# Patient Record
Sex: MALE | Race: WHITE | NOT HISPANIC OR LATINO | Employment: OTHER | RURAL
[De-identification: names, ages, dates, MRNs, and addresses within clinical notes are randomized per-mention and may not be internally consistent; named-entity substitution may affect disease eponyms.]

---

## 2017-08-15 ENCOUNTER — OFFICE VISIT (OUTPATIENT)
Dept: OTOLARYNGOLOGY | Facility: CLINIC | Age: 37
End: 2017-08-15

## 2017-08-15 VITALS — BODY MASS INDEX: 30.06 KG/M2 | HEIGHT: 70 IN | WEIGHT: 210 LBS | OXYGEN SATURATION: 97 %

## 2017-08-15 DIAGNOSIS — J34.2 DEVIATED NASAL SEPTUM: ICD-10-CM

## 2017-08-15 DIAGNOSIS — J32.4 CHRONIC PANSINUSITIS: Primary | ICD-10-CM

## 2017-08-15 PROCEDURE — 99243 OFF/OP CNSLTJ NEW/EST LOW 30: CPT | Performed by: OTOLARYNGOLOGY

## 2017-08-15 PROCEDURE — 31231 NASAL ENDOSCOPY DX: CPT | Performed by: OTOLARYNGOLOGY

## 2017-08-15 RX ORDER — FLUTICASONE PROPIONATE 50 MCG
2 SPRAY, SUSPENSION (ML) NASAL DAILY
Qty: 16 G | Refills: 11 | Status: SHIPPED | OUTPATIENT
Start: 2017-08-15 | End: 2021-08-10

## 2017-08-15 RX ORDER — OMEPRAZOLE 40 MG/1
CAPSULE, DELAYED RELEASE ORAL
COMMUNITY
Start: 2017-07-31 | End: 2019-06-13

## 2017-08-15 RX ORDER — PREDNISONE 20 MG/1
TABLET ORAL
Qty: 18 TABLET | Refills: 0 | Status: SHIPPED | OUTPATIENT
Start: 2017-08-15 | End: 2019-05-30

## 2017-08-15 RX ORDER — CEFUROXIME AXETIL 250 MG/1
250 TABLET ORAL 2 TIMES DAILY
Qty: 42 TABLET | Refills: 0 | Status: SHIPPED | OUTPATIENT
Start: 2017-08-15 | End: 2019-05-30

## 2017-08-15 RX ORDER — MONTELUKAST SODIUM 10 MG/1
TABLET ORAL
COMMUNITY
Start: 2017-06-06 | End: 2019-05-30

## 2017-08-16 NOTE — PROGRESS NOTES
Subjective   Jordin Parham is a 36 y.o. male.   This is a consultation from Dr. Hinkle  History of Present Illness     Patient reports he has had symptoms for greater than 6 months of nasal congestion headaches postnasal drainage sneezing and intermittent purulent rhinorrhea.  The headaches are severe when they occur and are usually either over the right or left eye.  Postnasal drainage is described as copious and awakens him from sleep and prevents him from getting good rest.  Nose is chronically congested on a daily basis.  Last antibiotics were proximally 2 months ago.  Currently is taking Singulair.  Has reportedly used Flonase in the past.  No previous nasal surgery or injury.    The following portions of the patient's history were reviewed and updated as appropriate: allergies, current medications, past family history, past medical history, past social history, past surgical history and problem list.      Jordin Parham reports that he has never smoked. He has never used smokeless tobacco.  Patient is not a tobacco user and has not been counseled for use of tobacco products    No family history on file.  Negative for bleeding disorder    Current Outpatient Prescriptions:   •  cefuroxime (CEFTIN) 250 MG tablet, Take 1 tablet by mouth 2 (Two) Times a Day., Disp: 42 tablet, Rfl: 0  •  fluticasone (FLONASE) 50 MCG/ACT nasal spray, 2 sprays into each nostril Daily for 30 days., Disp: 16 g, Rfl: 11  •  montelukast (SINGULAIR) 10 MG tablet, , Disp: , Rfl:   •  omeprazole (priLOSEC) 40 MG capsule, , Disp: , Rfl:   •  predniSONE (DELTASONE) 20 MG tablet, 1 po tid x 3 days 1 po bid x 3 days 1 po qd x 3 days then stop, Disp: 18 tablet, Rfl: 0  (Flonase, Ceftin, prednisone all prescribed as a result of this visit)    No past medical history on file.  Denies hypertension, coronary artery disease, diabetes    Review of Systems   HENT: Positive for trouble swallowing.    Gastrointestinal: Positive for abdominal pain and  nausea.        Heartburn   Musculoskeletal: Positive for back pain.   Psychiatric/Behavioral:        Mood changes; insomnia   All other systems reviewed and are negative.          Objective   Physical Exam    General: Well-developed well-nourished male in no acute distress.  Alert and oriented ×3. Head: Normocephalic. Face: Symmetrical strength and appearance. PERRL. EOMI. Voice:Strong. Speech:Fluent  Ears: External ears no deformity, canals show some nonobstructing wax, tympanic membranes intact clear and mobile bilaterally.  Nose: Nares show no discharge mass polyp or purulence.  Boggy mucosa is present.  No gross external deformity.  Septum: To the left  Oral cavity: Lips and gums without lesions.  Tongue and floor of mouth without lesions.  Parotid and submandibular ducts unobstructed.  No mucosal lesions on the buccal mucosa or vestibule of the mouth.  Pharynx: No erythema exudate mass or ulcer  Neck: No lymphadenopathy.  No thyromegaly.  Trachea and larynx midline.  No masses in the parotid or submandibular glands.    He'll endoscopy is performed: Lakhwinder-Synephrine and Xylocaine are instilled the nares.  0° scope is passed into each nostril.  Septal deformity to the left naris the left nasal passage.  Mucopurulent secretions are noted in the nares bilaterally including boggy mucosa in the middle meatus on both sides.    Patient reportedly had a CT scan in May 2017.  This is not available for review (the disc he brings today has a CT of the abdomen and pelvis not sinuses) but the report is reviewed and shows mucosal thickening in the frontal sinus on the right the ethmoid sinuses bilaterally and the maxillary sinuses bilaterally    Assessment/Plan   Jordin was seen today for sinus problem.    Diagnoses and all orders for this visit:    Chronic pansinusitis    Deviated nasal septum        Plan: We will attempt aggressive medical therapy with Flonase 2 sprays each nostril daily Ceftin 250 mg by mouth twice a day ×21  days and a 9 day prednisone taper.  Reevaluate in a month.  Call sooner for problems.    My thanks to Dr. Hinkle for this consultation

## 2019-05-28 PROBLEM — E66.9 CLASS 1 OBESITY WITH BODY MASS INDEX (BMI) OF 30.0 TO 30.9 IN ADULT: Status: ACTIVE | Noted: 2019-05-28
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
## 2019-05-30 ENCOUNTER — OFFICE VISIT (OUTPATIENT)
Dept: FAMILY MEDICINE CLINIC | Facility: CLINIC | Age: 39
End: 2019-05-30

## 2019-05-30 VITALS
BODY MASS INDEX: 31.42 KG/M2 | DIASTOLIC BLOOD PRESSURE: 78 MMHG | OXYGEN SATURATION: 98 % | TEMPERATURE: 98.6 F | HEART RATE: 66 BPM | WEIGHT: 224.4 LBS | SYSTOLIC BLOOD PRESSURE: 124 MMHG | HEIGHT: 71 IN

## 2019-05-30 DIAGNOSIS — F39 MOOD DISORDER (HCC): ICD-10-CM

## 2019-05-30 DIAGNOSIS — R68.82 LOW LIBIDO: ICD-10-CM

## 2019-05-30 DIAGNOSIS — F41.1 GAD (GENERALIZED ANXIETY DISORDER): ICD-10-CM

## 2019-05-30 DIAGNOSIS — K21.9 GASTROESOPHAGEAL REFLUX DISEASE, ESOPHAGITIS PRESENCE NOT SPECIFIED: ICD-10-CM

## 2019-05-30 DIAGNOSIS — Z00.00 ANNUAL PHYSICAL EXAM: ICD-10-CM

## 2019-05-30 DIAGNOSIS — Z00.00 GENERAL MEDICAL EXAMINATION: ICD-10-CM

## 2019-05-30 DIAGNOSIS — Z13.1 ENCOUNTER FOR SCREENING FOR DIABETES MELLITUS: ICD-10-CM

## 2019-05-30 DIAGNOSIS — R10.12 LEFT UPPER QUADRANT PAIN: ICD-10-CM

## 2019-05-30 DIAGNOSIS — Z13.29 ENCOUNTER FOR SCREENING FOR ENDOCRINE DISORDER: ICD-10-CM

## 2019-05-30 DIAGNOSIS — Z13.6 ENCOUNTER FOR SCREENING FOR CARDIOVASCULAR DISORDERS: ICD-10-CM

## 2019-05-30 DIAGNOSIS — E66.9 CLASS 1 OBESITY WITH BODY MASS INDEX (BMI) OF 30.0 TO 30.9 IN ADULT, UNSPECIFIED OBESITY TYPE, UNSPECIFIED WHETHER SERIOUS COMORBIDITY PRESENT: Primary | ICD-10-CM

## 2019-05-30 PROCEDURE — 99214 OFFICE O/P EST MOD 30 MIN: CPT | Performed by: FAMILY MEDICINE

## 2019-05-30 RX ORDER — SUCRALFATE 1 G/1
1 TABLET ORAL 4 TIMES DAILY
Qty: 30 TABLET | Refills: 2 | Status: SHIPPED | OUTPATIENT
Start: 2019-05-30 | End: 2019-07-19

## 2019-05-30 RX ORDER — QUETIAPINE FUMARATE 25 MG/1
25 TABLET, FILM COATED ORAL NIGHTLY
Qty: 30 TABLET | Refills: 3 | Status: SHIPPED | OUTPATIENT
Start: 2019-05-30 | End: 2019-07-19

## 2019-05-30 RX ORDER — PAROXETINE 10 MG/1
10 TABLET, FILM COATED ORAL EVERY MORNING
Qty: 30 TABLET | Refills: 3 | Status: SHIPPED | OUTPATIENT
Start: 2019-05-30 | End: 2019-07-19 | Stop reason: SDUPTHER

## 2019-05-30 NOTE — PATIENT INSTRUCTIONS
Bipolar 2 Disorder  Bipolar 2 disorder is a mental health disorder in which a person has episodes of emotional highs (sri) and lows (depression). Bipolar 2 is different from other bipolar disorders because the manic episodes are not as high and do not last as long. This is called hypomania. People with bipolar 2 disorder usually go back and forth between hypomanic and depressive episodes.  What are the causes?  The cause of this condition is not known.  What increases the risk?  The following factors may make you more likely to develop this condition:  · Having a family member with the disorder.  · An imbalance of certain chemicals in the brain (neurotransmitters).  · Stress, such as a death, illness, or financial problems.  · Certain conditions that affect the brain or spinal cord (neurologic conditions).  · Brain injury (trauma).  · Having another mental health disorder, such as:  ? Obsessive compulsive disorder.  ? Schizophrenia.    What are the signs or symptoms?  Symptoms of hypomania include:  · Very high self-esteem or self-confidence.  · Decreased need for sleep.  · Unusual talkativeness or feeling a need to keep talking. Speech may be very fast. It may seem like you cannot stop talking.  · Racing thoughts or constant talking, with quick shifts between topics that may or may not be related (flight of ideas).  · Decreased ability to focus or concentrate.  · Increased purposeful activity, such as work, studies, or social activity.  · Increased nonproductive activity. This could be pacing, squirming and fidgeting, or finger and toe tapping.  · Impulsive behavior and poor judgment. This may result in high-risk activities, such as having unprotected sex or spending a lot of money.    Symptoms of depression include:  · Feeling sad, hopeless, or helpless.  · Frequent or uncontrollable crying.  · Lack of feeling or caring about anything.  · Sleeping too much.  · Moving more slowly than usual.  · Not being able to  enjoy things you used to enjoy.  · Desire to be alone all the time.  · Feeling guilty or worthless.  · Lack of energy or motivation.  · Trouble concentrating or remembering.  · Trouble making decisions.  · Increased appetite.  · Thoughts of death or desire to harm yourself.    How is this diagnosed?  To diagnose bipolar 2 disorder, your health care provider may ask about your:  · Emotional episodes.  · Medical history.  · Alcohol and drug use. This includes prescription medicines. Certain medical conditions and substances can cause symptoms that seem like bipolar disorder (secondary bipolar disorder).    How is this treated?  Bipolar 2 disorder is a long-term (chronic) illness. It is best controlled with ongoing (continuous) treatment rather than being treated only when symptoms occur. Treatment may include:  · Psychotherapy. Some forms of talk therapy, such as cognitive-behavioral therapy (CBT), can provide support, education, and guidance.  · Coping strategies, such as journaling or relaxation exercises. Relaxation exercises include:  ? Yoga.  ? Meditation.  ? Deep breathing.  · Lifestyle changes, such as:  ? Limiting alcohol and drug use.  ? Exercising regularly.  ? Getting plenty of sleep.  ? Making healthy eating choices.  · Medicine. Medicine can be prescribed by a health care provider who specializes in treating mental disorders (psychiatrist).  ? Medicines called mood stabilizers are usually prescribed.  ? If symptoms occur even while taking a mood stabilizer, other medicines may be added.    A combination of medicine, talk therapy, and coping methods is the best way to treat this condition.  Follow these instructions at home:  Activity  · Return to your normal activities as told by your health care provider.  · Find activities that you enjoy, and make time to do them.  · Exercise regularly as told by your health care provider.  Lifestyle  · Limit alcohol intake to no more than 1 drink a day for nonpregnant  women and 2 drinks a day for men. One drink equals 12 oz of beer, 5 oz of wine, or 1½ oz of hard liquor.  · Follow a set schedule for eating and sleeping.  · Eat a balanced diet that includes fresh fruits and vegetables, whole grains, low-fat dairy, and lean meats.  · Get at least 7-8 hours of sleep each night.  General instructions  · Take over-the-counter and prescription medicines only as told by your health care provider.  · Think about joining a support group. Your health care provider may be able to recommend a support group.  · Talk with your family and loved ones about your treatment goals and how they can help.  · Keep all follow-up visits as told by your health care provider. This is important.  Where to find more information  For more information about bipolar 2 disorder, visit the following websites:  · National [place] on Mental Illness: www.liu.org  · [place] National [place] of Mental Health: www.nimh.nih.gov    Contact a health care provider if:  · Your symptoms get worse.  · You have side effects from your medicine, and they get worse.  · You have trouble sleeping.  · You have trouble doing daily activities.  · You feel unsafe in your surroundings.  · You are dealing with substance abuse.  Get help right away if:  · You have new symptoms.  · You have thoughts about harming yourself or others.  · You harm yourself.  Summary  · Bipolar 2 disorder is a mental health disorder in which a person has episodes of hypomania and depression.  · Bipolar 2 is best treated through a combination of medicines, talk therapy, and coping strategies.  · Talk with your family and loved ones about your treatment goals and how they can help.  This information is not intended to replace advice given to you by your health care provider. Make sure you discuss any questions you have with your health care provider.  Document Released: 01/23/2018 Document Revised: 01/23/2018 Document Reviewed: 01/23/2018  [employer]  Patient Education © 2019 [employer]    Bipolar 1 Disorder  Bipolar 1 disorder is a mental health disorder in which a person has episodes of emotional highs (sri), and may also have episodes of emotional lows (depression) in addition to highs. Bipolar 1 disorder is different from other bipolar disorders because it involves extreme manic episodes. These episodes last at least one week or involve symptoms that are so severe that hospitalization is needed to keep the person safe.  What increases the risk?  The cause of this condition is not known. However, certain factors make you more likely to have bipolar disorder, such as:  · Having a family member with the disorder.  · An imbalance of certain chemicals in the brain (neurotransmitters).  · Stress, such as illness, financial problems, or a death.  · Certain conditions that affect the brain or spinal cord (neurologic conditions).  · Brain injury (trauma).  · Having another mental health disorder, such as:  ? Obsessive compulsive disorder.  ? Schizophrenia.    What are the signs or symptoms?  Symptoms of sri include:  · Very high self-esteem or self-confidence.  · Decreased need for sleep.  · Unusual talkativeness or feeling a need to keep talking. Speech may be very fast. It may seem like you cannot stop talking.  · Racing thoughts or constant talking, with quick shifts between topics that may or may not be related (flight of ideas).  · Decreased ability to focus or concentrate.  · Increased purposeful activity, such as work, studies, or social activity.  · Increased nonproductive activity. This could be pacing, squirming and fidgeting, or finger and toe tapping.  · Impulsive behavior and poor judgment. This may result in high-risk activities, such as having unprotected sex or spending a lot of money.    Symptoms of depression include:  · Feeling sad, hopeless, or helpless.  · Frequent or uncontrollable crying.  · Lack of feeling or caring about  anything.  · Sleeping too much.  · Moving more slowly than usual.  · Not being able to enjoy things you used to enjoy.  · Wanting to be alone all the time.  · Feeling guilty or worthless.  · Lack of energy or motivation.  · Trouble concentrating or remembering.  · Trouble making decisions.  · Increased appetite.  · Thoughts of death, or the desire to harm yourself.    Sometimes, you may have a mixed mood. This means having symptoms of depression and sri. Stress can make symptoms worse.  How is this diagnosed?  To diagnose bipolar disorder, your health care provider may ask about your:  · Emotional episodes.  · Medical history.  · Alcohol and drug use. This includes prescription medicines. Certain medical conditions and substances can cause symptoms that seem like bipolar disorder (secondary bipolar disorder).    How is this treated?  Bipolar disorder is a long-term (chronic) illness. It is best controlled with ongoing (continuous) treatment rather than treatment only when symptoms occur. Treatment may include:  · Medicine. Medicine can be prescribed by a provider who specializes in treating mental disorders (psychiatrist).  ? Medicines called mood stabilizers are usually prescribed.  ? If symptoms occur even while taking a mood stabilizer, other medicines may be added.  · Psychotherapy. Some forms of talk therapy, such as cognitive-behavioral therapy (CBT), can provide support, education, and guidance.  · Coping methods, such as journaling or relaxation exercises. These may include:  ? Yoga.  ? Meditation.  ? Deep breathing.  · Lifestyle changes, such as:  ? Limiting alcohol and drug use.  ? Exercising regularly.  ? Getting plenty of sleep.  ? Making healthy eating choices.    A combination of medicine, talk therapy, and coping methods is best. A procedure in which electricity is applied to the brain through the scalp (electroconvulsive therapy) may be used in cases of severe sri when medicine and psychotherapy  work too slowly or do not work.  Follow these instructions at home:  Activity    · Return to your normal activities as told by your health care provider.  · Find activities that you enjoy, and make time to do them.  · Exercise regularly as told by your health care provider.  Lifestyle  · Limit alcohol intake to no more than 1 drink a day for nonpregnant women and 2 drinks a day for men. One drink equals 12 oz of beer, 5 oz of wine, or 1½ oz of hard liquor.  · Follow a set schedule for eating and sleeping.  · Eat a balanced diet that includes fresh fruits and vegetables, whole grains, low-fat dairy, and lean meat.  · Get 7-8 hours of sleep each night.  General instructions  · Take over-the-counter and prescription medicines only as told by your health care provider.  · Think about joining a support group. Your health care provider may be able to recommend a support group.  · Talk with your family and loved ones about your treatment goals and how they can help.  · Keep all follow-up visits as told by your health care provider. This is important.  Where to find more information  For more information about bipolar disorder, visit the following websites:  · National [place] on Mental Illness: www.liu.org  · [place] National [place] of Mental Health: www.nimh.nih.gov    Contact a health care provider if:  · Your symptoms get worse.  · You have side effects from your medicine, and they get worse.  · You have trouble sleeping.  · You have trouble doing daily activities.  · You feel unsafe in your surroundings.  · You are dealing with substance abuse.  Get help right away if:  · You have new symptoms.  · You have thoughts about harming yourself.  · You self-harm.  This information is not intended to replace advice given to you by your health care provider. Make sure you discuss any questions you have with your health care provider.  Document Released: 03/26/2002 Document Revised: 08/13/2017 Document Reviewed:  08/17/2017  [employer] Patient Education © 2019 [employer]  Paroxetine tablets  What is this medicine?  PAROXETINE (pa ISADORA e teen) is used to treat depression. It may also be used to treat anxiety disorders, obsessive compulsive disorder, panic attacks, post traumatic stress, and premenstrual dysphoric disorder (PMDD).  This medicine may be used for other purposes; ask your health care provider or pharmacist if you have questions.  COMMON BRAND NAME(S): Paxil, Pexeva  What should I tell my health care provider before I take this medicine?  They need to know if you have any of these conditions:  -bipolar disorder or a family history of bipolar disorder  -bleeding disorders  -glaucoma  -heart disease  -kidney disease  -liver disease  -low levels of sodium in the blood  -seizures  -suicidal thoughts, plans, or attempt; a previous suicide attempt by you or a family member  -take MAOIs like Carbex, Eldepryl, Marplan, Nardil, and Parnate  -take medicines that treat or prevent blood clots  -thyroid disease  -an unusual or allergic reaction to paroxetine, other medicines, foods, dyes, or preservatives  -pregnant or trying to get pregnant  -breast-feeding  How should I use this medicine?  Take this medicine by mouth with a glass of water. Follow the directions on the prescription label. You can take it with or without food. Take your medicine at regular intervals. Do not take your medicine more often than directed. Do not stop taking this medicine suddenly except upon the advice of your doctor. Stopping this medicine too quickly may cause serious side effects or your condition may worsen.  A special MedGuide will be given to you by the pharmacist with each prescription and refill. Be sure to read this information carefully each time.  Talk to your pediatrician regarding the use of this medicine in children. Special care may be needed.  Overdosage: If you think you have taken too much of this medicine contact a  poison control center or emergency room at once.  NOTE: This medicine is only for you. Do not share this medicine with others.  What if I miss a dose?  If you miss a dose, take it as soon as you can. If it is almost time for your next dose, take only that dose. Do not take double or extra doses.  What may interact with this medicine?  Do not take this medicine with any of the following medications:  -linezolid  -MAOIs like Carbex, Eldepryl, Marplan, Nardil, and Parnate  -methylene blue (injected into a vein)  -pimozide  -thioridazine  This medicine may also interact with the following medications:  -alcohol  -amphetamines  -aspirin and aspirin-like medicines  -atomoxetine  -certain medicines for depression, anxiety, or psychotic disturbances  -certain medicines for irregular heart beat like propafenone, flecainide, encainide, and quinidine  -certain medicines for migraine headache like almotriptan, eletriptan, frovatriptan, naratriptan, rizatriptan, sumatriptan, zolmitriptan  -cimetidine  -digoxin  -diuretics  -fentanyl  -fosamprenavir  -furazolidone  -isoniazid  -lithium  -medicines that treat or prevent blood clots like warfarin, enoxaparin, and dalteparin  -medicines for sleep  -NSAIDs, medicines for pain and inflammation, like ibuprofen or naproxen  -phenobarbital  -phenytoin  -procarbazine  -rasagiline  -ritonavir  -supplements like Brad's wort, kava kava, valerian  -tamoxifen  -tramadol  -tryptophan  This list may not describe all possible interactions. Give your health care provider a list of all the medicines, herbs, non-prescription drugs, or dietary supplements you use. Also tell them if you smoke, drink alcohol, or use illegal drugs. Some items may interact with your medicine.  What should I watch for while using this medicine?  Tell your doctor if your symptoms do not get better or if they get worse. Visit your doctor or health care professional for regular checks on your progress. Because it may  take several weeks to see the full effects of this medicine, it is important to continue your treatment as prescribed by your doctor.  Patients and their families should watch out for new or worsening thoughts of suicide or depression. Also watch out for sudden changes in feelings such as feeling anxious, agitated, panicky, irritable, hostile, aggressive, impulsive, severely restless, overly excited and hyperactive, or not being able to sleep. If this happens, especially at the beginning of treatment or after a change in dose, call your health care professional.  You may get drowsy or dizzy. Do not drive, use machinery, or do anything that needs mental alertness until you know how this medicine affects you. Do not stand or sit up quickly, especially if you are an older patient. This reduces the risk of dizzy or fainting spells. Alcohol may interfere with the effect of this medicine. Avoid alcoholic drinks.  Your mouth may get dry. Chewing sugarless gum or sucking hard candy, and drinking plenty of water will help. Contact your doctor if the problem does not go away or is severe.  What side effects may I notice from receiving this medicine?  Side effects that you should report to your doctor or health care professional as soon as possible:  -allergic reactions like skin rash, itching or hives, swelling of the face, lips, or tongue  -anxious  -black, tarry stools  -changes in vision  -confusion  -elevated mood, decreased need for sleep, racing thoughts, impulsive behavior  -eye pain  -fast, irregular heartbeat  -feeling faint or lightheaded, falls  -feeling agitated, angry, or irritable  -hallucination, loss of contact with reality  -loss of balance or coordination  -loss of memory  -painful or prolonged erections  -restlessness, pacing, inability to keep still  -seizures  -stiff muscles  -suicidal thoughts or other mood changes  -trouble sleeping  -unusual bleeding or bruising  -unusually weak or  tired  -vomiting  Side effects that usually do not require medical attention (report to your doctor or health care professional if they continue or are bothersome):  -change in appetite or weight  -change in sex drive or performance  -diarrhea  -dizziness  -dry mouth  -increased sweating  -indigestion, nausea  -tired  -tremors  This list may not describe all possible side effects. Call your doctor for medical advice about side effects. You may report side effects to FDA at 7-152-FDA-7235.  Where should I keep my medicine?  Keep out of the reach of children.  Store at room temperature between 15 and 30 degrees C (59 and 86 degrees F). Keep container tightly closed. Throw away any unused medicine after the expiration date.  NOTE: This sheet is a summary. It may not cover all possible information. If you have questions about this medicine, talk to your doctor, pharmacist, or health care provider.  © 2019 [employer] (2017-05-20 15:50:32)  Quetiapine tablets  What is this medicine?  QUETIAPINE (kwserenity ROCKWELL a christina) is an antipsychotic. It is used to treat schizophrenia and bipolar disorder, also known as manic-depression.  This medicine may be used for other purposes; ask your health care provider or pharmacist if you have questions.  COMMON BRAND NAME(S): Seroquel  What should I tell my health care provider before I take this medicine?  They need to know if you have any of these conditions:  -blockage in your bowel  -cataracts  -constipation  -dehydration  -diabetes  -difficulty swallowing  -glaucoma  -heart disease  -history of breast cancer  -kidney disease  -liver disease  -low blood counts, like low white cell, platelet, or red cell counts  -low blood pressure or dizziness when standing up  -Parkinson's disease  -previous heart attack  -prostate disease  -seizures  -stomach or intestine problems  -suicidal thoughts, plans or attempt; a previous suicide attempt by you or a family member  -thyroid  disease  -trouble passing urine  -an unusual or allergic reaction to quetiapine, other medicines, foods, dyes, or preservatives  -pregnant or trying to get pregnant  -breast-feeding  How should I use this medicine?  Take this medicine by mouth. Swallow it with a drink of water. Follow the directions on the prescription label. If it upsets your stomach you can take it with food. Take your medicine at regular intervals. Do not take it more often than directed. Do not stop taking except on the advice of your doctor or health care professional.  A special MedGuide will be given to you by the pharmacist with each prescription and refill. Be sure to read this information carefully each time.  Talk to your pediatrician regarding the use of this medicine in children. While this drug may be prescribed for children as young as 10 years for selected conditions, precautions do apply.  Patients over age 65 years may have a stronger reaction to this medicine and need smaller doses.  Overdosage: If you think you have taken too much of this medicine contact a poison control center or emergency room at once.  NOTE: This medicine is only for you. Do not share this medicine with others.  What if I miss a dose?  If you miss a dose, take it as soon as you can. If it is almost time for your next dose, take only that dose. Do not take double or extra doses.  What may interact with this medicine?  Do not take this medicine with any of the following medications:  -cisapride  -dofetilide  -dronedarone  -fluconazole  -metoclopramide  -pimozide  -posaconazole  -thioridazine  This medicine may also interact with the following medications:  -alcohol  -antihistamines for allergy cough and cold  -antiviral medicines for HIV or AIDS  -atropine  -certain medicines for bladder problems like oxybutynin, tolterodine  -certain medicines for blood pressure  -certain medicines for depression, anxiety, or psychotic disturbances  -certain medicines for  diabetes  -certain medicines for stomach problems like dicyclomine, hyoscyamine  -certain medicines for travel sickness like scopolamine  -certain medicines for Parkinson's disease  -certain medicines for seizures like carbamazepine, phenobarbital, phenytoin  -cimetidine  -erythromycin  -ipratropium  -other medicines that prolong the QT interval (cause an abnormal heart rhythm)  -rifampin  -steroid medicines like prednisone or cortisone  This list may not describe all possible interactions. Give your health care provider a list of all the medicines, herbs, non-prescription drugs, or dietary supplements you use. Also tell them if you smoke, drink alcohol, or use illegal drugs. Some items may interact with your medicine.  What should I watch for while using this medicine?  Visit your doctor or health care professional for regular checks on your progress. It may be several weeks before you see the full effects of this medicine.  Your health care provider may suggest that you have your eyes examined prior to starting this medicine, and every 6 months thereafter.  If you have been taking this medicine regularly for some time, do not suddenly stop taking it. You must gradually reduce the dose or your symptoms may get worse. Ask your doctor or health care professional for advice.  Patients and their families should watch out for worsening depression or thoughts of suicide. Also watch out for sudden or severe changes in feelings such as feeling anxious, agitated, panicky, irritable, hostile, aggressive, impulsive, severely restless, overly excited and hyperactive, or not being able to sleep. If this happens, especially at the beginning of antidepressant treatment or after a change in dose, call your health care professional.  You may get dizzy or drowsy. Do not drive, use machinery, or do anything that needs mental alertness until you know how this medicine affects you. Do not stand or sit up quickly, especially if you are  an older patient. This reduces the risk of dizzy or fainting spells. Alcohol can increase dizziness and drowsiness. Avoid alcoholic drinks.  Do not treat yourself for colds, diarrhea or allergies. Ask your doctor or health care professional for advice, some ingredients may increase possible side effects.  This medicine can reduce the response of your body to heat or cold. Dress warm in cold weather and stay hydrated in hot weather. If possible, avoid extreme temperatures like saunas, hot tubs, very hot or cold showers, or activities that can cause dehydration such as vigorous exercise.  What side effects may I notice from receiving this medicine?  Side effects that you should report to your doctor or health care professional as soon as possible:  -allergic reactions like skin rash, itching or hives, swelling of the face, lips, or tongue  -changes in vision  -difficulty swallowing  -elevated mood, decreased need for sleep, racing thoughts, impulsive behavior  -eye pain  -redness, blistering, peeling, or loosening of the skin, including inside the mouth  -restlessness, pacing, inability to keep still  -seizures  -signs and symptoms of a dangerous change in heartbeat or heart rhythm like chest pain; dizziness; fast, irregular heartbeat; palpitations; feeling faint or lightheaded; falls; breathing problems  -signs and symptoms of high blood sugar such as dizziness; dry mouth; dry skin; fruity breath; nausea; stomach pain; increased hunger; increased thirst; increased urination  -signs and symptoms of hypothyroidism like fatigue; increased sensitivity to cold; weight gain; hoarseness; thinning hair  -signs and symptoms of infection like fever; chills; cough; sore throat; pain or trouble passing urine  -signs and symptoms of low blood pressure like dizziness; feeling faint or lightheaded; falls; unusually weak or tired  -signs and symptoms of neuroleptic malignant syndrome (NMS) like confusion; fast, irregular heartbeat;  high fever; increased sweating; stiff muscles  -signs and symptoms of a stroke like changes in vision; confusion; trouble speaking or understanding; severe headaches; sudden numbness or weakness of the face, arm or leg; trouble walking; dizziness; loss of balance or coordination  -signs and symptoms of tardive dyskinesia, like uncontrollable head, mouth, neck, arm, or leg movements  -suicidal thoughts, mood changes  Side effects that usually do not require medical attention (report to your doctor or health care professional if they continue or are bothersome):  -change in sex drive or performance  -constipation  -drowsiness  -dry mouth  -upset stomach  -weight gain  This list may not describe all possible side effects. Call your doctor for medical advice about side effects. You may report side effects to FDA at 5-840-FDA-5065.  Where should I keep my medicine?  Keep out of the reach of children.  Store at room temperature between 15 and 30 degrees C (59 and 86 degrees F). Throw away any unused medicine after the expiration date.  NOTE: This sheet is a summary. It may not cover all possible information. If you have questions about this medicine, talk to your doctor, pharmacist, or health care provider.  © 2019 [employer] (2018-12-07 14:16:00)    Bipolar 1 Disorder  Bipolar 1 disorder is a mental health disorder in which a person has episodes of emotional highs (sri), and may also have episodes of emotional lows (depression) in addition to highs. Bipolar 1 disorder is different from other bipolar disorders because it involves extreme manic episodes. These episodes last at least one week or involve symptoms that are so severe that hospitalization is needed to keep the person safe.  What increases the risk?  The cause of this condition is not known. However, certain factors make you more likely to have bipolar disorder, such as:  · Having a family member with the disorder.  · An imbalance of certain  chemicals in the brain (neurotransmitters).  · Stress, such as illness, financial problems, or a death.  · Certain conditions that affect the brain or spinal cord (neurologic conditions).  · Brain injury (trauma).  · Having another mental health disorder, such as:  ? Obsessive compulsive disorder.  ? Schizophrenia.    What are the signs or symptoms?  Symptoms of sri include:  · Very high self-esteem or self-confidence.  · Decreased need for sleep.  · Unusual talkativeness or feeling a need to keep talking. Speech may be very fast. It may seem like you cannot stop talking.  · Racing thoughts or constant talking, with quick shifts between topics that may or may not be related (flight of ideas).  · Decreased ability to focus or concentrate.  · Increased purposeful activity, such as work, studies, or social activity.  · Increased nonproductive activity. This could be pacing, squirming and fidgeting, or finger and toe tapping.  · Impulsive behavior and poor judgment. This may result in high-risk activities, such as having unprotected sex or spending a lot of money.    Symptoms of depression include:  · Feeling sad, hopeless, or helpless.  · Frequent or uncontrollable crying.  · Lack of feeling or caring about anything.  · Sleeping too much.  · Moving more slowly than usual.  · Not being able to enjoy things you used to enjoy.  · Wanting to be alone all the time.  · Feeling guilty or worthless.  · Lack of energy or motivation.  · Trouble concentrating or remembering.  · Trouble making decisions.  · Increased appetite.  · Thoughts of death, or the desire to harm yourself.    Sometimes, you may have a mixed mood. This means having symptoms of depression and sri. Stress can make symptoms worse.  How is this diagnosed?  To diagnose bipolar disorder, your health care provider may ask about your:  · Emotional episodes.  · Medical history.  · Alcohol and drug use. This includes prescription medicines. Certain medical  conditions and substances can cause symptoms that seem like bipolar disorder (secondary bipolar disorder).    How is this treated?  Bipolar disorder is a long-term (chronic) illness. It is best controlled with ongoing (continuous) treatment rather than treatment only when symptoms occur. Treatment may include:  · Medicine. Medicine can be prescribed by a provider who specializes in treating mental disorders (psychiatrist).  ? Medicines called mood stabilizers are usually prescribed.  ? If symptoms occur even while taking a mood stabilizer, other medicines may be added.  · Psychotherapy. Some forms of talk therapy, such as cognitive-behavioral therapy (CBT), can provide support, education, and guidance.  · Coping methods, such as journaling or relaxation exercises. These may include:  ? Yoga.  ? Meditation.  ? Deep breathing.  · Lifestyle changes, such as:  ? Limiting alcohol and drug use.  ? Exercising regularly.  ? Getting plenty of sleep.  ? Making healthy eating choices.    A combination of medicine, talk therapy, and coping methods is best. A procedure in which electricity is applied to the brain through the scalp (electroconvulsive therapy) may be used in cases of severe sri when medicine and psychotherapy work too slowly or do not work.  Follow these instructions at home:  Activity    · Return to your normal activities as told by your health care provider.  · Find activities that you enjoy, and make time to do them.  · Exercise regularly as told by your health care provider.  Lifestyle  · Limit alcohol intake to no more than 1 drink a day for nonpregnant women and 2 drinks a day for men. One drink equals 12 oz of beer, 5 oz of wine, or 1½ oz of hard liquor.  · Follow a set schedule for eating and sleeping.  · Eat a balanced diet that includes fresh fruits and vegetables, whole grains, low-fat dairy, and lean meat.  · Get 7-8 hours of sleep each night.  General instructions  · Take over-the-counter and  prescription medicines only as told by your health care provider.  · Think about joining a support group. Your health care provider may be able to recommend a support group.  · Talk with your family and loved ones about your treatment goals and how they can help.  · Keep all follow-up visits as told by your health care provider. This is important.  Where to find more information  For more information about bipolar disorder, visit the following websites:  · National [place] on Mental Illness: www.liu.org  · [place] of [place] Health: www.nimh.nih.gov    Contact a health care provider if:  · Your symptoms get worse.  · You have side effects from your medicine, and they get worse.  · You have trouble sleeping.  · You have trouble doing daily activities.  · You feel unsafe in your surroundings.  · You are dealing with substance abuse.  Get help right away if:  · You have new symptoms.  · You have thoughts about harming yourself.  · You self-harm.  This information is not intended to replace advice given to you by your health care provider. Make sure you discuss any questions you have with your health care provider.  Document Released: 03/26/2002 Document Revised: 08/13/2017 Document Reviewed: 08/17/2017  [employer] Interactive Patient Education © 2019 [employer]  Panic Attack  A panic attack is a sudden episode of severe anxiety, fear, or discomfort that causes physical and emotional symptoms. The attack may be in response to something frightening, or it may occur for no known reason.  Symptoms of a panic attack can be similar to symptoms of a heart attack or stroke. It is important to see your health care provider when you have a panic attack so that these conditions can be ruled out.  A panic attack is a symptom of another condition. Most panic attacks go away with treatment of the underlying problem. If you have panic attacks often, you may have a condition called panic disorder.  What are the  causes?  A panic attack may be caused by:  · An extreme, life-threatening situation, such as a war or natural disaster.  · An anxiety disorder, such as post-traumatic stress disorder.  · Depression.  · Certain medical conditions, including heart problems, neurological conditions, and infections.  · Certain over-the-counter and prescription medicines.  · Illegal drugs that increase heart rate and blood pressure, such as methamphetamine.  · Alcohol.  · Supplements that increase anxiety.  · Panic disorder.    What increases the risk?  You are more likely to develop this condition if:  · You have an anxiety disorder.  · You have another mental health condition.  · You take certain medicines.  · You use alcohol, illegal drugs, or other substances.  · You are under extreme stress.  · A life event is causing increased feelings of anxiety and depression.    What are the signs or symptoms?  A panic attack starts suddenly, usually lasts about 20 minutes, and occurs with one or more of the following:  · A pounding heart.  · A feeling that your heart is beating irregularly or faster than normal (palpitations).  · Sweating.  · Trembling or shaking.  · Shortness of breath or feeling smothered.  · Feeling choked.  · Chest pain or discomfort.  · Nausea or a strange feeling in your stomach.  · Dizziness, feeling lightheaded, or feeling like you might faint.  · Chills or hot flashes.  · Numbness or tingling in your lips, hands, or feet.  · Feeling confused, or feeling that you are not yourself.  · Fear of losing control or being emotionally unstable.  · Fear of dying.    How is this diagnosed?  A panic attack is diagnosed with an assessment by your health care provider. During the assessment your health care provider will ask questions about:  · Your history of anxiety, depression, and panic attacks.  · Your medical history.  · Whether you drink alcohol, use illegal drugs, take supplements, or take medicines. Be honest about your  substance use.    Your health care provider may also:  · Order blood tests or other kinds of tests to rule out serious medical conditions.  · Refer you to a mental health professional for further evaluation.    How is this treated?  Treatment depends on the cause of the panic attack:  · If the cause is a medical problem, your health care provider will either treat that problem or refer you to a specialist.  · If the cause is emotional, you may be given anti-anxiety medicines or referred to a counselor. These medicines may reduce how often attacks happen, reduce how severe the attacks are, and lower anxiety.  · If the cause is a medicine, your health care provider may tell you to stop the medicine, change your dose, or take a different medicine.  · If the cause is a drug, treatment may involve letting the drug wear off and taking medicine to help the drug leave your body or to counteract its effects. Attacks caused by drug abuse may continue even if you stop using the drug.    Follow these instructions at home:  · Take over-the-counter and prescription medicines only as told by your health care provider.  · If you feel anxious, limit your caffeine intake.  · Take good care of your physical and mental health by:  ? Eating a balanced diet that includes plenty of fresh fruits and vegetables, whole grains, lean meats, and low-fat dairy.  ? Getting plenty of rest. Try to get 7-8 hours of uninterrupted sleep each night.  ? Exercising regularly. Try to get 30 minutes of physical activity at least 5 days a week.  ? Not smoking. Talk to your health care provider if you need help quitting.  ? Limiting alcohol intake to no more than 1 drink a day for nonpregnant women and 2 drinks a day for men. One drink equals 12 oz of beer, 5 oz of wine, or 1½ oz of hard liquor.  · Keep all follow-up visits as told by your health care provider. This is important. Panic attacks may have underlying physical or emotional problems that take time  to accurately diagnose.  Contact a health care provider if:  · Your symptoms do not improve, or they get worse.  · You are not able to take your medicine as prescribed because of side effects.  Get help right away if:  · You have serious thoughts about hurting yourself or others.  · You have symptoms of a panic attack. Do not drive yourself to the hospital. Have someone else drive you or call an ambulance.  If you ever feel like you may hurt yourself or others, or you have thoughts about taking your own life, get help right away. You can go to your nearest emergency department or call:  · Your local emergency services (911 in the [place]  · A suicide crisis helpline, such as the National Suicide Prevention Lifeline at 1-898.556.3100. This is open 24 hours a day.    Summary  · A panic attack is a sign of a serious health or mental health condition. Get help right away. Do not drive yourself to the hospital. Have someone else drive you or call an ambulance.  · Always see a health care provider to have the reasons for the panic attack correctly diagnosed.  · If your panic attack was caused by a physical problem, follow your health care provider's suggestions for medicine, referral to a specialist, and lifestyle changes.  · If your panic attack was caused by an emotional problem, follow through with counseling from a qualified mental health specialist.  · If you feel like you may hurt yourself or others, call 911 and get help right away.  This information is not intended to replace advice given to you by your health care provider. Make sure you discuss any questions you have with your health care provider.  Document Released: 12/18/2006 Document Revised: 01/26/2018 Document Reviewed: 01/26/2018  [employer] Interactive Patient Education © 2019 [employer]    Gastroesophageal Reflux Disease, Adult  Normally, food travels down the esophagus and stays in the stomach to be digested. However, when a person has gastroesophageal  reflux disease (GERD), food and stomach acid move back up into the esophagus. When this happens, the esophagus becomes sore and inflamed. Over time, GERD can create small holes (ulcers) in the lining of the esophagus.  What are the causes?  This condition is caused by a problem with the muscle between the esophagus and the stomach (lower esophageal sphincter, or LES). Normally, the LES muscle closes after food passes through the esophagus to the stomach. When the LES is weakened or abnormal, it does not close properly, and that allows food and stomach acid to go back up into the esophagus. The LES can be weakened by certain dietary substances, medicines, and medical conditions, including:  · Tobacco use.  · Pregnancy.  · Having a hiatal hernia.  · Heavy alcohol use.  · Certain foods and beverages, such as coffee, chocolate, onions, and peppermint.    What increases the risk?  This condition is more likely to develop in:  · People who have an increased body weight.  · People who have connective tissue disorders.  · People who use NSAID medicines.    What are the signs or symptoms?  Symptoms of this condition include:  · Heartburn.  · Difficult or painful swallowing.  · The feeling of having a lump in the throat.  · A bitter taste in the mouth.  · Bad breath.  · Having a large amount of saliva.  · Having an upset or bloated stomach.  · Belching.  · Chest pain.  · Shortness of breath or wheezing.  · Ongoing (chronic) cough or a night-time cough.  · Wearing away of tooth enamel.  · Weight loss.    Different conditions can cause chest pain. Make sure to see your health care provider if you experience chest pain.  How is this diagnosed?  Your health care provider will take a medical history and perform a physical exam. To determine if you have mild or severe GERD, your health care provider may also monitor how you respond to treatment. You may also have other tests, including:  · An endoscopy to examine your stomach and  esophagus with a small camera.  · A test that measures the acidity level in your esophagus.  · A test that measures how much pressure is on your esophagus.  · A barium swallow or modified barium swallow to show the shape, size, and functioning of your esophagus.    How is this treated?  The goal of treatment is to help relieve your symptoms and to prevent complications. Treatment for this condition may vary depending on how severe your symptoms are. Your health care provider may recommend:  · Changes to your diet.  · Medicine.  · Surgery.    Follow these instructions at home:  Diet  · Follow a diet as recommended by your health care provider. This may involve avoiding foods and drinks such as:  ? Coffee and tea (with or without caffeine).  ? Drinks that contain alcohol.  ? Energy drinks and sports drinks.  ? Carbonated drinks or sodas.  ? Chocolate and cocoa.  ? Peppermint and mint flavorings.  ? Garlic and onions.  ? Horseradish.  ? Spicy and acidic foods, including peppers, chili powder, sales powder, vinegar, hot sauces, and barbecue sauce.  ? Citrus fruit juices and citrus fruits, such as oranges, juancarlos, and limes.  ? Tomato-based foods, such as red sauce, chili, salsa, and pizza with red sauce.  ? Fried and fatty foods, such as donuts, french fries, potato chips, and high-fat dressings.  ? High-fat meats, such as hot dogs and fatty cuts of red and white meats, such as rib eye steak, sausage, ham, and santiago.  ? High-fat dairy items, such as whole milk, butter, and cream cheese.  · Eat small, frequent meals instead of large meals.  · Avoid drinking large amounts of liquid with your meals.  · Avoid eating meals during the 2-3 hours before bedtime.  · Avoid lying down right after you eat.  · Do not exercise right after you eat.  General instructions  · Pay attention to any changes in your symptoms.  · Take over-the-counter and prescription medicines only as told by your health care provider. Do not take aspirin,  ibuprofen, or other NSAIDs unless your health care provider told you to do so.  · Do not use any tobacco products, including cigarettes, chewing tobacco, and e-cigarettes. If you need help quitting, ask your health care provider.  · Wear loose-fitting clothing. Do not wear anything tight around your waist that causes pressure on your abdomen.  · Raise (elevate) the head of your bed 6 inches (15cm).  · Try to reduce your stress, such as with yoga or meditation. If you need help reducing stress, ask your health care provider.  · If you are overweight, reduce your weight to an amount that is healthy for you. Ask your health care provider for guidance about a safe weight loss goal.  · Keep all follow-up visits as told by your health care provider. This is important.  Contact a health care provider if:  · You have new symptoms.  · You have unexplained weight loss.  · You have difficulty swallowing, or it hurts to swallow.  · You have wheezing or a persistent cough.  · Your symptoms do not improve with treatment.  · You have a hoarse voice.  Get help right away if:  · You have pain in your arms, neck, jaw, teeth, or back.  · You feel sweaty, dizzy, or light-headed.  · You have chest pain or shortness of breath.  · You vomit and your vomit looks like blood or coffee grounds.  · You faint.  · Your stool is bloody or black.  · You cannot swallow, drink, or eat.  This information is not intended to replace advice given to you by your health care provider. Make sure you discuss any questions you have with your health care provider.  Document Released: 09/27/2006 Document Revised: 05/17/2017 Document Reviewed: 04/13/2016  [employer] Interactive Patient Education © 2018 [employer]    Food Choices for Gastroesophageal Reflux Disease, Adult  When you have gastroesophageal reflux disease (GERD), the foods you eat and your eating habits are very important. Choosing the right foods can help ease the discomfort of GERD. Consider  working with a diet and nutrition specialist (dietitian) to help you make healthy food choices.  What general guidelines should I follow?  Eating plan  · Choose healthy foods low in fat, such as fruits, vegetables, whole grains, low-fat dairy products, and lean meat, fish, and poultry.  · Eat frequent, small meals instead of three large meals each day. Eat your meals slowly, in a relaxed setting. Avoid bending over or lying down until 2-3 hours after eating.  · Limit high-fat foods such as fatty meats or fried foods.  · Limit your intake of oils, butter, and shortening to less than 8 teaspoons each day.  · Avoid the following:  ? Foods that cause symptoms. These may be different for different people. Keep a food diary to keep track of foods that cause symptoms.  ? Alcohol.  ? Drinking large amounts of liquid with meals.  ? Eating meals during the 2-3 hours before bed.  · Cook foods using methods other than frying. This may include baking, grilling, or broiling.  Lifestyle    · Maintain a healthy weight. Ask your health care provider what weight is healthy for you. If you need to lose weight, work with your health care provider to do so safely.  · Exercise for at least 30 minutes on 5 or more days each week, or as told by your health care provider.  · Avoid wearing clothes that fit tightly around your waist and chest.  · Do not use any products that contain nicotine or tobacco, such as cigarettes and e-cigarettes. If you need help quitting, ask your health care provider.  · Sleep with the head of your bed raised. Use a wedge under the mattress or blocks under the bed frame to raise the head of the bed.  What foods are not recommended?  The items listed may not be a complete list. Talk with your dietitian about what dietary choices are best for you.  Grains  Pastries or quick breads with added fat. French toast.  Vegetables  Deep fried vegetables. French fries. Any vegetables prepared with added fat. Any vegetables  that cause symptoms. For some people this may include tomatoes and tomato products, chili peppers, onions and garlic, and horseradish.  Fruits  Any fruits prepared with added fat. Any fruits that cause symptoms. For some people this may include citrus fruits, such as oranges, grapefruit, pineapple, and juancarlos.  Meats and other protein foods  High-fat meats, such as fatty beef or pork, hot dogs, ribs, ham, sausage, salami and santiago. Fried meat or protein, including fried fish and fried chicken. Nuts and nut butters.  Dairy  Whole milk and chocolate milk. Sour cream. Cream. Ice cream. Cream cheese. Milk shakes.  Beverages  Coffee and tea, with or without caffeine. Carbonated beverages. Sodas. Energy drinks. Fruit juice made with acidic fruits (such as orange or grapefruit). Tomato juice. Alcoholic drinks.  Fats and oils  Butter. Margarine. Shortening. Ghee.  Sweets and desserts  Chocolate and cocoa. Donuts.  Seasoning and other foods  Pepper. Peppermint and spearmint. Any condiments, herbs, or seasonings that cause symptoms. For some people, this may include sales, hot sauce, or vinegar-based salad dressings.  Summary  · When you have gastroesophageal reflux disease (GERD), food and lifestyle choices are very important to help ease the discomfort of GERD.  · Eat frequent, small meals instead of three large meals each day. Eat your meals slowly, in a relaxed setting. Avoid bending over or lying down until 2-3 hours after eating.  · Limit high-fat foods such as fatty meat or fried foods.  This information is not intended to replace advice given to you by your health care provider. Make sure you discuss any questions you have with your health care provider.  Document Released: 12/18/2006 Document Revised: 12/19/2017 Document Reviewed: 12/19/2017  [employer] Patient Education © 2019 [employer]

## 2019-05-31 ENCOUNTER — LAB (OUTPATIENT)
Dept: LAB | Facility: HOSPITAL | Age: 39
End: 2019-05-31

## 2019-05-31 DIAGNOSIS — R10.12 LEFT UPPER QUADRANT PAIN: ICD-10-CM

## 2019-05-31 DIAGNOSIS — R68.82 LOW LIBIDO: ICD-10-CM

## 2019-05-31 DIAGNOSIS — E66.9 CLASS 1 OBESITY WITH BODY MASS INDEX (BMI) OF 30.0 TO 30.9 IN ADULT, UNSPECIFIED OBESITY TYPE, UNSPECIFIED WHETHER SERIOUS COMORBIDITY PRESENT: ICD-10-CM

## 2019-05-31 LAB
25(OH)D3 SERPL-MCNC: 27.7 NG/ML (ref 30–100)
ALBUMIN SERPL-MCNC: 4.5 G/DL (ref 3.5–5.2)
ALBUMIN/GLOB SERPL: 1.3 G/DL
ALP SERPL-CCNC: 67 U/L (ref 39–117)
ALT SERPL W P-5'-P-CCNC: 23 U/L (ref 1–41)
AMPHET+METHAMPHET UR QL: NEGATIVE
AMYLASE SERPL-CCNC: 60 U/L (ref 28–100)
ANION GAP SERPL CALCULATED.3IONS-SCNC: 11 MMOL/L
AST SERPL-CCNC: 17 U/L (ref 1–40)
BARBITURATES UR QL SCN: NEGATIVE
BASOPHILS # BLD AUTO: 0.08 10*3/MM3 (ref 0–0.2)
BASOPHILS NFR BLD AUTO: 1.2 % (ref 0–1.5)
BENZODIAZ UR QL SCN: NEGATIVE
BILIRUB SERPL-MCNC: 0.6 MG/DL (ref 0.2–1.2)
BUN BLD-MCNC: 12 MG/DL (ref 6–20)
BUN/CREAT SERPL: 11.2 (ref 7–25)
CALCIUM SPEC-SCNC: 9.3 MG/DL (ref 8.6–10.5)
CANNABINOIDS SERPL QL: NEGATIVE
CHLORIDE SERPL-SCNC: 103 MMOL/L (ref 98–107)
CHOLEST SERPL-MCNC: 230 MG/DL (ref 0–200)
CO2 SERPL-SCNC: 27 MMOL/L (ref 22–29)
COCAINE UR QL: NEGATIVE
CREAT BLD-MCNC: 1.07 MG/DL (ref 0.76–1.27)
CRP SERPL-MCNC: 0.05 MG/DL (ref 0–0.5)
DEPRECATED RDW RBC AUTO: 38 FL (ref 37–54)
EOSINOPHIL # BLD AUTO: 0.31 10*3/MM3 (ref 0–0.4)
EOSINOPHIL NFR BLD AUTO: 4.6 % (ref 0.3–6.2)
ERYTHROCYTE [DISTWIDTH] IN BLOOD BY AUTOMATED COUNT: 12.6 % (ref 12.3–15.4)
ERYTHROCYTE [SEDIMENTATION RATE] IN BLOOD: 0 MM/HR (ref 0–15)
GFR SERPL CREATININE-BSD FRML MDRD: 77 ML/MIN/1.73
GLOBULIN UR ELPH-MCNC: 3.4 GM/DL
GLUCOSE BLD-MCNC: 104 MG/DL (ref 65–99)
HBA1C MFR BLD: 5.3 % (ref 4.8–5.6)
HCT VFR BLD AUTO: 48.6 % (ref 37.5–51)
HDLC SERPL-MCNC: 43 MG/DL (ref 40–60)
HGB BLD-MCNC: 16.4 G/DL (ref 13–17.7)
IMM GRANULOCYTES # BLD AUTO: 0.02 10*3/MM3 (ref 0–0.05)
IMM GRANULOCYTES NFR BLD AUTO: 0.3 % (ref 0–0.5)
IRON 24H UR-MRATE: 69 MCG/DL (ref 59–158)
IRON SATN MFR SERPL: 21 % (ref 20–50)
LDLC SERPL CALC-MCNC: 167 MG/DL (ref 0–100)
LDLC/HDLC SERPL: 3.89 {RATIO}
LIPASE SERPL-CCNC: 51 U/L (ref 13–60)
LYMPHOCYTES # BLD AUTO: 2 10*3/MM3 (ref 0.7–3.1)
LYMPHOCYTES NFR BLD AUTO: 29.7 % (ref 19.6–45.3)
MAGNESIUM SERPL-MCNC: 2.2 MG/DL (ref 1.6–2.6)
MCH RBC QN AUTO: 28.1 PG (ref 26.6–33)
MCHC RBC AUTO-ENTMCNC: 33.7 G/DL (ref 31.5–35.7)
MCV RBC AUTO: 83.4 FL (ref 79–97)
METHADONE UR QL SCN: NEGATIVE
MONOCYTES # BLD AUTO: 0.81 10*3/MM3 (ref 0.1–0.9)
MONOCYTES NFR BLD AUTO: 12 % (ref 5–12)
NEUTROPHILS # BLD AUTO: 3.51 10*3/MM3 (ref 1.7–7)
NEUTROPHILS NFR BLD AUTO: 52.2 % (ref 42.7–76)
NRBC BLD AUTO-RTO: 0 /100 WBC (ref 0–0.2)
OPIATES UR QL: NEGATIVE
OXYCODONE UR QL SCN: NEGATIVE
PLATELET # BLD AUTO: 218 10*3/MM3 (ref 140–450)
PMV BLD AUTO: 10.2 FL (ref 6–12)
POTASSIUM BLD-SCNC: 4.4 MMOL/L (ref 3.5–5.2)
PROT SERPL-MCNC: 7.9 G/DL (ref 6–8.5)
RBC # BLD AUTO: 5.83 10*6/MM3 (ref 4.14–5.8)
SODIUM BLD-SCNC: 141 MMOL/L (ref 136–145)
T3FREE SERPL-MCNC: 3.66 PG/ML (ref 2–4.4)
T4 FREE SERPL-MCNC: 1.04 NG/DL (ref 0.93–1.7)
TESTOST SERPL-MCNC: 458 NG/DL (ref 249–836)
TIBC SERPL-MCNC: 329 MCG/DL (ref 298–536)
TRANSFERRIN SERPL-MCNC: 221 MG/DL (ref 200–360)
TRIGL SERPL-MCNC: 99 MG/DL (ref 0–150)
TSH SERPL DL<=0.05 MIU/L-ACNC: 1.07 MIU/ML (ref 0.27–4.2)
VIT B12 BLD-MCNC: 339 PG/ML (ref 211–946)
VLDLC SERPL-MCNC: 19.8 MG/DL
WBC NRBC COR # BLD: 6.73 10*3/MM3 (ref 3.4–10.8)

## 2019-05-31 PROCEDURE — 82150 ASSAY OF AMYLASE: CPT

## 2019-05-31 PROCEDURE — 80307 DRUG TEST PRSMV CHEM ANLYZR: CPT

## 2019-05-31 PROCEDURE — 82306 VITAMIN D 25 HYDROXY: CPT

## 2019-05-31 PROCEDURE — 84481 FREE ASSAY (FT-3): CPT

## 2019-05-31 PROCEDURE — 86140 C-REACTIVE PROTEIN: CPT

## 2019-05-31 PROCEDURE — 84403 ASSAY OF TOTAL TESTOSTERONE: CPT

## 2019-05-31 PROCEDURE — 84439 ASSAY OF FREE THYROXINE: CPT

## 2019-05-31 PROCEDURE — 82607 VITAMIN B-12: CPT

## 2019-05-31 PROCEDURE — 84466 ASSAY OF TRANSFERRIN: CPT

## 2019-05-31 PROCEDURE — 80053 COMPREHEN METABOLIC PANEL: CPT

## 2019-05-31 PROCEDURE — 83540 ASSAY OF IRON: CPT

## 2019-05-31 PROCEDURE — 83735 ASSAY OF MAGNESIUM: CPT

## 2019-05-31 PROCEDURE — 85025 COMPLETE CBC W/AUTO DIFF WBC: CPT

## 2019-05-31 PROCEDURE — 84443 ASSAY THYROID STIM HORMONE: CPT

## 2019-05-31 PROCEDURE — 83036 HEMOGLOBIN GLYCOSYLATED A1C: CPT

## 2019-05-31 PROCEDURE — 83690 ASSAY OF LIPASE: CPT

## 2019-05-31 PROCEDURE — 80061 LIPID PANEL: CPT

## 2019-05-31 PROCEDURE — 85651 RBC SED RATE NONAUTOMATED: CPT

## 2019-05-31 PROCEDURE — 86677 HELICOBACTER PYLORI ANTIBODY: CPT

## 2019-06-03 ENCOUNTER — TELEPHONE (OUTPATIENT)
Dept: FAMILY MEDICINE CLINIC | Facility: CLINIC | Age: 39
End: 2019-06-03

## 2019-06-03 LAB
H PYLORI IGA SER IA-ACNC: <9 UNITS (ref 0–8.9)
H PYLORI IGG SER IA-ACNC: 7.32 INDEX VALUE (ref 0–0.79)
H PYLORI IGM SER-ACNC: <9 UNITS (ref 0–8.9)

## 2019-06-03 RX ORDER — ERGOCALCIFEROL 1.25 MG/1
50000 CAPSULE ORAL WEEKLY
Qty: 4 CAPSULE | Refills: 11 | Status: SHIPPED | OUTPATIENT
Start: 2019-06-03 | End: 2019-07-19 | Stop reason: SDUPTHER

## 2019-06-03 NOTE — TELEPHONE ENCOUNTER
----- Message from Jhon Amezquita MD sent at 5/31/2019  9:56 PM CDT -----  Testosterone levels normal    Vitamin B12 and T3 normal     Vitamin D low and recommend starting Vitamin D3 50,000 units once a week. Give 4 pills and 3 refills.     Awaiting rest of labwork.

## 2019-06-03 NOTE — TELEPHONE ENCOUNTER
----- Message from Jhon Amezquita MD sent at 5/31/2019  1:20 PM CDT -----  Urine drug screen normal     Thyroid studies normal     Kidney function shows GFR in 70s. Recommend pt drink a lot of fluids     Cholesterol levels elevated. Recommend heart healthy diet     CRP, amylase and lipase normal    Awaiting rest of labwork.

## 2019-06-03 NOTE — TELEPHONE ENCOUNTER
Unable to reach pt,left message to call me back----- Message from Jhon Amezquita MD sent at 5/31/2019  1:45 PM CDT -----  hga1c normal  Unable to reach pt,left message to call me back  Pt returned my call

## 2019-06-05 ENCOUNTER — TELEPHONE (OUTPATIENT)
Dept: FAMILY MEDICINE CLINIC | Facility: CLINIC | Age: 39
End: 2019-06-05

## 2019-06-05 RX ORDER — METRONIDAZOLE 500 MG/1
500 TABLET ORAL 2 TIMES DAILY
Qty: 28 TABLET | Refills: 0 | Status: SHIPPED | OUTPATIENT
Start: 2019-06-05 | End: 2019-07-19

## 2019-06-05 RX ORDER — OMEPRAZOLE 40 MG/1
40 CAPSULE, DELAYED RELEASE ORAL DAILY
Qty: 30 CAPSULE | Refills: 3 | Status: SHIPPED | OUTPATIENT
Start: 2019-06-05 | End: 2019-07-19 | Stop reason: SDUPTHER

## 2019-06-05 RX ORDER — AMOXICILLIN 500 MG/1
1000 CAPSULE ORAL 2 TIMES DAILY
Qty: 28 CAPSULE | Refills: 0 | Status: SHIPPED | OUTPATIENT
Start: 2019-06-05 | End: 2019-07-19

## 2019-06-05 NOTE — TELEPHONE ENCOUNTER
----- Message from Jhon Amezquita MD sent at 6/4/2019 12:18 PM CDT -----  Call pt    Pt has positive antibodies with H pylori IgG that suggest H Pylori gastritis. Pt may have had infection for months.  May be cause of abdominal pain    Recommend referral to Gastroenterology and will discuss in next visit. May need EGD with biopsy     Can continue on prilosec 40 mg daily    Also recommend pt start three antibiotics    Amoxicillin 1000 mg pO BID for 14 days. Give 28 pills and no refills  Clarithromycin 500 mg PO BID for 14 days. Give 28 pills and no refills  Flagyl  500 mg PO BID for 14 days give 28 pills and no refills    Send to pharmacy.    Recheck on next visit. Thanks  Called pt

## 2019-06-05 NOTE — TELEPHONE ENCOUNTER
Call pt will send medication but cannot take seroquel while taking clarithromycin which can cause heart issues.  Thanks

## 2019-06-06 ENCOUNTER — TELEPHONE (OUTPATIENT)
Dept: FAMILY MEDICINE CLINIC | Facility: CLINIC | Age: 39
End: 2019-06-06

## 2019-06-06 NOTE — TELEPHONE ENCOUNTER
Pharmacy left  requesting a call. Has questions about clarithromycin xl that was sent in yesterday.

## 2019-06-11 PROBLEM — A04.8 H. PYLORI INFECTION: Status: ACTIVE | Noted: 2019-06-11
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
## 2019-06-13 ENCOUNTER — OFFICE VISIT (OUTPATIENT)
Dept: FAMILY MEDICINE CLINIC | Facility: CLINIC | Age: 39
End: 2019-06-13

## 2019-06-13 VITALS
HEART RATE: 92 BPM | TEMPERATURE: 98.9 F | HEIGHT: 71 IN | SYSTOLIC BLOOD PRESSURE: 120 MMHG | WEIGHT: 228 LBS | DIASTOLIC BLOOD PRESSURE: 80 MMHG | OXYGEN SATURATION: 96 % | BODY MASS INDEX: 31.92 KG/M2

## 2019-06-13 DIAGNOSIS — F39 MOOD DISORDER (HCC): ICD-10-CM

## 2019-06-13 DIAGNOSIS — E78.2 MIXED HYPERLIPIDEMIA: ICD-10-CM

## 2019-06-13 DIAGNOSIS — K21.9 GASTROESOPHAGEAL REFLUX DISEASE, ESOPHAGITIS PRESENCE NOT SPECIFIED: ICD-10-CM

## 2019-06-13 DIAGNOSIS — F41.1 GAD (GENERALIZED ANXIETY DISORDER): ICD-10-CM

## 2019-06-13 DIAGNOSIS — A04.8 H. PYLORI INFECTION: ICD-10-CM

## 2019-06-13 DIAGNOSIS — F19.91 HISTORY OF DRUG USE: ICD-10-CM

## 2019-06-13 DIAGNOSIS — R10.12 LEFT UPPER QUADRANT PAIN: Primary | ICD-10-CM

## 2019-06-13 DIAGNOSIS — R68.82 LOW LIBIDO: ICD-10-CM

## 2019-06-13 DIAGNOSIS — E55.9 VITAMIN D DEFICIENCY: ICD-10-CM

## 2019-06-13 DIAGNOSIS — E66.9 CLASS 1 OBESITY WITH BODY MASS INDEX (BMI) OF 30.0 TO 30.9 IN ADULT, UNSPECIFIED OBESITY TYPE, UNSPECIFIED WHETHER SERIOUS COMORBIDITY PRESENT: ICD-10-CM

## 2019-06-13 DIAGNOSIS — N28.9 RENAL IMPAIRMENT: ICD-10-CM

## 2019-06-13 PROCEDURE — 99214 OFFICE O/P EST MOD 30 MIN: CPT | Performed by: FAMILY MEDICINE

## 2019-06-13 NOTE — PATIENT INSTRUCTIONS
Finish treatment with  H. Pylori infection with amoxicillin, clarithromycin and flagyl and prilosec or omeprazole    Then when finished with clarithromycin may resumre seroquel or quietipine.       Helicobacter Pylori Infection  Helicobacter pylori infection is an infection in the stomach that is caused by the Helicobacter pylori (H. pylori) bacteria. This type of bacteria often lives in the lining of the stomach. The infection can cause ulcers and irritation (gastritis) in some people. It is the most common cause of ulcers in the stomach (gastric ulcer) and in the upper part of the intestine (duodenal ulcer). Having this infection may also increase the risk of stomach cancer and a type of white blood cell cancer (lymphoma) that affects the stomach.  What are the causes?  H. pylori is a type of bacteria that is often found in the stomachs of healthy people. The bacteria may be passed from person to person through contact with stool or saliva. It is not known why some people develop ulcers, gastritis, or cancer from the infection.  What increases the risk?  This condition is more likely to develop in people who:  · Have family members with the infection.  · Live with many other people, such as in a dormitory.  · Are of , , or  descent.    What are the signs or symptoms?  Most people with this infection do not have symptoms. If you do have symptoms, they may include:  · Heartburn.  · Stomach pain.  · Nausea.  · Vomiting.  · Blood-tinged vomit.  · Loss of appetite.  · Bad breath.    How is this diagnosed?  This condition may be diagnosed based on your symptoms, a physical exam, and various tests. Tests may include:  · Blood tests or stool tests to check for the proteins (antibodies) that your body may produce in response to the bacteria. These tests are the best way to confirm the diagnosis.  · A breath test to check for the type of gas that the H. pylori bacteria release after breaking down a  substance called urea. For the test, you are asked to drink urea. This test is often done after treatment in order to find out if the treatment worked.  · A procedure in which a thin, flexible tube with a tiny camera at the end is placed into your stomach and upper intestine (upper endoscopy). Your health care provider may also take tissue samples (biopsy) to test for H. pylori and cancer.    How is this treated?  Treatment for this condition usually involves taking a combination of medicines (triple therapy) for a couple of weeks. Triple therapy includes one medicine to reduce the acid in your stomach and two types of antibiotic medicines. Many drug combinations have been approved for treatment. Treatment usually kills the H. pylori and reduces your risk of cancer. You may need to be tested for H. pylori again after treatment. In some cases, the treatment may need to be repeated.  Follow these instructions at home:  · Take over-the-counter and prescription medicines only as told by your health care provider.  · Take your antibiotics as told by your health care provider. Do not stop taking the antibiotics even if you start to feel better.  · You can do all your usual activities and eat what you usually do.  · Take steps to prevent future infections:  ? Wash your hands often.  ? Make sure the food you eat has been properly prepared.  ? Drink water only from clean sources.  · Keep all follow-up visits as told by your health care provider. This is important.  Contact a health care provider if:  · Your symptoms do not get better.  · Your symptoms return after treatment.  This information is not intended to replace advice given to you by your health care provider. Make sure you discuss any questions you have with your health care provider.  Document Released: 04/10/2017 Document Revised: 05/25/2017 Document Reviewed: 12/30/2015  Elsevier Interactive Patient Education © 2019 [employer]    Helicobacter Pylori Antibodies  Test  Why am I having this test?  This test is used to check for a type of bacteria called Helicobacter pylori (H. pylori). H. pylori can be found in the cells that line the stomach. Having high levels of H. pylori in your stomach puts you at risk for:  · Stomach ulcers and small bowel ulcers.  · Long-term (chronic) inflammation of the lining of the stomach.  · Ulcers in the part of the body that moves food from the mouth to the stomach (esophagus).  · Stomach cancer if the infection is not treated.    Most people with H. pylori in their stomach have no symptoms. Your health care provider may ask you to have this test if you have symptoms of a stomach ulcer or small bowel ulcer, such as stomach pain before or after eating, heartburn, or nausea after eating.  What is being tested?  This test checks your blood for antibodies to the H. pylori bacteria. Antibodies are proteins made by your immune system to fight germs and infection. The test checks for antibodies that the immune system produces in response to infection with H. pylori.  What kind of sample is taken?  A blood sample is required for this test. It is usually collected by inserting a needle into a blood vessel or by sticking a finger with a small needle.  Tell a health care provider about:  · All medicines you are taking, including vitamins, herbs, eye drops, creams, and over-the-counter medicines.  How are the results reported?  Your test results will be reported as values that are categorized as positive, negative, or equivocal. Equivocal means that your results are neither positive nor negative. Your health care provider will compare your results to normal ranges that were established after testing a large group of people (reference ranges). Reference ranges may vary among labs and hospitals. For this test, common reference ranges for the two types of antibodies that may be tested are:  · IgG antibodies:  ? Less than 0.75 units/mL. This is  negative.  ? Greater than or equal to 1 unit/mL. This is positive.  ? 0.75-0.99 units/mL. This is equivocal.  · IgM antibodies:  ? Less than or equal to 30 units/mL. This is negative.  ? Greater than or equal to 40 units/mL. This is positive.  ? 30.01-39.99 units/mL. This is equivocal.    What do the results mean?  Test results that are higher than normal, or positive, may indicate various health conditions, such as:  · Short-term or long-term irritation of the stomach lining (gastritis).  · Small bowel ulcer.  · Stomach ulcer.  · Stomach cancer.    Talk with your health care provider about what your results mean.  Questions to ask your health care provider  Ask your health care provider, or the department that is doing the test:  · When will my results be ready?  · How will I get my results?  · What are my treatment options?  · What other tests do I need?  · What are my next steps?    Summary  · This test is used to check for a type of bacteria called Helicobacter pylori (H. pylori). Having high levels of H. pylori in your stomach puts you at risk for ulcers in the gastrointestinal tract or stomach cancer.  · This test checks your blood for antibodies to the H. pylori bacteria.  · Most people with H. pylori in their stomach have no symptoms. Your health care provider may ask you to have this test if you have symptoms of a stomach ulcer or small bowel ulcer, such as stomach pain before or after eating, heartburn, or nausea after eating.  · Talk with your health care provider about what your results mean.  This information is not intended to replace advice given to you by your health care provider. Make sure you discuss any questions you have with your health care provider.  Document Released: 01/11/2006 Document Revised: 07/31/2018 Document Reviewed: 07/31/2018  Elsevier Interactive Patient Education © 2019 [employer]  Cariprazine oral capsules  What is this medicine?  CARIPRAZINE (car i PRA zeen) is an  antipsychotic. It is used to treat schizophrenia or bipolar disorder. Bipolar disorder is also known as manic-depression.  This medicine may be used for other purposes; ask your health care provider or pharmacist if you have questions.  COMMON BRAND NAME(S): JOANNE  What should I tell my health care provider before I take this medicine?  They need to know if you have any of these conditions:  -dementia  -diabetes  -difficulty swallowing  -heart disease  -history of breast cancer  -kidney disease  -liver disease  -low blood counts, like low white cell, platelet, or red cell counts  -low blood pressure  -Parkinson's disease  -seizures  -suicidal thoughts, plans, or attempt; a previous suicide attempt by you or a family member  -an unusual or allergic reaction to cariprazine, other medicines, foods, dyes, or preservatives  -pregnant or trying to get pregnant  -breast-feeding  How should I use this medicine?  Take this medicine by mouth with a glass of water. Follow the directions on the prescription label. You may take it with or without food. Take your medicine at regular intervals. Do not take it more often than directed. Do not stop taking except on your doctor's advice.  Talk to your pediatrician regarding the use of this medicine in children. Special care may be needed.  Overdosage: If you think you have taken too much of this medicine contact a poison control center or emergency room at once.  NOTE: This medicine is only for you. Do not share this medicine with others.  What if I miss a dose?  If you miss a dose, take it as soon as you can. If it is almost time for your next dose, take only that dose. Do not take double or extra doses.  What may interact with this medicine?  Do not take this medicine with any of the following medications:  -metoclopramide  This medicine may also interact with the following medications:  -carbamazepine  -certain medicines for depression, anxiety, or psychotic  disturbances  -certain medicines for sleep  -itraconazole  -ketoconazole  -medicines for blood pressure  -rifampin  -Jessica's wort  This list may not describe all possible interactions. Give your health care provider a list of all the medicines, herbs, non-prescription drugs, or dietary supplements you use. Also tell them if you smoke, drink alcohol, or use illegal drugs. Some items may interact with your medicine.  What should I watch for while using this medicine?  Visit your doctor or health care professional for regular checks on your progress. It may be several weeks before you see the full effects of this medicine. Notify your doctor or health care professional if your symptoms get worse, if you have new symptoms, if you are having an unusual effect from this medicine, or if you feel out of control, very discouraged or think you might harm yourself or others.  Do not suddenly stop taking this medicine. You may need to gradually reduce the dose. Ask your doctor or health care professional for advice.  You may get dizzy or drowsy. Do not drive, use machinery, or do anything that needs mental alertness until you know how this medicine affects you. Do not stand or sit up quickly, especially if you are an older patient. This reduces the risk of dizzy or fainting spells. Alcohol can increase dizziness and drowsiness. Avoid alcoholic drinks.  This medicine may cause dry eyes and blurred vision. If you wear contact lenses you may feel some discomfort. Lubricating drops may help. See your eye doctor if the problem does not go away or is severe.  This medicine can reduce the response of your body to heat or cold. Dress warm in cold weather and stay hydrated in hot weather. If possible, avoid extreme temperatures like saunas, hot tubs, very hot or cold showers, or activities that can cause dehydration such as vigorous exercise.  Women should inform their doctor if they wish to become pregnant or think they might be  pregnant. The effects of this medicine on an unborn child are not known. A registry is available to monitor pregnancy outcomes in pregnant women exposed to this medicine or similar medicines. Talk to your health care professional or pharmacist for more information.  What side effects may I notice from receiving this medicine?  Side effects that you should report to your doctor or health care professional as soon as possible:  -allergic reactions like skin rash, itching or hives, swelling of the face, lips, or tongue  -changes in emotions or moods  -confusion  -difficulty swallowing  -feeling faint or lightheaded, falls  -fever or chills, sore throat  -inability to control muscle movements in the face, mouth, hands, arms, or legs  -increased hunger or thirst  -increased urination  -missed or irregular menstrual periods  -problems with balance, talking, walking  -redness, blistering, peeling or loosening of the skin, including inside the mouth  -seizures  -stiff muscles  -suicidal thoughts or other mood changes  -unusually weak or tired  Side effects that usually do not require medical attention (report to your doctor or health care professional if they continue or are bothersome):  -constipation  -dizziness  -drowsiness  -nausea, vomiting  -restlessness  -upset stomach  -weight gain  This list may not describe all possible side effects. Call your doctor for medical advice about side effects. You may report side effects to FDA at 5-373-FDA-4012.  Where should I keep my medicine?  Keep out of the reach of children.  Store at room temperature between 15 and 30 degrees C (59 and 86 degrees F). Protect from light. Throw away any unused medicine after the expiration date.  NOTE: This sheet is a summary. It may not cover all possible information. If you have questions about this medicine, talk to your doctor, pharmacist, or health care provider.  © 2019 Elsevier/[employer] Standard (2017-11-17 15:40:17)    Helicobacter Pylori  Infection  Helicobacter pylori infection is an infection in the stomach that is caused by the Helicobacter pylori (H. pylori) bacteria. This type of bacteria often lives in the lining of the stomach. The infection can cause ulcers and irritation (gastritis) in some people. It is the most common cause of ulcers in the stomach (gastric ulcer) and in the upper part of the intestine (duodenal ulcer). Having this infection may also increase the risk of stomach cancer and a type of white blood cell cancer (lymphoma) that affects the stomach.  What are the causes?  H. pylori is a type of bacteria that is often found in the stomachs of healthy people. The bacteria may be passed from person to person through contact with stool or saliva. It is not known why some people develop ulcers, gastritis, or cancer from the infection.  What increases the risk?  This condition is more likely to develop in people who:  · Have family members with the infection.  · Live with many other people, such as in a dormitory.  · Are of , , or  descent.    What are the signs or symptoms?  Most people with this infection do not have symptoms. If you do have symptoms, they may include:  · Heartburn.  · Stomach pain.  · Nausea.  · Vomiting.  · Blood-tinged vomit.  · Loss of appetite.  · Bad breath.    How is this diagnosed?  This condition may be diagnosed based on your symptoms, a physical exam, and various tests. Tests may include:  · Blood tests or stool tests to check for the proteins (antibodies) that your body may produce in response to the bacteria. These tests are the best way to confirm the diagnosis.  · A breath test to check for the type of gas that the H. pylori bacteria release after breaking down a substance called urea. For the test, you are asked to drink urea. This test is often done after treatment in order to find out if the treatment worked.  · A procedure in which a thin, flexible tube with a tiny camera at the  end is placed into your stomach and upper intestine (upper endoscopy). Your health care provider may also take tissue samples (biopsy) to test for H. pylori and cancer.    How is this treated?  Treatment for this condition usually involves taking a combination of medicines (triple therapy) for a couple of weeks. Triple therapy includes one medicine to reduce the acid in your stomach and two types of antibiotic medicines. Many drug combinations have been approved for treatment. Treatment usually kills the H. pylori and reduces your risk of cancer. You may need to be tested for H. pylori again after treatment. In some cases, the treatment may need to be repeated.  Follow these instructions at home:  · Take over-the-counter and prescription medicines only as told by your health care provider.  · Take your antibiotics as told by your health care provider. Do not stop taking the antibiotics even if you start to feel better.  · You can do all your usual activities and eat what you usually do.  · Take steps to prevent future infections:  ? Wash your hands often.  ? Make sure the food you eat has been properly prepared.  ? Drink water only from clean sources.  · Keep all follow-up visits as told by your health care provider. This is important.  Contact a health care provider if:  · Your symptoms do not get better.  · Your symptoms return after treatment.  This information is not intended to replace advice given to you by your health care provider. Make sure you discuss any questions you have with your health care provider.  Document Released: 04/10/2017 Document Revised: 05/25/2017 Document Reviewed: 12/30/2015  [employer] Interactive Patient Education © 2019 [employer]    Helicobacter Pylori Antibodies Test  Why am I having this test?  This test is used to check for a type of bacteria called Helicobacter pylori (H. pylori). H. pylori can be found in the cells that line the stomach. Having high levels of H. pylori in your  stomach puts you at risk for:  · Stomach ulcers and small bowel ulcers.  · Long-term (chronic) inflammation of the lining of the stomach.  · Ulcers in the part of the body that moves food from the mouth to the stomach (esophagus).  · Stomach cancer if the infection is not treated.    Most people with H. pylori in their stomach have no symptoms. Your health care provider may ask you to have this test if you have symptoms of a stomach ulcer or small bowel ulcer, such as stomach pain before or after eating, heartburn, or nausea after eating.  What is being tested?  This test checks your blood for antibodies to the H. pylori bacteria. Antibodies are proteins made by your immune system to fight germs and infection. The test checks for antibodies that the immune system produces in response to infection with H. pylori.  What kind of sample is taken?  A blood sample is required for this test. It is usually collected by inserting a needle into a blood vessel or by sticking a finger with a small needle.  Tell a health care provider about:  · All medicines you are taking, including vitamins, herbs, eye drops, creams, and over-the-counter medicines.  How are the results reported?  Your test results will be reported as values that are categorized as positive, negative, or equivocal. Equivocal means that your results are neither positive nor negative. Your health care provider will compare your results to normal ranges that were established after testing a large group of people (reference ranges). Reference ranges may vary among labs and hospitals. For this test, common reference ranges for the two types of antibodies that may be tested are:  · IgG antibodies:  ? Less than 0.75 units/mL. This is negative.  ? Greater than or equal to 1 unit/mL. This is positive.  ? 0.75-0.99 units/mL. This is equivocal.  · IgM antibodies:  ? Less than or equal to 30 units/mL. This is negative.  ? Greater than or equal to 40 units/mL. This is  positive.  ? 30.01-39.99 units/mL. This is equivocal.    What do the results mean?  Test results that are higher than normal, or positive, may indicate various health conditions, such as:  · Short-term or long-term irritation of the stomach lining (gastritis).  · Small bowel ulcer.  · Stomach ulcer.  · Stomach cancer.    Talk with your health care provider about what your results mean.  Questions to ask your health care provider  Ask your health care provider, or the department that is doing the test:  · When will my results be ready?  · How will I get my results?  · What are my treatment options?  · What other tests do I need?  · What are my next steps?    Summary  · This test is used to check for a type of bacteria called Helicobacter pylori (H. pylori). Having high levels of H. pylori in your stomach puts you at risk for ulcers in the gastrointestinal tract or stomach cancer.  · This test checks your blood for antibodies to the H. pylori bacteria.  · Most people with H. pylori in their stomach have no symptoms. Your health care provider may ask you to have this test if you have symptoms of a stomach ulcer or small bowel ulcer, such as stomach pain before or after eating, heartburn, or nausea after eating.  · Talk with your health care provider about what your results mean.  This information is not intended to replace advice given to you by your health care provider. Make sure you discuss any questions you have with your health care provider.  Document Released: 01/11/2006 Document Revised: 07/31/2018 Document Reviewed: 07/31/2018  [employer] Interactive Patient Education © 2019 [employer]

## 2019-06-28 ENCOUNTER — TELEPHONE (OUTPATIENT)
Dept: FAMILY MEDICINE CLINIC | Facility: CLINIC | Age: 39
End: 2019-06-28

## 2019-06-28 NOTE — TELEPHONE ENCOUNTER
Call pt    Its okay to drink alcohol with moderation. Keep in mind alcohol will make him dehydrated.  Needs to drink enough water  Especially during hot weather.    Thanks

## 2019-06-28 NOTE — TELEPHONE ENCOUNTER
Patient called wanting to know if it would be okay to drink beer over the holiday. He states he don't understand his kidney problems that he has, so he wanted to ask.
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
## 2019-07-19 ENCOUNTER — OFFICE VISIT (OUTPATIENT)
Dept: FAMILY MEDICINE CLINIC | Facility: CLINIC | Age: 39
End: 2019-07-19

## 2019-07-19 VITALS
DIASTOLIC BLOOD PRESSURE: 82 MMHG | WEIGHT: 224.2 LBS | HEART RATE: 60 BPM | TEMPERATURE: 98.4 F | HEIGHT: 71 IN | BODY MASS INDEX: 31.39 KG/M2 | OXYGEN SATURATION: 98 % | SYSTOLIC BLOOD PRESSURE: 124 MMHG

## 2019-07-19 DIAGNOSIS — K21.9 GASTROESOPHAGEAL REFLUX DISEASE, ESOPHAGITIS PRESENCE NOT SPECIFIED: ICD-10-CM

## 2019-07-19 DIAGNOSIS — E66.9 CLASS 1 OBESITY WITH BODY MASS INDEX (BMI) OF 30.0 TO 30.9 IN ADULT, UNSPECIFIED OBESITY TYPE, UNSPECIFIED WHETHER SERIOUS COMORBIDITY PRESENT: Primary | ICD-10-CM

## 2019-07-19 DIAGNOSIS — F19.91 HISTORY OF DRUG USE: ICD-10-CM

## 2019-07-19 DIAGNOSIS — F39 MOOD DISORDER (HCC): ICD-10-CM

## 2019-07-19 DIAGNOSIS — E78.2 MIXED HYPERLIPIDEMIA: ICD-10-CM

## 2019-07-19 DIAGNOSIS — E55.9 VITAMIN D DEFICIENCY: ICD-10-CM

## 2019-07-19 DIAGNOSIS — N28.1 RENAL CYST: ICD-10-CM

## 2019-07-19 DIAGNOSIS — F41.1 GAD (GENERALIZED ANXIETY DISORDER): ICD-10-CM

## 2019-07-19 DIAGNOSIS — A04.8 H. PYLORI INFECTION: ICD-10-CM

## 2019-07-19 DIAGNOSIS — K76.0 FATTY LIVER: ICD-10-CM

## 2019-07-19 DIAGNOSIS — N28.9 RENAL IMPAIRMENT: ICD-10-CM

## 2019-07-19 DIAGNOSIS — R68.82 LOW LIBIDO: ICD-10-CM

## 2019-07-19 PROCEDURE — 99214 OFFICE O/P EST MOD 30 MIN: CPT | Performed by: FAMILY MEDICINE

## 2019-07-19 RX ORDER — HYDROXYZINE PAMOATE 25 MG/1
25 CAPSULE ORAL 3 TIMES DAILY PRN
Qty: 90 CAPSULE | Refills: 3 | Status: SHIPPED | OUTPATIENT
Start: 2019-07-19 | End: 2019-08-09

## 2019-07-19 RX ORDER — OMEPRAZOLE 40 MG/1
40 CAPSULE, DELAYED RELEASE ORAL DAILY
Qty: 30 CAPSULE | Refills: 3 | Status: SHIPPED | OUTPATIENT
Start: 2019-07-19 | End: 2020-03-25 | Stop reason: SDUPTHER

## 2019-07-19 RX ORDER — CLOTRIMAZOLE AND BETAMETHASONE DIPROPIONATE 10; .64 MG/G; MG/G
CREAM TOPICAL 2 TIMES DAILY
Qty: 45 G | Refills: 3 | Status: SHIPPED | OUTPATIENT
Start: 2019-07-19 | End: 2020-06-25

## 2019-07-19 RX ORDER — PAROXETINE 10 MG/1
10 TABLET, FILM COATED ORAL EVERY MORNING
Qty: 30 TABLET | Refills: 3 | Status: SHIPPED | OUTPATIENT
Start: 2019-07-19 | End: 2019-08-29

## 2019-07-19 RX ORDER — ERGOCALCIFEROL 1.25 MG/1
50000 CAPSULE ORAL WEEKLY
Qty: 4 CAPSULE | Refills: 11 | Status: SHIPPED | OUTPATIENT
Start: 2019-07-19 | End: 2020-04-02 | Stop reason: SDUPTHER

## 2019-07-19 NOTE — PATIENT INSTRUCTIONS
Nonalcoholic Fatty Liver Disease Diet  Nonalcoholic fatty liver disease is a condition that causes fat to accumulate in and around the liver. The disease makes it harder for the liver to work the way that it should. Following a healthy diet can help to keep nonalcoholic fatty liver disease under control. It can also help to prevent or improve conditions that are associated with the disease, such as heart disease, diabetes, high blood pressure, and abnormal cholesterol levels. Along with regular exercise, this diet:  · Promotes weight loss.  · Helps to control blood sugar levels.  · Helps to improve the way that the body uses insulin.    What do I need to know about this diet?  · Use the glycemic index (GI) to plan your meals. The index tells you how quickly a food will raise your blood sugar. Choose low-GI foods. These foods take a longer time to raise blood sugar.  · Keep track of how many calories you take in. Eating the right amount of calories will help you to achieve a healthy weight.  · You may want to follow a Mediterranean diet. This diet includes a lot of vegetables, lean meats or fish, whole grains, fruits, and healthy oils and fats.  What foods can I eat?  Grains  Whole grains, such as whole-wheat or whole-grain breads, crackers, tortillas, cereals, and pasta. Stone-ground whole wheat. Pumpernickel bread. Unsweetened oatmeal. Bulgur. Barley. Quinoa. Brown or wild rice. Corn or whole-wheat flour tortillas.  Vegetables  Lettuce. Spinach. Peas. Beets. Cauliflower. Cabbage. Broccoli. Carrots. Tomatoes. Squash. Eggplant. Herbs. Peppers. Onions. Cucumbers. [place] sprouts. Yams and sweet potatoes. Beans. Lentils.  Fruits  Bananas. Apples. Oranges. Grapes. Papaya. [place]. Pomegranate. Kiwi. Grapefruit. Cherries.  Meats and Other Protein Sources  Seafood and shellfish. Lean meats. Poultry. Tofu.  Dairy  Low-fat or fat-free dairy products, such as yogurt, cottage cheese, and cheese.  Beverages  Water. Sugar-free  "drinks. Tea. Coffee. Low-fat or skim milk. Milk alternatives, such as soy or almond milk. Real fruit juice.  Condiments  Mustard. Relish. Low-fat, low-sugar ketchup and barbecue sauce. Low-fat or fat-free mayonnaise.  Sweets and Desserts  Sugar-free sweets.  Fats and Oils  Avocado. Canola or olive oil. Nuts and nut butters. Seeds.  The items listed above may not be a complete list of recommended foods or beverages. Contact your dietitian for more options.  What foods are not recommended?  Palm oil and coconut oil. Processed foods. Fried foods. Sweetened drinks, such as sweet tea, milkshakes, snow cones, iced sweet drinks, and sodas. Alcohol. Sweets. Foods that contain a lot of salt or sodium.  The items listed above may not be a complete list of foods and beverages to avoid. Contact your dietitian for more information.  This information is not intended to replace advice given to you by your health care provider. Make sure you discuss any questions you have with your health care provider.  Document Released: 05/03/2016 Document Revised: 05/25/2017 Document Reviewed: 01/12/2016  [employer] Interactive Patient Education © 2019 [employer]    DASH Eating Plan  DASH stands for \"Dietary Approaches to Stop Hypertension.\" The DASH eating plan is a healthy eating plan that has been shown to reduce high blood pressure (hypertension). It may also reduce your risk for type 2 diabetes, heart disease, and stroke. The DASH eating plan may also help with weight loss.  What are tips for following this plan?  General guidelines  · Avoid eating more than 2,300 mg (milligrams) of salt (sodium) a day. If you have hypertension, you may need to reduce your sodium intake to 1,500 mg a day.  · Limit alcohol intake to no more than 1 drink a day for nonpregnant women and 2 drinks a day for men. One drink equals 12 oz of beer, 5 oz of wine, or 1½ oz of hard liquor.  · Work with your health care provider to maintain a healthy body weight or to " "lose weight. Ask what an ideal weight is for you.  · Get at least 30 minutes of exercise that causes your heart to beat faster (aerobic exercise) most days of the week. Activities may include walking, swimming, or biking.  · Work with your health care provider or diet and nutrition specialist (dietitian) to adjust your eating plan to your individual calorie needs.  Reading food labels  · Check food labels for the amount of sodium per serving. Choose foods with less than 5 percent of the Daily Value of sodium. Generally, foods with less than 300 mg of sodium per serving fit into this eating plan.  · To find whole grains, look for the word \"whole\" as the first word in the ingredient list.  Shopping  · Buy products labeled as \"low-sodium\" or \"no salt added.\"  · Buy fresh foods. Avoid canned foods and premade or frozen meals.  Cooking  · Avoid adding salt when cooking. Use salt-free seasonings or herbs instead of table salt or sea salt. Check with your health care provider or pharmacist before using salt substitutes.  · Do not bentley foods. Cook foods using healthy methods such as baking, boiling, grilling, and broiling instead.  · Cook with heart-healthy oils, such as olive, canola, soybean, or sunflower oil.  Meal planning    · Eat a balanced diet that includes:  ? 5 or more servings of fruits and vegetables each day. At each meal, try to fill half of your plate with fruits and vegetables.  ? Up to 6-8 servings of whole grains each day.  ? Less than 6 oz of lean meat, poultry, or fish each day. A 3-oz serving of meat is about the same size as a deck of cards. One egg equals 1 oz.  ? 2 servings of low-fat dairy each day.  ? A serving of nuts, seeds, or beans 5 times each week.  ? Heart-healthy fats. Healthy fats called Omega-3 fatty acids are found in foods such as flaxseeds and coldwater fish, like sardines, salmon, and mackerel.  · Limit how much you eat of the following:  ? Canned or prepackaged foods.  ? Food that is " high in trans fat, such as fried foods.  ? Food that is high in saturated fat, such as fatty meat.  ? Sweets, desserts, sugary drinks, and other foods with added sugar.  ? Full-fat dairy products.  · Do not salt foods before eating.  · Try to eat at least 2 vegetarian meals each week.  · Eat more home-cooked food and less restaurant, buffet, and fast food.  · When eating at a restaurant, ask that your food be prepared with less salt or no salt, if possible.  What foods are recommended?  The items listed may not be a complete list. Talk with your dietitian about what dietary choices are best for you.  Grains  Whole-grain or whole-wheat bread. Whole-grain or whole-wheat pasta. Brown rice. Oatmeal. Quinoa. Bulgur. Whole-grain and low-sodium cereals. Candis bread. Low-fat, low-sodium crackers. Whole-wheat flour tortillas.  Vegetables  Fresh or frozen vegetables (raw, steamed, roasted, or grilled). Low-sodium or reduced-sodium tomato and vegetable juice. Low-sodium or reduced-sodium tomato sauce and tomato paste. Low-sodium or reduced-sodium canned vegetables.  Fruits  All fresh, dried, or frozen fruit. Canned fruit in natural juice (without added sugar).  Meat and other protein foods  Skinless chicken or turkey. Ground chicken or turkey. Pork with fat trimmed off. Fish and seafood. Egg whites. Dried beans, peas, or lentils. Unsalted nuts, nut butters, and seeds. Unsalted canned beans. Lean cuts of beef with fat trimmed off. Low-sodium, lean deli meat.  Dairy  Low-fat (1%) or fat-free (skim) milk. Fat-free, low-fat, or reduced-fat cheeses. Nonfat, low-sodium ricotta or cottage cheese. Low-fat or nonfat yogurt. Low-fat, low-sodium cheese.  Fats and oils  Soft margarine without trans fats. Vegetable oil. Low-fat, reduced-fat, or light mayonnaise and salad dressings (reduced-sodium). Canola, safflower, olive, soybean, and sunflower oils. Avocado.  Seasoning and other foods  Herbs. Spices. Seasoning mixes without salt.  Unsalted popcorn and pretzels. Fat-free sweets.  What foods are not recommended?  The items listed may not be a complete list. Talk with your dietitian about what dietary choices are best for you.  Grains  Baked goods made with fat, such as croissants, muffins, or some breads. Dry pasta or rice meal packs.  Vegetables  Creamed or fried vegetables. Vegetables in a cheese sauce. Regular canned vegetables (not low-sodium or reduced-sodium). Regular canned tomato sauce and paste (not low-sodium or reduced-sodium). Regular tomato and vegetable juice (not low-sodium or reduced-sodium). Pickles. Olives.  Fruits  Canned fruit in a light or heavy syrup. Fried fruit. Fruit in cream or butter sauce.  Meat and other protein foods  Fatty cuts of meat. Ribs. Fried meat. [place]ura. Sausage. Bologna and other processed lunch meats. Salami. Fatback. Hotdogs. Bratwurst. Salted nuts and seeds. Canned beans with added salt. Canned or smoked fish. Whole eggs or egg yolks. Chicken or turkey with skin.  Dairy  Whole or 2% milk, cream, and half-and-half. Whole or full-fat cream cheese. Whole-fat or sweetened yogurt. Full-fat cheese. Nondairy creamers. Whipped toppings. Processed cheese and cheese spreads.  Fats and oils  Butter. Stick margarine. Lard. Shortening. Ghee. [place] fat. Tropical oils, such as coconut, palm kernel, or palm oil.  Seasoning and other foods  Salted popcorn and pretzels. Onion salt, garlic salt, seasoned salt, table salt, and sea salt. [facility] sauce. Tartar sauce. Barbecue sauce. Teriyaki sauce. Soy sauce, including reduced-sodium. Steak sauce. Canned and packaged gravies. Fish sauce. Oyster sauce. Cocktail sauce. Horseradish that you find on the shelf. Ketchup. Mustard. Meat flavorings and tenderizers. Bouillon cubes. Hot sauce and Tabasco sauce. Premade or packaged marinades. Premade or packaged taco seasonings. Relishes. Regular salad dressings.  Where to find more information:  · National Heart, Lung, and Blood  [place]: www.nhlbi.nih.gov  · American Heart Association: www.heart.org  Summary  · The DASH eating plan is a healthy eating plan that has been shown to reduce high blood pressure (hypertension). It may also reduce your risk for type 2 diabetes, heart disease, and stroke.  · With the DASH eating plan, you should limit salt (sodium) intake to 2,300 mg a day. If you have hypertension, you may need to reduce your sodium intake to 1,500 mg a day.  · When on the DASH eating plan, aim to eat more fresh fruits and vegetables, whole grains, lean proteins, low-fat dairy, and heart-healthy fats.  · Work with your health care provider or diet and nutrition specialist (dietitian) to adjust your eating plan to your individual calorie needs.  This information is not intended to replace advice given to you by your health care provider. Make sure you discuss any questions you have with your health care provider.  Document Released: 12/06/2012 Document Revised: 12/11/2017 Document Reviewed: 12/11/2017  [employer] Interactive Patient Education © 2019 [employer].    Low-Sodium Eating Plan  Sodium, which is an element that makes up salt, helps you maintain a healthy balance of fluids in your body. Too much sodium can increase your blood pressure and cause fluid and waste to be held in your body.  Your health care provider or dietitian may recommend following this plan if you have high blood pressure (hypertension), kidney disease, liver disease, or heart failure. Eating less sodium can help lower your blood pressure, reduce swelling, and protect your heart, liver, and kidneys.  What are tips for following this plan?  General guidelines  · Most people on this plan should limit their sodium intake to 1,500-2,000 mg (milligrams) of sodium each day.  Reading food labels  · The Nutrition Facts label lists the amount of sodium in one serving of the food. If you eat more than one serving, you must multiply the listed amount of sodium by the  "number of servings.  · Choose foods with less than 140 mg of sodium per serving.  · Avoid foods with 300 mg of sodium or more per serving.  Shopping  · Look for lower-sodium products, often labeled as \"low-sodium\" or \"no salt added.\"  · Always check the sodium content even if foods are labeled as \"unsalted\" or \"no salt added\".  · Buy fresh foods.  ? Avoid canned foods and premade or frozen meals.  ? Avoid canned, cured, or processed meats  · Buy breads that have less than 80 mg of sodium per slice.  Cooking  · Eat more home-cooked food and less restaurant, buffet, and fast food.  · Avoid adding salt when cooking. Use salt-free seasonings or herbs instead of table salt or sea salt. Check with your health care provider or pharmacist before using salt substitutes.  · Cook with plant-based oils, such as canola, sunflower, or olive oil.  Meal planning  · When eating at a restaurant, ask that your food be prepared with less salt or no salt, if possible.  · Avoid foods that contain MSG (monosodium glutamate). MSG is sometimes added to Chinese food, bouillon, and some canned foods.  What foods are recommended?  The items listed may not be a complete list. Talk with your dietitian about what dietary choices are best for you.  Grains  Low-sodium cereals, including oats, puffed wheat and rice, and shredded wheat. Low-sodium crackers. Unsalted rice. Unsalted pasta. Low-sodium bread. Whole-grain breads and whole-grain pasta.  Vegetables  Fresh or frozen vegetables. \"No salt added\" canned vegetables. \"No salt added\" tomato sauce and paste. Low-sodium or reduced-sodium tomato and vegetable juice.  Fruits  Fresh, frozen, or canned fruit. Fruit juice.  Meats and other protein foods  Fresh or frozen (no salt added) meat, poultry, seafood, and fish. Low-sodium canned tuna and salmon. Unsalted nuts. Dried peas, beans, and lentils without added salt. Unsalted canned beans. Eggs. Unsalted nut butters.  Dairy  Milk. Soy milk. Cheese that " is naturally low in sodium, such as ricotta cheese, fresh mozzarella, or Swiss cheese Low-sodium or reduced-sodium cheese. Cream cheese. Yogurt.  Fats and oils  Unsalted butter. Unsalted margarine with no trans fat. Vegetable oils such as canola or olive oils.  Seasonings and other foods  Fresh and dried herbs and spices. Salt-free seasonings. Low-sodium mustard and ketchup. Sodium-free salad dressing. Sodium-free light mayonnaise. Fresh or refrigerated horseradish. Lemon juice. Vinegar. Homemade, reduced-sodium, or low-sodium soups. Unsalted popcorn and pretzels. Low-salt or salt-free chips.  What foods are not recommended?  The items listed may not be a complete list. Talk with your dietitian about what dietary choices are best for you.  Grains  Instant hot cereals. Bread stuffing, pancake, and biscuit mixes. Croutons. Seasoned rice or pasta mixes. Noodle soup cups. Boxed or frozen macaroni and cheese. Regular salted crackers. Self-rising flour.  Vegetables  Sauerkraut, pickled vegetables, and relishes. Olives. French fries. Onion rings. Regular canned vegetables (not low-sodium or reduced-sodium). Regular canned tomato sauce and paste (not low-sodium or reduced-sodium). Regular tomato and vegetable juice (not low-sodium or reduced-sodium). Frozen vegetables in sauces.  Meats and other protein foods  Meat or fish that is salted, canned, smoked, spiced, or pickled. [place], ham, sausage, hotdogs, corned beef, chipped beef, packaged lunch meats, salt pork, jerky, pickled herring, anchovies, regular canned tuna, sardines, salted nuts.  Dairy  Processed cheese and cheese spreads. Cheese curds. Blue cheese. Feta cheese. String cheese. Regular cottage cheese. Buttermilk. Canned milk.  Fats and oils  Salted butter. Regular margarine. Ghee. [place] fat.  Seasonings and other foods  Onion salt, garlic salt, seasoned salt, table salt, and sea salt. Canned and packaged gravies. Worcestershire sauce. Tartar sauce. Barbecue sauce.  Teriyaki sauce. Soy sauce, including reduced-sodium. Steak sauce. Fish sauce. Oyster sauce. Cocktail sauce. Horseradish that you find on the shelf. Regular ketchup and mustard. Meat flavorings and tenderizers. Bouillon cubes. Hot sauce and Tabasco sauce. Premade or packaged marinades. Premade or packaged taco seasonings. Relishes. Regular salad dressings. Salsa. Potato and tortilla chips. Corn chips and puffs. Salted popcorn and pretzels. Canned or dried soups. Pizza. Frozen entrees and pot pies.  Summary  · Eating less sodium can help lower your blood pressure, reduce swelling, and protect your heart, liver, and kidneys.  · Most people on this plan should limit their sodium intake to 1,500-2,000 mg (milligrams) of sodium each day.  · Canned, boxed, and frozen foods are high in sodium. Restaurant foods, fast foods, and pizza are also very high in sodium. You also get sodium by adding salt to food.  · Try to cook at home, eat more fresh fruits and vegetables, and eat less fast food, canned, processed, or prepared foods.  This information is not intended to replace advice given to you by your health care provider. Make sure you discuss any questions you have with your health care provider.  Document Released: 06/09/2003 Document Revised: 12/11/2017 Document Reviewed: 12/11/2017  [employer] Interactive Patient Education © 2019 [employer]    High-Fiber Diet  Fiber, also called dietary fiber, is a type of carbohydrate found in fruits, vegetables, whole grains, and beans. A high-fiber diet can have many health benefits. Your health care provider may recommend a high-fiber diet to help:  · Prevent constipation. Fiber can make your bowel movements more regular.  · Lower your cholesterol.  · Relieve hemorrhoids, uncomplicated diverticulosis, or irritable bowel syndrome.  · Prevent overeating as part of a weight-loss plan.  · Prevent heart disease, type 2 diabetes, and certain cancers.    What is my plan?  The recommended  daily intake of fiber includes:  · 38 grams for men under age 50.  · 30 grams for men over age 50.  · 25 grams for women under age 50.  · 21 grams for women over age 50.    You can get the recommended daily intake of dietary fiber by eating a variety of fruits, vegetables, grains, and beans. Your health care provider may also recommend a fiber supplement if it is not possible to get enough fiber through your diet.  What do I need to know about a high-fiber diet?  · Fiber supplements have not been widely studied for their effectiveness, so it is better to get fiber through food sources.  · Always check the fiber content on the nutrition facts label of any prepackaged food. Look for foods that contain at least 5 grams of fiber per serving.  · Ask your dietitian if you have questions about specific foods that are related to your condition, especially if those foods are not listed in the following section.  · Increase your daily fiber consumption gradually. Increasing your intake of dietary fiber too quickly may cause bloating, cramping, or gas.  · Drink plenty of water. Water helps you to digest fiber.  What foods can I eat?  Grains  Whole-grain breads. Multigrain cereal. Oats and oatmeal. Brown rice. Barley. Bulgur wheat. Millet. Bran muffins. Popcorn. Rye wafer crackers.  Vegetables  Sweet potatoes. Spinach. Kale. Artichokes. Cabbage. Broccoli. Green peas. Carrots. Squash.  Fruits  Berries. Pears. Apples. Oranges. Avocados. Prunes and raisins. Dried figs.  Meats and Other Protein Sources  [place], kidney, jones, and soy beans. Split peas. Lentils. Nuts and seeds.  Dairy  Fiber-fortified yogurt.  Beverages  Fiber-fortified soy milk. Fiber-fortified orange juice.  Other  Fiber bars.  The items listed above may not be a complete list of recommended foods or beverages. Contact your dietitian for more options.  What foods are not recommended?  Grains  White bread. Pasta made with refined flour. White rice.  Vegetables  Fried  potatoes. Canned vegetables. Well-cooked vegetables.  Fruits  Fruit juice. Cooked, strained fruit.  Meats and Other Protein Sources  Fatty cuts of meat. Fried poultry or fried fish.  Dairy  Milk. Yogurt. Cream cheese. Sour cream.  Beverages  Soft drinks.  Other  Cakes and pastries. Butter and oils.  The items listed above may not be a complete list of foods and beverages to avoid. Contact your dietitian for more information.  What are some tips for including high-fiber foods in my diet?  · Eat a wide variety of high-fiber foods.  · Make sure that half of all grains consumed each day are whole grains.  · Replace breads and cereals made from refined flour or white flour with whole-grain breads and cereals.  · Replace white rice with brown rice, bulgur wheat, or millet.  · Start the day with a breakfast that is high in fiber, such as a cereal that contains at least 5 grams of fiber per serving.  · Use beans in place of meat in soups, salads, or pasta.  · Eat high-fiber snacks, such as berries, raw vegetables, nuts, or popcorn.  This information is not intended to replace advice given to you by your health care provider. Make sure you discuss any questions you have with your health care provider.  Document Released: 12/18/2006 Document Revised: 05/25/2017 Document Reviewed: 06/02/2015  [employer] Interactive Patient Education © 2018 [employer]    Heart-Healthy Eating Plan  Many factors influence your heart health, including eating and exercise habits. Heart (coronary) risk increases with abnormal blood fat (lipid) levels. Heart-healthy meal planning includes limiting unhealthy fats, increasing healthy fats, and making other small dietary changes. This includes maintaining a healthy body weight to help keep lipid levels within a normal range.  What is my plan?  Your health care provider recommends that you:  · Get no more than _________% of the total calories in your daily diet from fat.  · Limit your intake of  "saturated fat to less than _________% of your total calories each day.  · Limit the amount of cholesterol in your diet to less than _________ mg per day.    What types of fat should I choose?  · Choose healthy fats more often. Choose monounsaturated and polyunsaturated fats, such as olive oil and canola oil, flaxseeds, walnuts, almonds, and seeds.  · Eat more omega-3 fats. Good choices include salmon, mackerel, sardines, tuna, flaxseed oil, and ground flaxseeds. Aim to eat fish at least two times each week.  · Limit saturated fats. Saturated fats are primarily found in animal products, such as meats, butter, and cream. Plant sources of saturated fats include palm oil, palm kernel oil, and coconut oil.  · Avoid foods with partially hydrogenated oils in them. These contain trans fats. Examples of foods that contain trans fats are stick margarine, some tub margarines, cookies, crackers, and other baked goods.  What general guidelines do I need to follow?  · Check food labels carefully to identify foods with trans fats or high amounts of saturated fat.  · Fill one half of your plate with vegetables and green salads. Eat 4-5 servings of vegetables per day. A serving of vegetables equals 1 cup of raw leafy vegetables, ½ cup of raw or cooked cut-up vegetables, or ½ cup of vegetable juice.  · Fill one fourth of your plate with whole grains. Look for the word \"whole\" as the first word in the ingredient list.  · Fill one fourth of your plate with lean protein foods.  · Eat 4-5 servings of fruit per day. A serving of fruit equals one medium whole fruit, ¼ cup of dried fruit, ½ cup of fresh, frozen, or canned fruit, or ½ cup of 100% fruit juice.  · Eat more foods that contain soluble fiber. Examples of foods that contain this type of fiber are apples, broccoli, carrots, beans, peas, and barley. Aim to get 20-30 g of fiber per day.  · Eat more home-cooked food and less restaurant, buffet, and fast food.  · Limit or avoid " alcohol.  · Limit foods that are high in starch and sugar.  · Avoid fried foods.  · Cook foods by using methods other than frying. Baking, boiling, grilling, and broiling are all great options. Other fat-reducing suggestions include:  ? Removing the skin from poultry.  ? Removing all visible fats from meats.  ? Skimming the fat off of stews, soups, and gravies before serving them.  ? Steaming vegetables in water or broth.  · Lose weight if you are overweight. Losing just 5-10% of your initial body weight can help your overall health and prevent diseases such as diabetes and heart disease.  · Increase your consumption of nuts, legumes, and seeds to 4-5 servings per week. One serving of dried beans or legumes equals ½ cup after being cooked, one serving of nuts equals 1½ ounces, and one serving of seeds equals ½ ounce or 1 tablespoon.  · You may need to monitor your salt (sodium) intake, especially if you have high blood pressure. Talk with your health care provider or dietitian to get more information about reducing sodium.  What foods can I eat?  Grains    Breads, including Dutch, white, nolvia, wheat, raisin, rye, oatmeal, and Italian. Tortillas that are neither fried nor made with lard or trans fat. Low-fat rolls, including hotdog and hamburger buns and English muffins. Biscuits. Muffins. Waffles. Pancakes. Light popcorn. Whole-grain cereals. Flatbread. Zahra toast. Pretzels. Breadsticks. Rusks. Low-fat snacks and crackers, including oyster, saltine, matzo, ginger, animal, and rye. Rice and pasta, including brown rice and those that are made with whole wheat.  Vegetables  All vegetables.  Fruits  All fruits, but limit coconut.  Meats and Other Protein Sources  Lean, well-trimmed beef, veal, pork, and lamb. Chicken and turkey without skin. All fish and shellfish. Wild duck, rabbit, pheasant, and venison. Egg whites or low-cholesterol egg substitutes. Dried beans, peas, lentils, and tofu. Seeds and most  nuts.  Dairy  Low-fat or nonfat cheeses, including ricotta, string, and mozzarella. Skim or 1% milk that is liquid, powdered, or evaporated. Buttermilk that is made with low-fat milk. Nonfat or low-fat yogurt.  Beverages  Mineral water. Diet carbonated beverages.  Sweets and Desserts  Sherbets and fruit ices. Honey, jam, marmalade, jelly, and syrups. Meringues and gelatins. Pure sugar candy, such as hard candy, jelly beans, gumdrops, mints, marshmallows, and small amounts of dark chocolate. Patrick food cake.  Eat all sweets and desserts in moderation.  Fats and Oils  Nonhydrogenated (trans-free) margarines. Vegetable oils, including soybean, sesame, sunflower, olive, peanut, safflower, corn, canola, and cottonseed. Salad dressings or mayonnaise that are made with a vegetable oil. Limit added fats and oils that you use for cooking, baking, salads, and as spreads.  Other  Cocoa powder. Coffee and tea. All seasonings and condiments.  The items listed above may not be a complete list of recommended foods or beverages. Contact your dietitian for more options.  What foods are not recommended?  Grains  Breads that are made with saturated or trans fats, oils, or whole milk. Croissants. Butter rolls. Cheese breads. Sweet rolls. Donuts. Buttered popcorn. Chow mein noodles. High-fat crackers, such as cheese or butter crackers.  Meats and Other Protein Sources  Fatty meats, such as hotdogs, short ribs, sausage, spareribs, santiago, ribeye roast or steak, and mutton. High-fat deli meats, such as salami and bologna. Caviar. Domestic duck and goose. Organ meats, such as kidney, liver, sweetbreads, brains, gizzard, chitterlings, and heart.  Dairy  Cream, sour cream, cream cheese, and creamed cottage cheese. Whole milk cheeses, including blue (jesse), [place] Vignesh, Brie, Addy, American, Havarti, Swiss, cheddar, Camembert, and Essex. Whole or 2% milk that is liquid, evaporated, or condensed. Whole buttermilk. Cream sauce or high-fat  cheese sauce. Yogurt that is made from whole milk.  Beverages  Regular sodas and drinks with added sugar.  Sweets and Desserts  Frosting. Pudding. Cookies. Cakes other than robby food cake. Candy that has milk chocolate or white chocolate, hydrogenated fat, butter, coconut, or unknown ingredients. Buttered syrups. Full-fat ice cream or ice cream drinks.  Fats and Oils  Gravy that has suet, meat fat, or shortening. Cocoa butter, hydrogenated oils, palm oil, coconut oil, palm kernel oil. These can often be found in baked products, candy, fried foods, nondairy creamers, and whipped toppings. Solid fats and shortenings, including santiago fat, salt pork, lard, and butter. Nondairy cream substitutes, such as coffee creamers and sour cream substitutes. Salad dressings that are made of unknown oils, cheese, or sour cream.  The items listed above may not be a complete list of foods and beverages to avoid. Contact your dietitian for more information.  This information is not intended to replace advice given to you by your health care provider. Make sure you discuss any questions you have with your health care provider.  Document Released: 09/26/2009 Document Revised: 07/07/2017 Document Reviewed: 06/11/2015  [employer] Interactive Patient Education © 2019 [employer]

## 2019-07-25 DIAGNOSIS — R10.12 LEFT UPPER QUADRANT PAIN: ICD-10-CM

## 2019-08-09 ENCOUNTER — OFFICE VISIT (OUTPATIENT)
Dept: GASTROENTEROLOGY | Facility: CLINIC | Age: 39
End: 2019-08-09

## 2019-08-09 VITALS
HEART RATE: 64 BPM | WEIGHT: 219.6 LBS | HEIGHT: 71 IN | DIASTOLIC BLOOD PRESSURE: 68 MMHG | BODY MASS INDEX: 30.74 KG/M2 | SYSTOLIC BLOOD PRESSURE: 118 MMHG

## 2019-08-09 DIAGNOSIS — R19.5 MUCUS IN STOOL: ICD-10-CM

## 2019-08-09 DIAGNOSIS — R19.4 CHANGE IN BOWEL HABITS: ICD-10-CM

## 2019-08-09 DIAGNOSIS — R10.12 LEFT UPPER QUADRANT PAIN: ICD-10-CM

## 2019-08-09 DIAGNOSIS — K21.9 GASTROESOPHAGEAL REFLUX DISEASE, ESOPHAGITIS PRESENCE NOT SPECIFIED: Primary | ICD-10-CM

## 2019-08-09 PROCEDURE — 99214 OFFICE O/P EST MOD 30 MIN: CPT | Performed by: NURSE PRACTITIONER

## 2019-08-09 RX ORDER — SODIUM, POTASSIUM,MAG SULFATES 17.5-3.13G
1 SOLUTION, RECONSTITUTED, ORAL ORAL EVERY 12 HOURS
Qty: 2 BOTTLE | Refills: 0 | Status: SHIPPED | OUTPATIENT
Start: 2019-08-09 | End: 2019-09-17 | Stop reason: HOSPADM

## 2019-08-09 RX ORDER — DEXTROSE AND SODIUM CHLORIDE 5; .45 G/100ML; G/100ML
30 INJECTION, SOLUTION INTRAVENOUS CONTINUOUS PRN
Status: CANCELLED | OUTPATIENT
Start: 2019-09-17

## 2019-08-09 NOTE — PROGRESS NOTES
Chief Complaint   Patient presents with   • Heartburn   • Abdominal Pain   • h-pylori       Subjective    Jordin Parham is a 38 y.o. male. he is here today for follow-up.    History of Present Illness  38-year-old male presents to discuss chronic reflux history of H. pylori gastritis fatty liver and left upper quadrant abdominal pain changes in bowel habits and mucus within his stool.  States he has modified his diet in order to lose weight for fatty liver and has been eating lots of fruits and vegetables and feels like diarrhea may be related to that however has noted some mucus within his stool and has intermittent left upper quadrant abdominal pain.  He was treated for H. pylori in May with triple therapy and started on PPI and states symptoms have significantly improve however if he misses medication he will have reflux throughout the day.  He denies any nausea vomiting and has lost 5 pounds with active attempts at weight loss.  Plan; schedule patient for EGD and colonoscopy due to chronic reflux left upper quadrant abdominal pain change in bowel habits and mucus within the stool.  Follow-up after test return office sooner if needed       The following portions of the patient's history were reviewed and updated as appropriate:   Past Medical History:   Diagnosis Date   • Fatty liver    • GERD (gastroesophageal reflux disease)    • Hyperlipidemia      Past Surgical History:   Procedure Laterality Date   • HEMORRHOIDECTOMY       Family History   Problem Relation Age of Onset   • Alcohol abuse Mother    • Anxiety disorder Mother    • Arthritis Mother    • Asthma Mother    • Cancer Mother    • Depression Mother    • Hypertension Mother    • Stroke Mother    • Hyperlipidemia Father    • Thyroid disease Sister        Prior to Admission medications    Medication Sig Start Date End Date Taking? Authorizing Provider   clotrimazole-betamethasone (LOTRISONE) 1-0.05 % cream Apply  topically to the appropriate area as  "directed 2 (Two) Times a Day. 7/19/19  Yes Jhon Amezquita MD   omeprazole (priLOSEC) 40 MG capsule Take 1 capsule by mouth Daily. 7/19/19  Yes Jhon Amezquita MD   PARoxetine (PAXIL) 10 MG tablet Take 1 tablet by mouth Every Morning. 7/19/19  Yes Jhon Amezquita MD   vitamin D (ERGOCALCIFEROL) 09995 units capsule capsule Take 1 capsule by mouth 1 (One) Time Per Week. 7/19/19  Yes Jhon Amezquita MD   hydrOXYzine pamoate (VISTARIL) 25 MG capsule Take 1 capsule by mouth 3 (Three) Times a Day As Needed for Itching. 7/19/19 8/9/19 Yes Jhon Amezquita MD   Cariprazine HCl (VRAYLAR) 1.5 MG capsule capsule Take 1 capsule by mouth Daily. 7/19/19 8/9/19  Jhon Amezquita MD     No Known Allergies  Social History     Socioeconomic History   • Marital status:      Spouse name: Not on file   • Number of children: Not on file   • Years of education: Not on file   • Highest education level: Not on file   Tobacco Use   • Smoking status: Never Smoker   • Smokeless tobacco: Never Used   Substance and Sexual Activity   • Alcohol use: Yes       Review of Systems  Review of Systems   Constitutional: Positive for appetite change. Negative for activity change, chills, diaphoresis, fatigue, fever and unexpected weight change.   HENT: Negative for trouble swallowing.    Respiratory: Negative for shortness of breath.    Gastrointestinal: Positive for abdominal pain and diarrhea. Negative for abdominal distention, anal bleeding, blood in stool, constipation, nausea, rectal pain and vomiting.   Musculoskeletal: Negative for arthralgias.   Skin: Negative for pallor.   Neurological: Negative for light-headedness.        /68 (BP Location: Right arm)   Pulse 64   Ht 179.1 cm (70.5\")   Wt 99.6 kg (219 lb 9.6 oz)   BMI 31.06 kg/m²     Objective    Physical Exam   Constitutional: He is oriented to person, place, and time. He appears well-developed and well-nourished. He is cooperative. No distress.   HENT:   Head: " Normocephalic and atraumatic.   Neck: Normal range of motion. Neck supple. No thyromegaly present.   Cardiovascular: Normal rate, regular rhythm and normal heart sounds.   Pulmonary/Chest: Effort normal and breath sounds normal. He has no wheezes. He has no rhonchi. He has no rales.   Abdominal: Soft. Normal appearance and bowel sounds are normal. He exhibits no distension. There is no hepatosplenomegaly. There is no tenderness. There is no rigidity and no guarding. No hernia.   Lymphadenopathy:     He has no cervical adenopathy.   Neurological: He is alert and oriented to person, place, and time.   Skin: Skin is warm, dry and intact. No rash noted. No pallor.   Psychiatric: He has a normal mood and affect. His speech is normal.     Lab on 05/31/2019   Component Date Value Ref Range Status   • WBC 05/31/2019 6.73  3.40 - 10.80 10*3/mm3 Final   • RBC 05/31/2019 5.83* 4.14 - 5.80 10*6/mm3 Final   • Hemoglobin 05/31/2019 16.4  13.0 - 17.7 g/dL Final   • Hematocrit 05/31/2019 48.6  37.5 - 51.0 % Final   • MCV 05/31/2019 83.4  79.0 - 97.0 fL Final   • MCH 05/31/2019 28.1  26.6 - 33.0 pg Final   • MCHC 05/31/2019 33.7  31.5 - 35.7 g/dL Final   • RDW 05/31/2019 12.6  12.3 - 15.4 % Final   • RDW-SD 05/31/2019 38.0  37.0 - 54.0 fl Final   • MPV 05/31/2019 10.2  6.0 - 12.0 fL Final   • Platelets 05/31/2019 218  140 - 450 10*3/mm3 Final   • Neutrophil % 05/31/2019 52.2  42.7 - 76.0 % Final   • Lymphocyte % 05/31/2019 29.7  19.6 - 45.3 % Final   • Monocyte % 05/31/2019 12.0  5.0 - 12.0 % Final   • Eosinophil % 05/31/2019 4.6  0.3 - 6.2 % Final   • Basophil % 05/31/2019 1.2  0.0 - 1.5 % Final   • Immature Grans % 05/31/2019 0.3  0.0 - 0.5 % Final   • Neutrophils, Absolute 05/31/2019 3.51  1.70 - 7.00 10*3/mm3 Final   • Lymphocytes, Absolute 05/31/2019 2.00  0.70 - 3.10 10*3/mm3 Final   • Monocytes, Absolute 05/31/2019 0.81  0.10 - 0.90 10*3/mm3 Final   • Eosinophils, Absolute 05/31/2019 0.31  0.00 - 0.40 10*3/mm3 Final   •  Basophils, Absolute 05/31/2019 0.08  0.00 - 0.20 10*3/mm3 Final   • Immature Grans, Absolute 05/31/2019 0.02  0.00 - 0.05 10*3/mm3 Final   • nRBC 05/31/2019 0.0  0.0 - 0.2 /100 WBC Final   • Glucose 05/31/2019 104* 65 - 99 mg/dL Final   • BUN 05/31/2019 12  6 - 20 mg/dL Final   • Creatinine 05/31/2019 1.07  0.76 - 1.27 mg/dL Final   • Sodium 05/31/2019 141  136 - 145 mmol/L Final   • Potassium 05/31/2019 4.4  3.5 - 5.2 mmol/L Final   • Chloride 05/31/2019 103  98 - 107 mmol/L Final   • CO2 05/31/2019 27.0  22.0 - 29.0 mmol/L Final   • Calcium 05/31/2019 9.3  8.6 - 10.5 mg/dL Final   • Total Protein 05/31/2019 7.9  6.0 - 8.5 g/dL Final   • Albumin 05/31/2019 4.50  3.50 - 5.20 g/dL Final   • ALT (SGPT) 05/31/2019 23  1 - 41 U/L Final   • AST (SGOT) 05/31/2019 17  1 - 40 U/L Final   • Alkaline Phosphatase 05/31/2019 67  39 - 117 U/L Final   • Total Bilirubin 05/31/2019 0.6  0.2 - 1.2 mg/dL Final   • eGFR Non African Amer 05/31/2019 77  >60 mL/min/1.73 Final   • Globulin 05/31/2019 3.4  gm/dL Final   • A/G Ratio 05/31/2019 1.3  g/dL Final   • BUN/Creatinine Ratio 05/31/2019 11.2  7.0 - 25.0 Final   • Anion Gap 05/31/2019 11.0  mmol/L Final   • Hemoglobin A1C 05/31/2019 5.30  4.80 - 5.60 % Final   • Total Cholesterol 05/31/2019 230* 0 - 200 mg/dL Final   • Triglycerides 05/31/2019 99  0 - 150 mg/dL Final   • HDL Cholesterol 05/31/2019 43  40 - 60 mg/dL Final   • LDL Cholesterol  05/31/2019 167* 0 - 100 mg/dL Final   • VLDL Cholesterol 05/31/2019 19.8  mg/dL Final   • LDL/HDL Ratio 05/31/2019 3.89   Final   • TSH 05/31/2019 1.070  0.270 - 4.200 mIU/mL Final   • Free T4 05/31/2019 1.04  0.93 - 1.70 ng/dL Final   • T3, Free 05/31/2019 3.66  2.00 - 4.40 pg/mL Final   • 25 Hydroxy, Vitamin D 05/31/2019 27.7* 30.0 - 100.0 ng/ml Final   • Vitamin B-12 05/31/2019 339  211 - 946 pg/mL Final   • Iron 05/31/2019 69  59 - 158 mcg/dL Final   • Iron Saturation 05/31/2019 21  20 - 50 % Final   • Transferrin 05/31/2019 221  200 - 360  mg/dL Final   • TIBC 05/31/2019 329  298 - 536 mcg/dL Final   • Amphet/Methamphet, Screen 05/31/2019 Negative  Negative Final   • Barbiturates Screen, Urine 05/31/2019 Negative  Negative Final   • Benzodiazepine Screen, Urine 05/31/2019 Negative  Negative Final   • Cocaine Screen, Urine 05/31/2019 Negative  Negative Final   • Opiate Screen 05/31/2019 Negative  Negative Final   • THC, Screen, Urine 05/31/2019 Negative  Negative Final   • Methadone Screen, Urine 05/31/2019 Negative  Negative Final   • Oxycodone Screen, Urine 05/31/2019 Negative  Negative Final   • Magnesium 05/31/2019 2.2  1.6 - 2.6 mg/dL Final   • Testosterone, Total 05/31/2019 458.00  249.00 - 836.00 ng/dL Final   • Amylase 05/31/2019 60  28 - 100 U/L Final   • Lipase 05/31/2019 51  13 - 60 U/L Final   • Sed Rate 05/31/2019 0  0 - 15 mm/hr Final   • C-Reactive Protein 05/31/2019 0.05  0.00 - 0.50 mg/dL Final   • H. pylori IgG 05/31/2019 7.32* 0.00 - 0.79 Index Value Final                                 Negative           <0.80                               Equivocal    0.80 - 0.89                               Positive           >0.89   • H. pylori, IgA ABS 05/31/2019 <9.0  0.0 - 8.9 units Final                                    Negative          <9.0                                  Equivocal   9.0 - 11.0                                  Positive         >11.0   • H. Pylori, IgM 05/31/2019 <9.0  0.0 - 8.9 units Final                                    Negative          <9.0                                  Equivocal   9.0 - 11.0                                  Positive         >11.0  This test was developed and its performance characteristics  determined by [facility]. It has not been cleared or approved  by the Food and Drug Administration.     Assessment/Plan      1. Gastroesophageal reflux disease, esophagitis presence not specified    2. Left upper quadrant pain    3. Change in bowel habits    4. Mucus in stool    .       Orders placed during  this encounter include:  Orders Placed This Encounter   Procedures   • Follow Anesthesia Guidelines / Standing Orders     Standing Status:   Future   • Obtain Informed Consent     Standing Status:   Future     Order Specific Question:   Informed Consent Given For     Answer:   ESOPHAGOGASTRODUODENOSCOPY and Colonoscopy       ESOPHAGOGASTRODUODENOSCOPY (N/A), COLONOSCOPY (N/A)    Review and/or summary of lab tests, radiology, procedures, medications. Review and summary of old records and obtaining of history. The risks and benefits of my recommendations, as well as other treatment options were discussed with the patient today. Questions were answered.    New Medications Ordered This Visit   Medications   • sodium-potassium-magnesium sulfates (SUPREP BOWEL PREP KIT) 17.5-3.13-1.6 GM/177ML solution oral solution     Sig: Take 1 bottle by mouth Every 12 (Twelve) Hours.     Dispense:  2 bottle     Refill:  0       Follow-up: Return in about 4 weeks (around 9/6/2019).          This document has been electronically signed by YULIET Okeefe on August 9, 2019 9:38 AM             Results for orders placed or performed in visit on 05/31/19   Helicobacter Pylori, IgA IgG IgM   Result Value Ref Range    H. pylori IgG 7.32 (H) 0.00 - 0.79 Index Value    H. pylori, IgA ABS <9.0 0.0 - 8.9 units    H. Pylori, IgM <9.0 0.0 - 8.9 units   CBC Auto Differential   Result Value Ref Range    WBC 6.73 3.40 - 10.80 10*3/mm3    RBC 5.83 (H) 4.14 - 5.80 10*6/mm3    Hemoglobin 16.4 13.0 - 17.7 g/dL    Hematocrit 48.6 37.5 - 51.0 %    MCV 83.4 79.0 - 97.0 fL    MCH 28.1 26.6 - 33.0 pg    MCHC 33.7 31.5 - 35.7 g/dL    RDW 12.6 12.3 - 15.4 %    RDW-SD 38.0 37.0 - 54.0 fl    MPV 10.2 6.0 - 12.0 fL    Platelets 218 140 - 450 10*3/mm3    Neutrophil % 52.2 42.7 - 76.0 %    Lymphocyte % 29.7 19.6 - 45.3 %    Monocyte % 12.0 5.0 - 12.0 %    Eosinophil % 4.6 0.3 - 6.2 %    Basophil % 1.2 0.0 - 1.5 %    Immature Grans % 0.3 0.0 - 0.5 %    Neutrophils,  Absolute 3.51 1.70 - 7.00 10*3/mm3    Lymphocytes, Absolute 2.00 0.70 - 3.10 10*3/mm3    Monocytes, Absolute 0.81 0.10 - 0.90 10*3/mm3    Eosinophils, Absolute 0.31 0.00 - 0.40 10*3/mm3    Basophils, Absolute 0.08 0.00 - 0.20 10*3/mm3    Immature Grans, Absolute 0.02 0.00 - 0.05 10*3/mm3    nRBC 0.0 0.0 - 0.2 /100 WBC   Iron Profile   Result Value Ref Range    Iron 69 59 - 158 mcg/dL    Iron Saturation 21 20 - 50 %    Transferrin 221 200 - 360 mg/dL    TIBC 329 298 - 536 mcg/dL   Urine Drug Screen - Urine, Clean Catch   Result Value Ref Range    Amphet/Methamphet, Screen Negative Negative    Barbiturates Screen, Urine Negative Negative    Benzodiazepine Screen, Urine Negative Negative    Cocaine Screen, Urine Negative Negative    Opiate Screen Negative Negative    THC, Screen, Urine Negative Negative    Methadone Screen, Urine Negative Negative    Oxycodone Screen, Urine Negative Negative   Vitamin D 25 Hydroxy   Result Value Ref Range    25 Hydroxy, Vitamin D 27.7 (L) 30.0 - 100.0 ng/ml   Sedimentation rate, automated   Result Value Ref Range    Sed Rate 0 0 - 15 mm/hr   C-reactive protein   Result Value Ref Range    C-Reactive Protein 0.05 0.00 - 0.50 mg/dL   T3, Free   Result Value Ref Range    T3, Free 3.66 2.00 - 4.40 pg/mL   TSH   Result Value Ref Range    TSH 1.070 0.270 - 4.200 mIU/mL   T4, Free   Result Value Ref Range    Free T4 1.04 0.93 - 1.70 ng/dL   Testosterone   Result Value Ref Range    Testosterone, Total 458.00 249.00 - 836.00 ng/dL   Magnesium   Result Value Ref Range    Magnesium 2.2 1.6 - 2.6 mg/dL   Lipase   Result Value Ref Range    Lipase 51 13 - 60 U/L   Hemoglobin A1c   Result Value Ref Range    Hemoglobin A1C 5.30 4.80 - 5.60 %   Vitamin B12   Result Value Ref Range    Vitamin B-12 339 211 - 946 pg/mL   Amylase   Result Value Ref Range    Amylase 60 28 - 100 U/L   Lipid Panel   Result Value Ref Range    Total Cholesterol 230 (H) 0 - 200 mg/dL    Triglycerides 99 0 - 150 mg/dL    HDL  "Cholesterol 43 40 - 60 mg/dL    LDL Cholesterol  167 (H) 0 - 100 mg/dL    VLDL Cholesterol 19.8 mg/dL    LDL/HDL Ratio 3.89    Comprehensive Metabolic Panel   Result Value Ref Range    Glucose 104 (H) 65 - 99 mg/dL    BUN 12 6 - 20 mg/dL    Creatinine 1.07 0.76 - 1.27 mg/dL    Sodium 141 136 - 145 mmol/L    Potassium 4.4 3.5 - 5.2 mmol/L    Chloride 103 98 - 107 mmol/L    CO2 27.0 22.0 - 29.0 mmol/L    Calcium 9.3 8.6 - 10.5 mg/dL    Total Protein 7.9 6.0 - 8.5 g/dL    Albumin 4.50 3.50 - 5.20 g/dL    ALT (SGPT) 23 1 - 41 U/L    AST (SGOT) 17 1 - 40 U/L    Alkaline Phosphatase 67 39 - 117 U/L    Total Bilirubin 0.6 0.2 - 1.2 mg/dL    eGFR Non African Amer 77 >60 mL/min/1.73    Globulin 3.4 gm/dL    A/G Ratio 1.3 g/dL    BUN/Creatinine Ratio 11.2 7.0 - 25.0    Anion Gap 11.0 mmol/L   Results for orders placed or performed in visit on 04/03/08   Converted Surgical Pathology   Result Value Ref Range    Spec Descr 1 SPECIMEN(S): A VAS DEFERENS, LEFT     Specimen description 2 SPECIMEN(S): B VAS DEFERENS, RIGHT     Clinical Information   CLINICAL HISTORY:  Vasectomy       Gross Description         GROSS DESCRIPTION:  The first container is labeled \"left vas deferens\" and has a segment of  cylindrical tissue measuring 0.5 cm long and 0.3 cm in diameter.  It is  entirely embedded as A.  The second container is labeled \"right vas deferens\" and has a segment  of cylindrical tissue measuring 0.6 cm long and 0.3 cm in diameter.  It  is bisected and entirely embedded as B.      Final Diagnosis         FINAL DIAGNOSIS:  A.  SEGMENT, LEFT VAS DEFERENS:            SEGMENT OF UNREMARKABLE VAS DEFERENS.  B.  SEGMENT, RIGHT VAS DEFERENS:            SEGMENT OF UNREMARKABLE VAS DEFERENS.    DIAGNOSIS CODE:  1      Comment   CLINICAL DIAGNOSIS:  Vasectomy       CONVERTED (HISTORICAL) FINAL PATHOLOGIST       Diagnostician:  GANESH KHAN M.D.  Pathologist  Electronically Signed 04/07/2008       "

## 2019-08-09 NOTE — PATIENT INSTRUCTIONS
Heartburn    Heartburn is a type of pain or discomfort that can happen in the throat or chest. It is often described as a burning pain. It may also cause a bad taste in the mouth. Heartburn may feel worse when you lie down or bend over, and it is often worse at night. Heartburn may be caused by stomach contents that move back up into the esophagus (reflux).  Follow these instructions at home:  Take these actions to decrease your discomfort and to help avoid complications.  Diet  · Follow a diet as recommended by your health care provider. This may involve avoiding foods and drinks such as:  ? Coffee and tea (with or without caffeine).  ? Drinks that contain alcohol.  ? Energy drinks and sports drinks.  ? Carbonated drinks or sodas.  ? Chocolate and cocoa.  ? Peppermint and mint flavorings.  ? Garlic and onions.  ? Horseradish.  ? Spicy and acidic foods, including peppers, chili powder, sales powder, vinegar, hot sauces, and barbecue sauce.  ? Citrus fruit juices and citrus fruits, such as oranges, juancarlos, and limes.  ? Tomato-based foods, such as red sauce, chili, salsa, and pizza with red sauce.  ? Fried and fatty foods, such as donuts, french fries, potato chips, and high-fat dressings.  ? High-fat meats, such as hot dogs and fatty cuts of red and white meats, such as rib eye steak, sausage, ham, and santiago.  ? High-fat dairy items, such as whole milk, butter, and cream cheese.  · Eat small, frequent meals instead of large meals.  · Avoid drinking large amounts of liquid with your meals.  · Avoid eating meals during the 2-3 hours before bedtime.  · Avoid lying down right after you eat.  · Do not exercise right after you eat.  General instructions  · Pay attention to any changes in your symptoms.  · Take over-the-counter and prescription medicines only as told by your health care provider. Do not take aspirin, ibuprofen, or other NSAIDs unless your health care provider told you to do so.  · Do not use any tobacco  products, including cigarettes, chewing tobacco, and e-cigarettes. If you need help quitting, ask your health care provider.  · Wear loose-fitting clothing. Do not wear anything tight around your waist that causes pressure on your abdomen.  · Raise (elevate) the head of your bed about 6 inches (15 cm).  · Try to reduce your stress, such as with yoga or meditation. If you need help reducing stress, ask your health care provider.  · If you are overweight, reduce your weight to an amount that is healthy for you. Ask your health care provider for guidance about a safe weight loss goal.  · Keep all follow-up visits as told by your health care provider. This is important.  Contact a health care provider if:  · You have new symptoms.  · You have unexplained weight loss.  · You have difficulty swallowing, or it hurts to swallow.  · You have wheezing or a persistent cough.  · Your symptoms do not improve with treatment.  · You have frequent heartburn for more than two weeks.  Get help right away if:  · You have pain in your arms, neck, jaw, teeth, or back.  · You feel sweaty, dizzy, or light-headed.  · You have chest pain or shortness of breath.  · You vomit and your vomit looks like blood or coffee grounds.  · Your stool is bloody or black.  This information is not intended to replace advice given to you by your health care provider. Make sure you discuss any questions you have with your health care provider.  Document Released: 05/06/2010 Document Revised: 05/25/2017 Document Reviewed: 04/13/2016  [employer] Interactive Patient Education © 2019 [employer]

## 2019-08-20 ENCOUNTER — LAB (OUTPATIENT)
Dept: LAB | Facility: HOSPITAL | Age: 39
End: 2019-08-20

## 2019-08-20 DIAGNOSIS — K76.0 FATTY LIVER: ICD-10-CM

## 2019-08-20 DIAGNOSIS — N28.9 RENAL IMPAIRMENT: ICD-10-CM

## 2019-08-20 LAB
25(OH)D3 SERPL-MCNC: 32.3 NG/ML (ref 30–100)
ALBUMIN SERPL-MCNC: 4.4 G/DL (ref 3.5–5.2)
ALBUMIN/GLOB SERPL: 1.3 G/DL
ALP SERPL-CCNC: 65 U/L (ref 39–117)
ALT SERPL W P-5'-P-CCNC: 19 U/L (ref 1–41)
ANION GAP SERPL CALCULATED.3IONS-SCNC: 12.9 MMOL/L (ref 5–15)
AST SERPL-CCNC: 15 U/L (ref 1–40)
BASOPHILS # BLD AUTO: 0.07 10*3/MM3 (ref 0–0.2)
BASOPHILS NFR BLD AUTO: 1.1 % (ref 0–1.5)
BILIRUB SERPL-MCNC: 0.9 MG/DL (ref 0.2–1.2)
BUN BLD-MCNC: 12 MG/DL (ref 6–20)
BUN/CREAT SERPL: 12.4 (ref 7–25)
CALCIUM SPEC-SCNC: 9.3 MG/DL (ref 8.6–10.5)
CHLORIDE SERPL-SCNC: 100 MMOL/L (ref 98–107)
CO2 SERPL-SCNC: 26.1 MMOL/L (ref 22–29)
CREAT BLD-MCNC: 0.97 MG/DL (ref 0.76–1.27)
DEPRECATED RDW RBC AUTO: 40.1 FL (ref 37–54)
EOSINOPHIL # BLD AUTO: 0.35 10*3/MM3 (ref 0–0.4)
EOSINOPHIL NFR BLD AUTO: 5.5 % (ref 0.3–6.2)
ERYTHROCYTE [DISTWIDTH] IN BLOOD BY AUTOMATED COUNT: 13 % (ref 12.3–15.4)
GFR SERPL CREATININE-BSD FRML MDRD: 86 ML/MIN/1.73
GLOBULIN UR ELPH-MCNC: 3.3 GM/DL
GLUCOSE BLD-MCNC: 96 MG/DL (ref 65–99)
HCT VFR BLD AUTO: 52.1 % (ref 37.5–51)
HGB BLD-MCNC: 16.9 G/DL (ref 13–17.7)
IMM GRANULOCYTES # BLD AUTO: 0.03 10*3/MM3 (ref 0–0.05)
IMM GRANULOCYTES NFR BLD AUTO: 0.5 % (ref 0–0.5)
LYMPHOCYTES # BLD AUTO: 1.83 10*3/MM3 (ref 0.7–3.1)
LYMPHOCYTES NFR BLD AUTO: 29 % (ref 19.6–45.3)
MCH RBC QN AUTO: 27.7 PG (ref 26.6–33)
MCHC RBC AUTO-ENTMCNC: 32.4 G/DL (ref 31.5–35.7)
MCV RBC AUTO: 85.3 FL (ref 79–97)
MONOCYTES # BLD AUTO: 0.68 10*3/MM3 (ref 0.1–0.9)
MONOCYTES NFR BLD AUTO: 10.8 % (ref 5–12)
NEUTROPHILS # BLD AUTO: 3.35 10*3/MM3 (ref 1.7–7)
NEUTROPHILS NFR BLD AUTO: 53.1 % (ref 42.7–76)
NRBC BLD AUTO-RTO: 0 /100 WBC (ref 0–0.2)
PLATELET # BLD AUTO: 245 10*3/MM3 (ref 140–450)
PMV BLD AUTO: 10.2 FL (ref 6–12)
POTASSIUM BLD-SCNC: 4.4 MMOL/L (ref 3.5–5.2)
PROT SERPL-MCNC: 7.7 G/DL (ref 6–8.5)
RBC # BLD AUTO: 6.11 10*6/MM3 (ref 4.14–5.8)
SODIUM BLD-SCNC: 139 MMOL/L (ref 136–145)
WBC NRBC COR # BLD: 6.31 10*3/MM3 (ref 3.4–10.8)

## 2019-08-20 PROCEDURE — 82306 VITAMIN D 25 HYDROXY: CPT

## 2019-08-20 PROCEDURE — 80053 COMPREHEN METABOLIC PANEL: CPT

## 2019-08-20 PROCEDURE — 85025 COMPLETE CBC W/AUTO DIFF WBC: CPT

## 2019-08-21 ENCOUNTER — TELEPHONE (OUTPATIENT)
Dept: FAMILY MEDICINE CLINIC | Facility: CLINIC | Age: 39
End: 2019-08-21

## 2019-08-21 NOTE — TELEPHONE ENCOUNTER
----- Message from Jhon Amezquita MD sent at 8/21/2019  7:20 AM CDT -----  Call pt    Vitamin D improved    Also kidney function improved with GFR increasing from 70s to 80s range    On CB hematocrit and red blood cell slightly elevated and concentrated. Continue to monitor    Recheck on next visit. Thanks  Called pt
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
## 2019-08-29 ENCOUNTER — OFFICE VISIT (OUTPATIENT)
Dept: FAMILY MEDICINE CLINIC | Facility: CLINIC | Age: 39
End: 2019-08-29

## 2019-08-29 VITALS
BODY MASS INDEX: 31.19 KG/M2 | TEMPERATURE: 98.6 F | WEIGHT: 222.8 LBS | SYSTOLIC BLOOD PRESSURE: 120 MMHG | HEIGHT: 71 IN | DIASTOLIC BLOOD PRESSURE: 80 MMHG | HEART RATE: 78 BPM | OXYGEN SATURATION: 96 %

## 2019-08-29 DIAGNOSIS — E78.2 MIXED HYPERLIPIDEMIA: ICD-10-CM

## 2019-08-29 DIAGNOSIS — M25.511 RIGHT SHOULDER PAIN, UNSPECIFIED CHRONICITY: ICD-10-CM

## 2019-08-29 DIAGNOSIS — E55.9 VITAMIN D DEFICIENCY: ICD-10-CM

## 2019-08-29 DIAGNOSIS — F41.1 GAD (GENERALIZED ANXIETY DISORDER): ICD-10-CM

## 2019-08-29 DIAGNOSIS — E66.9 CLASS 1 OBESITY WITH BODY MASS INDEX (BMI) OF 30.0 TO 30.9 IN ADULT, UNSPECIFIED OBESITY TYPE, UNSPECIFIED WHETHER SERIOUS COMORBIDITY PRESENT: Primary | ICD-10-CM

## 2019-08-29 DIAGNOSIS — K76.0 FATTY LIVER: ICD-10-CM

## 2019-08-29 DIAGNOSIS — L28.2 PRURITIC RASH: ICD-10-CM

## 2019-08-29 DIAGNOSIS — F19.91 HISTORY OF DRUG USE: ICD-10-CM

## 2019-08-29 DIAGNOSIS — A04.8 H. PYLORI INFECTION: ICD-10-CM

## 2019-08-29 DIAGNOSIS — F39 MOOD DISORDER (HCC): ICD-10-CM

## 2019-08-29 DIAGNOSIS — N28.9 RENAL IMPAIRMENT: ICD-10-CM

## 2019-08-29 PROCEDURE — 99214 OFFICE O/P EST MOD 30 MIN: CPT | Performed by: FAMILY MEDICINE

## 2019-08-29 RX ORDER — FLUTICASONE PROPIONATE 50 MCG
2 SPRAY, SUSPENSION (ML) NASAL DAILY
Qty: 1 BOTTLE | Refills: 3 | Status: SHIPPED | OUTPATIENT
Start: 2019-08-29 | End: 2022-02-04 | Stop reason: SDUPTHER

## 2019-08-29 RX ORDER — HYDROXYZINE PAMOATE 25 MG/1
25 CAPSULE ORAL 3 TIMES DAILY PRN
Qty: 90 CAPSULE | Refills: 3 | Status: SHIPPED | OUTPATIENT
Start: 2019-08-29 | End: 2019-12-05

## 2019-08-29 RX ORDER — PAROXETINE HYDROCHLORIDE 20 MG/1
20 TABLET, FILM COATED ORAL EVERY MORNING
Qty: 30 TABLET | Refills: 3 | Status: SHIPPED | OUTPATIENT
Start: 2019-08-29 | End: 2020-01-17 | Stop reason: SDUPTHER

## 2019-09-17 ENCOUNTER — ANESTHESIA (OUTPATIENT)
Dept: GASTROENTEROLOGY | Facility: HOSPITAL | Age: 39
End: 2019-09-17

## 2019-09-17 ENCOUNTER — HOSPITAL ENCOUNTER (OUTPATIENT)
Facility: HOSPITAL | Age: 39
Setting detail: HOSPITAL OUTPATIENT SURGERY
Discharge: HOME | End: 2019-09-17
Attending: INTERNAL MEDICINE | Admitting: INTERNAL MEDICINE

## 2019-09-17 ENCOUNTER — ANESTHESIA EVENT (OUTPATIENT)
Dept: GASTROENTEROLOGY | Facility: HOSPITAL | Age: 39
End: 2019-09-17

## 2019-09-17 VITALS
TEMPERATURE: 97.3 F | HEART RATE: 95 BPM | HEIGHT: 70 IN | DIASTOLIC BLOOD PRESSURE: 84 MMHG | BODY MASS INDEX: 30.66 KG/M2 | RESPIRATION RATE: 20 BRPM | SYSTOLIC BLOOD PRESSURE: 139 MMHG | WEIGHT: 214.13 LBS | OXYGEN SATURATION: 93 %

## 2019-09-17 DIAGNOSIS — K21.9 GASTROESOPHAGEAL REFLUX DISEASE, ESOPHAGITIS PRESENCE NOT SPECIFIED: ICD-10-CM

## 2019-09-17 DIAGNOSIS — R19.5 MUCUS IN STOOL: ICD-10-CM

## 2019-09-17 DIAGNOSIS — R19.4 CHANGE IN BOWEL HABITS: ICD-10-CM

## 2019-09-17 DIAGNOSIS — R10.12 LEFT UPPER QUADRANT PAIN: ICD-10-CM

## 2019-09-17 PROCEDURE — 88305 TISSUE EXAM BY PATHOLOGIST: CPT | Performed by: PATHOLOGY

## 2019-09-17 PROCEDURE — 25010000002 PROPOFOL 10 MG/ML EMULSION: Performed by: NURSE ANESTHETIST, CERTIFIED REGISTERED

## 2019-09-17 PROCEDURE — 88305 TISSUE EXAM BY PATHOLOGIST: CPT | Performed by: INTERNAL MEDICINE

## 2019-09-17 PROCEDURE — 45380 COLONOSCOPY AND BIOPSY: CPT | Performed by: INTERNAL MEDICINE

## 2019-09-17 PROCEDURE — 88342 IMHCHEM/IMCYTCHM 1ST ANTB: CPT | Performed by: INTERNAL MEDICINE

## 2019-09-17 PROCEDURE — 88342 IMHCHEM/IMCYTCHM 1ST ANTB: CPT | Performed by: PATHOLOGY

## 2019-09-17 PROCEDURE — 43239 EGD BIOPSY SINGLE/MULTIPLE: CPT | Performed by: INTERNAL MEDICINE

## 2019-09-17 RX ORDER — PROPOFOL 10 MG/ML
VIAL (ML) INTRAVENOUS AS NEEDED
Status: DISCONTINUED | OUTPATIENT
Start: 2019-09-17 | End: 2019-09-17 | Stop reason: SURG

## 2019-09-17 RX ORDER — DEXTROSE AND SODIUM CHLORIDE 5; .45 G/100ML; G/100ML
30 INJECTION, SOLUTION INTRAVENOUS CONTINUOUS PRN
Status: DISCONTINUED | OUTPATIENT
Start: 2019-09-17 | End: 2019-09-17 | Stop reason: HOSPADM

## 2019-09-17 RX ORDER — LIDOCAINE HYDROCHLORIDE 20 MG/ML
INJECTION, SOLUTION INTRAVENOUS AS NEEDED
Status: DISCONTINUED | OUTPATIENT
Start: 2019-09-17 | End: 2019-09-17 | Stop reason: SURG

## 2019-09-17 RX ADMIN — PROPOFOL 100 MG: 10 INJECTION, EMULSION INTRAVENOUS at 14:21

## 2019-09-17 RX ADMIN — PROPOFOL 100 MG: 10 INJECTION, EMULSION INTRAVENOUS at 14:23

## 2019-09-17 RX ADMIN — PROPOFOL 200 MG: 10 INJECTION, EMULSION INTRAVENOUS at 14:15

## 2019-09-17 RX ADMIN — LIDOCAINE HYDROCHLORIDE 100 MG: 20 INJECTION, SOLUTION INTRAVENOUS at 14:15

## 2019-09-17 RX ADMIN — PROPOFOL 100 MG: 10 INJECTION, EMULSION INTRAVENOUS at 14:17

## 2019-09-17 RX ADMIN — PROPOFOL 30 MG: 10 INJECTION, EMULSION INTRAVENOUS at 14:25

## 2019-09-17 RX ADMIN — PROPOFOL 100 MG: 10 INJECTION, EMULSION INTRAVENOUS at 14:19

## 2019-09-17 RX ADMIN — DEXTROSE AND SODIUM CHLORIDE 30 ML/HR: 5; 450 INJECTION, SOLUTION INTRAVENOUS at 12:34

## 2019-09-17 NOTE — ANESTHESIA PREPROCEDURE EVALUATION
Anesthesia Evaluation     Patient summary reviewed and Nursing notes reviewed   NPO Solid Status: > 8 hours  NPO Liquid Status: > 2 hours           Airway   No difficulty expected  Dental      Pulmonary - negative pulmonary ROS and normal exam   Cardiovascular     Rhythm: regular  Rate: normal    (+) hyperlipidemia,       Neuro/Psych  (+) psychiatric history Bipolar,     GI/Hepatic/Renal/Endo    (+)   liver disease fatty liver disease,     Musculoskeletal (-) negative ROS    Abdominal    Substance History      OB/GYN          Other - negative ROS                       Anesthesia Plan    ASA 3     MAC     intravenous induction   Anesthetic plan, all risks, benefits, and alternatives have been provided, discussed and informed consent has been obtained with: patient.    Plan discussed with CRNA.

## 2019-09-17 NOTE — ANESTHESIA POSTPROCEDURE EVALUATION
Patient: Jordin Parham    Procedure Summary     Date:  09/17/19 Room / Location:  [facility] ENDOSCOPY 2 / [facility] ENDOSCOPY    Anesthesia Start:  1410 Anesthesia Stop:  1431    Procedures:       ESOPHAGOGASTRODUODENOSCOPY (N/A )      COLONOSCOPY (N/A ) Diagnosis:       Gastroesophageal reflux disease, esophagitis presence not specified      Left upper quadrant pain      Change in bowel habits      Mucus in stool      (Gastroesophageal reflux disease, esophagitis presence not specified [K21.9])      (Left upper quadrant pain [R10.12])      (Change in bowel habits [R19.4])      (Mucus in stool [R19.5])    Surgeon:  Rigoberto Flower MD Provider:  Man Ayala CRNA    Anesthesia Type:  MAC ASA Status:  3          Anesthesia Type: MAC  Last vitals  BP   131/77 (09/17/19 1229)   Temp   97.8 °F (36.6 °C) (09/17/19 1229)   Pulse   71 (09/17/19 1229)   Resp   16 (09/17/19 1229)     SpO2   97 % (09/17/19 1229)     Post Anesthesia Care and Evaluation    Patient location during evaluation: bedside  Patient participation: complete - patient participated  Level of consciousness: awake  Pain score: 0  Pain management: adequate  Airway patency: patent  Anesthetic complications: No anesthetic complications  PONV Status: none  Cardiovascular status: acceptable  Respiratory status: acceptable  Hydration status: acceptable

## 2019-09-17 NOTE — H&P
No chief complaint on file.      Subjective    Jordin Parham is a 38 y.o. male. he is here today for follow-up.    History of Present Illness  38-year-old male presents to discuss chronic reflux history of H. pylori gastritis fatty liver and left upper quadrant abdominal pain changes in bowel habits and mucus within his stool.  States he has modified his diet in order to lose weight for fatty liver and has been eating lots of fruits and vegetables and feels like diarrhea may be related to that however has noted some mucus within his stool and has intermittent left upper quadrant abdominal pain.  He was treated for H. pylori in May with triple therapy and started on PPI and states symptoms have significantly improve however if he misses medication he will have reflux throughout the day.  He denies any nausea vomiting and has lost 5 pounds with active attempts at weight loss.  Plan; schedule patient for EGD and colonoscopy due to chronic reflux left upper quadrant abdominal pain change in bowel habits and mucus within the stool.  Follow-up after test return office sooner if needed       The following portions of the patient's history were reviewed and updated as appropriate:   Past Medical History:   Diagnosis Date   • Anxiety    • Fatty liver    • GERD (gastroesophageal reflux disease)    • Hyperlipidemia      Past Surgical History:   Procedure Laterality Date   • HEMORRHOIDECTOMY       Family History   Problem Relation Age of Onset   • Alcohol abuse Mother    • Anxiety disorder Mother    • Arthritis Mother    • Asthma Mother    • Cancer Mother    • Depression Mother    • Hypertension Mother    • Stroke Mother    • Hyperlipidemia Father    • Thyroid disease Sister        Prior to Admission medications    Medication Sig Start Date End Date Taking? Authorizing Provider   clotrimazole-betamethasone (LOTRISONE) 1-0.05 % cream Apply  topically to the appropriate area as directed 2 (Two) Times a Day. 7/19/19  Yes  "Jhon Amezquita MD   omeprazole (priLOSEC) 40 MG capsule Take 1 capsule by mouth Daily. 7/19/19  Yes Jhon Amezquita MD   PARoxetine (PAXIL) 10 MG tablet Take 1 tablet by mouth Every Morning. 7/19/19  Yes Jhon Amezquita MD   vitamin D (ERGOCALCIFEROL) 34819 units capsule capsule Take 1 capsule by mouth 1 (One) Time Per Week. 7/19/19  Yes Jhon Amezquita MD   hydrOXYzine pamoate (VISTARIL) 25 MG capsule Take 1 capsule by mouth 3 (Three) Times a Day As Needed for Itching. 7/19/19 8/9/19 Yes Jhon Amezquita MD   Cariprazine HCl (VRAYLAR) 1.5 MG capsule capsule Take 1 capsule by mouth Daily. 7/19/19 8/9/19  Jhon Amezquita MD     No Known Allergies  Social History     Socioeconomic History   • Marital status: Single     Spouse name: Not on file   • Number of children: Not on file   • Years of education: Not on file   • Highest education level: Not on file   Tobacco Use   • Smoking status: Never Smoker   • Smokeless tobacco: Never Used   Substance and Sexual Activity   • Alcohol use: Yes     Comment: rarely   • Drug use: No   • Sexual activity: Defer       Review of Systems  Review of Systems   Constitutional: Positive for appetite change. Negative for activity change, chills, diaphoresis, fatigue, fever and unexpected weight change.   HENT: Negative for trouble swallowing.    Respiratory: Negative for shortness of breath.    Gastrointestinal: Positive for abdominal pain and diarrhea. Negative for abdominal distention, anal bleeding, blood in stool, constipation, nausea, rectal pain and vomiting.   Musculoskeletal: Negative for arthralgias.   Skin: Negative for pallor.   Neurological: Negative for light-headedness.        Ht 177.8 cm (70\")   Wt 99.8 kg (220 lb)   BMI 31.57 kg/m²     Objective    Physical Exam   Constitutional: He is oriented to person, place, and time. He appears well-developed and well-nourished. He is cooperative. No distress.   HENT:   Head: Normocephalic and atraumatic.   Neck: Normal range " of motion. Neck supple. No thyromegaly present.   Cardiovascular: Normal rate, regular rhythm and normal heart sounds.   Pulmonary/Chest: Effort normal and breath sounds normal. He has no wheezes. He has no rhonchi. He has no rales.   Abdominal: Soft. Normal appearance and bowel sounds are normal. He exhibits no distension. There is no hepatosplenomegaly. There is no tenderness. There is no rigidity and no guarding. No hernia.   Lymphadenopathy:     He has no cervical adenopathy.   Neurological: He is alert and oriented to person, place, and time.   Skin: Skin is warm, dry and intact. No rash noted. No pallor.   Psychiatric: He has a normal mood and affect. His speech is normal.     Lab on 05/31/2019   Component Date Value Ref Range Status   • WBC 05/31/2019 6.73  3.40 - 10.80 10*3/mm3 Final   • RBC 05/31/2019 5.83* 4.14 - 5.80 10*6/mm3 Final   • Hemoglobin 05/31/2019 16.4  13.0 - 17.7 g/dL Final   • Hematocrit 05/31/2019 48.6  37.5 - 51.0 % Final   • MCV 05/31/2019 83.4  79.0 - 97.0 fL Final   • MCH 05/31/2019 28.1  26.6 - 33.0 pg Final   • MCHC 05/31/2019 33.7  31.5 - 35.7 g/dL Final   • RDW 05/31/2019 12.6  12.3 - 15.4 % Final   • RDW-SD 05/31/2019 38.0  37.0 - 54.0 fl Final   • MPV 05/31/2019 10.2  6.0 - 12.0 fL Final   • Platelets 05/31/2019 218  140 - 450 10*3/mm3 Final   • Neutrophil % 05/31/2019 52.2  42.7 - 76.0 % Final   • Lymphocyte % 05/31/2019 29.7  19.6 - 45.3 % Final   • Monocyte % 05/31/2019 12.0  5.0 - 12.0 % Final   • Eosinophil % 05/31/2019 4.6  0.3 - 6.2 % Final   • Basophil % 05/31/2019 1.2  0.0 - 1.5 % Final   • Immature Grans % 05/31/2019 0.3  0.0 - 0.5 % Final   • Neutrophils, Absolute 05/31/2019 3.51  1.70 - 7.00 10*3/mm3 Final   • Lymphocytes, Absolute 05/31/2019 2.00  0.70 - 3.10 10*3/mm3 Final   • Monocytes, Absolute 05/31/2019 0.81  0.10 - 0.90 10*3/mm3 Final   • Eosinophils, Absolute 05/31/2019 0.31  0.00 - 0.40 10*3/mm3 Final   • Basophils, Absolute 05/31/2019 0.08  0.00 - 0.20  10*3/mm3 Final   • Immature Grans, Absolute 05/31/2019 0.02  0.00 - 0.05 10*3/mm3 Final   • nRBC 05/31/2019 0.0  0.0 - 0.2 /100 WBC Final   • Glucose 05/31/2019 104* 65 - 99 mg/dL Final   • BUN 05/31/2019 12  6 - 20 mg/dL Final   • Creatinine 05/31/2019 1.07  0.76 - 1.27 mg/dL Final   • Sodium 05/31/2019 141  136 - 145 mmol/L Final   • Potassium 05/31/2019 4.4  3.5 - 5.2 mmol/L Final   • Chloride 05/31/2019 103  98 - 107 mmol/L Final   • CO2 05/31/2019 27.0  22.0 - 29.0 mmol/L Final   • Calcium 05/31/2019 9.3  8.6 - 10.5 mg/dL Final   • Total Protein 05/31/2019 7.9  6.0 - 8.5 g/dL Final   • Albumin 05/31/2019 4.50  3.50 - 5.20 g/dL Final   • ALT (SGPT) 05/31/2019 23  1 - 41 U/L Final   • AST (SGOT) 05/31/2019 17  1 - 40 U/L Final   • Alkaline Phosphatase 05/31/2019 67  39 - 117 U/L Final   • Total Bilirubin 05/31/2019 0.6  0.2 - 1.2 mg/dL Final   • eGFR Non African Amer 05/31/2019 77  >60 mL/min/1.73 Final   • Globulin 05/31/2019 3.4  gm/dL Final   • A/G Ratio 05/31/2019 1.3  g/dL Final   • BUN/Creatinine Ratio 05/31/2019 11.2  7.0 - 25.0 Final   • Anion Gap 05/31/2019 11.0  mmol/L Final   • Hemoglobin A1C 05/31/2019 5.30  4.80 - 5.60 % Final   • Total Cholesterol 05/31/2019 230* 0 - 200 mg/dL Final   • Triglycerides 05/31/2019 99  0 - 150 mg/dL Final   • HDL Cholesterol 05/31/2019 43  40 - 60 mg/dL Final   • LDL Cholesterol  05/31/2019 167* 0 - 100 mg/dL Final   • VLDL Cholesterol 05/31/2019 19.8  mg/dL Final   • LDL/HDL Ratio 05/31/2019 3.89   Final   • TSH 05/31/2019 1.070  0.270 - 4.200 mIU/mL Final   • Free T4 05/31/2019 1.04  0.93 - 1.70 ng/dL Final   • T3, Free 05/31/2019 3.66  2.00 - 4.40 pg/mL Final   • 25 Hydroxy, Vitamin D 05/31/2019 27.7* 30.0 - 100.0 ng/ml Final   • Vitamin B-12 05/31/2019 339  211 - 946 pg/mL Final   • Iron 05/31/2019 69  59 - 158 mcg/dL Final   • Iron Saturation 05/31/2019 21  20 - 50 % Final   • Transferrin 05/31/2019 221  200 - 360 mg/dL Final   • TIBC 05/31/2019 532 933 - 914  mcg/dL Final   • Amphet/Methamphet, Screen 05/31/2019 Negative  Negative Final   • Barbiturates Screen, Urine 05/31/2019 Negative  Negative Final   • Benzodiazepine Screen, Urine 05/31/2019 Negative  Negative Final   • Cocaine Screen, Urine 05/31/2019 Negative  Negative Final   • Opiate Screen 05/31/2019 Negative  Negative Final   • THC, Screen, Urine 05/31/2019 Negative  Negative Final   • Methadone Screen, Urine 05/31/2019 Negative  Negative Final   • Oxycodone Screen, Urine 05/31/2019 Negative  Negative Final   • Magnesium 05/31/2019 2.2  1.6 - 2.6 mg/dL Final   • Testosterone, Total 05/31/2019 458.00  249.00 - 836.00 ng/dL Final   • Amylase 05/31/2019 60  28 - 100 U/L Final   • Lipase 05/31/2019 51  13 - 60 U/L Final   • Sed Rate 05/31/2019 0  0 - 15 mm/hr Final   • C-Reactive Protein 05/31/2019 0.05  0.00 - 0.50 mg/dL Final   • H. pylori IgG 05/31/2019 7.32* 0.00 - 0.79 Index Value Final                                 Negative           <0.80                               Equivocal    0.80 - 0.89                               Positive           >0.89   • H. pylori, IgA ABS 05/31/2019 <9.0  0.0 - 8.9 units Final                                    Negative          <9.0                                  Equivocal   9.0 - 11.0                                  Positive         >11.0   • H. Pylori, IgM 05/31/2019 <9.0  0.0 - 8.9 units Final                                    Negative          <9.0                                  Equivocal   9.0 - 11.0                                  Positive         >11.0  This test was developed and its performance characteristics  determined by [facility]. It has not been cleared or approved  by the Food and Drug Administration.     Assessment/Plan      1. Gastroesophageal reflux disease, esophagitis presence not specified    2. Left upper quadrant pain    3. Change in bowel habits    4. Mucus in stool    .       Orders placed during this encounter include:  Orders Placed This  Encounter   Procedures   • Obtain Informed Consent     Standing Status:   Standing     Number of Occurrences:   1     Order Specific Question:   Informed Consent Given For     Answer:   ESOPHAGOGASTRODUODENOSCOPY and Colonoscopy   • POC Glucose Once     Prior to Procedure on ALL Diabetic Patients     Standing Status:   Standing     Number of Occurrences:   1   • Insert Peripheral IV     Standing Status:   Standing     Number of Occurrences:   1       ESOPHAGOGASTRODUODENOSCOPY (N/A), COLONOSCOPY (N/A)    Review and/or summary of lab tests, radiology, procedures, medications. Review and summary of old records and obtaining of history. The risks and benefits of my recommendations, as well as other treatment options were discussed with the patient today. Questions were answered.    New Medications Ordered This Visit   Medications   • dextrose 5 % and sodium chloride 0.45 % infusion       Follow-up: No Follow-up on file.          This document has been electronically signed by Rigoberto Flower MD on September 17, 2019 12:25 PM             Results for orders placed or performed in visit on 08/20/19   CBC Auto Differential   Result Value Ref Range    WBC 6.31 3.40 - 10.80 10*3/mm3    RBC 6.11 (H) 4.14 - 5.80 10*6/mm3    Hemoglobin 16.9 13.0 - 17.7 g/dL    Hematocrit 52.1 (H) 37.5 - 51.0 %    MCV 85.3 79.0 - 97.0 fL    MCH 27.7 26.6 - 33.0 pg    MCHC 32.4 31.5 - 35.7 g/dL    RDW 13.0 12.3 - 15.4 %    RDW-SD 40.1 37.0 - 54.0 fl    MPV 10.2 6.0 - 12.0 fL    Platelets 245 140 - 450 10*3/mm3    Neutrophil % 53.1 42.7 - 76.0 %    Lymphocyte % 29.0 19.6 - 45.3 %    Monocyte % 10.8 5.0 - 12.0 %    Eosinophil % 5.5 0.3 - 6.2 %    Basophil % 1.1 0.0 - 1.5 %    Immature Grans % 0.5 0.0 - 0.5 %    Neutrophils, Absolute 3.35 1.70 - 7.00 10*3/mm3    Lymphocytes, Absolute 1.83 0.70 - 3.10 10*3/mm3    Monocytes, Absolute 0.68 0.10 - 0.90 10*3/mm3    Eosinophils, Absolute 0.35 0.00 - 0.40 10*3/mm3    Basophils, Absolute 0.07 0.00 - 0.20  10*3/mm3    Immature Grans, Absolute 0.03 0.00 - 0.05 10*3/mm3    nRBC 0.0 0.0 - 0.2 /100 WBC   Vitamin D 25 Hydroxy   Result Value Ref Range    25 Hydroxy, Vitamin D 32.3 30.0 - 100.0 ng/ml   Comprehensive Metabolic Panel   Result Value Ref Range    Glucose 96 65 - 99 mg/dL    BUN 12 6 - 20 mg/dL    Creatinine 0.97 0.76 - 1.27 mg/dL    Sodium 139 136 - 145 mmol/L    Potassium 4.4 3.5 - 5.2 mmol/L    Chloride 100 98 - 107 mmol/L    CO2 26.1 22.0 - 29.0 mmol/L    Calcium 9.3 8.6 - 10.5 mg/dL    Total Protein 7.7 6.0 - 8.5 g/dL    Albumin 4.40 3.50 - 5.20 g/dL    ALT (SGPT) 19 1 - 41 U/L    AST (SGOT) 15 1 - 40 U/L    Alkaline Phosphatase 65 39 - 117 U/L    Total Bilirubin 0.9 0.2 - 1.2 mg/dL    eGFR Non African Amer 86 >60 mL/min/1.73    Globulin 3.3 gm/dL    A/G Ratio 1.3 g/dL    BUN/Creatinine Ratio 12.4 7.0 - 25.0    Anion Gap 12.9 5.0 - 15.0 mmol/L   Results for orders placed or performed in visit on 05/31/19   Helicobacter Pylori, IgA IgG IgM   Result Value Ref Range    H. pylori IgG 7.32 (H) 0.00 - 0.79 Index Value    H. pylori, IgA ABS <9.0 0.0 - 8.9 units    H. Pylori, IgM <9.0 0.0 - 8.9 units   CBC Auto Differential   Result Value Ref Range    WBC 6.73 3.40 - 10.80 10*3/mm3    RBC 5.83 (H) 4.14 - 5.80 10*6/mm3    Hemoglobin 16.4 13.0 - 17.7 g/dL    Hematocrit 48.6 37.5 - 51.0 %    MCV 83.4 79.0 - 97.0 fL    MCH 28.1 26.6 - 33.0 pg    MCHC 33.7 31.5 - 35.7 g/dL    RDW 12.6 12.3 - 15.4 %    RDW-SD 38.0 37.0 - 54.0 fl    MPV 10.2 6.0 - 12.0 fL    Platelets 218 140 - 450 10*3/mm3    Neutrophil % 52.2 42.7 - 76.0 %    Lymphocyte % 29.7 19.6 - 45.3 %    Monocyte % 12.0 5.0 - 12.0 %    Eosinophil % 4.6 0.3 - 6.2 %    Basophil % 1.2 0.0 - 1.5 %    Immature Grans % 0.3 0.0 - 0.5 %    Neutrophils, Absolute 3.51 1.70 - 7.00 10*3/mm3    Lymphocytes, Absolute 2.00 0.70 - 3.10 10*3/mm3    Monocytes, Absolute 0.81 0.10 - 0.90 10*3/mm3    Eosinophils, Absolute 0.31 0.00 - 0.40 10*3/mm3    Basophils, Absolute 0.08 0.00 -  0.20 10*3/mm3    Immature Grans, Absolute 0.02 0.00 - 0.05 10*3/mm3    nRBC 0.0 0.0 - 0.2 /100 WBC   Iron Profile   Result Value Ref Range    Iron 69 59 - 158 mcg/dL    Iron Saturation 21 20 - 50 %    Transferrin 221 200 - 360 mg/dL    TIBC 329 298 - 536 mcg/dL   Urine Drug Screen - Urine, Clean Catch   Result Value Ref Range    Amphet/Methamphet, Screen Negative Negative    Barbiturates Screen, Urine Negative Negative    Benzodiazepine Screen, Urine Negative Negative    Cocaine Screen, Urine Negative Negative    Opiate Screen Negative Negative    THC, Screen, Urine Negative Negative    Methadone Screen, Urine Negative Negative    Oxycodone Screen, Urine Negative Negative   Vitamin D 25 Hydroxy   Result Value Ref Range    25 Hydroxy, Vitamin D 27.7 (L) 30.0 - 100.0 ng/ml   Sedimentation rate, automated   Result Value Ref Range    Sed Rate 0 0 - 15 mm/hr   C-reactive protein   Result Value Ref Range    C-Reactive Protein 0.05 0.00 - 0.50 mg/dL   T3, Free   Result Value Ref Range    T3, Free 3.66 2.00 - 4.40 pg/mL   TSH   Result Value Ref Range    TSH 1.070 0.270 - 4.200 mIU/mL   T4, Free   Result Value Ref Range    Free T4 1.04 0.93 - 1.70 ng/dL   Testosterone   Result Value Ref Range    Testosterone, Total 458.00 249.00 - 836.00 ng/dL   Magnesium   Result Value Ref Range    Magnesium 2.2 1.6 - 2.6 mg/dL   Lipase   Result Value Ref Range    Lipase 51 13 - 60 U/L   Hemoglobin A1c   Result Value Ref Range    Hemoglobin A1C 5.30 4.80 - 5.60 %   Vitamin B12   Result Value Ref Range    Vitamin B-12 339 211 - 946 pg/mL   Amylase   Result Value Ref Range    Amylase 60 28 - 100 U/L   Lipid Panel   Result Value Ref Range    Total Cholesterol 230 (H) 0 - 200 mg/dL    Triglycerides 99 0 - 150 mg/dL    HDL Cholesterol 43 40 - 60 mg/dL    LDL Cholesterol  167 (H) 0 - 100 mg/dL    VLDL Cholesterol 19.8 mg/dL    LDL/HDL Ratio 3.89      *Note: Due to a large number of results and/or encounters for the requested time period, some  results have not been displayed. A complete set of results can be found in Results Review.

## 2019-09-23 LAB
LAB AP CASE REPORT: NORMAL
LAB AP DIAGNOSIS COMMENT: NORMAL
PATH REPORT.FINAL DX SPEC: NORMAL
PATH REPORT.GROSS SPEC: NORMAL

## 2019-10-03 NOTE — PROGRESS NOTES
Subjective:  Jordin Parham is a 38 y.o. male who presents for       Patient Active Problem List   Diagnosis   • Class 1 obesity with body mass index (BMI) of 30.0 to 30.9 in adult   • Gastroesophageal reflux disease   • COOKIE (generalized anxiety disorder)   • Left upper quadrant pain   • Low libido   • Mood disorder (CMS/HCC)   • H. pylori infection   • Renal impairment   • Mixed hyperlipidemia   • Vitamin D deficiency   • History of drug use   • Fatty liver   • Renal cyst   • Change in bowel habits   • Mucus in stool           Current Outpatient Medications:   •  clotrimazole-betamethasone (LOTRISONE) 1-0.05 % cream, Apply  topically to the appropriate area as directed 2 (Two) Times a Day., Disp: 45 g, Rfl: 3  •  hydrOXYzine pamoate (VISTARIL) 25 MG capsule, Take 1 capsule by mouth 3 (Three) Times a Day As Needed for Itching., Disp: 90 capsule, Rfl: 3  •  omeprazole (priLOSEC) 40 MG capsule, Take 1 capsule by mouth Daily., Disp: 30 capsule, Rfl: 3  •  PARoxetine (PAXIL) 20 MG tablet, Take 1 tablet by mouth Every Morning., Disp: 30 tablet, Rfl: 3  •  vitamin D (ERGOCALCIFEROL) 53652 units capsule capsule, Take 1 capsule by mouth 1 (One) Time Per Week., Disp: 4 capsule, Rfl: 11    HPI     Pt is 39 yo male with CMH of obesity, COOKIE, GERD, mood disorder, history of H pylori infection, VItamin D deficiency,  Renal impairment, History of illicit drug use, fatty liver disease, gastritis,  Eosinophilic esophagitis, internal/external hemorrhoids, sp hemorrhoidectomy      8/29/19 pt is here for recheck. He is doing okay. He is eating healthier. He lost 6 lbs.  He is doing better with paxil but needs adjustment. He has eGD and colonoscopy scheduled soon. He was treated for H. Pylori.  Fiancee  State he is doing better mentally. His main concern is his right shoulder. 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  He denies trauma.  Has not tried anything pain  He also has allergy issues tried claritin but would like something  Hedy has a recurring rash. Was given prescription but pharmacy did not have . Had labwork  On 8/20/19  GFR improved from 77 to 86. Liver enzymes normal. CBC showed normal hemoglobin. Vitamin D     10/10/19 pt is here for recheck and followup. Pt had endoscopy done with Gastroenterology on 9/17/19 that showed  Severe esophagitis, gastritis. Colonoscopy showed internal/external hemorrhodis.  On biopsy he has eosinophilic esophagitis and gastritis. No evidence of H pyloi,  Since taking prilosec and changing his diet he has noticed improvement.   He has lost 3 lbs since last visit. He has felt better   His abdominal pain has subsided      Shoulder Injury    The incident occurred in the yard. The right shoulder is affected. The quality of the pain is described as aching. The pain does not radiate. The pain is at a severity of 4/10. The pain is mild. Pertinent negatives include no chest pain, muscle weakness, numbness or tingling. Nothing aggravates the symptoms. He has tried nothing for the symptoms. The treatment provided no relief.    Obesity   This is a chronic problem. The current episode started more than 1 year ago. The problem occurs constantly. The problem has been unchanged. Associated symptoms include abdominal pain and fatigue. Pertinent negatives include no anorexia, arthralgias, change in bowel habit, chest pain, chills, congestion, coughing, diaphoresis, fever, headaches, joint swelling, myalgias, nausea, neck pain, numbness, rash, sore throat, swollen glands, urinary symptoms, vertigo, visual change, vomiting or weakness. Nothing aggravates the symptoms. He has tried nothing for the symptoms. The treatment provided no relief.   Mental Health Problem   The primary symptoms include dysphoric mood. The primary symptoms do not include delusions, hallucinations, bizarre behavior, disorganized speech or negative symptoms. This is a chronic problem.   The onset of the illness is precipitated by a  stressful event, drug abuse and emotional stress. The degree of incapacity that he is experiencing as a consequence of his illness is severe. Sequelae of the illness include an inability to work and harmed interpersonal relations. Additional symptoms of the illness include anhedonia, insomnia, fatigue, agitation, feelings of worthlessness and abdominal pain. Additional symptoms of the illness do not include hypersomnia, appetite change, unexpected weight change, psychomotor retardation, attention impairment, euphoric mood, increased goal-directed activity, decreased need for sleep, distractible, poor judgment, visual change or headaches. He does not admit to suicidal ideas. He does not have a plan to commit suicide. He does not contemplate harming himself. He has not already injured self. He does not contemplate injuring another person. He has not already  injured another person. Risk factors that are present for mental illness include a history of mental illness.   Abdominal Pain   This is a chronic problem. The current episode started more than 1 year ago. The onset quality is gradual. The problem occurs intermittently. The problem has been unchanged. The pain is located in the LUQ. The pain is at a severity of 5/10. The pain is moderate. The quality of the pain is aching. The abdominal pain does not radiate. Pertinent negatives include no anorexia, arthralgias, belching, constipation, diarrhea, fever, flatus, frequency, headaches, melena, myalgias, nausea or vomiting. Nothing aggravates the pain. The pain is relieved by nothing. He has tried nothing for the symptoms. Prior diagnostic workup includes GI consult, lower endoscopy and upper endoscopy. There is no history of abdominal surgery, colon cancer, Crohn's disease, gallstones, GERD, irritable bowel syndrome, pancreatitis, PUD or ulcerative colitis.   Male  Problem   The patient's pertinent negatives include no genital injury, genital itching, genital  lesions, pelvic pain, penile discharge or penile pain. This is a chronic problem. The current episode started more than 1 year ago. The problem occurs daily. The problem has been gradually worsening. The pain is medium. Associated symptoms include abdominal pain. Pertinent negatives include no anorexia, chest pain, chills, constipation, coughing, diarrhea, fever, frequency, headaches, nausea, rash, shortness of breath, sore throat or vomiting. Nothing aggravates the symptoms. He has tried nothing for the symptoms. The treatment provided no relief. He is not sexually active. No, his partner does not have an STD. There is no history of BPH, chlamydia, cryptorchidism, erectile aid use, erectile dysfunction, a femoral hernia, gonorrhea, herpes simplex, HIV, an inguinal hernia, kidney stones, prostatitis, sickle cell disease, syphilis or varicocele.     Review of Systems  Review of Systems   Constitutional: Positive for activity change and fatigue. Negative for appetite change, chills, diaphoresis and fever.   HENT: Negative for congestion, postnasal drip, rhinorrhea, sinus pressure, sinus pain, sneezing, sore throat, trouble swallowing and voice change.    Respiratory: Negative for cough, choking, chest tightness, shortness of breath, wheezing and stridor.    Cardiovascular: Negative for chest pain.   Gastrointestinal: Positive for abdominal pain. Negative for diarrhea, nausea and vomiting.   Neurological: Negative for weakness and headaches.   Psychiatric/Behavioral: Positive for agitation and behavioral problems.       Patient Active Problem List   Diagnosis   • Class 1 obesity with body mass index (BMI) of 30.0 to 30.9 in adult   • Gastroesophageal reflux disease   • COOKIE (generalized anxiety disorder)   • Left upper quadrant pain   • Low libido   • Mood disorder (CMS/HCC)   • H. pylori infection   • Renal impairment   • Mixed hyperlipidemia   • Vitamin D deficiency   • History of drug use   • Fatty liver   • Renal  cyst   • Change in bowel habits   • Mucus in stool     Past Surgical History:   Procedure Laterality Date   • COLONOSCOPY N/A 9/17/2019    Procedure: COLONOSCOPY;  Surgeon: Rigoberto Flower MD;  Location: [facility] ENDOSCOPY;  Service: Gastroenterology   • ENDOSCOPY N/A 9/17/2019    Procedure: ESOPHAGOGASTRODUODENOSCOPY;  Surgeon: Rigoberto Flower MD;  Location: [facility] ENDOSCOPY;  Service: Gastroenterology   • HEMORRHOIDECTOMY       Social History     Socioeconomic History   • Marital status: Single     Spouse name: Not on file   • Number of children: Not on file   • Years of education: Not on file   • Highest education level: Not on file   Tobacco Use   • Smoking status: Never Smoker   • Smokeless tobacco: Never Used   Substance and Sexual Activity   • Alcohol use: Yes     Comment: rarely   • Drug use: No   • Sexual activity: Defer     Family History   Problem Relation Age of Onset   • Alcohol abuse Mother    • Anxiety disorder Mother    • Arthritis Mother    • Asthma Mother    • Cancer Mother    • Depression Mother    • Hypertension Mother    • Stroke Mother    • Hyperlipidemia Father    • Thyroid disease Sister      Admission on 09/17/2019, Discharged on 09/17/2019   Component Date Value Ref Range Status   • Case Report 09/17/2019    Final                    Value:Surgical Pathology Report                         Case: [id number]                                  Authorizing Provider:  Rigoberto Flower MD        Collected:           09/17/2019 02:20 PM          Ordering Location:     [facility]             Received:            09/18/2019 07:26 AM                                 [facility]                                                     Pathologist:           Venu Colon MD                                                        Specimens:   1) - Gastric, Antrum                                                                                2) - Esophagus, Distal                                                                               3) - Small Intestine, Ileum, terminal ileum                                                         4) - Large Intestine, colonic mucosa                                                      • Final Diagnosis 09/17/2019    Final                    Value:This result contains rich text formatting which cannot be displayed here.   • Comment 09/17/2019    Final                    Value:This result contains rich text formatting which cannot be displayed here.   • Gross Description 09/17/2019    Final                    Value:This result contains rich text formatting which cannot be displayed here.   Lab on 08/20/2019   Component Date Value Ref Range Status   • WBC 08/20/2019 6.31  3.40 - 10.80 10*3/mm3 Final   • RBC 08/20/2019 6.11* 4.14 - 5.80 10*6/mm3 Final   • Hemoglobin 08/20/2019 16.9  13.0 - 17.7 g/dL Final   • Hematocrit 08/20/2019 52.1* 37.5 - 51.0 % Final   • MCV 08/20/2019 85.3  79.0 - 97.0 fL Final   • MCH 08/20/2019 27.7  26.6 - 33.0 pg Final   • MCHC 08/20/2019 32.4  31.5 - 35.7 g/dL Final   • RDW 08/20/2019 13.0  12.3 - 15.4 % Final   • RDW-SD 08/20/2019 40.1  37.0 - 54.0 fl Final   • MPV 08/20/2019 10.2  6.0 - 12.0 fL Final   • Platelets 08/20/2019 245  140 - 450 10*3/mm3 Final   • Neutrophil % 08/20/2019 53.1  42.7 - 76.0 % Final   • Lymphocyte % 08/20/2019 29.0  19.6 - 45.3 % Final   • Monocyte % 08/20/2019 10.8  5.0 - 12.0 % Final   • Eosinophil % 08/20/2019 5.5  0.3 - 6.2 % Final   • Basophil % 08/20/2019 1.1  0.0 - 1.5 % Final   • Immature Grans % 08/20/2019 0.5  0.0 - 0.5 % Final   • Neutrophils, Absolute 08/20/2019 3.35  1.70 - 7.00 10*3/mm3 Final   • Lymphocytes, Absolute 08/20/2019 1.83  0.70 - 3.10 10*3/mm3 Final   • Monocytes, Absolute 08/20/2019 0.68  0.10 - 0.90 10*3/mm3 Final   • Eosinophils, Absolute 08/20/2019 0.35  0.00 - 0.40 10*3/mm3 Final   • Basophils, Absolute 08/20/2019 0.07  0.00 - 0.20 10*3/mm3 Final   • Immature Grans, Absolute  08/20/2019 0.03  0.00 - 0.05 10*3/mm3 Final   • nRBC 08/20/2019 0.0  0.0 - 0.2 /100 WBC Final   • Glucose 08/20/2019 96  65 - 99 mg/dL Final   • BUN 08/20/2019 12  6 - 20 mg/dL Final   • Creatinine 08/20/2019 0.97  0.76 - 1.27 mg/dL Final   • Sodium 08/20/2019 139  136 - 145 mmol/L Final   • Potassium 08/20/2019 4.4  3.5 - 5.2 mmol/L Final   • Chloride 08/20/2019 100  98 - 107 mmol/L Final   • CO2 08/20/2019 26.1  22.0 - 29.0 mmol/L Final   • Calcium 08/20/2019 9.3  8.6 - 10.5 mg/dL Final   • Total Protein 08/20/2019 7.7  6.0 - 8.5 g/dL Final   • Albumin 08/20/2019 4.40  3.50 - 5.20 g/dL Final   • ALT (SGPT) 08/20/2019 19  1 - 41 U/L Final   • AST (SGOT) 08/20/2019 15  1 - 40 U/L Final   • Alkaline Phosphatase 08/20/2019 65  39 - 117 U/L Final   • Total Bilirubin 08/20/2019 0.9  0.2 - 1.2 mg/dL Final   • eGFR Non  Amer 08/20/2019 86  >60 mL/min/1.73 Final   • Globulin 08/20/2019 3.3  gm/dL Final   • A/G Ratio 08/20/2019 1.3  g/dL Final   • BUN/Creatinine Ratio 08/20/2019 12.4  7.0 - 25.0 Final   • Anion Gap 08/20/2019 12.9  5.0 - 15.0 mmol/L Final   • 25 Hydroxy, Vitamin D 08/20/2019 32.3  30.0 - 100.0 ng/ml Final   Lab on 05/31/2019   Component Date Value Ref Range Status   • WBC 05/31/2019 6.73  3.40 - 10.80 10*3/mm3 Final   • RBC 05/31/2019 5.83* 4.14 - 5.80 10*6/mm3 Final   • Hemoglobin 05/31/2019 16.4  13.0 - 17.7 g/dL Final   • Hematocrit 05/31/2019 48.6  37.5 - 51.0 % Final   • MCV 05/31/2019 83.4  79.0 - 97.0 fL Final   • MCH 05/31/2019 28.1  26.6 - 33.0 pg Final   • MCHC 05/31/2019 33.7  31.5 - 35.7 g/dL Final   • RDW 05/31/2019 12.6  12.3 - 15.4 % Final   • RDW-SD 05/31/2019 38.0  37.0 - 54.0 fl Final   • MPV 05/31/2019 10.2  6.0 - 12.0 fL Final   • Platelets 05/31/2019 218  140 - 450 10*3/mm3 Final   • Neutrophil % 05/31/2019 52.2  42.7 - 76.0 % Final   • Lymphocyte % 05/31/2019 29.7  19.6 - 45.3 % Final   • Monocyte % 05/31/2019 12.0  5.0 - 12.0 % Final   • Eosinophil % 05/31/2019 4.6  0.3 - 6.2 %  Final   • Basophil % 05/31/2019 1.2  0.0 - 1.5 % Final   • Immature Grans % 05/31/2019 0.3  0.0 - 0.5 % Final   • Neutrophils, Absolute 05/31/2019 3.51  1.70 - 7.00 10*3/mm3 Final   • Lymphocytes, Absolute 05/31/2019 2.00  0.70 - 3.10 10*3/mm3 Final   • Monocytes, Absolute 05/31/2019 0.81  0.10 - 0.90 10*3/mm3 Final   • Eosinophils, Absolute 05/31/2019 0.31  0.00 - 0.40 10*3/mm3 Final   • Basophils, Absolute 05/31/2019 0.08  0.00 - 0.20 10*3/mm3 Final   • Immature Grans, Absolute 05/31/2019 0.02  0.00 - 0.05 10*3/mm3 Final   • nRBC 05/31/2019 0.0  0.0 - 0.2 /100 WBC Final   • Glucose 05/31/2019 104* 65 - 99 mg/dL Final   • BUN 05/31/2019 12  6 - 20 mg/dL Final   • Creatinine 05/31/2019 1.07  0.76 - 1.27 mg/dL Final   • Sodium 05/31/2019 141  136 - 145 mmol/L Final   • Potassium 05/31/2019 4.4  3.5 - 5.2 mmol/L Final   • Chloride 05/31/2019 103  98 - 107 mmol/L Final   • CO2 05/31/2019 27.0  22.0 - 29.0 mmol/L Final   • Calcium 05/31/2019 9.3  8.6 - 10.5 mg/dL Final   • Total Protein 05/31/2019 7.9  6.0 - 8.5 g/dL Final   • Albumin 05/31/2019 4.50  3.50 - 5.20 g/dL Final   • ALT (SGPT) 05/31/2019 23  1 - 41 U/L Final   • AST (SGOT) 05/31/2019 17  1 - 40 U/L Final   • Alkaline Phosphatase 05/31/2019 67  39 - 117 U/L Final   • Total Bilirubin 05/31/2019 0.6  0.2 - 1.2 mg/dL Final   • eGFR Non African Amer 05/31/2019 77  >60 mL/min/1.73 Final   • Globulin 05/31/2019 3.4  gm/dL Final   • A/G Ratio 05/31/2019 1.3  g/dL Final   • BUN/Creatinine Ratio 05/31/2019 11.2  7.0 - 25.0 Final   • Anion Gap 05/31/2019 11.0  mmol/L Final   • Hemoglobin A1C 05/31/2019 5.30  4.80 - 5.60 % Final   • Total Cholesterol 05/31/2019 230* 0 - 200 mg/dL Final   • Triglycerides 05/31/2019 99  0 - 150 mg/dL Final   • HDL Cholesterol 05/31/2019 43  40 - 60 mg/dL Final   • LDL Cholesterol  05/31/2019 167* 0 - 100 mg/dL Final   • VLDL Cholesterol 05/31/2019 19.8  mg/dL Final   • LDL/HDL Ratio 05/31/2019 3.89   Final   • TSH 05/31/2019 1.070  0.270  - 4.200 mIU/mL Final   • Free T4 05/31/2019 1.04  0.93 - 1.70 ng/dL Final   • T3, Free 05/31/2019 3.66  2.00 - 4.40 pg/mL Final   • 25 Hydroxy, Vitamin D 05/31/2019 27.7* 30.0 - 100.0 ng/ml Final   • Vitamin B-12 05/31/2019 339  211 - 946 pg/mL Final   • Iron 05/31/2019 69  59 - 158 mcg/dL Final   • Iron Saturation 05/31/2019 21  20 - 50 % Final   • Transferrin 05/31/2019 221  200 - 360 mg/dL Final   • TIBC 05/31/2019 329  298 - 536 mcg/dL Final   • Amphet/Methamphet, Screen 05/31/2019 Negative  Negative Final   • Barbiturates Screen, Urine 05/31/2019 Negative  Negative Final   • Benzodiazepine Screen, Urine 05/31/2019 Negative  Negative Final   • Cocaine Screen, Urine 05/31/2019 Negative  Negative Final   • Opiate Screen 05/31/2019 Negative  Negative Final   • THC, Screen, Urine 05/31/2019 Negative  Negative Final   • Methadone Screen, Urine 05/31/2019 Negative  Negative Final   • Oxycodone Screen, Urine 05/31/2019 Negative  Negative Final   • Magnesium 05/31/2019 2.2  1.6 - 2.6 mg/dL Final   • Testosterone, Total 05/31/2019 458.00  249.00 - 836.00 ng/dL Final   • Amylase 05/31/2019 60  28 - 100 U/L Final   • Lipase 05/31/2019 51  13 - 60 U/L Final   • Sed Rate 05/31/2019 0  0 - 15 mm/hr Final   • C-Reactive Protein 05/31/2019 0.05  0.00 - 0.50 mg/dL Final   • H. pylori IgG 05/31/2019 7.32* 0.00 - 0.79 Index Value Final                                 Negative           <0.80                               Equivocal    0.80 - 0.89                               Positive           >0.89   • H. pylori, IgA ABS 05/31/2019 <9.0  0.0 - 8.9 units Final                                    Negative          <9.0                                  Equivocal   9.0 - 11.0                                  Positive         >11.0   • H. Pylori, IgM 05/31/2019 <9.0  0.0 - 8.9 units Final                                    Negative          <9.0                                  Equivocal   9.0 - 11.0                                   "Positive         >11.0  This test was developed and its performance characteristics  determined by [facility]. It has not been cleared or approved  by the Food and Drug Administration.      No image results found.    @[email or web address]@    There is no immunization history on file for this patient.    The following portions of the patient's history were reviewed and updated as appropriate: allergies, current medications, past family history, past medical history, past social history, past surgical history and problem list.        Physical Exam  /80 (BP Location: Right arm, Patient Position: Sitting)   Pulse 96   Ht 177.8 cm (70\")   Wt 99.4 kg (219 lb 3.2 oz)   SpO2 98%   BMI 31.45 kg/m²     Physical Exam   Constitutional: He is oriented to person, place, and time. He appears well-developed and well-nourished.   HENT:   Head: Normocephalic and atraumatic.   Right Ear: External ear normal.   Eyes: Conjunctivae and EOM are normal. Pupils are equal, round, and reactive to light.   Neck: Normal range of motion. Neck supple.   Cardiovascular: Normal rate, regular rhythm and normal heart sounds.   No murmur heard.  Pulmonary/Chest: Effort normal and breath sounds normal. No respiratory distress.   Abdominal: Soft. Bowel sounds are normal. He exhibits no distension. There is no tenderness.   Obese abdomen    Musculoskeletal: Normal range of motion. He exhibits no edema or deformity.   Neurological: He is alert and oriented to person, place, and time. No cranial nerve deficit.   Skin: Skin is warm. No rash noted. He is not diaphoretic. No erythema. No pallor.   Psychiatric: He has a normal mood and affect. His behavior is normal.   Nursing note and vitals reviewed.      Assessment/Plan    Diagnosis Plan   1. Eosinophilic esophagitis  Recurrent Gastrointestinal Distress    Alpha - Gal Panel   2. Mood disorder (CMS/HCC)     3. Vitamin D deficiency     4. History of drug use     5. Mixed hyperlipidemia     6. Fatty liver   "   7. COOKIE (generalized anxiety disorder)     8. Gastroesophageal reflux disease, esophagitis presence not specified     9. Class 1 obesity with body mass index (BMI) of 30.0 to 30.9 in adult, unspecified obesity type, unspecified whether serious comorbidity present     10. Other gastritis without hemorrhage, unspecified chronicity          -went over labwork and US of abdomen  -renal cyst - monitor.   -eosinophilic esophagitis - will check gi distress panel and alpha gal. advsied to followup with  Gastroenteorlogy   -recommend influenza vaccination   -fatty liver -recommend heart healthy diet. Gave information today  -pruritis - vistaril 25 mg PO TID along with lotrisone cream BID  -H pylori infection, clarithromycin amoxicillin, PPI, flagyl.  Recommend EGD. Referral to Gastroenterology. Pt canceled appt with [facility]. He will need to reschedule   -vitamin D deficiency -vitamin D once a week  -LUQ pain - will get US of abdomen   -HLP - DASH diet, heart healthy diet.  -renal impairment -  Will monitor kidney function   -GERD -continue prilosec. Possible gastritis. Will need last EGD and endoscopy. Start on carafate 1 gram tablet QID. Will have colonoscopy and   -allergic rhintis - flonase nasal psray   -right shoulder pain - x-ray. Consider Orthopedic and PT/OT   -for low libido - will check testosterone along with thyroid level  -mood disorder/insomnia  - likely bipolar disorder. Recommend seroquel 25 mg at bedtime.  Will add   -panic attack - recommend paxil  Will go up from 10 to 20 mg daily.  Drug information provided  -recommended counseling but pt decline for now   -obesity - counseled pt to lose weight >5 minutes, diet information provided  -advised pt to be safe and call with questions and concerns. All questions addressed tdoay.   -recheck in 4  weeks         This document has been electronically signed by Jhon Amezquita MD on October 10, 2019 4:49 PM

## 2019-10-10 ENCOUNTER — OFFICE VISIT (OUTPATIENT)
Dept: FAMILY MEDICINE CLINIC | Facility: CLINIC | Age: 39
End: 2019-10-10

## 2019-10-10 VITALS
OXYGEN SATURATION: 98 % | WEIGHT: 219.2 LBS | SYSTOLIC BLOOD PRESSURE: 122 MMHG | HEART RATE: 96 BPM | BODY MASS INDEX: 31.38 KG/M2 | HEIGHT: 70 IN | DIASTOLIC BLOOD PRESSURE: 80 MMHG

## 2019-10-10 DIAGNOSIS — E78.2 MIXED HYPERLIPIDEMIA: ICD-10-CM

## 2019-10-10 DIAGNOSIS — F41.1 GAD (GENERALIZED ANXIETY DISORDER): ICD-10-CM

## 2019-10-10 DIAGNOSIS — F39 MOOD DISORDER (HCC): ICD-10-CM

## 2019-10-10 DIAGNOSIS — E66.9 CLASS 1 OBESITY WITH BODY MASS INDEX (BMI) OF 30.0 TO 30.9 IN ADULT, UNSPECIFIED OBESITY TYPE, UNSPECIFIED WHETHER SERIOUS COMORBIDITY PRESENT: ICD-10-CM

## 2019-10-10 DIAGNOSIS — K29.60 OTHER GASTRITIS WITHOUT HEMORRHAGE, UNSPECIFIED CHRONICITY: ICD-10-CM

## 2019-10-10 DIAGNOSIS — K21.9 GASTROESOPHAGEAL REFLUX DISEASE, ESOPHAGITIS PRESENCE NOT SPECIFIED: ICD-10-CM

## 2019-10-10 DIAGNOSIS — K20.0 EOSINOPHILIC ESOPHAGITIS: Primary | ICD-10-CM

## 2019-10-10 DIAGNOSIS — E55.9 VITAMIN D DEFICIENCY: ICD-10-CM

## 2019-10-10 DIAGNOSIS — K76.0 FATTY LIVER: ICD-10-CM

## 2019-10-10 DIAGNOSIS — F19.91 HISTORY OF DRUG USE: ICD-10-CM

## 2019-10-10 PROCEDURE — 99214 OFFICE O/P EST MOD 30 MIN: CPT | Performed by: FAMILY MEDICINE

## 2019-10-10 NOTE — PATIENT INSTRUCTIONS
Esophagitis    Esophagitis is inflammation of the esophagus. The esophagus is the tube that carries food and liquids from your mouth to your stomach. Esophagitis can cause soreness or pain in the esophagus. This condition can make it difficult and painful to swallow.  What are the causes?  Most causes of esophagitis are not serious. Common causes of this condition include:  · Gastroesophageal reflux disease (GERD). This is when stomach contents move back up into the esophagus (reflux).  · Repeated vomiting.  · An allergic-type reaction, especially caused by food allergies (eosinophilic esophagitis).  · Injury to the esophagus by swallowing large pills with or without water, or swallowing certain types of medicines.  · Swallowing (ingesting) harmful chemicals, such as household cleaning products.  · Heavy alcohol use.  · An infection of the esophagus. This most often occurs in people who have a weakened immune system.  · Radiation or chemotherapy treatment for cancer.  · Certain diseases such as sarcoidosis, Crohn disease, and scleroderma.  What are the signs or symptoms?  Symptoms of this condition include:  · Difficult or painful swallowing.  · Pain with swallowing acidic liquids, such as citrus juices.  · Pain with burping.  · Chest pain.  · Difficulty breathing.  · Nausea.  · Vomiting.  · Pain in the abdomen.  · Weight loss.  · Ulcers in the mouth.  · Patches of white material in the mouth (candidiasis).  · Fever.  · Coughing up blood or vomiting blood.  · Stool that is black, tarry, or bright red.  How is this diagnosed?  Your health care provider will take a medical history and perform a physical exam. You may also have other tests, including:  · An endoscopy to examine your stomach and esophagus with a small camera.  · A test that measures the acidity level in your esophagus.  · A test that measures how much pressure is on your esophagus.  · A barium swallow or modified barium swallow to show the shape, size,  and functioning of your esophagus.  · Allergy tests.  How is this treated?  Treatment for this condition depends on the cause of your esophagitis. In some cases, steroids or other medicines may be given to help relieve your symptoms or to treat the underlying cause of your condition. You may have to make some lifestyle changes, such as:  · Avoiding alcohol.  · Quitting smoking.  · Changing your diet.  · Exercising.  · Changing your sleep habits and your sleep environment.  Follow these instructions at home:  Take these actions to decrease your discomfort and to help avoid complications.  Diet  · Follow a diet as recommended by your health care provider. This may involve avoiding foods and drinks such as:  ? Coffee and tea (with or without caffeine).  ? Drinks that contain alcohol.  ? Energy drinks and sports drinks.  ? Carbonated drinks or sodas.  ? Chocolate and cocoa.  ? Peppermint and mint flavorings.  ? Garlic and onions.  ? Horseradish.  ? Spicy and acidic foods, including peppers, chili powder, sales powder, vinegar, hot sauces, and barbecue sauce.  ? Citrus fruit juices and citrus fruits, such as oranges, juancarlos, and limes.  ? Tomato-based foods, such as red sauce, chili, salsa, and pizza with red sauce.  ? Fried and fatty foods, such as donuts, french fries, potato chips, and high-fat dressings.  ? High-fat meats, such as hot dogs and fatty cuts of red and white meats, such as rib eye steak, sausage, ham, and santiago.  ? High-fat dairy items, such as whole milk, butter, and cream cheese.  · Eat small, frequent meals instead of large meals.  · Avoid drinking large amounts of liquid with your meals.  · Avoid eating meals during the 2-3 hours before bedtime.  · Avoid lying down right after you eat.  · Do not exercise right after you eat.  · Avoid foods and drinks that seem to make your symptoms worse.  General instructions  · Pay attention to any changes in your symptoms.  · Take over-the-counter and prescription  medicines only as told by your health care provider. Do not take aspirin, ibuprofen, or other NSAIDs unless your health care provider told you to do so.  · If you have trouble taking pills, use a pill splitter to decrease the size of the pill. This will decrease the chance of the pill getting stuck or injuring your esophagus on the way down. Also, drink water after you take a pill.  · Do not use any tobacco products, including cigarettes, chewing tobacco, and e-cigarettes. If you need help quitting, ask your health care provider.  · Wear loose-fitting clothing. Do not wear anything tight around your waist that causes pressure on your abdomen.  · Raise (elevate) the head of your bed about 6 inches (15 cm).  · Try to reduce your stress, such as with yoga or meditation. If you need help reducing stress, ask your health care provider.  · If you are overweight, reduce your weight to an amount that is healthy for you. Ask your health care provider for guidance about a safe weight loss goal.  · Keep all follow-up visits as told by your health care provider. This is important.  Contact a health care provider if:  · You have new symptoms.  · You have unexplained weight loss.  · You have difficulty swallowing, or it hurts to swallow.  · You have wheezing or a persistent cough.  · Your symptoms do not improve with treatment.  · You have frequent heartburn for more than two weeks.  Get help right away if:  · You have severe pain in your arms, neck, jaw, teeth, or back.  · You feel sweaty, dizzy, or light-headed.  · You have chest pain or shortness of breath.  · You vomit and your vomit looks like blood or coffee grounds.  · Your stool is bloody or black.  · You have a fever.  · You cannot swallow, drink, or eat.  This information is not intended to replace advice given to you by your health care provider. Make sure you discuss any questions you have with your health care provider.  Document Released: 01/25/2006 Document  Revised: 05/25/2017 Document Reviewed: 04/13/2016  [employer] Patient Education © 2019 [employer]

## 2019-11-08 ENCOUNTER — LAB (OUTPATIENT)
Dept: LAB | Facility: HOSPITAL | Age: 39
End: 2019-11-08

## 2019-11-08 DIAGNOSIS — K20.0 EOSINOPHILIC ESOPHAGITIS: ICD-10-CM

## 2019-11-08 PROCEDURE — 86003 ALLG SPEC IGE CRUDE XTRC EA: CPT

## 2019-11-08 PROCEDURE — 83516 IMMUNOASSAY NONANTIBODY: CPT

## 2019-11-08 PROCEDURE — 86008 ALLG SPEC IGE RECOMB EA: CPT

## 2019-11-09 LAB
GLIADIN PEPTIDE IGA SER-ACNC: 4 UNITS (ref 0–19)
GLIADIN PEPTIDE IGG SER-ACNC: 2 UNITS (ref 0–19)
TTG IGA SER-ACNC: <2 U/ML (ref 0–3)
TTG IGG SER-ACNC: <2 U/ML (ref 0–5)

## 2019-11-11 LAB
CALIF WALNUT POLN IGE QN: <0.1 KU/L
CODFISH IGE QN: <0.1 KU/L
CONV CLASS DESCRIPTION: ABNORMAL
COW MILK IGE QN: 1.02 KU/L
EGG WHITE IGE QN: <0.1 KU/L
GLUTEN IGE QN: 0.79 KU/L
HAZELNUT IGE QN: 0.25 KU/L
PEANUT IGE QN: 0.1 KU/L
SCALLOP IGE QN: <0.1 KU/L
SESAME SEED IGE: <0.1 KU/L
SHRIMP IGE: <0.1 KU/L
SOYBEAN IGE QN: 0.16 KU/L
WHEAT IGE QN: 0.84 KU/L

## 2019-11-13 ENCOUNTER — TELEPHONE (OUTPATIENT)
Dept: FAMILY MEDICINE CLINIC | Facility: CLINIC | Age: 39
End: 2019-11-13

## 2019-11-13 LAB
ALPHA GAL IGE: 0.4 KU/L
BEEF IGE QN: 0.62 KU/L
LAMB IGE QN: <0.1 KU/L
Lab: 0
Lab: 1
Lab: ABNORMAL
PORK IGE: 0.18 KU/L

## 2019-11-13 NOTE — TELEPHONE ENCOUNTER
----- Message from Jhon Amezquita MD sent at 11/13/2019 12:23 PM CST -----  I called pt and discussed results. 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 Recommended pt refrain from beef products. Pt advised to make appt in December    Recheck on next visit    All questions answered pt voiced understanding

## 2019-12-05 ENCOUNTER — TELEPHONE (OUTPATIENT)
Dept: FAMILY MEDICINE CLINIC | Facility: CLINIC | Age: 39
End: 2019-12-05

## 2019-12-05 RX ORDER — BUSPIRONE HYDROCHLORIDE 5 MG/1
5 TABLET ORAL 3 TIMES DAILY
Qty: 90 TABLET | Refills: 3 | Status: SHIPPED | OUTPATIENT
Start: 2019-12-05 | End: 2020-04-02

## 2020-01-17 ENCOUNTER — TELEPHONE (OUTPATIENT)
Dept: FAMILY MEDICINE CLINIC | Facility: CLINIC | Age: 40
End: 2020-01-17

## 2020-01-17 RX ORDER — PAROXETINE HYDROCHLORIDE 20 MG/1
20 TABLET, FILM COATED ORAL EVERY MORNING
Qty: 30 TABLET | Refills: 3 | Status: SHIPPED | OUTPATIENT
Start: 2020-01-17 | End: 2020-03-25 | Stop reason: SDUPTHER

## 2020-03-25 RX ORDER — OMEPRAZOLE 40 MG/1
40 CAPSULE, DELAYED RELEASE ORAL DAILY
Qty: 30 CAPSULE | Refills: 3 | Status: SHIPPED | OUTPATIENT
Start: 2020-03-25 | End: 2020-08-31 | Stop reason: SDUPTHER

## 2020-03-25 RX ORDER — PAROXETINE HYDROCHLORIDE 20 MG/1
20 TABLET, FILM COATED ORAL EVERY MORNING
Qty: 30 TABLET | Refills: 3 | Status: SHIPPED | OUTPATIENT
Start: 2020-03-25 | End: 2020-10-21 | Stop reason: SDUPTHER

## 2020-03-25 NOTE — TELEPHONE ENCOUNTER
Patient called and requested a refill on his omeprazole (priLOSEC) 40 MG capsule and PARoxetine (PAXIL) 20 MG tablet Rx.    Verified [facility].    Patient can be contacted at [phone].

## 2020-03-30 ENCOUNTER — TELEPHONE (OUTPATIENT)
Dept: FAMILY MEDICINE CLINIC | Facility: CLINIC | Age: 40
End: 2020-03-30

## 2020-03-30 NOTE — TELEPHONE ENCOUNTER
Patient called in stating that he has a cough he cant get rid of. He stated he can feel it in his chest that he is congested. He is requesting something be called in for him.     Please call at [phone]    Pharmacy confirmed.

## 2020-04-01 PROBLEM — Z91.018 ALLERGY TO ALPHA-GAL: Status: ACTIVE | Noted: 2020-04-01

## 2020-04-01 NOTE — PROGRESS NOTES
Subjective:  Jordin Parham is a 39 y.o. male who presents for       Patient Active Problem List   Diagnosis   • Class 1 obesity with body mass index (BMI) of 30.0 to 30.9 in adult   • Gastroesophageal reflux disease   • COOKIE (generalized anxiety disorder)   • Left upper quadrant pain   • Low libido   • Mood disorder (CMS/HCC)   • H. pylori infection   • Renal impairment   • Mixed hyperlipidemia   • Vitamin D deficiency   • History of drug use   • Fatty liver   • Renal cyst   • Change in bowel habits   • Mucus in stool   • Allergy to alpha-gal   • Bronchitis   • Upper respiratory tract infection   • Eosinophilic esophagitis           Current Outpatient Medications:   •  clotrimazole-betamethasone (LOTRISONE) 1-0.05 % cream, Apply  topically to the appropriate area as directed 2 (Two) Times a Day., Disp: 45 g, Rfl: 3  •  omeprazole (priLOSEC) 40 MG capsule, Take 1 capsule by mouth Daily., Disp: 30 capsule, Rfl: 3  •  PARoxetine (Paxil) 20 MG tablet, Take 1 tablet by mouth Every Morning., Disp: 30 tablet, Rfl: 3  •  vitamin D (ERGOCALCIFEROL) 1.25 MG (77169 UT) capsule capsule, Take 1 capsule by mouth 1 (One) Time Per Week., Disp: 4 capsule, Rfl: 11  •  azithromycin (Zithromax Z-Luis) 250 MG tablet, Take 2 tablets the first day, then 1 tablet daily for 4 days., Disp: 6 tablet, Rfl: 0  •  busPIRone (BUSPAR) 10 MG tablet, Take 1 tablet by mouth 3 (Three) Times a Day., Disp: 90 tablet, Rfl: 3  •  guaiFENesin (Mucinex) 600 MG 12 hr tablet, Take 2 tablets by mouth 2 (Two) Times a Day., Disp: 30 tablet, Rfl: 3    Pt is 39 yo male with CMH of obesity, COOKIE, GERD, mood disorder, history of H pylori infection, VItamin D deficiency,  Renal impairment, History of illicit drug use, fatty liver disease, gastritis,  Eosinophilic esophagitis, internal/external hemorrhoids, sp hemorrhoidectomy      10/10/19 pt is here for recheck and followup. Pt had endoscopy done with Gastroenterology on 9/17/19 that showed  Severe  esophagitis, gastritis. Colonoscopy showed internal/external hemorrhodis.  On biopsy he has eosinophilic esophagitis and gastritis. No evidence of H pyloi,  Since taking prilosec and changing his diet he has noticed improvement.   He has lost 3 lbs since last visit. He has felt better   His abdominal pain has subsided     4/2/20 Telemedicine Visit today for recheck. His main concern is cough for the past 2 weeks. No fever, no shortness of air no chest pain no dizziness. No known sick contact. He does not smoke tobacco. He currently works driving truck. He continues to take prilosec daily. He has had improvement of abdominal pain. He eats a lot of chicken         Cough   This is a recurrent problem. The current episode started 1 to 4 weeks ago. The problem has been waxing and waning. The problem occurs constantly. The cough is non-productive. Associated symptoms include shortness of breath. Pertinent negatives include no chest pain, chills, ear congestion, ear pain, fever, headaches, heartburn, hemoptysis, myalgias, nasal congestion, postnasal drip, rash, rhinorrhea, sore throat, sweats, weight loss or wheezing. Nothing aggravates the symptoms. He has tried nothing for the symptoms. The treatment provided no relief. There is no history of asthma, bronchiectasis, bronchitis, COPD, emphysema, environmental allergies or pneumonia.   Shoulder Injury    The incident occurred in the yard. The right shoulder is affected. The quality of the pain is described as aching. The pain does not radiate. The pain is at a severity of 4/10. The pain is mild. Pertinent negatives include no chest pain, muscle weakness, numbness or tingling. Nothing aggravates the symptoms. He has tried nothing for the symptoms. The treatment provided no relief.    Obesity   This is a chronic problem. The current episode started more than 1 year ago. The problem occurs constantly. The problem has been unchanged. Associated symptoms include abdominal  pain and fatigue. Pertinent negatives include no anorexia, arthralgias, change in bowel habit, chest pain, chills, congestion, coughing, diaphoresis, fever, headaches, joint swelling, myalgias, nausea, neck pain, numbness, rash, sore throat, swollen glands, urinary symptoms, vertigo, visual change, vomiting or weakness. Nothing aggravates the symptoms. He has tried nothing for the symptoms. The treatment provided no relief.   Mental Health Problem   The primary symptoms include dysphoric mood. The primary symptoms do not include delusions, hallucinations, bizarre behavior, disorganized speech or negative symptoms. This is a chronic problem.   The onset of the illness is precipitated by a stressful event, drug abuse and emotional stress. The degree of incapacity that he is experiencing as a consequence of his illness is severe. Sequelae of the illness include an inability to work and harmed interpersonal relations. Additional symptoms of the illness include anhedonia, insomnia, fatigue, agitation, feelings of worthlessness and abdominal pain. Additional symptoms of the illness do not include hypersomnia, appetite change, unexpected weight change, psychomotor retardation, attention impairment, euphoric mood, increased goal-directed activity, decreased need for sleep, distractible, poor judgment, visual change or headaches. He does not admit to suicidal ideas. He does not have a plan to commit suicide. He does not contemplate harming himself. He has not already injured self. He does not contemplate injuring another person. He has not already  injured another person. Risk factors that are present for mental illness include a history of mental illness.   Abdominal Pain   This is a chronic problem. The current episode started more than 1 year ago. The onset quality is gradual. The problem occurs intermittently. The problem has been unchanged. The pain is located in the LUQ. The pain is at a severity of 5/10. The pain  is moderate. The quality of the pain is aching. The abdominal pain does not radiate. Pertinent negatives include no anorexia, arthralgias, belching, constipation, diarrhea, fever, flatus, frequency, headaches, melena, myalgias, nausea or vomiting. Nothing aggravates the pain. The pain is relieved by nothing. He has tried nothing for the symptoms. Prior diagnostic workup includes GI consult, lower endoscopy and upper endoscopy. There is no history of abdominal surgery, colon cancer, Crohn's disease, gallstones, GERD, irritable bowel syndrome, pancreatitis, PUD or ulcerative colitis.   Male  Problem   The patient's pertinent negatives include no genital injury, genital itching, genital lesions, pelvic pain, penile discharge or penile pain. This is a chronic problem. The current episode started more than 1 year ago. The problem occurs daily. The problem has been gradually worsening. The pain is medium. Associated symptoms include abdominal pain. Pertinent negatives include no anorexia, chest pain, chills, constipation, coughing, diarrhea, fever, frequency, headaches, nausea, rash, shortness of breath, sore throat or vomiting. Nothing aggravates the symptoms. He has tried nothing for the symptoms. The treatment provided no relief. He is not sexually active. No, his partner does not have an STD. There is no history of BPH, chlamydia, cryptorchidism, erectile aid use, erectile dysfunction, a femoral hernia, gonorrhea, herpes simplex, HIV, an inguinal hernia, kidney stones, prostatitis, sickle cell disease, syphilis or varicocele.     Review of Systems  Review of Systems   Constitutional: Positive for activity change and fatigue. Negative for appetite change, chills, diaphoresis, fever and weight loss.   HENT: Negative for congestion, ear pain, postnasal drip, rhinorrhea, sinus pressure, sinus pain, sneezing, sore throat, trouble swallowing and voice change.    Respiratory: Positive for cough and shortness of breath.  Negative for hemoptysis, choking, chest tightness, wheezing and stridor.    Cardiovascular: Negative for chest pain.   Gastrointestinal: Negative for diarrhea, heartburn, nausea and vomiting.   Musculoskeletal: Negative for myalgias.   Skin: Negative for rash.   Allergic/Immunologic: Negative for environmental allergies.   Neurological: Positive for weakness and numbness. Negative for headaches.   Psychiatric/Behavioral: Positive for agitation and behavioral problems. The patient is nervous/anxious.         Mood disorder       Patient Active Problem List   Diagnosis   • Class 1 obesity with body mass index (BMI) of 30.0 to 30.9 in adult   • Gastroesophageal reflux disease   • COOKIE (generalized anxiety disorder)   • Left upper quadrant pain   • Low libido   • Mood disorder (CMS/HCC)   • H. pylori infection   • Renal impairment   • Mixed hyperlipidemia   • Vitamin D deficiency   • History of drug use   • Fatty liver   • Renal cyst   • Change in bowel habits   • Mucus in stool   • Allergy to alpha-gal   • Bronchitis   • Upper respiratory tract infection   • Eosinophilic esophagitis     Past Surgical History:   Procedure Laterality Date   • COLONOSCOPY N/A 9/17/2019    Procedure: COLONOSCOPY;  Surgeon: Rigoberto Flower MD;  Location: [facility] ENDOSCOPY;  Service: Gastroenterology   • ENDOSCOPY N/A 9/17/2019    Procedure: ESOPHAGOGASTRODUODENOSCOPY;  Surgeon: Rigoberto Flower MD;  Location: [facility] ENDOSCOPY;  Service: Gastroenterology   • HEMORRHOIDECTOMY       Social History     Socioeconomic History   • Marital status: Single     Spouse name: Not on file   • Number of children: Not on file   • Years of education: Not on file   • Highest education level: Not on file   Tobacco Use   • Smoking status: Never Smoker   • Smokeless tobacco: Never Used   Substance and Sexual Activity   • Alcohol use: Yes     Comment: rarely   • Drug use: No   • Sexual activity: Defer     Family History   Problem Relation Age of Onset   • Alcohol  abuse Mother    • Anxiety disorder Mother    • Arthritis Mother    • Asthma Mother    • Cancer Mother    • Depression Mother    • Hypertension Mother    • Stroke Mother    • Hyperlipidemia Father    • Thyroid disease Sister      Lab on 11/08/2019   Component Date Value Ref Range Status   • Beef 11/08/2019 0.62* <0.35 kU/L Final   • Class Description 11/08/2019 1   Final   • Amado 11/08/2019 <0.10  <0.35 kU/L Final   • Class Interpretation 11/08/2019 0   Final   • Pork 11/08/2019 0.18  <0.35 kU/L Final   • Class Interpretation 11/08/2019 0/1   Final    The test method is the [employer] ImmunoCAP allergen-specific  IgE system. CLASS INTERPRETATION   <0.10 kU/L= 0,  Negative; 0.10 - 0.34 kU/L= 0/1, Equivocal/Borderline;  0.35 - 0.69  kU/L=1, Low Positive; 0.70 - 3.49 kU/L=2,  Moderate Positive;  3.50  - 17.49 kU/L=3, High Positive;  17.50 - 49.99 kU/L= 4, Very High Positive; 50.00 - 99.99  kU/L= 5, Very High Positive;   >99.99 kU/L=6, Very High  Positive  *This test was developed and its performance  characteristics determined by [employer]. It has not  been cleared or approved by the U.[place]. Food and Drug  Administration.   • Alpha Gal IgE 11/08/2019 0.40* <0.10 kU/L Final    Previous reports (EBONY 2009;123:426-433) have demonstrated  that patients with IgE antibodies to  yawzrmtbo-d-1,3-galactose are at risk for delayed  anaphylaxis, angioedema, or urticaria following  consumption of beef, pork, or lamb.   • Class Description 11/08/2019 Comment   Final        Levels of Specific IgE       Class  Description of Class      ---------------------------  -----  --------------------                     < 0.10         0         Negative             0.10 -    0.31         0/I       Equivocal/Low             0.32 -    0.55         I         Low             0.56 -    1.40         II        Moderate             1.41 -    3.90         III       High             3.91 -   19.00         IV        Very High            19.01 -  100.00          V         Very High                    >100.00         VI        Very High   • Egg White 11/08/2019 <0.10  Class 0 kU/L Final   • Milk, Cow's 11/08/2019 1.02* Class II kU/L Final   • CodFish 11/08/2019 <0.10  Class 0 kU/L Final   • Sesame Seed 11/08/2019 <0.10  Class 0 kU/L Final   • Peanut 11/08/2019 0.10* Class 0/I kU/L Final   • Soybean 11/08/2019 0.16* Class 0/I kU/L Final   • Hazelnut 11/08/2019 0.25* Class 0/I kU/L Final   • Shrimp 11/08/2019 <0.10  Class 0 kU/L Final   • Scallop 11/08/2019 <0.10  Class 0 kU/L Final   • Gluten 11/08/2019 0.79* Class II kU/L Final   • [place]i 11/08/2019 <0.10  Class 0 kU/L Final   • Wheat 11/08/2019 0.84* Class II kU/L Final   • Gliadin Deamidated Peptide Ab, IgA 11/08/2019 4  0 - 19 units Final                       Negative                   0 - 19                     Weak Positive             20 - 30                     Moderate to Strong Positive   >30   • Deaminated Gliadin Ab IgG 11/08/2019 2  0 - 19 units Final                       Negative                   0 - 19                     Weak Positive             20 - 30                     Moderate to Strong Positive   >30   • Tissue Transglutaminase IgA 11/08/2019 <2  0 - 3 U/mL Final                                  Negative        0 -  3                                Weak Positive   4 - 10                                Positive           >10   Tissue Transglutaminase (tTG) has been identified   as the endomysial antigen.  Studies have demonstr-   ated that endomysial IgA antibodies have over 99%   specificity for gluten sensitive enteropathy.   • Tissue Transglutaminase IgG 11/08/2019 <2  0 - 5 U/mL Final                                  Negative        0 - 5                                Weak Positive   6 - 9                                Positive           >9      No image results found.    [unfilled]    There is no immunization history on file for this patient.    The following portions of the patient's  history were reviewed and updated as appropriate: allergies, current medications, past family history, past medical history, past social history, past surgical history and problem list.        Physical Exam  Physical Exam   Constitutional: He is oriented to person, place, and time. He appears well-developed and well-nourished.   HENT:   Head: Normocephalic and atraumatic.   Right Ear: External ear normal.   Coughing while on phone    Eyes: Pupils are equal, round, and reactive to light. Conjunctivae and EOM are normal.   Neck: Normal range of motion. Neck supple.   Cardiovascular: Normal rate, regular rhythm and normal heart sounds.   No murmur heard.  Pulmonary/Chest: No respiratory distress.   Unable to perform exam    Abdominal: Soft. Bowel sounds are normal. He exhibits no distension. There is no tenderness.   Obese abdomen    Musculoskeletal: Normal range of motion. He exhibits no edema or deformity.   Neurological: He is alert and oriented to person, place, and time. No cranial nerve deficit.   Skin: Skin is warm. No rash noted. He is not diaphoretic. No erythema. No pallor.   Psychiatric: He has a normal mood and affect. His behavior is normal.   Nursing note and vitals reviewed.      Assessment/Plan    Diagnosis Plan   1. Cough     2. Class 1 obesity with body mass index (BMI) of 30.0 to 30.9 in adult, unspecified obesity type, unspecified whether serious comorbidity present     3. COOKIE (generalized anxiety disorder)     4. Fatty liver     5. Gastroesophageal reflux disease, esophagitis presence not specified     6. Mixed hyperlipidemia     7. Mood disorder (CMS/HCC)     8. Vitamin D deficiency     9. Eosinophilic esophagitis     10. Allergy to alpha-gal     11. Upper respiratory tract infection, unspecified type     12. Bronchitis          -went over labwork and US of abdomen  -renal cyst - monitor.   -URI/cough/bronchitis  - likely viral but has been going on for 2 weeks now. Azithromycin for 5 days.   Recommend zinc and probiotic supplement. mucinex 600 mg every 12 hours PRN.  Use abluterol inhaler PRN   -eosinophilic esophagitis/alpha gal - advised pt to refrain from meat,pork products if possible/ GI following. Advised to limit if possible cow's milk soybean wheat products as well   -fatty liver -recommend heart healthy diet. Gave information today/ GI Following   -pruritis - vistaril 25 mg PO TID along with lotrisone cream BID  -H pylori infection,  Was treated clarithromycin amoxicillin, PPI, flagyl.  Recommend EGD. Referral to Gastroenterology. Pt canceled appt with [facility]. He will need to reschedule   -vitamin D deficiency -vitamin D once a week  -HLP - DASH diet, heart healthy diet.  -renal impairment -  Will monitor kidney function   -GERD -continue prilosec. Possible gastritis. Will need last EGD and endoscopy. Start on carafate 1 gram tablet QID. Will have colonoscopy and   -allergic rhintis - flonase nasal psray   -right shoulder pain - x-ray. Consider Orthopedic and PT/OT   -mood disorder/insomnia  - likely bipolar disorder. Recommend seroquel 25 mg at bedtime.  Will add   -panic attack/COOKIE - recommend paxil  Will go up from 10 to 20 mg daily.  Drug information provided  -recommended counseling but pt decline for now.  buspar from 5 to 10 mg PO TID  -obesity - counseled pt to lose weight >5 minutes, diet information provided  -advised pt to be safe and call with questions and concerns. All questions addressed tdoay.   -advised pt to followup with specialist and referrals  -advised pt go to ER or call 911 if symptoms worrisome or severe  -advised pt to be safe during current COVID 19 pandemic  This visit has been rescheduled as a phone visit to comply with patient safety concerns in accordance with CDC recommendations. Total time of discussion was 18 minutes.        This document has been electronically signed by Jhon Amezquita MD on April 2, 2020 08:28

## 2020-04-02 ENCOUNTER — OFFICE VISIT (OUTPATIENT)
Dept: FAMILY MEDICINE CLINIC | Facility: CLINIC | Age: 40
End: 2020-04-02

## 2020-04-02 ENCOUNTER — TELEPHONE (OUTPATIENT)
Dept: FAMILY MEDICINE CLINIC | Facility: CLINIC | Age: 40
End: 2020-04-02

## 2020-04-02 DIAGNOSIS — K20.0 EOSINOPHILIC ESOPHAGITIS: ICD-10-CM

## 2020-04-02 DIAGNOSIS — F39 MOOD DISORDER (HCC): ICD-10-CM

## 2020-04-02 DIAGNOSIS — K21.9 GASTROESOPHAGEAL REFLUX DISEASE, ESOPHAGITIS PRESENCE NOT SPECIFIED: ICD-10-CM

## 2020-04-02 DIAGNOSIS — Z91.018 ALLERGY TO ALPHA-GAL: ICD-10-CM

## 2020-04-02 DIAGNOSIS — E66.9 CLASS 1 OBESITY WITH BODY MASS INDEX (BMI) OF 30.0 TO 30.9 IN ADULT, UNSPECIFIED OBESITY TYPE, UNSPECIFIED WHETHER SERIOUS COMORBIDITY PRESENT: ICD-10-CM

## 2020-04-02 DIAGNOSIS — J40 BRONCHITIS: Primary | ICD-10-CM

## 2020-04-02 DIAGNOSIS — E55.9 VITAMIN D DEFICIENCY: ICD-10-CM

## 2020-04-02 DIAGNOSIS — K76.0 FATTY LIVER: ICD-10-CM

## 2020-04-02 DIAGNOSIS — J06.9 UPPER RESPIRATORY TRACT INFECTION, UNSPECIFIED TYPE: ICD-10-CM

## 2020-04-02 DIAGNOSIS — E78.2 MIXED HYPERLIPIDEMIA: ICD-10-CM

## 2020-04-02 DIAGNOSIS — F41.1 GAD (GENERALIZED ANXIETY DISORDER): ICD-10-CM

## 2020-04-02 DIAGNOSIS — R05.9 COUGH: ICD-10-CM

## 2020-04-02 PROCEDURE — 99213 OFFICE O/P EST LOW 20 MIN: CPT | Performed by: FAMILY MEDICINE

## 2020-04-02 RX ORDER — BUSPIRONE HYDROCHLORIDE 10 MG/1
10 TABLET ORAL 3 TIMES DAILY
Qty: 90 TABLET | Refills: 3 | Status: SHIPPED | OUTPATIENT
Start: 2020-04-02 | End: 2020-11-30 | Stop reason: SDUPTHER

## 2020-04-02 RX ORDER — GUAIFENESIN 600 MG/1
1200 TABLET, EXTENDED RELEASE ORAL 2 TIMES DAILY
Qty: 30 TABLET | Refills: 3 | Status: SHIPPED | OUTPATIENT
Start: 2020-04-02 | End: 2020-06-25

## 2020-04-02 RX ORDER — ERGOCALCIFEROL 1.25 MG/1
50000 CAPSULE ORAL WEEKLY
Qty: 4 CAPSULE | Refills: 11 | Status: SHIPPED | OUTPATIENT
Start: 2020-04-02 | End: 2020-11-30

## 2020-04-02 RX ORDER — AZITHROMYCIN 250 MG/1
TABLET, FILM COATED ORAL
Qty: 6 TABLET | Refills: 0 | Status: SHIPPED | OUTPATIENT
Start: 2020-04-02 | End: 2020-04-09

## 2020-04-02 NOTE — TELEPHONE ENCOUNTER
Ahmet with [facility] is requesting a call back. States that he needs diagnosis for the zpack that was called in this morning.

## 2020-04-08 NOTE — PROGRESS NOTES
Subjective:  Jordin Parham is a 39 y.o. male who presents for       Patient Active Problem List   Diagnosis   • Class 1 obesity with body mass index (BMI) of 30.0 to 30.9 in adult   • Gastroesophageal reflux disease   • COOKIE (generalized anxiety disorder)   • Left upper quadrant pain   • Low libido   • Mood disorder (CMS/HCC)   • H. pylori infection   • Renal impairment   • Mixed hyperlipidemia   • Vitamin D deficiency   • History of drug use   • Fatty liver   • Renal cyst   • Change in bowel habits   • Mucus in stool   • Allergy to alpha-gal   • Bronchitis   • Upper respiratory tract infection   • Eosinophilic esophagitis           Current Outpatient Medications:   •  azithromycin (Zithromax Z-Luis) 250 MG tablet, Take 2 tablets the first day, then 1 tablet daily for 4 days., Disp: 6 tablet, Rfl: 0  •  busPIRone (BUSPAR) 10 MG tablet, Take 1 tablet by mouth 3 (Three) Times a Day., Disp: 90 tablet, Rfl: 3  •  clotrimazole-betamethasone (LOTRISONE) 1-0.05 % cream, Apply  topically to the appropriate area as directed 2 (Two) Times a Day., Disp: 45 g, Rfl: 3  •  guaiFENesin (Mucinex) 600 MG 12 hr tablet, Take 2 tablets by mouth 2 (Two) Times a Day., Disp: 30 tablet, Rfl: 3  •  omeprazole (priLOSEC) 40 MG capsule, Take 1 capsule by mouth Daily., Disp: 30 capsule, Rfl: 3  •  PARoxetine (Paxil) 20 MG tablet, Take 1 tablet by mouth Every Morning., Disp: 30 tablet, Rfl: 3  •  vitamin D (ERGOCALCIFEROL) 1.25 MG (78002 UT) capsule capsule, Take 1 capsule by mouth 1 (One) Time Per Week., Disp: 4 capsule, Rfl: 11    HPI     Pt is 39 yo male with CMH of obesity, COOKIE, GERD, mood disorder, history of H pylori infection, VItamin D deficiency,  Renal impairment, History of illicit drug use, fatty liver disease, gastritis,  Eosinophilic esophagitis, internal/external hemorrhoids, sp hemorrhoidectomy      4/2/20 Telemedicine Visit today for recheck. His main concern is cough for the past 2 weeks. No fever, no shortness of air  no chest pain no dizziness. No known sick contact. He does not smoke tobacco. He currently works driving truck. He continues to take prilosec daily. He has had improvement of abdominal pain. He eats a lot of chicken     4/9/20 Telemedicine Visit. On last visit pt was having issues with cough and URI. Was given azithromycin abx. He is feeling better and cough has improved. Has been taking mucinex and albuterol inhaler PRN. He is interested in accupuncture for his alpha gal. Knows a friend who had it done. He continues to refrain from meat products. He has intremittent itching when he does eat meat and beef. He also states his throat closes up             Cough   This is a recurrent problem. The current episode started 1 to 4 weeks ago. The problem has been waxing and waning. The problem occurs constantly. The cough is non-productive. Associated symptoms include shortness of breath. Pertinent negatives include no chest pain, chills, ear congestion, ear pain, fever, headaches, heartburn, hemoptysis, myalgias, nasal congestion, postnasal drip, rash, rhinorrhea, sore throat, sweats, weight loss or wheezing. Nothing aggravates the symptoms. He has tried nothing for the symptoms. The treatment provided no relief. There is no history of asthma, bronchiectasis, bronchitis, COPD, emphysema, environmental allergies or pneumonia.   Shoulder Injury    The incident occurred in the yard. The right shoulder is affected. The quality of the pain is described as aching. The pain does not radiate. The pain is at a severity of 4/10. The pain is mild. Pertinent negatives include no chest pain, muscle weakness, numbness or tingling. Nothing aggravates the symptoms. He has tried nothing for the symptoms. The treatment provided no relief.    Obesity   This is a chronic problem. The current episode started more than 1 year ago. The problem occurs constantly. The problem has been unchanged. Associated symptoms include abdominal pain and  fatigue. Pertinent negatives include no anorexia, arthralgias, change in bowel habit, chest pain, chills, congestion, coughing, diaphoresis, fever, headaches, joint swelling, myalgias, nausea, neck pain, numbness, rash, sore throat, swollen glands, urinary symptoms, vertigo, visual change, vomiting or weakness. Nothing aggravates the symptoms. He has tried nothing for the symptoms. The treatment provided no relief.   Mental Health Problem   The primary symptoms include dysphoric mood. The primary symptoms do not include delusions, hallucinations, bizarre behavior, disorganized speech or negative symptoms. This is a chronic problem.   The onset of the illness is precipitated by a stressful event, drug abuse and emotional stress. The degree of incapacity that he is experiencing as a consequence of his illness is severe. Sequelae of the illness include an inability to work and harmed interpersonal relations. Additional symptoms of the illness include anhedonia, insomnia, fatigue, agitation, feelings of worthlessness and abdominal pain. Additional symptoms of the illness do not include hypersomnia, appetite change, unexpected weight change, psychomotor retardation, attention impairment, euphoric mood, increased goal-directed activity, decreased need for sleep, distractible, poor judgment, visual change or headaches. He does not admit to suicidal ideas. He does not have a plan to commit suicide. He does not contemplate harming himself. He has not already injured self. He does not contemplate injuring another person. He has not already  injured another person. Risk factors that are present for mental illness include a history of mental illness.   Abdominal Pain   This is a chronic problem. The current episode started more than 1 year ago. The onset quality is gradual. The problem occurs intermittently. The problem has been unchanged. The pain is located in the LUQ. The pain is at a severity of 5/10. The pain is moderate.  The quality of the pain is aching. The abdominal pain does not radiate. Pertinent negatives include no anorexia, arthralgias, belching, constipation, diarrhea, fever, flatus, frequency, headaches, melena, myalgias, nausea or vomiting. Nothing aggravates the pain. The pain is relieved by nothing. He has tried nothing for the symptoms. Prior diagnostic workup includes GI consult, lower endoscopy and upper endoscopy. There is no history of abdominal surgery, colon cancer, Crohn's disease, gallstones, GERD, irritable bowel syndrome, pancreatitis, PUD or ulcerative colitis.   Male  Problem   The patient's pertinent negatives include no genital injury, genital itching, genital lesions, pelvic pain, penile discharge or penile pain. This is a chronic problem. The current episode started more than 1 year ago. The problem occurs daily. The problem has been gradually worsening. The pain is medium. Associated symptoms include abdominal pain. Pertinent negatives include no anorexia, chest pain, chills, constipation, coughing, diarrhea, fever, frequency, headaches, nausea, rash, shortness of breath, sore throat or vomiting. Nothing aggravates the symptoms. He has tried nothing for the symptoms. The treatment provided no relief. He is not sexually active. No, his partner does not have an STD. There is no history of BPH, chlamydia, cryptorchidism, erectile aid use, erectile dysfunction, a femoral hernia, gonorrhea, herpes simplex, HIV, an inguinal hernia, kidney stones, prostatitis, sickle cell disease, syphilis or varicocele.     Review of Systems  Review of Systems   Constitutional: Positive for activity change and fatigue. Negative for appetite change, chills, diaphoresis and fever.   HENT: Positive for congestion. Negative for postnasal drip, rhinorrhea, sinus pressure, sinus pain, sneezing, sore throat, trouble swallowing and voice change.    Respiratory: Positive for cough. Negative for choking, chest tightness, shortness  of breath, wheezing and stridor.    Cardiovascular: Negative for chest pain.   Gastrointestinal: Positive for abdominal pain. Negative for diarrhea, nausea and vomiting.   Neurological: Positive for weakness and numbness. Negative for headaches.   Psychiatric/Behavioral:        Mood disorder       Patient Active Problem List   Diagnosis   • Class 1 obesity with body mass index (BMI) of 30.0 to 30.9 in adult   • Gastroesophageal reflux disease   • COOKIE (generalized anxiety disorder)   • Left upper quadrant pain   • Low libido   • Mood disorder (CMS/HCC)   • H. pylori infection   • Renal impairment   • Mixed hyperlipidemia   • Vitamin D deficiency   • History of drug use   • Fatty liver   • Renal cyst   • Change in bowel habits   • Mucus in stool   • Allergy to alpha-gal   • Bronchitis   • Upper respiratory tract infection   • Eosinophilic esophagitis     Past Surgical History:   Procedure Laterality Date   • COLONOSCOPY N/A 9/17/2019    Procedure: COLONOSCOPY;  Surgeon: Rigoberto Flower MD;  Location: [facility] ENDOSCOPY;  Service: Gastroenterology   • ENDOSCOPY N/A 9/17/2019    Procedure: ESOPHAGOGASTRODUODENOSCOPY;  Surgeon: Rigoberto Flower MD;  Location: [facility] ENDOSCOPY;  Service: Gastroenterology   • HEMORRHOIDECTOMY       Social History     Socioeconomic History   • Marital status: Single     Spouse name: Not on file   • Number of children: Not on file   • Years of education: Not on file   • Highest education level: Not on file   Tobacco Use   • Smoking status: Never Smoker   • Smokeless tobacco: Never Used   Substance and Sexual Activity   • Alcohol use: Yes     Comment: rarely   • Drug use: No   • Sexual activity: Defer     Family History   Problem Relation Age of Onset   • Alcohol abuse Mother    • Anxiety disorder Mother    • Arthritis Mother    • Asthma Mother    • Cancer Mother    • Depression Mother    • Hypertension Mother    • Stroke Mother    • Hyperlipidemia Father    • Thyroid disease Sister      Lab  on 11/08/2019   Component Date Value Ref Range Status   • Beef 11/08/2019 0.62* <0.35 kU/L Final   • Class Description 11/08/2019 1   Final   • [place] 11/08/2019 <0.10  <0.35 kU/L Final   • Class Interpretation 11/08/2019 0   Final   • Pork 11/08/2019 0.18  <0.35 kU/L Final   • Class Interpretation 11/08/2019 0/1   Final    The test method is the [employer] ImmunoCAP allergen-specific  IgE system. CLASS INTERPRETATION   <0.10 kU/L= 0,  Negative; 0.10 - 0.34 kU/L= 0/1, Equivocal/Borderline;  0.35 - 0.69  kU/L=1, Low Positive; 0.70 - 3.49 kU/L=2,  Moderate Positive;  3.50  - 17.49 kU/L=3, High Positive;  17.50 - 49.99 kU/L= 4, Very High Positive; 50.00 - 99.99  kU/L= 5, Very High Positive;   >99.99 kU/L=6, Very High  Positive  *This test was developed and its performance  characteristics determined by [employer]. It has not  been cleared or approved by the U.[place]. Food and Drug  Administration.   • Alpha Gal IgE 11/08/2019 0.40* <0.10 kU/L Final    Previous reports (EBONY 2009;123:426-433) have demonstrated  that patients with IgE antibodies to  [id number]-galactose are at risk for delayed  anaphylaxis, angioedema, or urticaria following  consumption of beef, pork, or lamb.   • Class Description 11/08/2019 Comment   Final        Levels of Specific IgE       Class  Description of Class      ---------------------------  -----  --------------------                     < 0.10         0         Negative             0.10 -    0.31         0/I       Equivocal/Low             0.32 -    0.55         I         Low             0.56 -    1.40         II        Moderate             1.41 -    3.90         III       High             3.91 -   19.00         IV        Very High            19.01 -  100.00         V         Very High                    >100.00         VI        Very High   • Egg White 11/08/2019 <0.10  Class 0 kU/L Final   • Milk, Cow's 11/08/2019 1.02* Class II kU/L Final   • CodFish 11/08/2019 <0.10  Class 0 kU/L  Final   • Sesame Seed 11/08/2019 <0.10  Class 0 kU/L Final   • Peanut 11/08/2019 0.10* Class 0/I kU/L Final   • Soybean 11/08/2019 0.16* Class 0/I kU/L Final   • Hazelnut 11/08/2019 0.25* Class 0/I kU/L Final   • Shrimp 11/08/2019 <0.10  Class 0 kU/L Final   • Scallop 11/08/2019 <0.10  Class 0 kU/L Final   • Gluten 11/08/2019 0.79* Class II kU/L Final   • [place] 11/08/2019 <0.10  Class 0 kU/L Final   • Wheat 11/08/2019 0.84* Class II kU/L Final   • Gliadin Deamidated Peptide Ab, IgA 11/08/2019 4  0 - 19 units Final                       Negative                   0 - 19                     Weak Positive             20 - 30                     Moderate to Strong Positive   >30   • Deaminated Gliadin Ab IgG 11/08/2019 2  0 - 19 units Final                       Negative                   0 - 19                     Weak Positive             20 - 30                     Moderate to Strong Positive   >30   • Tissue Transglutaminase IgA 11/08/2019 <2  0 - 3 U/mL Final                                  Negative        0 -  3                                Weak Positive   4 - 10                                Positive           >10   Tissue Transglutaminase (tTG) has been identified   as the endomysial antigen.  Studies have demonstr-   ated that endomysial IgA antibodies have over 99%   specificity for gluten sensitive enteropathy.   • Tissue Transglutaminase IgG 11/08/2019 <2  0 - 5 U/mL Final                                  Negative        0 - 5                                Weak Positive   6 - 9                                Positive           >9      No image results found.    @[email or web address]@    There is no immunization history on file for this patient.    The following portions of the patient's history were reviewed and updated as appropriate: allergies, current medications, past family history, past medical history, past social history, past surgical history and problem list.        Physical Exam  Physical Exam    Constitutional: He is oriented to person, place, and time. He appears well-developed and well-nourished.   HENT:   Head: Normocephalic and atraumatic.   Right Ear: External ear normal.   Eyes: Pupils are equal, round, and reactive to light. Conjunctivae and EOM are normal.   Neck: Normal range of motion. Neck supple.   Cardiovascular: Normal rate, regular rhythm and normal heart sounds.   No murmur heard.  Pulmonary/Chest: Effort normal and breath sounds normal. No respiratory distress.   Abdominal: Soft. Bowel sounds are normal. He exhibits no distension. There is no tenderness.   Musculoskeletal: Normal range of motion. He exhibits no edema or deformity.   Neurological: He is alert and oriented to person, place, and time. No cranial nerve deficit.   Skin: Skin is warm. No rash noted. He is not diaphoretic. No erythema. No pallor.   Psychiatric: He has a normal mood and affect. His behavior is normal.   Nursing note and vitals reviewed.      Assessment/Plan    Diagnosis Plan   1. Vitamin D deficiency     2. Mixed hyperlipidemia     3. Mood disorder (CMS/HCC)     4. Low libido     5. Gastroesophageal reflux disease, esophagitis presence not specified     6. Fatty liver     7. COOKIE (generalized anxiety disorder)     8. Eosinophilic esophagitis     9. Class 1 obesity with body mass index (BMI) of 30.0 to 30.9 in adult, unspecified obesity type, unspecified whether serious comorbidity present     10. Allergy to alpha-gal     11. Bronchitis             -went over labwork and US of abdomen  -renal cyst - monitor.   -URI/cough/bronchitis  - likely viral but has been going on for 2 weeks now. Azithromycin for 5 days.  Recommend zinc and probiotic supplement. mucinex 600 mg every 12 hours PRN.  Use abluterol inhaler PRN   -eosinophilic esophagitis/alpha gal - advised pt to refrain from meat,pork products if possible/ GI following. Advised to limit if possible cow's milk soybean wheat products as well. He is interested in  accupuncuture and he will look online for services. I advised what may work for another individual may not work for pt. He is aware of potential outcomes   -fatty liver -recommend heart healthy diet. Gave information today/ GI Following   -pruritis - vistaril 25 mg PO TID along with lotrisone cream BID  -H pylori infection,  Was treated clarithromycin amoxicillin, PPI, flagyl.  Recommend EGD. Referral to Gastroenterology. Pt [facility] appt [facility]. He will need to reschedule   -vitamin D deficiency -vitamin D once a week  -HLP - DASH diet, heart healthy diet.  -renal impairment -  Will monitor kidney function   -GERD -continue prilosec. Possible gastritis. Will need last EGD and endoscopy. Start on carafate 1 gram tablet QID. Will have colonoscopy and   -allergic rhintis - flonase nasal spray and singulair 10 mg PO qhs.   -right shoulder pain - x-ray. Consider Orthopedic and PT/OT   -mood disorder/insomnia  - likely bipolar disorder. Recommend seroquel 25 mg at bedtime.  Will add   -panic attack/COOKIE - recommend paxil  Will go up from 10 to 20 mg daily.  Drug information provided  -recommended counseling but pt decline for now.  buspar from 5 to 10 mg PO TID  -obesity - counseled pt to lose weight >5 minutes, diet information provided  -advised pt to be safe and call with questions and concerns. All questions addressed tdoay.   -advised pt to followup with specialist and referrals  -advised pt go to ER or call 911 if symptoms worrisome or severe  -advised pt to be safe during current COVID 19 pandemic  This visit has been rescheduled as a phone visit to comply with patient safety concerns in accordance with CDC recommendations. Total time of discussion was 20 minutes.  -recheck in 2 months         This document has been electronically signed by Jhon Amezquita MD on April 9, 2020 07:40

## 2020-04-09 ENCOUNTER — OFFICE VISIT (OUTPATIENT)
Dept: FAMILY MEDICINE CLINIC | Facility: CLINIC | Age: 40
End: 2020-04-09

## 2020-04-09 VITALS — HEIGHT: 70 IN | BODY MASS INDEX: 31.45 KG/M2

## 2020-04-09 DIAGNOSIS — R68.82 LOW LIBIDO: ICD-10-CM

## 2020-04-09 DIAGNOSIS — E66.9 CLASS 1 OBESITY WITH BODY MASS INDEX (BMI) OF 30.0 TO 30.9 IN ADULT, UNSPECIFIED OBESITY TYPE, UNSPECIFIED WHETHER SERIOUS COMORBIDITY PRESENT: ICD-10-CM

## 2020-04-09 DIAGNOSIS — F39 MOOD DISORDER (HCC): ICD-10-CM

## 2020-04-09 DIAGNOSIS — J40 BRONCHITIS: ICD-10-CM

## 2020-04-09 DIAGNOSIS — K76.0 FATTY LIVER: ICD-10-CM

## 2020-04-09 DIAGNOSIS — E55.9 VITAMIN D DEFICIENCY: Primary | ICD-10-CM

## 2020-04-09 DIAGNOSIS — E78.2 MIXED HYPERLIPIDEMIA: ICD-10-CM

## 2020-04-09 DIAGNOSIS — F41.1 GAD (GENERALIZED ANXIETY DISORDER): ICD-10-CM

## 2020-04-09 DIAGNOSIS — Z91.018 ALLERGY TO ALPHA-GAL: ICD-10-CM

## 2020-04-09 DIAGNOSIS — K21.9 GASTROESOPHAGEAL REFLUX DISEASE, ESOPHAGITIS PRESENCE NOT SPECIFIED: ICD-10-CM

## 2020-04-09 DIAGNOSIS — K20.0 EOSINOPHILIC ESOPHAGITIS: ICD-10-CM

## 2020-04-09 PROCEDURE — 99213 OFFICE O/P EST LOW 20 MIN: CPT | Performed by: FAMILY MEDICINE

## 2020-04-09 RX ORDER — HYDROXYZINE PAMOATE 25 MG/1
25 CAPSULE ORAL 3 TIMES DAILY PRN
Qty: 90 CAPSULE | Refills: 3 | Status: SHIPPED | OUTPATIENT
Start: 2020-04-09 | End: 2020-11-30

## 2020-04-09 RX ORDER — MONTELUKAST SODIUM 10 MG/1
10 TABLET ORAL NIGHTLY
Qty: 30 TABLET | Refills: 3 | Status: SHIPPED | OUTPATIENT
Start: 2020-04-09 | End: 2020-11-30

## 2020-06-24 ENCOUNTER — TELEPHONE (OUTPATIENT)
Dept: FAMILY MEDICINE CLINIC | Facility: CLINIC | Age: 40
End: 2020-06-24

## 2020-06-24 NOTE — TELEPHONE ENCOUNTER
Caller: Jordin Parham    Relationship to patient: Self    Best call back number: [phone]    Patient is needing: Requesting to speak with clinical staff in regards to pain and swelling in his hand. Did not want to schedule an appointment prior to speaking with Dr. Amezquita.

## 2020-06-24 NOTE — PROGRESS NOTES
Subjective:  Jordin Parham is a 39 y.o. male who presents for       Patient Active Problem List   Diagnosis   • Class 1 obesity with body mass index (BMI) of 30.0 to 30.9 in adult   • Gastroesophageal reflux disease   • COOKIE (generalized anxiety disorder)   • Left upper quadrant pain   • Low libido   • Mood disorder (CMS/HCC)   • H. pylori infection   • Renal impairment   • Mixed hyperlipidemia   • Vitamin D deficiency   • History of drug use   • Fatty liver   • Renal cyst   • Change in bowel habits   • Mucus in stool   • Allergy to alpha-gal   • Bronchitis   • Upper respiratory tract infection   • Eosinophilic esophagitis   • Onychomycosis   • Bilateral hand pain   • Bilateral wrist pain   • Overweight   • Pruritic rash   • Insomnia           Current Outpatient Medications:   •  busPIRone (BUSPAR) 10 MG tablet, Take 1 tablet by mouth 3 (Three) Times a Day., Disp: 90 tablet, Rfl: 3  •  hydrOXYzine pamoate (Vistaril) 25 MG capsule, Take 1 capsule by mouth 3 (Three) Times a Day As Needed for Itching., Disp: 90 capsule, Rfl: 3  •  montelukast (Singulair) 10 MG tablet, Take 1 tablet by mouth Every Night., Disp: 30 tablet, Rfl: 3  •  omeprazole (priLOSEC) 40 MG capsule, Take 1 capsule by mouth Daily., Disp: 30 capsule, Rfl: 3  •  PARoxetine (Paxil) 20 MG tablet, Take 1 tablet by mouth Every Morning., Disp: 30 tablet, Rfl: 3  •  doxepin (SINEquan) 25 MG capsule, Take 1 capsule by mouth Every Night., Disp: 30 capsule, Rfl: 3  •  terbinafine (LamISIL) 250 MG tablet, Take 1 tablet by mouth Daily., Disp: 30 tablet, Rfl: 2  •  vitamin D (ERGOCALCIFEROL) 1.25 MG (01164 UT) capsule capsule, Take 1 capsule by mouth 1 (One) Time Per Week., Disp: 4 capsule, Rfl: 11        Pt is 38 yo male with management  of obesity, COOKIE, GERD, mood disorder, history of H pylori infection, VItamin D deficiency,  Renal impairment, History of illicit drug use, fatty liver disease, gastritis,  Eosinophilic esophagitis, internal/external  hemorrhoids, sp hemorrhoidectomy      10/10/19 pt is here for recheck and followup. Pt had endoscopy done with Gastroenterology on 9/17/19 that showed  Severe esophagitis, gastritis. Colonoscopy showed internal/external hemorrhodis.  On biopsy he has eosinophilic esophagitis and gastritis. No evidence of H pyloi,  Since taking prilosec and changing his diet he has noticed improvement.   He has lost 3 lbs since last visit. He has felt better   His abdominal pain has subsided     6/25/20 in office visit for recheck on pt's GERD, obesity ,AD, mood disorder, alpha gal allergy.  He has been having issues with bilateral hand and wrist pain for past few weeks. This past week pain got worse. It hurts to touch wrist and drive. He works driving trucks.  Numbness and tingling radiate to both fingers.  Hurts worse at bedtime.  Also has cys on right cyst. Also has toenail fungus. In regads to alpha gal he has been doing accupuncture in [facility].  He has only done it once. Also has issues with insomnia. He has tried melatonin with no relief. Also has rash on back. It is itchy.l has not tried anything for this     Hand Pain    The incident occurred more than 1 week ago. There was no injury mechanism. The pain is present in the left wrist, right hand, right wrist, right fingers, left fingers and left hand. The quality of the pain is described as aching and stabbing. The pain does not radiate. The pain is at a severity of 4/10. The pain is moderate. The pain has been constant since the incident. Associated symptoms include numbness and tingling. Pertinent negatives include no chest pain or muscle weakness. The symptoms are aggravated by movement, lifting and palpation. He has tried nothing for the symptoms. The treatment provided no relief.   Wrist Pain    The pain is present in the left hand, left fingers, right wrist, right hand, left wrist and right fingers. The current episode started more than 1 month ago. There has been  no history of extremity trauma. The problem occurs constantly. The problem has been unchanged. The quality of the pain is described as aching. The pain is at a severity of 4/10. The pain is moderate. Associated symptoms include numbness, stiffness and tingling. Pertinent negatives include no fever, inability to bear weight, itching, joint locking, joint swelling or limited range of motion. The symptoms are aggravated by activity. He has tried nothing for the symptoms. The treatment provided no relief. Family history does not include gout or rheumatoid arthritis. There is no history of diabetes, gout, osteoarthritis or rheumatoid arthritis. alpha gal    Rash   This is a chronic problem. The current episode started more than 1 year ago. The problem is unchanged. The rash is diffuse. He was exposed to nothing. Associated symptoms include fatigue. Pertinent negatives include no anorexia, congestion, cough, diarrhea, eye pain, facial edema, fever, nail changes, rhinorrhea, shortness of breath, sore throat or vomiting. Past treatments include nothing. The treatment provided no relief. His past medical history is significant for allergies. There is no history of asthma, eczema or varicella. (Alpha gal )         Obesity   This is a chronic problem. The current episode started more than 1 year ago. The problem occurs constantly. The problem has been unchanged. Associated symptoms include abdominal pain and fatigue. Pertinent negatives include no anorexia, arthralgias, change in bowel habit, chest pain, chills, congestion, coughing, diaphoresis, fever, headaches, joint swelling, myalgias, nausea, neck pain, numbness, rash, sore throat, swollen glands, urinary symptoms, vertigo, visual change, vomiting or weakness. Nothing aggravates the symptoms. He has tried nothing for the symptoms. The treatment provided no relief.   Mental Health Problem   The primary symptoms include dysphoric mood. The primary symptoms do not  include delusions, hallucinations, bizarre behavior, disorganized speech or negative symptoms. This is a chronic problem.   The onset of the illness is precipitated by a stressful event, drug abuse and emotional stress. The degree of incapacity that he is experiencing as a consequence of his illness is severe. Sequelae of the illness include an inability to work and harmed interpersonal relations. Additional symptoms of the illness include anhedonia, insomnia, fatigue, agitation, feelings of worthlessness and abdominal pain. Additional symptoms of the illness do not include hypersomnia, appetite change, unexpected weight change, psychomotor retardation, attention impairment, euphoric mood, increased goal-directed activity, decreased need for sleep, distractible, poor judgment, visual change or headaches. He does not admit to suicidal ideas. He does not have a plan to commit suicide. He does not contemplate harming himself. He has not already injured self. He does not contemplate injuring another person. He has not already  injured another person. Risk factors that are present for mental illness include a history of mental illness.   Abdominal Pain   This is a chronic problem. The current episode started more than 1 year ago. The onset quality is gradual. The problem occurs intermittently. The problem has been unchanged. The pain is located in the LUQ. The pain is at a severity of 5/10. The pain is moderate. The quality of the pain is aching. The abdominal pain does not radiate. Pertinent negatives include no anorexia, arthralgias, belching, constipation, diarrhea, fever, flatus, frequency, headaches, melena, myalgias, nausea or vomiting. Nothing aggravates the pain. The pain is relieved by nothing. He has tried nothing for the symptoms. Prior diagnostic workup includes GI consult, lower endoscopy and upper endoscopy. There is no history of abdominal surgery, colon cancer, Crohn's disease, gallstones, GERD, irritable  bowel syndrome, pancreatitis, PUD or ulcerative colitis.       Review of Systems  Review of Systems   Constitutional: Positive for activity change and fatigue. Negative for appetite change, chills, diaphoresis and fever.   HENT: Negative for congestion, postnasal drip, rhinorrhea, sinus pressure, sinus pain, sneezing, sore throat, trouble swallowing and voice change.    Eyes: Negative for pain.   Respiratory: Negative for cough, choking, chest tightness, shortness of breath, wheezing and stridor.    Cardiovascular: Negative for chest pain.   Gastrointestinal: Positive for abdominal pain. Negative for anorexia, diarrhea, nausea and vomiting.   Musculoskeletal: Positive for arthralgias and stiffness. Negative for gout.        Bilateral hand pain/numbness     Bilateral hand pain/wrist pain    Skin: Positive for rash. Negative for itching and nail changes.        Toenail fungus    Neurological: Positive for tingling and numbness. Negative for weakness and headaches.   Psychiatric/Behavioral: Positive for agitation, behavioral problems and sleep disturbance. The patient is nervous/anxious.         Depressed mood        Patient Active Problem List   Diagnosis   • Class 1 obesity with body mass index (BMI) of 30.0 to 30.9 in adult   • Gastroesophageal reflux disease   • COOKIE (generalized anxiety disorder)   • Left upper quadrant pain   • Low libido   • Mood disorder (CMS/HCC)   • H. pylori infection   • Renal impairment   • Mixed hyperlipidemia   • Vitamin D deficiency   • History of drug use   • Fatty liver   • Renal cyst   • Change in bowel habits   • Mucus in stool   • Allergy to alpha-gal   • Bronchitis   • Upper respiratory tract infection   • Eosinophilic esophagitis   • Onychomycosis   • Bilateral hand pain   • Bilateral wrist pain   • Overweight   • Pruritic rash   • Insomnia     Past Surgical History:   Procedure Laterality Date   • COLONOSCOPY N/A 9/17/2019    Procedure: COLONOSCOPY;  Surgeon: Rigoberto Flower MD;   Location: [facility] ENDOSCOPY;  Service: Gastroenterology   • ENDOSCOPY N/A 9/17/2019    Procedure: ESOPHAGOGASTRODUODENOSCOPY;  Surgeon: Rigoberto Flower MD;  Location: [facility] ENDOSCOPY;  Service: Gastroenterology   • HEMORRHOIDECTOMY       Social History     Socioeconomic History   • Marital status: Single     Spouse name: Not on file   • Number of children: Not on file   • Years of education: Not on file   • Highest education level: Not on file   Tobacco Use   • Smoking status: Never Smoker   • Smokeless tobacco: Never Used   Substance and Sexual Activity   • Alcohol use: Yes     Comment: rarely   • Drug use: No   • Sexual activity: Defer     Family History   Problem Relation Age of Onset   • Alcohol abuse Mother    • Anxiety disorder Mother    • Arthritis Mother    • Asthma Mother    • Cancer Mother    • Depression Mother    • Hypertension Mother    • Stroke Mother    • Hyperlipidemia Father    • Thyroid disease Sister      No visits with results within 6 Month(s) from this visit.   Latest known visit with results is:   Lab on 11/08/2019   Component Date Value Ref Range Status   • Beef 11/08/2019 0.62* <0.35 kU/L Final   • Class Description 11/08/2019 1   Final   • Amado 11/08/2019 <0.10  <0.35 kU/L Final   • Class Interpretation 11/08/2019 0   Final   • Pork 11/08/2019 0.18  <0.35 kU/L Final   • Class Interpretation 11/08/2019 0/1   Final    The test method is the Glance App ImmunoCAP allergen-specific  IgE system. CLASS INTERPRETATION   <0.10 kU/L= 0,  Negative; 0.10 - 0.34 kU/L= 0/1, Equivocal/Borderline;  0.35 - 0.69  kU/L=1, Low Positive; 0.70 - 3.49 kU/L=2,  Moderate Positive;  3.50  - 17.49 kU/L=3, High Positive;  17.50 - 49.99 kU/L= 4, Very High Positive; 50.00 - 99.99  kU/L= 5, Very High Positive;   >99.99 kU/L=6, Very High  Positive  *This test was developed and its performance  characteristics determined by [facility]. It has not  been cleared or approved by the U.[place]. Food and Drug  Administration.   •  Alpha Gal IgE 11/08/2019 0.40* <0.10 kU/L Final    Previous reports (EBONY 2009;123:426-433) have demonstrated  that patients with IgE antibodies to  [id number]-galactose are at risk for delayed  anaphylaxis, angioedema, or urticaria following  consumption of beef, pork, or lamb.   • Class Description 11/08/2019 Comment   Final        Levels of Specific IgE       Class  Description of Class      ---------------------------  -----  --------------------                     < 0.10         0         Negative             0.10 -    0.31         0/I       Equivocal/Low             0.32 -    0.55         I         Low             0.56 -    1.40         II        Moderate             1.41 -    3.90         III       High             3.91 -   19.00         IV        Very High            19.01 -  100.00         V         Very High                    >100.00         VI        Very High   • Egg White 11/08/2019 <0.10  Class 0 kU/L Final   • Milk, Cow's 11/08/2019 1.02* Class II kU/L Final   • CodFish 11/08/2019 <0.10  Class 0 kU/L Final   • Sesame Seed 11/08/2019 <0.10  Class 0 kU/L Final   • Peanut 11/08/2019 0.10* Class 0/I kU/L Final   • Soybean 11/08/2019 0.16* Class 0/I kU/L Final   • Hazelnut 11/08/2019 0.25* Class 0/I kU/L Final   • Shrimp 11/08/2019 <0.10  Class 0 kU/L Final   • Scallop 11/08/2019 <0.10  Class 0 kU/L Final   • Gluten 11/08/2019 0.79* Class II kU/L Final   • Bittinger 11/08/2019 <0.10  Class 0 kU/L Final   • Wheat 11/08/2019 0.84* Class II kU/L Final   • Gliadin Deamidated Peptide Ab, IgA 11/08/2019 4  0 - 19 units Final                       Negative                   0 - 19                     Weak Positive             20 - 30                     Moderate to Strong Positive   >30   • Deaminated Gliadin Ab IgG 11/08/2019 2  0 - 19 units Final                       Negative                   0 - 19                     Weak Positive             20 - 30                     Moderate to Strong Positive   >30  "  • Tissue Transglutaminase IgA 11/08/2019 <2  0 - 3 U/mL Final                                  Negative        0 -  3                                Weak Positive   4 - 10                                Positive           >10   Tissue Transglutaminase (tTG) has been identified   as the endomysial antigen.  Studies have demonstr-   ated that endomysial IgA antibodies have over 99%   specificity for gluten sensitive enteropathy.   • Tissue Transglutaminase IgG 11/08/2019 <2  0 - 5 U/mL Final                                  Negative        0 - 5                                Weak Positive   6 - 9                                Positive           >9      No image results found.    [unfilled]    There is no immunization history on file for this patient.    The following portions of the patient's history were reviewed and updated as appropriate: allergies, current medications, past family history, past medical history, past social history, past surgical history and problem list.        Physical Exam  /68 (BP Location: Left arm, Patient Position: Sitting, Cuff Size: Adult)   Pulse 78   Temp 98.3 °F (36.8 °C) (Temporal)   Ht 177.8 cm (70\")   Wt 91.8 kg (202 lb 6.4 oz)   SpO2 95%   BMI 29.04 kg/m²     Physical Exam   Constitutional: He is oriented to person, place, and time. He appears well-developed and well-nourished.   HENT:   Head: Normocephalic and atraumatic.   Right Ear: External ear normal.   Eyes: Pupils are equal, round, and reactive to light. Conjunctivae and EOM are normal.   Neck: Normal range of motion. Neck supple.   Cardiovascular: Normal rate, regular rhythm and normal heart sounds.   No murmur heard.  Pulmonary/Chest: Effort normal and breath sounds normal. No respiratory distress.   Abdominal: Soft. Bowel sounds are normal. He exhibits no distension. There is no tenderness.   Obese abdomen    Musculoskeletal: He exhibits tenderness. He exhibits no edema or deformity.        Right wrist: " He exhibits decreased range of motion, tenderness and bony tenderness.        Left wrist: He exhibits decreased range of motion, tenderness and bony tenderness.        Arms:       Neurological: He is alert and oriented to person, place, and time. No cranial nerve deficit.   Skin: Skin is warm. Rash noted. He is not diaphoretic. No erythema. No pallor.   Pruritic rash on back    Psychiatric: He has a normal mood and affect. His behavior is normal.   Nursing note and vitals reviewed.      Assessment/Plan    Diagnosis Plan   1. Bilateral wrist pain  XR Wrist 2 View Bilateral    XR hand 3+ vw bilateral    Ambulatory Referral to Neurology   2. COOKIE (generalized anxiety disorder)  CBC Auto Differential    Comprehensive Metabolic Panel    Lipid Panel    Vitamin D 25 Hydroxy   3. Vitamin D deficiency  CBC Auto Differential    Comprehensive Metabolic Panel    Lipid Panel    Vitamin D 25 Hydroxy   4. Mixed hyperlipidemia  CBC Auto Differential    Comprehensive Metabolic Panel    Lipid Panel    Vitamin D 25 Hydroxy   5. Fatty liver  CBC Auto Differential    Comprehensive Metabolic Panel    Lipid Panel    Vitamin D 25 Hydroxy   6. Allergy to alpha-gal  CBC Auto Differential    Comprehensive Metabolic Panel    Lipid Panel    Vitamin D 25 Hydroxy   7. Overweight  CBC Auto Differential    Comprehensive Metabolic Panel    Lipid Panel    Vitamin D 25 Hydroxy   8. Bilateral hand pain  XR Wrist 2 View Bilateral    XR hand 3+ vw bilateral    Ambulatory Referral to Neurology   9. Onychomycosis     10. Insomnia, unspecified type     11. Pruritic rash               -went over labwork and US of abdomen  -adivsed pt to watch out for possible interaction between doxepin and paxil.  Gave information on serotonin syndrome to go home with. Will taper paxil in coming weeks   -hand pain/wrist pain - suspect carpal tunnel. Will refer to Neurology information provided.  Start on naproxen 500 mg PO BId. Drug information provided. Recommend wrist  brace but pt declined.. Consider Orthopedic referral in future   -toenail fungus - start on lamsil 250 mg daily for 12 weeks   -renal cyst - monitor.   -rash start on triamcinolone cream topcial BID  -eosinophilic esophagitis/alphal gal allergy  -GI following, advised pt to refrain from beef, pork or lamb products   -insomnia - doxepin 25 mg at bedtime   -overweight counseled weight loss >5 minutes BMI at 29.0   -fatty liver -recommend heart healthy diet. Gave information today  -pruritis - vistaril 25 mg PO TID along with lotrisone cream BID  -vitamin D deficiency -vitamin D once a week  -HLP - DASH diet, heart healthy diet.  -renal impairment -  Will monitor kidney function   -GERD -continue prilosec. Possible gastritis. Will need last EGD and endoscopy. Start on carafate 1 gram tablet QID. Will have colonoscopy and   -allergic rhintis - flonase nasal psray   -mood disorder/insomnia  -on paxil but wants to get off it. Stop paxil but taper dose.    -panic attack - recommend paxil  Will go up from 10 to 20 mg daily.  Drug information provided  -recommended counseling but pt decline for now   -obesity - counseled pt to lose weight >5 minutes, diet information provided  -advised pt to be safe and call with questions and concerns. All questions addressed tdoay.   -advised pt to go to ER or call 911 if symptoms worrisome or severe  -advised pt to followup with specialist and referrals  -advised pt to be safe during COVID-19 pandemic  --total time with pt >25 minutes   -recheck in 4-6 weeks         This document has been electronically signed by Jhon Amezquita MD on June 25, 2020 10:22

## 2020-06-24 NOTE — TELEPHONE ENCOUNTER
Pain and swelling in both hands. Pt stated he has a knot on each wrist and when you touch them you have a tingling feeling in finger tips. Numbness in finger tips. Pt stated this has been going on for a week. Transferred pt to make appointment.

## 2020-06-25 ENCOUNTER — OFFICE VISIT (OUTPATIENT)
Dept: FAMILY MEDICINE CLINIC | Facility: CLINIC | Age: 40
End: 2020-06-25

## 2020-06-25 ENCOUNTER — LAB (OUTPATIENT)
Dept: LAB | Facility: HOSPITAL | Age: 40
End: 2020-06-25

## 2020-06-25 VITALS
DIASTOLIC BLOOD PRESSURE: 68 MMHG | SYSTOLIC BLOOD PRESSURE: 118 MMHG | BODY MASS INDEX: 28.98 KG/M2 | HEART RATE: 78 BPM | HEIGHT: 70 IN | OXYGEN SATURATION: 95 % | WEIGHT: 202.4 LBS | TEMPERATURE: 98.3 F

## 2020-06-25 DIAGNOSIS — K76.0 FATTY LIVER: ICD-10-CM

## 2020-06-25 DIAGNOSIS — F41.1 GAD (GENERALIZED ANXIETY DISORDER): ICD-10-CM

## 2020-06-25 DIAGNOSIS — M25.531 BILATERAL WRIST PAIN: Primary | ICD-10-CM

## 2020-06-25 DIAGNOSIS — E55.9 VITAMIN D DEFICIENCY: ICD-10-CM

## 2020-06-25 DIAGNOSIS — M79.641 BILATERAL HAND PAIN: ICD-10-CM

## 2020-06-25 DIAGNOSIS — Z91.018 ALLERGY TO ALPHA-GAL: ICD-10-CM

## 2020-06-25 DIAGNOSIS — E66.3 OVERWEIGHT: ICD-10-CM

## 2020-06-25 DIAGNOSIS — M79.642 BILATERAL HAND PAIN: ICD-10-CM

## 2020-06-25 DIAGNOSIS — L28.2 PRURITIC RASH: ICD-10-CM

## 2020-06-25 DIAGNOSIS — B35.1 ONYCHOMYCOSIS: ICD-10-CM

## 2020-06-25 DIAGNOSIS — E78.2 MIXED HYPERLIPIDEMIA: ICD-10-CM

## 2020-06-25 DIAGNOSIS — G47.00 INSOMNIA, UNSPECIFIED TYPE: ICD-10-CM

## 2020-06-25 DIAGNOSIS — M25.532 BILATERAL WRIST PAIN: Primary | ICD-10-CM

## 2020-06-25 LAB
25(OH)D3 SERPL-MCNC: 33.3 NG/ML (ref 30–100)
ALBUMIN SERPL-MCNC: 4.4 G/DL (ref 3.5–5.2)
ALBUMIN/GLOB SERPL: 1.7 G/DL
ALP SERPL-CCNC: 77 U/L (ref 39–117)
ALT SERPL W P-5'-P-CCNC: 56 U/L (ref 1–41)
ANION GAP SERPL CALCULATED.3IONS-SCNC: 10.4 MMOL/L (ref 5–15)
AST SERPL-CCNC: 30 U/L (ref 1–40)
BASOPHILS # BLD AUTO: 0.08 10*3/MM3 (ref 0–0.2)
BASOPHILS NFR BLD AUTO: 1 % (ref 0–1.5)
BILIRUB SERPL-MCNC: 0.4 MG/DL (ref 0.2–1.2)
BUN BLD-MCNC: 11 MG/DL (ref 6–20)
BUN/CREAT SERPL: 12.4 (ref 7–25)
CALCIUM SPEC-SCNC: 9.4 MG/DL (ref 8.6–10.5)
CHLORIDE SERPL-SCNC: 104 MMOL/L (ref 98–107)
CHOLEST SERPL-MCNC: 223 MG/DL (ref 0–200)
CO2 SERPL-SCNC: 25.6 MMOL/L (ref 22–29)
CREAT BLD-MCNC: 0.89 MG/DL (ref 0.76–1.27)
DEPRECATED RDW RBC AUTO: 39.2 FL (ref 37–54)
EOSINOPHIL # BLD AUTO: 0.41 10*3/MM3 (ref 0–0.4)
EOSINOPHIL NFR BLD AUTO: 5.3 % (ref 0.3–6.2)
ERYTHROCYTE [DISTWIDTH] IN BLOOD BY AUTOMATED COUNT: 13.1 % (ref 12.3–15.4)
GFR SERPL CREATININE-BSD FRML MDRD: 95 ML/MIN/1.73
GLOBULIN UR ELPH-MCNC: 2.6 GM/DL
GLUCOSE BLD-MCNC: 105 MG/DL (ref 65–99)
HCT VFR BLD AUTO: 46.8 % (ref 37.5–51)
HDLC SERPL-MCNC: 51 MG/DL (ref 40–60)
HGB BLD-MCNC: 16 G/DL (ref 13–17.7)
IMM GRANULOCYTES # BLD AUTO: 0.12 10*3/MM3 (ref 0–0.05)
IMM GRANULOCYTES NFR BLD AUTO: 1.6 % (ref 0–0.5)
LDLC SERPL CALC-MCNC: 134 MG/DL (ref 0–100)
LDLC/HDLC SERPL: 2.64 {RATIO}
LYMPHOCYTES # BLD AUTO: 1.58 10*3/MM3 (ref 0.7–3.1)
LYMPHOCYTES NFR BLD AUTO: 20.6 % (ref 19.6–45.3)
MCH RBC QN AUTO: 28.3 PG (ref 26.6–33)
MCHC RBC AUTO-ENTMCNC: 34.2 G/DL (ref 31.5–35.7)
MCV RBC AUTO: 82.7 FL (ref 79–97)
MONOCYTES # BLD AUTO: 0.84 10*3/MM3 (ref 0.1–0.9)
MONOCYTES NFR BLD AUTO: 10.9 % (ref 5–12)
NEUTROPHILS # BLD AUTO: 4.65 10*3/MM3 (ref 1.7–7)
NEUTROPHILS NFR BLD AUTO: 60.6 % (ref 42.7–76)
NRBC BLD AUTO-RTO: 0 /100 WBC (ref 0–0.2)
PLATELET # BLD AUTO: 220 10*3/MM3 (ref 140–450)
PMV BLD AUTO: 10.4 FL (ref 6–12)
POTASSIUM BLD-SCNC: 4.7 MMOL/L (ref 3.5–5.2)
PROT SERPL-MCNC: 7 G/DL (ref 6–8.5)
RBC # BLD AUTO: 5.66 10*6/MM3 (ref 4.14–5.8)
SODIUM BLD-SCNC: 140 MMOL/L (ref 136–145)
TRIGL SERPL-MCNC: 188 MG/DL (ref 0–150)
VLDLC SERPL-MCNC: 37.6 MG/DL (ref 5–40)
WBC NRBC COR # BLD: 7.68 10*3/MM3 (ref 3.4–10.8)

## 2020-06-25 PROCEDURE — 85025 COMPLETE CBC W/AUTO DIFF WBC: CPT

## 2020-06-25 PROCEDURE — 99214 OFFICE O/P EST MOD 30 MIN: CPT | Performed by: FAMILY MEDICINE

## 2020-06-25 PROCEDURE — 82306 VITAMIN D 25 HYDROXY: CPT

## 2020-06-25 PROCEDURE — 80053 COMPREHEN METABOLIC PANEL: CPT

## 2020-06-25 PROCEDURE — 80061 LIPID PANEL: CPT

## 2020-06-25 RX ORDER — DOXEPIN HYDROCHLORIDE 25 MG/1
25 CAPSULE ORAL NIGHTLY
Qty: 30 CAPSULE | Refills: 3 | Status: SHIPPED | OUTPATIENT
Start: 2020-06-25 | End: 2020-09-01

## 2020-06-25 RX ORDER — TERBINAFINE HYDROCHLORIDE 250 MG/1
250 TABLET ORAL DAILY
Qty: 30 TABLET | Refills: 2 | Status: SHIPPED | OUTPATIENT
Start: 2020-06-25 | End: 2020-11-30

## 2020-06-25 NOTE — PATIENT INSTRUCTIONS
New medications  Naproxen 500 mg twice a day for hand/wrist. Take with meals and water  Get x-ray and labs   -will refer to Neurology for hand issues/possible carpal tunnel  -start on lamsil 250 mg daily for toenail fungus  -stop paxil but take every other day for 1 week then every other 2 days for 1 week then every other 3 days for one week then stop  -start on doxepin for sleep 25 mg at bedtime. May interact with paxil look out for severe symptoms. May cause serotonin syndrome       Carpal Tunnel Syndrome    Carpal tunnel syndrome is a condition that causes pain in your hand and arm. The carpal tunnel is a narrow area that is on the palm side of your wrist. Repeated wrist motion or certain diseases may cause swelling in the tunnel. This swelling can pinch the main nerve in the wrist (median nerve).  What are the causes?  This condition may be caused by:  · Repeated wrist motions.  · Wrist injuries.  · Arthritis.  · A sac of fluid (cyst) or abnormal growth (tumor) in the carpal tunnel.  · Fluid buildup during pregnancy.  Sometimes the cause is not known.  What increases the risk?  The following factors may make you more likely to develop this condition:  · Having a job in which you move your wrist in the same way many times. This includes jobs like being a  or a .  · Being a woman.  · Having other health conditions, such as:  ? Diabetes.  ? Obesity.  ? A thyroid gland that is not active enough (hypothyroidism).  ? Kidney failure.  What are the signs or symptoms?  Symptoms of this condition include:  · A tingling feeling in your fingers.  · Tingling or a loss of feeling (numbness) in your hand.  · Pain in your entire arm. This pain may get worse when you bend your wrist and elbow for a long time.  · Pain in your wrist that goes up your arm to your shoulder.  · Pain that goes down into your palm or fingers.  · A weak feeling in your hands. You may find it hard to grab and hold items.  You may feel  worse at night.  How is this diagnosed?  This condition is diagnosed with a medical history and physical exam. You may also have tests, such as:  · Electromyogram (EMG). This test checks the signals that the nerves send to the muscles.  · Nerve conduction study. This test checks how well signals pass through your nerves.  · Imaging tests, such as X-rays, ultrasound, and MRI. These tests check for what might be the cause of your condition.  How is this treated?  This condition may be treated with:  · Lifestyle changes. You will be asked to stop or change the activity that caused your problem.  · Doing exercise and activities that make bones and muscles stronger (physical therapy).  · Learning how to use your hand again (occupational therapy).  · Medicines for pain and swelling (inflammation). You may have injections in your wrist.  · A wrist splint.  · Surgery.  Follow these instructions at home:  If you have a splint:  · Wear the splint as told by your doctor. Remove it only as told by your doctor.  · Loosen the splint if your fingers:  ? Tingle.  ? Lose feeling (become numb).  ? Turn cold and blue.  · Keep the splint clean.  · If the splint is not waterproof:  ? Do not let it get wet.  ? Cover it with a watertight covering when you take a bath or a shower.  Managing pain, stiffness, and swelling    · If told, put ice on the painful area:  ? If you have a removable splint, remove it as told by your doctor.  ? Put ice in a plastic bag.  ? Place a towel between your skin and the bag.  ? Leave the ice on for 20 minutes, 2-3 times per day.  General instructions  · Take over-the-counter and prescription medicines only as told by your doctor.  · Rest your wrist from any activity that may cause pain. If needed, talk with your boss at work about changes that can help your wrist heal.  · Do any exercises as told by your doctor, physical therapist, or occupational therapist.  · Keep all follow-up visits as told by your  doctor. This is important.  Contact a doctor if:  · You have new symptoms.  · Medicine does not help your pain.  · Your symptoms get worse.  Get help right away if:  · You have very bad numbness or tingling in your wrist or hand.  Summary  · Carpal tunnel syndrome is a condition that causes pain in your hand and arm.  · It is often caused by repeated wrist motions.  · Lifestyle changes and medicines are used to treat this problem. Surgery may help in very bad cases.  · Follow your doctor's instructions about wearing a splint, resting your wrist, keeping follow-up visits, and calling for help.  This information is not intended to replace advice given to you by your health care provider. Make sure you discuss any questions you have with your health care provider.  Document Released: 12/06/2012 Document Revised: 04/26/2019 Document Reviewed: 04/26/2019  [employer] Patient Education © 2020 [employer]  Naproxen Sodium oral tablet, extended-release  What is this medicine?  NAPROXEN (na PROX en) is a non-steroidal anti-inflammatory drug (NSAID). It is used to reduce swelling and to treat pain. This medicine may be used for dental pain, headache, or painful monthly periods. It is also used for painful joint and muscular problems such as arthritis, tendinitis, bursitis, and gout.  This medicine may be used for other purposes; ask your health care provider or pharmacist if you have questions.  COMMON BRAND NAME(S): Midol Extended Relief, Naprelan, Naprelan Dose Card  What should I tell my health care provider before I take this medicine?  They need to know if you have any of these conditions:  · cigarette smoker  · coronary artery bypass graft (CABG) surgery within the past 2 weeks  · drink more than 3 alcohol-containing drinks a day  · heart disease  · high blood pressure  · history of stomach bleeding  · kidney disease  · liver disease  · lung or breathing disease, like asthma  · an unusual or allergic reaction to  naproxen, aspirin, other NSAIDs, other medicines, foods, dyes, or preservatives  · pregnant or trying to get pregnant  · breast-feeding  How should I use this medicine?  Take this medicine by mouth with a glass of water. Follow the directions on the prescription label. Take this medicine with food if it upsets your stomach. Try to not lie down for at least 10 minutes after you take it. Take your medicine at regular intervals. Do not take your medicine more often than directed. Long-term, continuous use may increase the risk of heart attack or stroke.  A special MedGuide will be given to you by the pharmacist with each prescription and refill. Be sure to read this information carefully each time.  Talk to your pediatrician regarding the use of this medicine in children. Special care may be needed.  Overdosage: If you think you have taken too much of this medicine contact a poison control center or emergency room at once.  NOTE: This medicine is only for you. Do not share this medicine with others.  What if I miss a dose?  If you miss a dose, take it as soon as you can. If it is almost time for your next dose, take only that dose. Do not take double or extra doses.  What may interact with this medicine?  · alcohol  · aspirin  · cidofovir  · diuretics  · lithium  · methotrexate  · other drugs for inflammation like ketorolac or prednisone  · pemetrexed  · probenecid  · warfarin  This list may not describe all possible interactions. Give your health care provider a list of all the medicines, herbs, non-prescription drugs, or dietary supplements you use. Also tell them if you smoke, drink alcohol, or use illegal drugs. Some items may interact with your medicine.  What should I watch for while using this medicine?  Tell your doctor or healthcare provider if your pain does not get better. Talk to your doctor before taking another medicine for pain. Do not treat yourself.  This medicine does not prevent heart attack or  stroke. In fact, this medicine may increase the chance of a heart attack or stroke. The chance may increase with longer use of this medicine and in people who have heart disease. If you take aspirin to prevent heart attack or stroke, talk with your doctor or healthcare provider.  This medicine may cause serious skin reactions. They can happen weeks to months after starting the medicine. Contact your healthcare provider right away if you notice fevers or flu-like symptoms with a rash. The rash may be red or purple and then turn into blisters or peeling of the skin. Or, you might notice a red rash with swelling of the face, lips or lymph nodes in your neck or under your arms.  Do not take other medicines that contain aspirin, ibuprofen, or naproxen with this medicine. Side effects such as stomach upset, nausea, or ulcers may be more likely to occur. Many medicines available without a prescription should not be taken with this medicine.  This medicine can cause ulcers and bleeding in the stomach and intestines at any time during treatment. Do not smoke cigarettes or drink alcohol. These increase irritation to your stomach and can make it more susceptible to damage from this medicine. Ulcers and bleeding can happen without warning symptoms and can cause death.  You may get drowsy or dizzy. Do not drive, use machinery, or do anything that needs mental alertness until you know how this medicine affects you. Do not stand or sit up quickly, especially if you are an older patient. This reduces the risk of dizzy or fainting spells.  This medicine can cause you to bleed more easily. Try to avoid damage to your teeth and gums when you brush or floss your teeth.  What side effects may I notice from receiving this medicine?  Side effects that you should report to your doctor or health care professional as soon as possible:  · black or bloody stools, blood in the urine or vomit  · blurred vision  · chest pain  · difficulty  breathing or wheezing  · nausea or vomiting  · redness, blistering, peeling, or loosening of the skin, including inside the mouth  · severe stomach pain  · skin rash, hives, or itching  · slurred speech or weakness on one side of the body  · swelling of eyelids, throat, lips  · unexplained weight gain or swelling  · unusually weak or tired  · yellowing of eyes or skin  Side effects that usually do not require medical attention (report to your doctor or health care professional if they continue or are bothersome):  · constipation  · headache  · heartburn  This list may not describe all possible side effects. Call your doctor for medical advice about side effects. You may report side effects to FDA at 7-303-XBC-0648.  Where should I keep my medicine?  Keep out of the reach of children.  Store at room temperature between 20 and 25 degrees C (68 and 77 degrees F). Keep container tightly closed. Throw away any unused medicine after the expiration date.  NOTE: This sheet is a summary. It may not cover all possible information. If you have questions about this medicine, talk to your doctor, pharmacist, or health care provider.  © 2020 [employer] (2020-03-10 07:54:00)  Doxepin oral tablets  What is this medicine?  DOXEPIN (DOX e pin) is used to treat insomnia. This medicine helps you sleep through the night.  This medicine may be used for other purposes; ask your health care provider or pharmacist if you have questions.  COMMON BRAND NAME(S): Yodit  What should I tell my health care provider before I take this medicine?  They need to know if you have any of these conditions:  · bipolar disorder  · depression  · difficulty passing urine  · glaucoma  · heart disease  · history of a drug or alcohol abuse problem  · liver disease  · lung or breathing disease, like asthma or sleep apnea  · prostate trouble  · schizophrenia  · suicidal thoughts, plans, or attempt; a previous suicide attempt by you or a family  member  · an unusual or allergic reaction to doxepin, other medicines, foods, dyes, or preservatives  · pregnant or trying to get pregnant  · breast-feeding  How should I use this medicine?  Take this medicine by mouth with a glass of water. Follow the directions on the prescription label. Take this medicine on an empty stomach, and not within 3 hours of a meal. This medicine should be taken within 30 minutes of going to sleep and only when you are able to get a full night of sleep before you must be active again. Do not take it more often than directed. Do not stop taking this medicine suddenly except upon the advice of your doctor. Stopping this medicine too quickly may cause serious side effects or your condition may worsen.  A special MedGuide will be given to you by the pharmacist with each prescription and refill. Be sure to read this information carefully each time.  Talk to your pediatrician regarding the use of this medicine in children. Special care may be needed.  Overdosage: If you think you have taken too much of this medicine contact a poison control center or emergency room at once.  NOTE: This medicine is only for you. Do not share this medicine with others.  What if I miss a dose?  This does not apply. This medicine should only be taken before going to sleep. Do not take double or extra doses.  What may interact with this medicine?  Do not take this medicine with any of the following medications:  · arsenic trioxide  · certain medicines used to regulate abnormal heartbeat or to treat other heart conditions  · cisapride  · halofantrine  · levomethadyl  · linezolid  · MAOIs like Carbex, Eldepryl, Marplan, Nardil, and Parnate  · methylene blue  · other medicines for mental depression  · phenothiazines like perphenazine, thioridazine and chlorpromazine  · pimozide  · procarbazine  · sparfloxacin  · [place]'s Wort  This medicine may also interact with the following  "medications:  · cimetidine  · tolazamide  · ziprasidone  This list may not describe all possible interactions. Give your health care provider a list of all the medicines, herbs, non-prescription drugs, or dietary supplements you use. Also tell them if you smoke, drink alcohol, or use illegal drugs. Some items may interact with your medicine.  What should I watch for while using this medicine?  Visit your doctor or health care professional for regular checks on your progress. Keep a regular sleep schedule by going to bed at about the same time each night. Avoid caffeine-containing drinks in the evening hours. Talk to your doctor if you still have trouble sleeping within 7 to 10 days of using this medicine. This may mean there is another cause for your sleep problems.  Do not take this medicine unless you are able to get a full night of sleep before you must be active again. After taking this medicine, do not drive, use machinery, or do anything that needs mental alertness. Take this medicine only within 30 minutes of going to bed and then confine your activities to those needed to get ready for sleep. You may still feel drowsy the next day after taking this medicine. If this happens, do not drive, use machinery, or do anything that needs mental alertness until you feel fully awake. Do not stand or sit up quickly, especially if you are an older patient. This reduces the risk of dizzy or fainting spells.  After taking this medicine, you may get up out of bed and do an activity that you do not know you are doing. The next morning, you may have no memory of this. Activities include driving a car (\"sleep-driving\"), making and eating food, talking on the phone, sexual activity, and sleep-walking. Serious injuries have occurred. Call your doctor right away if you find out you have done any of these activities. Do not take this medicine if you have used alcohol that evening. Do not take it if you have taken another medicine " for sleep. The risk of doing these sleep-related activities is higher.  Patients and their families should watch out for new or worsening thoughts of suicide or depression. Also watch out for sudden changes in feelings such as feeling anxious, agitated, panicky, irritable, hostile, aggressive, impulsive, severely restless, overly excited and hyperactive, or not being able to sleep. If this happens, especially at the beginning of treatment or after a change in dose, call your health care professional.  Your mouth may get dry. Chewing sugarless gum or sucking hard candy, and drinking plenty of water may help. Contact your doctor if the problem does not go away or is severe.  This medicine may cause dry eyes and blurred vision. If you wear contact lenses you may feel some discomfort. Lubricating drops may help. See your eye doctor if the problem does not go away or is severe.  This medicine can cause constipation. Try to have a bowel movement at least every 2 to 3 days. If you do not have a bowel movement for 3 days, call your doctor or health care professional.  This medicine can make you more sensitive to the sun. Keep out of the sun. If you cannot avoid being in the sun, wear protective clothing and use sunscreen. Do not use sun lamps or tanning beds/booths.  What side effects may I notice from receiving this medicine?  Side effects that you should report to your doctor or health care professional as soon as possible:  · allergic reactions like skin rash, itching or hives, swelling of the face, lips, or tongue  · breathing problems  · changes in vision  · confusion  · eye pain  · fast, irregular heartbeat  · feeling faint or lightheaded, falls  · fever with increased sweating  · seizures  · stiff muscles  · suicidal thoughts or other mood changes  · trouble passing urine or change in the amount of urine  · unusual activities while asleep like driving, eating, making phone calls  Side effects that usually do not  require medical attention (report to your doctor or health care professional if they continue or are bothersome):  · change in appetite or weight  · constipation  · daytime drowsiness  · dry mouth  · upset stomach  This list may not describe all possible side effects. Call your doctor for medical advice about side effects. You may report side effects to FDA at 4-264-CZH-1360.  Where should I keep my medicine?  Keep out of the reach of children.  Store at room temperature between 20 and 25 degrees C (68 and 77 degrees F). Throw away any unused medicine after the expiration date.  NOTE: This sheet is a summary. It may not cover all possible information. If you have questions about this medicine, talk to your doctor, pharmacist, or health care provider.  © 2020 [employer] (2019-12-10 13:16:53)    Serotonin Syndrome  Serotonin is a chemical in your body (neurotransmitter) that helps to control several functions, such as:  · Brain and nerve cell function.  · Mood and emotions.  · Memory.  · Eating.  · Sleeping.  · Sexual activity.  · Stress response.  Having too much serotonin in your body can cause serotonin syndrome. This condition can be harmful to your brain and nerve cells. This can be a life-threatening condition.  What are the causes?  This condition may be caused by taking medicines or drugs that increase the level of serotonin in your body, such as:  · Antidepressant medicines.  · Migraine medicines.  · Certain pain medicines.  · Certain drugs, including ecstasy, LSD, cocaine, and amphetamines.  · Over-the-counter cough or cold medicines that contain dextromethorphan.  · Certain herbal supplements, including Brad's wort, ginseng, and nutmeg.  This condition usually occurs when you take these medicines or drugs in combination, but it can also happen with a high dose of a single medicine or drug.  What increases the risk?  You are more likely to develop this condition if:  · You just started taking  a medicine or drug that increases the level of serotonin in the body.  · You recently increased the dose of a medicine or drug that increases the level of serotonin in the body.  · You take more than one medicine or drug that increases the level of serotonin in the body.  What are the signs or symptoms?  Symptoms of this condition usually start within several hours of taking a medicine or drug. Symptoms may be mild or severe. Mild symptoms include:  · Sweating.  · Restlessness or agitation.  · Muscle twitching or stiffness.  · Rapid heart rate.  · Nausea and vomiting.  · Diarrhea.  · Headache.  · Shivering or goose bumps.  · Confusion.  Severe symptoms include:  · Irregular heartbeat.  · Seizures.  · Loss of consciousness.  · High fever.  How is this diagnosed?  This condition may be diagnosed based on:  · Your medical history.   · A physical exam.  · Your prior use of drugs and medicines.  · Blood or urine tests. These may be used to rule out other causes of your symptoms.  How is this treated?  The treatment for this condition depends on the severity of your symptoms.  · For mild cases, stopping the medicine or drug that caused your condition is usually all that is needed.  · For moderate to severe cases, treatment in a hospital may be needed to prevent or manage life-threatening symptoms. This may include medicines to control your symptoms, IV fluids, interventions to support your breathing, and treatments to control your body temperature.  Follow these instructions at home:  Medicines    · Take over-the-counter and prescription medicines only as told by your health care provider. This is important.  · Check with your health care provider before you start taking any new prescriptions, over-the-counter medicines, herbs, or supplements.  · Avoid combining any medicines that can cause this condition to occur.  Lifestyle    · Maintain a healthy lifestyle.  ? Eat a healthy diet that includes plenty of vegetables,  fruits, whole grains, low-fat dairy products, and lean protein. Do not eat a lot of foods that are high in fat, added sugars, or salt.  ? Get the right amount and quality of sleep. Most adults need 7-9 hours of sleep each night.  ? Make time to exercise, even if it is only for short periods of time. Most adults should exercise for at least 150 minutes each week.  ? Do not drink alcohol.  ? Do not use illegal drugs, and do not take medicines for reasons other than they are prescribed.  General instructions  · Do not use any products that contain nicotine or tobacco, such as cigarettes and e-cigarettes. If you need help quitting, ask your health care provider.  · Keep all follow-up visits as told by your health care provider. This is important.  Contact a health care provider if:  · Your symptoms do not improve or they get worse.  Get help right away if you:  · Have worsening confusion, severe headache, chest pain, high fever, seizures, or loss of consciousness.  · Experience serious side effects of medicine, such as swelling of your face, lips, tongue, or throat.  · Have serious thoughts about hurting yourself or others.  These symptoms may represent a serious problem that is an emergency. Do not wait to see if the symptoms will go away. Get medical help right away. Call your local emergency services (073 in the [place] Do not drive yourself to the hospital.  If you ever feel like you may hurt yourself or others, or have thoughts about taking your own life, get help right away. You can go to your nearest emergency department or call:  · Your local emergency services (233 in the [place]  · ·A suicide crisis helpline, such as the National Suicide Prevention Lifeline at 1-513.255.8910. This is open 24 hours a day.  Summary  · Serotonin is a brain chemical that helps to regulate the nervous system. High levels of serotonin in the body can cause serotonin syndrome, which is a very dangerous condition.  · This condition may  be caused by taking medicines or drugs that increase the level of serotonin in your body.  · Treatment depends on the severity of your symptoms. For mild cases, stopping the medicine or drug that caused your condition is usually all that is needed.  · Check with your health care provider before you start taking any new prescriptions, over-the-counter medicines, herbs, or supplements.  This information is not intended to replace advice given to you by your health care provider. Make sure you discuss any questions you have with your health care provider.  Document Released: 01/25/2006 Document Revised: 01/25/2019 Document Reviewed: 01/25/2019  [employer] © 2020 [employer]  Triamcinolone skin cream, ointment, lotion, or aerosol  What is this medicine?  TRIAMCINOLONE (trye am SIN oh lone) is a corticosteroid. It is used on the skin to reduce swelling, redness, itching, and allergic reactions.  This medicine may be used for other purposes; ask your health care provider or pharmacist if you have questions.  COMMON BRAND NAME(S): Aristocort, Aristocort A, Aristocort HP, Cinalog, Cinolar, DERMASORB TA Complete, Flutex, Kenalog, Pediaderm TA, SP Rx 228, Triacet, Trianex, Triderm  What should I tell my health care provider before I take this medicine?  They need to know if you have any of these conditions:  · any active infection  · large areas of burned or damaged skin  · skin wasting or thinning  · an unusual or allergic reaction to triamcinolone, corticosteroids, other medicines, foods, dyes, or preservatives  · pregnant or trying to get pregnant  · breast-feeding  How should I use this medicine?  This medicine is for external use only. Do not take by mouth. Follow the directions on the prescription label. Wash your hands before and after use. Apply a thin film of medicine to the affected area. Do not cover with a bandage or dressing unless your doctor or health care professional tells you to. Do not use  on healthy skin or over large areas of skin. Do not get this medicine in your eyes. If you do, rinse out with plenty of cool tap water. It is important not to use more medicine than prescribed. Do not use your medicine more often than directed.  Talk to your pediatrician regarding the use of this medicine in children. Special care may be needed.  Elderly patients are more likely to have damaged skin through aging, and this may increase side effects. This medicine should only be used for brief periods and infrequently in older patients.  Overdosage: If you think you have taken too much of this medicine contact a poison control center or emergency room at once.  NOTE: This medicine is only for you. Do not share this medicine with others.  What if I miss a dose?  If you miss a dose, use it as soon as you can. If it is almost time for your next dose, use only that dose. Do not use double or extra doses.  What may interact with this medicine?  Interactions are not expected.  This list may not describe all possible interactions. Give your health care provider a list of all the medicines, herbs, non-prescription drugs, or dietary supplements you use. Also tell them if you smoke, drink alcohol, or use illegal drugs. Some items may interact with your medicine.  What should I watch for while using this medicine?  Tell your doctor or health care professional if your symptoms do not start to get better within one week. Do not use for more than 14 days. Do not use on healthy skin or over large areas of skin. Tell your doctor or health care professional if you are exposed to anyone with measles or chickenpox, or if you develop sores or blisters that do not heal properly.  Do not use an airtight bandage to cover the affected area unless your doctor or health care professional tells you to. If you are to cover the area, follow the instructions carefully. Covering the area where the medicine is applied can increase the amount that  passes through the skin and increases the risk of side effects.  If treating the diaper area of a child, avoid covering the treated area with tight-fitting diapers or plastic pants. This may increase the amount of medicine that passes through the skin and increase the risk of serious side effects.  What side effects may I notice from receiving this medicine?  Side effects that you should report to your doctor or health care professional as soon as possible:  · burning or itching of the skin  · dark red spots on the skin  · infection  · painful, red, pus filled blisters in hair follicles  · thinning of the skin, sunburn more likely especially on the face  Side effects that usually do not require medical attention (report to your doctor or health care professional if they continue or are bothersome):  · dry skin, irritation  · unusual increased growth of hair on the face or body  This list may not describe all possible side effects. Call your doctor for medical advice about side effects. You may report side effects to FDA at 6-177-QEE-5472.  Where should I keep my medicine?  Keep out of the reach of children.  Store at room temperature between 15 and 30 degrees C (59 and 86 degrees F). Do not freeze. Throw away any unused medicine after the expiration date.  NOTE: This sheet is a summary. It may not cover all possible information. If you have questions about this medicine, talk to your doctor, pharmacist, or health care provider.  © 2020 [employer] (2019-09-19 10:50:30)

## 2020-06-26 ENCOUNTER — TELEPHONE (OUTPATIENT)
Dept: FAMILY MEDICINE CLINIC | Facility: CLINIC | Age: 40
End: 2020-06-26

## 2020-06-26 NOTE — TELEPHONE ENCOUNTER
----- Message from Jhon Amezquita MD sent at 6/25/2020  9:40 PM CDT -----  Please let pt know x-ray of both wrist normal

## 2020-06-26 NOTE — TELEPHONE ENCOUNTER
----- Message from Jhon Amezquita MD sent at 6/26/2020  7:21 AM CDT -----  Please call pt    Vitamin D levels normal     On CMP kidney function shows GFR at 95.     On liver enzymes ALT slightly up at 56. Will need to monitor. Pt does have history of fatty liver disease     On lipid panel total cholesterol slightly down but triglcyerides high.  LDL cholesterol down from 167 to 134. Continue with heart healthy diet more vegetables and lean protein. Exercise and weight loss recommended as well     Recheck on next visit. Thanks

## 2020-06-26 NOTE — TELEPHONE ENCOUNTER
----- Message from Jhon Amezquita MD sent at 6/25/2020  9:40 PM CDT -----  Please let pt know that x-ray of right hand shows arthritis of hand     Left hand shows no osteoarthritis     There is a 2 mm rounded lesion that is benign appearing on right hand    Recheck on next visit

## 2020-08-31 RX ORDER — OMEPRAZOLE 40 MG/1
40 CAPSULE, DELAYED RELEASE ORAL DAILY
Qty: 30 CAPSULE | Refills: 3 | Status: SHIPPED | OUTPATIENT
Start: 2020-08-31 | End: 2020-11-30 | Stop reason: SDUPTHER

## 2020-09-01 ENCOUNTER — OFFICE VISIT (OUTPATIENT)
Dept: FAMILY MEDICINE CLINIC | Facility: CLINIC | Age: 40
End: 2020-09-01

## 2020-09-01 ENCOUNTER — LAB (OUTPATIENT)
Dept: LAB | Facility: HOSPITAL | Age: 40
End: 2020-09-01

## 2020-09-01 VITALS
TEMPERATURE: 95.1 F | BODY MASS INDEX: 30.06 KG/M2 | SYSTOLIC BLOOD PRESSURE: 110 MMHG | DIASTOLIC BLOOD PRESSURE: 68 MMHG | OXYGEN SATURATION: 99 % | HEIGHT: 70 IN | WEIGHT: 210 LBS | HEART RATE: 66 BPM

## 2020-09-01 DIAGNOSIS — M79.89 BILATERAL SWELLING OF FEET: ICD-10-CM

## 2020-09-01 DIAGNOSIS — M79.89 SWELLING OF BOTH HANDS: ICD-10-CM

## 2020-09-01 DIAGNOSIS — E78.2 MIXED HYPERLIPIDEMIA: ICD-10-CM

## 2020-09-01 DIAGNOSIS — M19.041 ARTHRITIS OF RIGHT HAND: ICD-10-CM

## 2020-09-01 DIAGNOSIS — M25.532 BILATERAL WRIST PAIN: ICD-10-CM

## 2020-09-01 DIAGNOSIS — G47.00 INSOMNIA, UNSPECIFIED TYPE: ICD-10-CM

## 2020-09-01 DIAGNOSIS — M79.641 BILATERAL HAND PAIN: ICD-10-CM

## 2020-09-01 DIAGNOSIS — Z91.018 ALLERGY TO ALPHA-GAL: ICD-10-CM

## 2020-09-01 DIAGNOSIS — E66.9 CLASS 1 OBESITY WITH BODY MASS INDEX (BMI) OF 30.0 TO 30.9 IN ADULT, UNSPECIFIED OBESITY TYPE, UNSPECIFIED WHETHER SERIOUS COMORBIDITY PRESENT: ICD-10-CM

## 2020-09-01 DIAGNOSIS — M25.531 BILATERAL WRIST PAIN: ICD-10-CM

## 2020-09-01 DIAGNOSIS — Z91.018 ALLERGY TO ALPHA-GAL: Primary | ICD-10-CM

## 2020-09-01 DIAGNOSIS — K76.0 FATTY LIVER: ICD-10-CM

## 2020-09-01 DIAGNOSIS — F41.1 GAD (GENERALIZED ANXIETY DISORDER): ICD-10-CM

## 2020-09-01 DIAGNOSIS — M79.642 BILATERAL HAND PAIN: ICD-10-CM

## 2020-09-01 LAB
ALBUMIN SERPL-MCNC: 4.6 G/DL (ref 3.5–5.2)
ALBUMIN/GLOB SERPL: 1.5 G/DL
ALP SERPL-CCNC: 66 U/L (ref 39–117)
ALT SERPL W P-5'-P-CCNC: 22 U/L (ref 1–41)
ANION GAP SERPL CALCULATED.3IONS-SCNC: 9.6 MMOL/L (ref 5–15)
AST SERPL-CCNC: 15 U/L (ref 1–40)
BASOPHILS # BLD AUTO: 0.07 10*3/MM3 (ref 0–0.2)
BASOPHILS NFR BLD AUTO: 1.1 % (ref 0–1.5)
BILIRUB SERPL-MCNC: 0.5 MG/DL (ref 0–1.2)
BUN SERPL-MCNC: 11 MG/DL (ref 6–20)
BUN/CREAT SERPL: 10.4 (ref 7–25)
CALCIUM SPEC-SCNC: 9.7 MG/DL (ref 8.6–10.5)
CHLORIDE SERPL-SCNC: 102 MMOL/L (ref 98–107)
CO2 SERPL-SCNC: 29.4 MMOL/L (ref 22–29)
CREAT SERPL-MCNC: 1.06 MG/DL (ref 0.76–1.27)
CRP SERPL-MCNC: 0.05 MG/DL (ref 0–0.5)
DEPRECATED RDW RBC AUTO: 40.2 FL (ref 37–54)
EOSINOPHIL # BLD AUTO: 0.36 10*3/MM3 (ref 0–0.4)
EOSINOPHIL NFR BLD AUTO: 5.5 % (ref 0.3–6.2)
ERYTHROCYTE [DISTWIDTH] IN BLOOD BY AUTOMATED COUNT: 13.1 % (ref 12.3–15.4)
ERYTHROCYTE [SEDIMENTATION RATE] IN BLOOD: 1 MM/HR (ref 0–15)
GFR SERPL CREATININE-BSD FRML MDRD: 77 ML/MIN/1.73
GLOBULIN UR ELPH-MCNC: 3 GM/DL
GLUCOSE SERPL-MCNC: 104 MG/DL (ref 65–99)
HCT VFR BLD AUTO: 48.5 % (ref 37.5–51)
HGB BLD-MCNC: 16.2 G/DL (ref 13–17.7)
IMM GRANULOCYTES # BLD AUTO: 0.02 10*3/MM3 (ref 0–0.05)
IMM GRANULOCYTES NFR BLD AUTO: 0.3 % (ref 0–0.5)
LYMPHOCYTES # BLD AUTO: 1.66 10*3/MM3 (ref 0.7–3.1)
LYMPHOCYTES NFR BLD AUTO: 25.4 % (ref 19.6–45.3)
MCH RBC QN AUTO: 27.9 PG (ref 26.6–33)
MCHC RBC AUTO-ENTMCNC: 33.4 G/DL (ref 31.5–35.7)
MCV RBC AUTO: 83.6 FL (ref 79–97)
MONOCYTES # BLD AUTO: 0.8 10*3/MM3 (ref 0.1–0.9)
MONOCYTES NFR BLD AUTO: 12.3 % (ref 5–12)
NEUTROPHILS NFR BLD AUTO: 3.62 10*3/MM3 (ref 1.7–7)
NEUTROPHILS NFR BLD AUTO: 55.4 % (ref 42.7–76)
NRBC BLD AUTO-RTO: 0 /100 WBC (ref 0–0.2)
PLATELET # BLD AUTO: 223 10*3/MM3 (ref 140–450)
PMV BLD AUTO: 10.6 FL (ref 6–12)
POTASSIUM SERPL-SCNC: 5 MMOL/L (ref 3.5–5.2)
PROT SERPL-MCNC: 7.6 G/DL (ref 6–8.5)
RBC # BLD AUTO: 5.8 10*6/MM3 (ref 4.14–5.8)
SODIUM SERPL-SCNC: 141 MMOL/L (ref 136–145)
URATE SERPL-MCNC: 5.3 MG/DL (ref 3.4–7)
WBC # BLD AUTO: 6.53 10*3/MM3 (ref 3.4–10.8)

## 2020-09-01 PROCEDURE — 80053 COMPREHEN METABOLIC PANEL: CPT

## 2020-09-01 PROCEDURE — 86003 ALLG SPEC IGE CRUDE XTRC EA: CPT

## 2020-09-01 PROCEDURE — 86140 C-REACTIVE PROTEIN: CPT

## 2020-09-01 PROCEDURE — 86235 NUCLEAR ANTIGEN ANTIBODY: CPT

## 2020-09-01 PROCEDURE — 86225 DNA ANTIBODY NATIVE: CPT

## 2020-09-01 PROCEDURE — 85025 COMPLETE CBC W/AUTO DIFF WBC: CPT

## 2020-09-01 PROCEDURE — 85651 RBC SED RATE NONAUTOMATED: CPT

## 2020-09-01 PROCEDURE — 84550 ASSAY OF BLOOD/URIC ACID: CPT

## 2020-09-01 PROCEDURE — 99214 OFFICE O/P EST MOD 30 MIN: CPT | Performed by: FAMILY MEDICINE

## 2020-09-01 PROCEDURE — 86431 RHEUMATOID FACTOR QUANT: CPT

## 2020-09-01 PROCEDURE — 81374 HLA I TYPING 1 ANTIGEN LR: CPT

## 2020-09-01 PROCEDURE — 86038 ANTINUCLEAR ANTIBODIES: CPT

## 2020-09-01 PROCEDURE — 86008 ALLG SPEC IGE RECOMB EA: CPT

## 2020-09-01 RX ORDER — DOXEPIN HYDROCHLORIDE 50 MG/1
50 CAPSULE ORAL NIGHTLY
Qty: 30 CAPSULE | Refills: 3 | Status: SHIPPED | OUTPATIENT
Start: 2020-09-01 | End: 2020-11-30 | Stop reason: SDUPTHER

## 2020-09-01 NOTE — PATIENT INSTRUCTIONS
Eliminate beef, pork dairy in diet    Focus on vegetables fish, chicken and fruits.      Please génesis Dr[facility] for appt

## 2020-09-02 LAB
ANA SER QL: NEGATIVE
CENTROMERE B AB SER-ACNC: <0.2 AI (ref 0–0.9)
CHROMATIN AB SERPL-ACNC: <0.2 AI (ref 0–0.9)
CHROMATIN AB SERPL-ACNC: <10 IU/ML (ref 0–14)
DSDNA AB SER-ACNC: <1 IU/ML (ref 0–9)
ENA JO1 AB SER-ACNC: <0.2 AI (ref 0–0.9)
ENA RNP AB SER-ACNC: <0.2 AI (ref 0–0.9)
ENA SCL70 AB SER-ACNC: <0.2 AI (ref 0–0.9)
ENA SM AB SER-ACNC: <0.2 AI (ref 0–0.9)
ENA SS-A AB SER-ACNC: <0.2 AI (ref 0–0.9)
ENA SS-B AB SER-ACNC: <0.2 AI (ref 0–0.9)
Lab: NORMAL

## 2020-09-04 ENCOUNTER — TELEPHONE (OUTPATIENT)
Dept: FAMILY MEDICINE CLINIC | Facility: CLINIC | Age: 40
End: 2020-09-04

## 2020-09-04 NOTE — TELEPHONE ENCOUNTER
----- Message from Jhon Amezquita MD sent at 9/2/2020  7:22 AM CDT -----  Labs so far stable awaiting rheumatoid panel and alpha gal test to be complete    Recheck on next visit. Thanks

## 2020-09-04 NOTE — TELEPHONE ENCOUNTER
----- Message from Jhon Amezquita MD sent at 9/3/2020  7:23 AM CDT -----  CONSTANTIN comprehensive panel so far negative    Awaiting rest of labwork

## 2020-09-08 LAB
ALPHA GAL IGE: 1.14 KU/L
BEEF IGE QN: 1.14 KU/L
LAMB IGE QN: 0.14 KU/L
Lab: 1
Lab: 2
Lab: ABNORMAL
PORK IGE: 0.61 KU/L

## 2020-09-09 LAB — HLA-B27 QL NAA+PROBE: NEGATIVE

## 2020-09-16 ENCOUNTER — TELEPHONE (OUTPATIENT)
Dept: FAMILY MEDICINE CLINIC | Facility: CLINIC | Age: 40
End: 2020-09-16

## 2020-09-16 NOTE — TELEPHONE ENCOUNTER
----- Message from Jhon Amezquita MD sent at 9/16/2020 10:48 AM CDT -----  Wrote letter for pt to be sent to his address excusing daughter from in class learning and do Virtual Learning. thanks

## 2020-10-22 RX ORDER — PAROXETINE HYDROCHLORIDE 20 MG/1
20 TABLET, FILM COATED ORAL EVERY MORNING
Qty: 30 TABLET | Refills: 3 | Status: SHIPPED | OUTPATIENT
Start: 2020-10-22 | End: 2020-11-30 | Stop reason: SDUPTHER

## 2020-11-30 ENCOUNTER — OFFICE VISIT (OUTPATIENT)
Dept: FAMILY MEDICINE CLINIC | Facility: CLINIC | Age: 40
End: 2020-11-30

## 2020-11-30 VITALS
SYSTOLIC BLOOD PRESSURE: 110 MMHG | WEIGHT: 215 LBS | TEMPERATURE: 97.8 F | OXYGEN SATURATION: 98 % | HEART RATE: 66 BPM | DIASTOLIC BLOOD PRESSURE: 68 MMHG | BODY MASS INDEX: 30.78 KG/M2 | HEIGHT: 70 IN

## 2020-11-30 DIAGNOSIS — E78.2 MIXED HYPERLIPIDEMIA: ICD-10-CM

## 2020-11-30 DIAGNOSIS — G47.00 INSOMNIA, UNSPECIFIED TYPE: Primary | ICD-10-CM

## 2020-11-30 DIAGNOSIS — R07.9 CHEST PAIN, UNSPECIFIED TYPE: ICD-10-CM

## 2020-11-30 DIAGNOSIS — Z11.59 NEED FOR HEPATITIS C SCREENING TEST: ICD-10-CM

## 2020-11-30 DIAGNOSIS — K21.9 GASTROESOPHAGEAL REFLUX DISEASE, UNSPECIFIED WHETHER ESOPHAGITIS PRESENT: ICD-10-CM

## 2020-11-30 DIAGNOSIS — F41.1 GAD (GENERALIZED ANXIETY DISORDER): ICD-10-CM

## 2020-11-30 DIAGNOSIS — I49.3 PVC (PREMATURE VENTRICULAR CONTRACTION): ICD-10-CM

## 2020-11-30 DIAGNOSIS — Z23 NEED FOR IMMUNIZATION AGAINST INFLUENZA: ICD-10-CM

## 2020-11-30 DIAGNOSIS — E55.9 VITAMIN D DEFICIENCY: ICD-10-CM

## 2020-11-30 DIAGNOSIS — E66.9 CLASS 1 OBESITY WITH BODY MASS INDEX (BMI) OF 30.0 TO 30.9 IN ADULT, UNSPECIFIED OBESITY TYPE, UNSPECIFIED WHETHER SERIOUS COMORBIDITY PRESENT: ICD-10-CM

## 2020-11-30 DIAGNOSIS — R07.89 ATYPICAL CHEST PAIN: ICD-10-CM

## 2020-11-30 DIAGNOSIS — G47.9 SLEEP DISORDER: ICD-10-CM

## 2020-11-30 DIAGNOSIS — R73.01 IMPAIRED FASTING GLUCOSE: ICD-10-CM

## 2020-11-30 PROCEDURE — 90471 IMMUNIZATION ADMIN: CPT | Performed by: FAMILY MEDICINE

## 2020-11-30 PROCEDURE — 93010 ELECTROCARDIOGRAM REPORT: CPT | Performed by: INTERNAL MEDICINE

## 2020-11-30 PROCEDURE — 93005 ELECTROCARDIOGRAM TRACING: CPT | Performed by: FAMILY MEDICINE

## 2020-11-30 PROCEDURE — 90686 IIV4 VACC NO PRSV 0.5 ML IM: CPT | Performed by: FAMILY MEDICINE

## 2020-11-30 PROCEDURE — 99214 OFFICE O/P EST MOD 30 MIN: CPT | Performed by: FAMILY MEDICINE

## 2020-11-30 RX ORDER — DOXEPIN HYDROCHLORIDE 50 MG/1
50 CAPSULE ORAL NIGHTLY
Qty: 30 CAPSULE | Refills: 3 | Status: SHIPPED | OUTPATIENT
Start: 2020-11-30 | End: 2020-11-30

## 2020-11-30 RX ORDER — PAROXETINE HYDROCHLORIDE 20 MG/1
20 TABLET, FILM COATED ORAL EVERY MORNING
Qty: 30 TABLET | Refills: 3 | Status: SHIPPED | OUTPATIENT
Start: 2020-11-30 | End: 2021-01-15 | Stop reason: SDUPTHER

## 2020-11-30 RX ORDER — MONTELUKAST SODIUM 10 MG/1
10 TABLET ORAL NIGHTLY
Qty: 30 TABLET | Refills: 3 | Status: SHIPPED | OUTPATIENT
Start: 2020-11-30 | End: 2021-01-15 | Stop reason: SDUPTHER

## 2020-11-30 RX ORDER — OMEPRAZOLE 40 MG/1
40 CAPSULE, DELAYED RELEASE ORAL DAILY
Qty: 30 CAPSULE | Refills: 3 | Status: SHIPPED | OUTPATIENT
Start: 2020-11-30 | End: 2021-01-15 | Stop reason: SDUPTHER

## 2020-11-30 RX ORDER — BUSPIRONE HYDROCHLORIDE 10 MG/1
10 TABLET ORAL 3 TIMES DAILY
Qty: 90 TABLET | Refills: 3 | Status: SHIPPED | OUTPATIENT
Start: 2020-11-30 | End: 2021-01-15 | Stop reason: SDUPTHER

## 2020-11-30 RX ORDER — ERGOCALCIFEROL 1.25 MG/1
50000 CAPSULE ORAL WEEKLY
Qty: 4 CAPSULE | Refills: 3 | Status: SHIPPED | OUTPATIENT
Start: 2020-11-30 | End: 2021-01-15 | Stop reason: SDUPTHER

## 2020-11-30 NOTE — PROGRESS NOTES
Subjective:  Jordin Parham is a 39 y.o. male who presents for shoulder pain       Patient Active Problem List   Diagnosis   • Class 1 obesity with body mass index (BMI) of 30.0 to 30.9 in adult   • Gastroesophageal reflux disease   • COOKIE (generalized anxiety disorder)   • Left upper quadrant pain   • Low libido   • Mood disorder (CMS/HCC)   • H. pylori infection   • Renal impairment   • Mixed hyperlipidemia   • Vitamin D deficiency   • History of drug use   • Fatty liver   • Renal cyst   • Change in bowel habits   • Mucus in stool   • Allergy to alpha-gal   • Bronchitis   • Upper respiratory tract infection   • Eosinophilic esophagitis   • Onychomycosis   • Bilateral hand pain   • Bilateral wrist pain   • Overweight   • Pruritic rash   • Insomnia   • Arthritis of right hand   • Bilateral swelling of feet           Current Outpatient Medications:   •  busPIRone (BUSPAR) 10 MG tablet, Take 1 tablet by mouth 3 (Three) Times a Day., Disp: 90 tablet, Rfl: 3  •  montelukast (Singulair) 10 MG tablet, Take 1 tablet by mouth Every Night., Disp: 30 tablet, Rfl: 3  •  omeprazole (priLOSEC) 40 MG capsule, Take 1 capsule by mouth Daily., Disp: 30 capsule, Rfl: 3  •  PARoxetine (Paxil) 20 MG tablet, Take 1 tablet by mouth Every Morning., Disp: 30 tablet, Rfl: 3  •  vitamin D (ERGOCALCIFEROL) 1.25 MG (12059 UT) capsule capsule, Take 1 capsule by mouth 1 (One) Time Per Week., Disp: 4 capsule, Rfl: 3         Pt is 40 yo male with management  of obesity, COOKIE, GERD , mood disorder, history of H pylori infection, VItamin D deficiency,  Renal impairment, History of illicit drug use, fatty liver disease, gastritis,  Eosinophilic esophagitis, internal/external hemorrhoids, sp hemorrhoidectomy      6/25/20 in office visit for recheck on pt's GERD, obesity ,AD, mood disorder, alpha gal allergy.  He has been having issues with bilateral hand and wrist pain for past few weeks. This past week pain got worse. It hurts to touch wrist  and drive. He works driving trucks.  Numbness and tingling radiate to both fingers.  Hurts worse at bedtime.  Also has cys on right cyst. Also has toenail fungus. In regads to alpha gal he has been doing accupuncture in [facility].  He has only done it once. Also has issues with insomnia. He has tried melatonin with no relief. Also has rash on back. It is itchy.l has not tried anything for this     11/30/20 in office visit for recheck on pt's above medical issues. He continue to take his medication for depression and anxiety. Pt continues to take his medicaitons for COOKIE, GERD, vitamin D deficiency. His main concern is chest pain that  Started about a few weeks ago. Pt went to [facility] ER and supposedly had EKG and chest x-ray do not have results here today. State chest pain radiates from central chest to left arm. He has numbness and tingling in both arms. He states chest pain occurs with rest and acvitiy. It may last for a few minutes. No syncopal episodes. He has had dizziness and nausea. The chest pain occurs everyday . Pt has a family history of heart disease in mother and uncle. He would also like to see sleep medicine. He gets up after 4 hours and gasping for breath. He has not had a sleep study before     Anxiety  Presents for follow-up visit. Symptoms include chest pain, decreased concentration, depressed mood, dizziness, excessive worry, irritability, nausea, nervous/anxious behavior and shortness of breath. Patient reports no compulsions, confusion, dry mouth, feeling of choking, hyperventilation, impotence, insomnia, malaise, muscle tension, obsessions, palpitations, panic, restlessness or suicidal ideas. The quality of sleep is fair.     There is no history of anxiety/panic attacks, arrhythmia or CAD. Compliance with medications is %.   GI Problem  The primary symptoms include fatigue, nausea and arthralgias. Primary symptoms do not include fever, abdominal pain or vomiting. The illness  began more than 7 days ago. The onset was gradual. The problem has been gradually improving.   The illness is also significant for constipation. The illness does not include chills, anorexia, dysphagia, bloating, tenesmus, back pain or itching. Significant associated medical issues include GERD. Associated medical issues do not include inflammatory bowel disease, gallstones, liver disease, alcohol abuse, PUD, gastric bypass, bowel resection, irritable bowel syndrome, hemorrhoids or diverticulitis. Associated medical issues comments: alpha gal, esophagitis .   Chest Pain   This is a new problem. The current episode started 1 to 4 weeks ago. The onset quality is gradual. The problem occurs constantly. The problem has been waxing and waning. The pain is present in the substernal region and lateral region. The pain is at a severity of 4/10. The pain is mild. The quality of the pain is described as squeezing, tightness, heavy, numbness and pressure. The pain radiates to the left jaw and left arm. Associated symptoms include dizziness, nausea, shortness of breath and weakness. Pertinent negatives include no abdominal pain, back pain, claudication, cough, diaphoresis, exertional chest pressure, fever, headaches, hemoptysis, irregular heartbeat, leg pain, lower extremity edema, malaise/fatigue, near-syncope, numbness, orthopnea, palpitations, PND, sputum production, syncope or vomiting. The treatment provided no relief. Risk factors include lack of exercise, male gender, sedentary lifestyle and stress.   His past medical history is significant for hyperlipidemia.   Pertinent negatives for past medical history include no aneurysm, no anxiety/panic attacks, no aortic aneurysm, no aortic dissection, no arrhythmia, no bicuspid aortic valve, no CAD, no cancer, no congenital heart disease, no connective tissue disease, no COPD, no CHF, no diabetes, no DVT, no hyperhomocysteinemia, no hypertension, no Kawasaki disease, no Marfan's  syndrome, no MI, no mitral valve prolapse, no pacemaker, no PE, no PVD, no recent injury, no rheumatic fever, no seizures, no sickle cell disease, no sleep apnea, no spontaneous pneumothorax, no stimulant use, no strokes, no thyroid problem, no TIA, Bernard syndrome and no valve disorder.   His family medical history is significant for heart disease, hyperlipidemia and hypertension.   Pertinent negatives for family medical history include: no aortic dissection, no CAD, no connective tissue disease, no diabetes, no Marfan's syndrome, no early MI, no PE, no PVD, no sickle cell disease, no stroke, no sudden death and no TIA.      Obesity   This is a chronic problem. The current episode started more than 1 year ago. The problem occurs constantly. The problem has been unchanged. Associated symptoms include abdominal pain and fatigue. Pertinent negatives include no anorexia, arthralgias, change in bowel habit, chest pain, chills, congestion, coughing, diaphoresis, fever, headaches, joint swelling, myalgias, nausea, neck pain, numbness, rash, sore throat, swollen glands, urinary symptoms, vertigo, visual change, vomiting or weakness. Nothing aggravates the symptoms. He has tried nothing for the symptoms. The treatment provided no relief.       Review of Systems  Review of Systems   Constitutional: Positive for activity change, fatigue and irritability. Negative for chills, diaphoresis, fever and malaise/fatigue.   Respiratory: Positive for shortness of breath. Negative for cough, hemoptysis and sputum production.    Cardiovascular: Positive for chest pain. Negative for palpitations, orthopnea, claudication, syncope, PND and near-syncope.   Gastrointestinal: Positive for constipation and nausea. Negative for abdominal pain, anorexia, bloating, dysphagia and vomiting.   Genitourinary: Negative for impotence.   Musculoskeletal: Positive for arthralgias. Negative for back pain.   Skin: Negative for itching.   Neurological:  Positive for dizziness and weakness. Negative for seizures, numbness and headaches.   Psychiatric/Behavioral: Positive for decreased concentration. Negative for confusion and suicidal ideas. The patient is nervous/anxious. The patient does not have insomnia.        Patient Active Problem List   Diagnosis   • Class 1 obesity with body mass index (BMI) of 30.0 to 30.9 in adult   • Gastroesophageal reflux disease   • COOKIE (generalized anxiety disorder)   • Left upper quadrant pain   • Low libido   • Mood disorder (CMS/HCC)   • H. pylori infection   • Renal impairment   • Mixed hyperlipidemia   • Vitamin D deficiency   • History of drug use   • Fatty liver   • Renal cyst   • Change in bowel habits   • Mucus in stool   • Allergy to alpha-gal   • Bronchitis   • Upper respiratory tract infection   • Eosinophilic esophagitis   • Onychomycosis   • Bilateral hand pain   • Bilateral wrist pain   • Overweight   • Pruritic rash   • Insomnia   • Arthritis of right hand   • Bilateral swelling of feet     Past Surgical History:   Procedure Laterality Date   • COLONOSCOPY N/A 9/17/2019    Procedure: COLONOSCOPY;  Surgeon: Rigoberto Flower MD;  Location: [facility] ENDOSCOPY;  Service: Gastroenterology   • ENDOSCOPY N/A 9/17/2019    Procedure: ESOPHAGOGASTRODUODENOSCOPY;  Surgeon: Rigoberto Flower MD;  Location: [facility] ENDOSCOPY;  Service: Gastroenterology   • HEMORRHOIDECTOMY       Social History     Socioeconomic History   • Marital status: Single     Spouse name: Not on file   • Number of children: Not on file   • Years of education: Not on file   • Highest education level: Not on file   Tobacco Use   • Smoking status: Never Smoker   • Smokeless tobacco: Never Used   Substance and Sexual Activity   • Alcohol use: Yes     Comment: rarely   • Drug use: No   • Sexual activity: Defer     Family History   Problem Relation Age of Onset   • Alcohol abuse Mother    • Anxiety disorder Mother    • Arthritis Mother    • Asthma Mother    • Cancer  Mother    • Depression Mother    • Hypertension Mother    • Stroke Mother    • Hyperlipidemia Father    • Thyroid disease Sister      Lab on 09/01/2020   Component Date Value Ref Range Status   • WBC 09/01/2020 6.53  3.40 - 10.80 10*3/mm3 Final   • RBC 09/01/2020 5.80  4.14 - 5.80 10*6/mm3 Final   • Hemoglobin 09/01/2020 16.2  13.0 - 17.7 g/dL Final   • Hematocrit 09/01/2020 48.5  37.5 - 51.0 % Final   • MCV 09/01/2020 83.6  79.0 - 97.0 fL Final   • MCH 09/01/2020 27.9  26.6 - 33.0 pg Final   • MCHC 09/01/2020 33.4  31.5 - 35.7 g/dL Final   • RDW 09/01/2020 13.1  12.3 - 15.4 % Final   • RDW-SD 09/01/2020 40.2  37.0 - 54.0 fl Final   • MPV 09/01/2020 10.6  6.0 - 12.0 fL Final   • Platelets 09/01/2020 223  140 - 450 10*3/mm3 Final   • Neutrophil % 09/01/2020 55.4  42.7 - 76.0 % Final   • Lymphocyte % 09/01/2020 25.4  19.6 - 45.3 % Final   • Monocyte % 09/01/2020 12.3* 5.0 - 12.0 % Final   • Eosinophil % 09/01/2020 5.5  0.3 - 6.2 % Final   • Basophil % 09/01/2020 1.1  0.0 - 1.5 % Final   • Immature Grans % 09/01/2020 0.3  0.0 - 0.5 % Final   • Neutrophils, Absolute 09/01/2020 3.62  1.70 - 7.00 10*3/mm3 Final   • Lymphocytes, Absolute 09/01/2020 1.66  0.70 - 3.10 10*3/mm3 Final   • Monocytes, Absolute 09/01/2020 0.80  0.10 - 0.90 10*3/mm3 Final   • Eosinophils, Absolute 09/01/2020 0.36  0.00 - 0.40 10*3/mm3 Final   • Basophils, Absolute 09/01/2020 0.07  0.00 - 0.20 10*3/mm3 Final   • Immature Grans, Absolute 09/01/2020 0.02  0.00 - 0.05 10*3/mm3 Final   • nRBC 09/01/2020 0.0  0.0 - 0.2 /100 WBC Final   • Glucose 09/01/2020 104* 65 - 99 mg/dL Final   • BUN 09/01/2020 11  6 - 20 mg/dL Final   • Creatinine 09/01/2020 1.06  0.76 - 1.27 mg/dL Final   • Sodium 09/01/2020 141  136 - 145 mmol/L Final   • Potassium 09/01/2020 5.0  3.5 - 5.2 mmol/L Final   • Chloride 09/01/2020 102  98 - 107 mmol/L Final   • CO2 09/01/2020 29.4* 22.0 - 29.0 mmol/L Final   • Calcium 09/01/2020 9.7  8.6 - 10.5 mg/dL Final   • Total Protein  09/01/2020 7.6  6.0 - 8.5 g/dL Final   • Albumin 09/01/2020 4.60  3.50 - 5.20 g/dL Final   • ALT (SGPT) 09/01/2020 22  1 - 41 U/L Final   • AST (SGOT) 09/01/2020 15  1 - 40 U/L Final   • Alkaline Phosphatase 09/01/2020 66  39 - 117 U/L Final   • Total Bilirubin 09/01/2020 0.5  0.0 - 1.2 mg/dL Final   • eGFR Non African Amer 09/01/2020 77  >60 mL/min/1.73 Final   • Globulin 09/01/2020 3.0  gm/dL Final   • A/G Ratio 09/01/2020 1.5  g/dL Final   • BUN/Creatinine Ratio 09/01/2020 10.4  7.0 - 25.0 Final   • Anion Gap 09/01/2020 9.6  5.0 - 15.0 mmol/L Final   • CONSTANTIN Direct 09/01/2020 Negative  Negative Final   • Rheumatoid Factor Quantitative 09/01/2020 <10.0  0.0 - 14.0 IU/mL Final   • Sed Rate 09/01/2020 1  0 - 15 mm/hr Final   • C-Reactive Protein 09/01/2020 0.05  0.00 - 0.50 mg/dL Final   • Anti-DNA (DS) Ab Qn 09/01/2020 <1  0 - 9 IU/mL Final                                       Negative      <5                                     Equivocal  5 - 9                                     Positive      >9   • RNP Antibodies 09/01/2020 <0.2  0.0 - 0.9 AI Final   • Aguilar Antibodies 09/01/2020 <0.2  0.0 - 0.9 AI Final   • Antiscleroderma-70 Antibodies 09/01/2020 <0.2  0.0 - 0.9 AI Final   • TELMA SSA (RO) Ab 09/01/2020 <0.2  0.0 - 0.9 AI Final   • TELMA SSB (LA) Ab 09/01/2020 <0.2  0.0 - 0.9 AI Final   • Antichromatin Antibodies 09/01/2020 <0.2  0.0 - 0.9 AI Final   • URIEL-1 IgG 09/01/2020 <0.2  0.0 - 0.9 AI Final   • Anti-Centromere B Antibodies 09/01/2020 <0.2  0.0 - 0.9 AI Final   • See below: 09/01/2020 Comment   Final    Comment: Autoantibody                       Disease Association  ------------------------------------------------------------                          Condition                  Frequency  ---------------------   ------------------------   ---------  Antinuclear Antibody,    SLE, mixed connective  Direct (CONSTNATIN-D)           tissue diseases  ---------------------   ------------------------   ---------  dsDNA                     SLE                        40 - 60%  ---------------------   ------------------------   ---------  Chromatin                Drug induced SLE                90%                           SLE                        48 - 97%  ---------------------   ------------------------   ---------  SSA (Ro)                 SLE                        25 - 35%                           Sjogren's Syndrome         40 - 70%                            Lupus                 100%  ---------------------   ------------------------   ---------  SSB (La)                 SLE                                                        10%                           Sjogren's Syndrome              30%  ---------------------   -----------------------    ---------  Sm (anti-Smith)          SLE                        15 - 30%  ---------------------   -----------------------    ---------  RNP                      Mixed Connective Tissue                           Disease                         95%  (U1 nRNP,                SLE                        30 - 50%  anti-ribonucleoprotein)  Polymyositis and/or                           Dermatomyositis                 20%  ---------------------   ------------------------   ---------  Scl-70 (antiDNA          Scleroderma (diffuse)      20 - 35%  topoisomerase)           Crest                           13%  ---------------------   ------------------------   ---------  Venessa-1                     Polymyositis and/or                           Dermatomyositis            20 - 40%  ---------------------   ------------------------   ---------  Centromere B             Scleroderma -                            Crest                           variant                         80%   • Uric Acid 2020 5.3  3.4 - 7.0 mg/dL Final   • HLA B27 2020 Negative   Final    HLA-B*27 Negative  B27 allele interpretation for all loci based on IMGT/HLA  database version 3.38  This test was developed and its  performance characteristics  determined by [employer].  It has not been cleared or approved  by the Food and Drug Administration.  [facility] CLIA ID Number [id number]  This test was performed using PCR (Polymerase Chain Reaction)/SSOP  (Sequence Specific Oligonucleotide Probes) technique.  SBT (Sequence  Based Typing) and/or SSP (Sequence Specific Primers) may be used as  supplemental methods when necessary.  Please contact [facility] Customer  Service at [phone] if you have any questions.   Director of [facility] Laboratory   Dr Kirill Olvera, PhD   • Beef 09/01/2020 1.14* <0.35 kU/L Final   • Class Description 09/01/2020 2   Final   • Amado 09/01/2020 0.14  <0.35 kU/L Final   • Class Interpretation 09/01/2020 0/1   Final   • Pork 09/01/2020 0.61* <0.35 kU/L Final   • Class Interpretation 09/01/2020 1   Final    The test method is the [employer] ImmunoCAP allergen-specific  IgE system. CLASS INTERPRETATION   <0.10 kU/L= 0,  Negative; 0.10 - 0.34 kU/L= 0/1, Equivocal/Borderline;  0.35 - 0.69  kU/L=1, Low Positive; 0.70 - 3.49 kU/L=2,  Moderate Positive;  3.50  - 17.49 kU/L=3, High Positive;  17.50 - 49.99 kU/L= 4, Very High Positive; 50.00 - 99.99  kU/L= 5, Very High Positive;   >99.99 kU/L=6, Very High  Positive  *This test was developed and its performance  characteristics determined by [facility]. It has not  been cleared or approved by the [place]. Food and Drug  Administration.   • Alpha Gal IgE 09/01/2020 1.14* <0.10 kU/L Final    Previous reports (EBONY 2009;123:426-433) have demonstrated  that patients with IgE antibodies to  [id number],3-galactose are at risk for delayed  anaphylaxis, angioedema, or urticaria following  consumption of beef, pork, or lamb.   Lab on 06/25/2020   Component Date Value Ref Range Status   • WBC 06/25/2020 7.68  3.40 - 10.80 10*3/mm3 Final   • RBC 06/25/2020 5.66  4.14 - 5.80 10*6/mm3 Final   • Hemoglobin 06/25/2020 16.0  13.0 - 17.7 g/dL Final   • Hematocrit 06/25/2020 46.8  37.5 - 51.0 %  Final   • MCV 06/25/2020 82.7  79.0 - 97.0 fL Final   • MCH 06/25/2020 28.3  26.6 - 33.0 pg Final   • MCHC 06/25/2020 34.2  31.5 - 35.7 g/dL Final   • RDW 06/25/2020 13.1  12.3 - 15.4 % Final   • RDW-SD 06/25/2020 39.2  37.0 - 54.0 fl Final   • MPV 06/25/2020 10.4  6.0 - 12.0 fL Final   • Platelets 06/25/2020 220  140 - 450 10*3/mm3 Final   • Neutrophil % 06/25/2020 60.6  42.7 - 76.0 % Final   • Lymphocyte % 06/25/2020 20.6  19.6 - 45.3 % Final   • Monocyte % 06/25/2020 10.9  5.0 - 12.0 % Final   • Eosinophil % 06/25/2020 5.3  0.3 - 6.2 % Final   • Basophil % 06/25/2020 1.0  0.0 - 1.5 % Final   • Immature Grans % 06/25/2020 1.6* 0.0 - 0.5 % Final   • Neutrophils, Absolute 06/25/2020 4.65  1.70 - 7.00 10*3/mm3 Final   • Lymphocytes, Absolute 06/25/2020 1.58  0.70 - 3.10 10*3/mm3 Final   • Monocytes, Absolute 06/25/2020 0.84  0.10 - 0.90 10*3/mm3 Final   • Eosinophils, Absolute 06/25/2020 0.41* 0.00 - 0.40 10*3/mm3 Final   • Basophils, Absolute 06/25/2020 0.08  0.00 - 0.20 10*3/mm3 Final   • Immature Grans, Absolute 06/25/2020 0.12* 0.00 - 0.05 10*3/mm3 Final   • nRBC 06/25/2020 0.0  0.0 - 0.2 /100 WBC Final   • Glucose 06/25/2020 105* 65 - 99 mg/dL Final   • BUN 06/25/2020 11  6 - 20 mg/dL Final   • Creatinine 06/25/2020 0.89  0.76 - 1.27 mg/dL Final   • Sodium 06/25/2020 140  136 - 145 mmol/L Final   • Potassium 06/25/2020 4.7  3.5 - 5.2 mmol/L Final   • Chloride 06/25/2020 104  98 - 107 mmol/L Final   • CO2 06/25/2020 25.6  22.0 - 29.0 mmol/L Final   • Calcium 06/25/2020 9.4  8.6 - 10.5 mg/dL Final   • Total Protein 06/25/2020 7.0  6.0 - 8.5 g/dL Final   • Albumin 06/25/2020 4.40  3.50 - 5.20 g/dL Final   • ALT (SGPT) 06/25/2020 56* 1 - 41 U/L Final   • AST (SGOT) 06/25/2020 30  1 - 40 U/L Final   • Alkaline Phosphatase 06/25/2020 77  39 - 117 U/L Final   • Total Bilirubin 06/25/2020 0.4  0.2 - 1.2 mg/dL Final   • eGFR Non African Amer 06/25/2020 95  >60 mL/min/1.73 Final   • Globulin 06/25/2020 2.6  gm/dL Final    • A/G Ratio 06/25/2020 1.7  g/dL Final   • BUN/Creatinine Ratio 06/25/2020 12.4  7.0 - 25.0 Final   • Anion Gap 06/25/2020 10.4  5.0 - 15.0 mmol/L Final   • Total Cholesterol 06/25/2020 223* 0 - 200 mg/dL Final   • Triglycerides 06/25/2020 188* 0 - 150 mg/dL Final   • HDL Cholesterol 06/25/2020 51  40 - 60 mg/dL Final   • LDL Cholesterol  06/25/2020 134* 0 - 100 mg/dL Final   • VLDL Cholesterol 06/25/2020 37.6  5 - 40 mg/dL Final   • LDL/HDL Ratio 06/25/2020 2.64   Final   • 25 Hydroxy, Vitamin D 06/25/2020 33.3  30.0 - 100.0 ng/ml Final      XR hand 3+ vw bilateral  Narrative: EXAM DESCRIPTION:     XR HAND 3+ VW BILATERAL    CLINICAL HISTORY:     39 years  Male  hand pain, M25.531 Pain in right wrist M25.532  Pain in left wrist M79.641 Pain in right hand M79.642 Pain in  left hand    COMPARISON:     None available    TECHNIQUE:     Six views-AP, oblique, and lateral radiographs of both hands    FINDINGS:     There is no evidence of an acute fracture. The bony  mineralization is normal.    There are osteoarthritic changes involving the distal  interphalangeal joint of the right fifth digit. The left hand  demonstrates no radiographic evidence of osteoarthritis. There  are no erosive changes identified. There is a small 2 mm  well-corticated rounded lucency in the right scaphoid. A benign  etiology is favored.  Impression: 1. No evidence of an acute fracture.  2. Mild osteoarthritic changes involving the distal  interphalangeal joint of the right fifth digit.  3. Benign-appearing 2 mm rounded lucent lesion in the right  scaphoid.    Electronically signed by:  Shana Carlos MD  6/25/2020 3:00 PM  CDT Workstation: [id number]  XR wrist 3+ vw bilateral  Narrative: EXAM DESCRIPTION:     XR WRIST 3+ VW BILATERAL    CLINICAL HISTORY:     39 years  Male  wrist pain, M25.531 Pain in right wrist M25.532  Pain in left wrist M79.641 Pain in right hand M79.642 Pain in  left hand    COMPARISON:     None available    TECHNIQUE:  "    Six views-AP, oblique, and lateral radiographs of the both wrists    FINDINGS:     There is no evidence of an acute fracture or malalignment. The  bony mineralization of both wrists is within normal limits. No  articular abnormalities are identified. There is no significant  soft tissue swelling.  Impression: 1. Unremarkable exam of the wrists without evidence of an acute  fracture or significant degenerative change.    Electronically signed by:  Shana Carlos MD  6/25/2020 2:58 PM  CDT Workstation: [id number]    @[employer]@  Immunization History   Administered Date(s) Administered   • Flulaval/Fluarix/Fluzone Quad 11/30/2020   • Td 01/04/1996       The following portions of the patient's history were reviewed and updated as appropriate: allergies, current medications, past family history, past medical history, past social history, past surgical history and problem list.        Physical Exam  /68 (BP Location: Left arm, Patient Position: Sitting, Cuff Size: Adult)   Pulse 66   Temp 97.8 °F (36.6 °C)   Ht 177.8 cm (70\")   Wt 97.5 kg (215 lb)   SpO2 98%   BMI 30.85 kg/m²     Physical Exam  Vitals signs and nursing note reviewed.   Constitutional:       Appearance: He is well-developed. He is not diaphoretic.   HENT:      Head: Normocephalic and atraumatic.      Right Ear: External ear normal.   Eyes:      Conjunctiva/sclera: Conjunctivae normal.      Pupils: Pupils are equal, round, and reactive to light.   Neck:      Musculoskeletal: Normal range of motion and neck supple.   Cardiovascular:      Rate and Rhythm: Normal rate and regular rhythm.      Heart sounds: Normal heart sounds. No murmur.   Pulmonary:      Effort: Pulmonary effort is normal. No respiratory distress.      Breath sounds: Normal breath sounds.   Abdominal:      General: Bowel sounds are normal. There is no distension.      Palpations: Abdomen is soft.      Tenderness: There is abdominal tenderness.   Musculoskeletal: Normal range of " motion.         General: Tenderness present. No deformity.   Skin:     General: Skin is warm.      Coloration: Skin is not pale.      Findings: No erythema or rash.   Neurological:      Mental Status: He is alert and oriented to person, place, and time.      Cranial Nerves: No cranial nerve deficit.   Psychiatric:         Behavior: Behavior normal.         Assessment/Plan    Diagnosis Plan   1. Insomnia, unspecified type  Ambulatory Referral to Sleep Medicine   2. Gastroesophageal reflux disease, unspecified whether esophagitis present  CBC Auto Differential    Comprehensive Metabolic Panel    Hemoglobin A1c    Lipid Panel    Vitamin D 25 Hydroxy    Hepatitis C Antibody   3. COOKIE (generalized anxiety disorder)  CBC Auto Differential    Comprehensive Metabolic Panel    Hemoglobin A1c    Lipid Panel    Vitamin D 25 Hydroxy    Hepatitis C Antibody   4. Vitamin D deficiency  CBC Auto Differential    Comprehensive Metabolic Panel    Hemoglobin A1c    Lipid Panel    Vitamin D 25 Hydroxy    Hepatitis C Antibody   5. Mixed hyperlipidemia  CBC Auto Differential    Comprehensive Metabolic Panel    Hemoglobin A1c    Lipid Panel    Vitamin D 25 Hydroxy    Hepatitis C Antibody   6. Need for hepatitis C screening test  CBC Auto Differential    Comprehensive Metabolic Panel    Hemoglobin A1c    Lipid Panel    Vitamin D 25 Hydroxy    Hepatitis C Antibody   7. Impaired fasting glucose  CBC Auto Differential    Comprehensive Metabolic Panel    Hemoglobin A1c    Lipid Panel    Vitamin D 25 Hydroxy    Hepatitis C Antibody   8. Class 1 obesity with body mass index (BMI) of 30.0 to 30.9 in adult, unspecified obesity type, unspecified whether serious comorbidity present     9. Chest pain, unspecified type  ECG 12 Lead    XR Chest PA & Lateral    Ambulatory Referral to Cardiology   10. Need for immunization against influenza  Fluzone Quad >6 Months ([id number])   11. PVC (premature ventricular contraction)  Ambulatory Referral to Cardiology    12. Atypical chest pain  Ambulatory Referral to Cardiology   13. Sleep disorder  Ambulatory Referral to Sleep Medicine        -recommend labwork   -recommend influenza vaccination -given today  -sleep disorder/insmonia - refer to sleep medicine. Suspect sleep apnea   -chest pain - EKG today. Will get chest x-ray.  Need last ER report form [facility]  EKG showed Sinus rhythm with PVC. Recommend cardiology referral   -adivsed pt to watch out for possible interaction between doxepin and paxil.  Gave information on serotonin syndrome to go home with. Will taper paxil in coming weeks   -hand pain/wrist pain - suspect carpal tunnel. Will refer to Neurology information provided.  Start on naproxen 500 mg PO BId. Drug information provided. Recommend wrist brace but pt declined.. Consider Orthopedic referral in future   -renal cyst - monitor.   -rash start on triamcinolone cream topcial BID  -eosinophilic esophagitis/alphal gal allergy  -GI following, advised pt to refrain from beef, pork or lamb products   -insomnia - doxepin 25 mg at bedtime   -obesity  counseled weight loss >5 minutes BMI at 30.85   -fatty liver -recommend heart healthy diet. Gave information today  -pruritis - vistaril 25 mg PO TID along with lotrisone cream BID  -vitamin D deficiency -vitamin D once a week  -HLP - DASH diet, heart healthy diet.  -renal impairment -  Will monitor kidney function   -GERD -continue prilosec. Possible gastritis. Will need last EGD and endoscopy. Start on carafate 1 gram tablet QID. Will have colonoscopy and   -allergic rhintis - flonase nasal psray   -mood disorder/insomnia  -on paxil but wants to get off it. Stop paxil but taper dose.    -panic attack - recommend paxil  Will go up from 10 to 20 mg daily.  Drug information provided  -recommended counseling but pt decline for now   -obesity - counseled pt to lose weight >5 minutes, diet information provided  -advised pt to be safe and call with questions and concerns.  All questions addressed tdoay.   -advised pt to go to ER or call 911 if symptoms worrisome or severe  -advised pt to followup with specialist and referrals  -advised pt to be safe during COVID-19 pandemic  --total time with pt >25 minutes   -recheck in 6 weeks         This document has been electronically signed by Jhon Amezquita MD on November 30, 2020 14:45 CST

## 2020-12-01 ENCOUNTER — LAB (OUTPATIENT)
Dept: LAB | Facility: HOSPITAL | Age: 40
End: 2020-12-01

## 2020-12-01 DIAGNOSIS — R73.01 IMPAIRED FASTING GLUCOSE: ICD-10-CM

## 2020-12-01 DIAGNOSIS — K21.9 GASTROESOPHAGEAL REFLUX DISEASE, UNSPECIFIED WHETHER ESOPHAGITIS PRESENT: ICD-10-CM

## 2020-12-01 DIAGNOSIS — E78.2 MIXED HYPERLIPIDEMIA: ICD-10-CM

## 2020-12-01 DIAGNOSIS — F41.1 GAD (GENERALIZED ANXIETY DISORDER): ICD-10-CM

## 2020-12-01 DIAGNOSIS — E55.9 VITAMIN D DEFICIENCY: ICD-10-CM

## 2020-12-01 DIAGNOSIS — Z11.59 NEED FOR HEPATITIS C SCREENING TEST: ICD-10-CM

## 2020-12-01 LAB
25(OH)D3 SERPL-MCNC: 22.6 NG/ML (ref 30–100)
ALBUMIN SERPL-MCNC: 4.8 G/DL (ref 3.5–5.2)
ALBUMIN/GLOB SERPL: 1.8 G/DL
ALP SERPL-CCNC: 76 U/L (ref 39–117)
ALT SERPL W P-5'-P-CCNC: 16 U/L (ref 1–41)
ANION GAP SERPL CALCULATED.3IONS-SCNC: 10.6 MMOL/L (ref 5–15)
AST SERPL-CCNC: 17 U/L (ref 1–40)
BASOPHILS # BLD AUTO: 0.07 10*3/MM3 (ref 0–0.2)
BASOPHILS NFR BLD AUTO: 1.1 % (ref 0–1.5)
BILIRUB SERPL-MCNC: 1.1 MG/DL (ref 0–1.2)
BUN SERPL-MCNC: 10 MG/DL (ref 6–20)
BUN/CREAT SERPL: 11.5 (ref 7–25)
CALCIUM SPEC-SCNC: 9.1 MG/DL (ref 8.6–10.5)
CHLORIDE SERPL-SCNC: 104 MMOL/L (ref 98–107)
CHOLEST SERPL-MCNC: 244 MG/DL (ref 0–200)
CO2 SERPL-SCNC: 25.4 MMOL/L (ref 22–29)
CREAT SERPL-MCNC: 0.87 MG/DL (ref 0.76–1.27)
DEPRECATED RDW RBC AUTO: 42.3 FL (ref 37–54)
EOSINOPHIL # BLD AUTO: 0.36 10*3/MM3 (ref 0–0.4)
EOSINOPHIL NFR BLD AUTO: 5.4 % (ref 0.3–6.2)
ERYTHROCYTE [DISTWIDTH] IN BLOOD BY AUTOMATED COUNT: 13.9 % (ref 12.3–15.4)
GFR SERPL CREATININE-BSD FRML MDRD: 97 ML/MIN/1.73
GLOBULIN UR ELPH-MCNC: 2.7 GM/DL
GLUCOSE SERPL-MCNC: 98 MG/DL (ref 65–99)
HBA1C MFR BLD: 5.3 % (ref 4.8–5.6)
HCT VFR BLD AUTO: 48.1 % (ref 37.5–51)
HCV AB SER DONR QL: NORMAL
HDLC SERPL-MCNC: 52 MG/DL (ref 40–60)
HGB BLD-MCNC: 16.3 G/DL (ref 13–17.7)
IMM GRANULOCYTES # BLD AUTO: 0.02 10*3/MM3 (ref 0–0.05)
IMM GRANULOCYTES NFR BLD AUTO: 0.3 % (ref 0–0.5)
LDLC SERPL CALC-MCNC: 165 MG/DL (ref 0–100)
LDLC/HDLC SERPL: 3.12 {RATIO}
LYMPHOCYTES # BLD AUTO: 0.94 10*3/MM3 (ref 0.7–3.1)
LYMPHOCYTES NFR BLD AUTO: 14.1 % (ref 19.6–45.3)
MCH RBC QN AUTO: 28.5 PG (ref 26.6–33)
MCHC RBC AUTO-ENTMCNC: 33.9 G/DL (ref 31.5–35.7)
MCV RBC AUTO: 84.1 FL (ref 79–97)
MONOCYTES # BLD AUTO: 0.73 10*3/MM3 (ref 0.1–0.9)
MONOCYTES NFR BLD AUTO: 11 % (ref 5–12)
NEUTROPHILS NFR BLD AUTO: 4.54 10*3/MM3 (ref 1.7–7)
NEUTROPHILS NFR BLD AUTO: 68.1 % (ref 42.7–76)
NRBC BLD AUTO-RTO: 0 /100 WBC (ref 0–0.2)
PLATELET # BLD AUTO: 202 10*3/MM3 (ref 140–450)
PMV BLD AUTO: 10 FL (ref 6–12)
POTASSIUM SERPL-SCNC: 4.2 MMOL/L (ref 3.5–5.2)
PROT SERPL-MCNC: 7.5 G/DL (ref 6–8.5)
RBC # BLD AUTO: 5.72 10*6/MM3 (ref 4.14–5.8)
SODIUM SERPL-SCNC: 140 MMOL/L (ref 136–145)
TRIGL SERPL-MCNC: 150 MG/DL (ref 0–150)
VLDLC SERPL-MCNC: 27 MG/DL (ref 5–40)
WBC # BLD AUTO: 6.66 10*3/MM3 (ref 3.4–10.8)

## 2020-12-01 PROCEDURE — 85025 COMPLETE CBC W/AUTO DIFF WBC: CPT

## 2020-12-01 PROCEDURE — 82306 VITAMIN D 25 HYDROXY: CPT

## 2020-12-01 PROCEDURE — 80053 COMPREHEN METABOLIC PANEL: CPT

## 2020-12-01 PROCEDURE — 80061 LIPID PANEL: CPT

## 2020-12-01 PROCEDURE — 83036 HEMOGLOBIN GLYCOSYLATED A1C: CPT

## 2020-12-01 PROCEDURE — 86803 HEPATITIS C AB TEST: CPT

## 2020-12-02 ENCOUNTER — OFFICE VISIT (OUTPATIENT)
Dept: CARDIOLOGY | Facility: CLINIC | Age: 40
End: 2020-12-02

## 2020-12-02 ENCOUNTER — TELEPHONE (OUTPATIENT)
Dept: FAMILY MEDICINE CLINIC | Facility: CLINIC | Age: 40
End: 2020-12-02

## 2020-12-02 VITALS
OXYGEN SATURATION: 100 % | HEIGHT: 70 IN | BODY MASS INDEX: 29.49 KG/M2 | HEART RATE: 63 BPM | WEIGHT: 206 LBS | SYSTOLIC BLOOD PRESSURE: 102 MMHG | DIASTOLIC BLOOD PRESSURE: 80 MMHG

## 2020-12-02 DIAGNOSIS — E78.2 HYPERLIPEMIA, MIXED: ICD-10-CM

## 2020-12-02 DIAGNOSIS — R00.2 PALPITATIONS: ICD-10-CM

## 2020-12-02 DIAGNOSIS — R07.9 CHEST PAIN, UNSPECIFIED TYPE: Primary | ICD-10-CM

## 2020-12-02 LAB
QT INTERVAL: 388 MS
QTC INTERVAL: 397 MS

## 2020-12-02 PROCEDURE — 99204 OFFICE O/P NEW MOD 45 MIN: CPT | Performed by: INTERNAL MEDICINE

## 2020-12-02 NOTE — TELEPHONE ENCOUNTER
----- Message from Jhon Amezquita MD sent at 12/2/2020  7:42 AM CST -----  Please call pt    labwork stable except Vitamin D is low and recommend pt  continue taking Vitamin D3 50,000 units once a week give 4 pills and 3 refills if not taking already    On lipid panel  Total cholesterol and LDL elevated. Recommend heart healthy diet more vegetables and lean protein in diet    Hep C antibody test negative    Kidney and liver function stable. CBC stable    Recheck on next visit. Thanks

## 2020-12-02 NOTE — PROGRESS NOTES
[facility]  OFFICE NOTE    Cardiovascular Medicine  Rahat Ennis M.D., JOSÉVI         Jhon Amezquita MD  [address]  [place] [address]    Thank you for asking me to see Jordin Parham for chest pain.    History of Present Illness  This is a 39 y.o. male with:    1.  Chest pain  2.  Hyperlipidemia  3.  Obesity  4.  Dysphagia    Jordin Parham is a 39 y.o. male who presents for consultation today.  Patient reported over the last several weeks has been having intermittent chest pain.  Patient woke up with chest pain 1 day, he described this as tightness in his left side of the chest, it is nonradiating, is that it is intermittent, lasts for several hours and then resolves on its own.  He reported actually improved with exertion and is worse when he is under a lot of stress.  Associated shortness of breath, orthopnea PND or lower extremity swelling.    No prior history of myocardial infarction or chest pain.  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  He does have a family history of premature coronary artery disease.  Recently diagnosed with hyperlipidemia    Also reported pain in his right lower extremity when he is driving his car.    Review of Systems - ROS  Constitution: Negative for weakness, weight gain and weight loss.   HENT: Negative for congestion.    Eyes: Negative for blurred vision.   Cardiovascular: As mentioned above  Respiratory: Negative for cough and hemoptysis.    Endocrine: Negative for polydipsia and polyuria.   Hematologic/Lymphatic: Negative for bleeding problem. Does not bruise/bleed easily.   Skin: Negative for flushing.   Musculoskeletal: Negative for neck pain and stiffness.   Gastrointestinal: Negative for abdominal pain, diarrhea, jaundice, melena, nausea and vomiting.   Genitourinary: Negative for dysuria and hematuria.   Neurological:  "Negative for dizziness, focal weakness and numbness.   Psychiatric/Behavioral: Negative for altered mental status and depression.          All other systems were reviewed and were negative.    family history includes Alcohol abuse in his brother, mother, and sister; Anxiety disorder in his brother, mother, and sister; Arthritis in his mother; Asthma in his mother; Cancer in his brother, mother, and sister; Depression in his brother, mother, and sister; Heart disease in his maternal uncle; Heart failure in his maternal uncle; Hyperlipidemia in his father and maternal uncle; Hypertension in his mother; Stroke in his mother; Thyroid disease in his sister.     reports that he has never smoked. He has never used smokeless tobacco. He reports previous alcohol use. He reports current drug use. Drug: Marijuana.    No Known Allergies      Current Outpatient Medications:   •  busPIRone (BUSPAR) 10 MG tablet, Take 1 tablet by mouth 3 (Three) Times a Day., Disp: 90 tablet, Rfl: 3  •  montelukast (Singulair) 10 MG tablet, Take 1 tablet by mouth Every Night., Disp: 30 tablet, Rfl: 3  •  omeprazole (priLOSEC) 40 MG capsule, Take 1 capsule by mouth Daily., Disp: 30 capsule, Rfl: 3  •  PARoxetine (Paxil) 20 MG tablet, Take 1 tablet by mouth Every Morning., Disp: 30 tablet, Rfl: 3  •  vitamin D (ERGOCALCIFEROL) 1.25 MG (34300 UT) capsule capsule, Take 1 capsule by mouth 1 (One) Time Per Week., Disp: 4 capsule, Rfl: 3    Physical Exam:  Vitals:    12/02/20 0806 12/02/20 0815   BP: 128/62 102/80   BP Location: Left arm Right arm   Pulse: 63    SpO2: 100%    Weight: 93.4 kg (206 lb)    Height: 177.8 cm (70\")    PainSc:   2    PainLoc: Chest      Current Pain Level: none  Pulse Ox: Normal  on room air  General: alert, appears stated age and cooperative     Body Habitus: well-nourished    HEENT: Head: Normocephalic, no lesions, without obvious abnormality. No arcus senilis, xanthelasma or xanthomas.    Neuro: alert, oriented x3  Pulses: " 2+ and symmetric  JVP: Volume/Pulsation: Normal.  Normal waveforms.   Appropriate inspiratory decrease.  No Kussmaul's. No Salma's.   Carotid Exam: no bruit normal pulsation bilaterally   Carotid Volume: normal.     Respirations: no increased work of breathing   Chest:  Normal    Pulmonary:Normal   Precordium: Normal impulses. P2 is not palpable.  RV Heave: absent  LV Heave: absent  Linden:  normal size and placement  Palpable S4: absent.  Heart rate: normal    Heart Rhythm: regular     Heart Sounds: S1: normal  S2: normal  S3: absent   S4: absent  Opening Snap: absent    Pericardial Rub:  Absent: .    Abdomen:   Appearance: normal .  Palpation: Soft, non-tender to palpation, bowel sounds positive in all four quadrants; no guarding or rebound tenderness  Extremity: no edema.   LE Skin: no rashes  LE Hair:  normal  LE Pulses: well perfused with normal pulses in the distal extremities  Pallor on elevation: Absent. Rubor on dependency: None      DATA REVIEWED:     EKG. I personally reviewed and interpreted the EKG.  Sinus rhythm with PACs    ECG/EMG Results (all)     None        ---------------------------------------------------  TTE/COLE:             --------------------------------------------------------------------------------------------------  LABS:     The 10-year CVD risk score (Lila et al., 2008) is: 3.3%    Values used to calculate the score:      Age: 39 years      Sex: Male      Diabetic: No      Tobacco smoker: No      Systolic Blood Pressure: 102 mmHg      Is BP treated: No      HDL Cholesterol: 52 mg/dL      Total Cholesterol: 244 mg/dL         Lab Results   Component Value Date    GLUCOSE 98 12/01/2020    BUN 10 12/01/2020    CREATININE 0.87 12/01/2020    EGFRIFNONA 97 12/01/2020    BCR 11.5 12/01/2020    K 4.2 12/01/2020    CO2 25.4 12/01/2020    CALCIUM 9.1 12/01/2020    ALBUMIN 4.80 12/01/2020    AST 17 12/01/2020    ALT 16 12/01/2020     Lab Results   Component Value Date    WBC 6.66  12/01/2020    HGB 16.3 12/01/2020    HCT 48.1 12/01/2020    MCV 84.1 12/01/2020     12/01/2020     Lab Results   Component Value Date    CHOL 244 (H) 12/01/2020    TRIG 150 12/01/2020    HDL 52 12/01/2020     (H) 12/01/2020     Lab Results   Component Value Date    TSH 1.070 05/31/2019     No results found for: CKTOTAL, CKMB, CKMBINDEX, TROPONINI, TROPONINT  Lab Results   Component Value Date    HGBA1C 5.30 12/01/2020     No results found for: DDIMER  Lab Results   Component Value Date    ALT 16 12/01/2020     Lab Results   Component Value Date    HGBA1C 5.30 12/01/2020    HGBA1C 5.30 05/31/2019     Lab Results   Component Value Date    CREATININE 0.87 12/01/2020     Lab Results   Component Value Date    IRON 69 05/31/2019    TIBC 329 05/31/2019     No results found for: INR, PROTIME    Assessment/Plan     1.  Chest pain:  Risk factors are hyperlipidemia, family history of premature coronary artery disease.  His chest pain is rather atypical in character.  EKG with PACs but otherwise was okay.  We will plan performing a exercise nuclear stress test for further evaluation.  I explained the risk, benefits, alternatives and limitation of nuclear stress test with the patient patient is agreeable.  Advised him to seek medical attention here to have significant chest pain or resolving    2.  Hyperlipidemia:  ASCVD score is 3%.  Continue lifestyle modification for now.    3.  Palpitations:  EKG showed PACs.  We will continue to monitor and see how he does on the treadmill.  Can consider a 2-week monitor to assess for burden of arrhythmia.    4.  Claudication:  We will get screening ABIs      Prevention:  Patient's Body mass index is 29.56 kg/m². BMI is above normal parameters. Recommendations include: exercise counseling and nutrition counseling.      Jordin Randall Prasad  reports that he has never smoked. He has never used smokeless tobacco..            This document has been electronically signed by Rahat  MD Loli on December 2, 2020 08:22 CST

## 2020-12-14 RX ORDER — ALBUTEROL SULFATE 90 UG/1
2 AEROSOL, METERED RESPIRATORY (INHALATION) EVERY 4 HOURS PRN
Qty: 18 G | Refills: 3 | Status: SHIPPED | OUTPATIENT
Start: 2020-12-14 | End: 2021-02-15 | Stop reason: SDUPTHER

## 2020-12-29 ENCOUNTER — DOCUMENTATION (OUTPATIENT)
Dept: CARDIOLOGY | Facility: CLINIC | Age: 40
End: 2020-12-29

## 2020-12-29 LAB
BH CV ECHO MEAS - ACS: 1.9 CM
BH CV ECHO MEAS - AO MAX PG (FULL): 1.1 MMHG
BH CV ECHO MEAS - AO MAX PG: 4.9 MMHG
BH CV ECHO MEAS - AO MEAN PG (FULL): 1 MMHG
BH CV ECHO MEAS - AO MEAN PG: 3 MMHG
BH CV ECHO MEAS - AO ROOT AREA (BSA CORRECTED): 1.5
BH CV ECHO MEAS - AO ROOT AREA: 7.5 CM^2
BH CV ECHO MEAS - AO ROOT DIAM: 3.1 CM
BH CV ECHO MEAS - AO V2 MAX: 111 CM/SEC
BH CV ECHO MEAS - AO V2 MEAN: 81.2 CM/SEC
BH CV ECHO MEAS - AO V2 VTI: 24.3 CM
BH CV ECHO MEAS - ASC AORTA: 3.1 CM
BH CV ECHO MEAS - AVA(I,A): 2.7 CM^2
BH CV ECHO MEAS - AVA(I,D): 2.7 CM^2
BH CV ECHO MEAS - AVA(V,A): 2.8 CM^2
BH CV ECHO MEAS - AVA(V,D): 2.8 CM^2
BH CV ECHO MEAS - BSA(HAYCOCK): 2.2 M^2
BH CV ECHO MEAS - BSA: 2.1 M^2
BH CV ECHO MEAS - BZI_BMI: 29.6 KILOGRAMS/M^2
BH CV ECHO MEAS - BZI_METRIC_HEIGHT: 177.8 CM
BH CV ECHO MEAS - BZI_METRIC_WEIGHT: 93.4 KG
BH CV ECHO MEAS - EDV(CUBED): 91.1 ML
BH CV ECHO MEAS - EDV(MOD-SP2): 140 ML
BH CV ECHO MEAS - EDV(MOD-SP4): 153 ML
BH CV ECHO MEAS - EDV(TEICH): 92.4 ML
BH CV ECHO MEAS - EF(CUBED): 67.3 %
BH CV ECHO MEAS - EF(MOD-SP2): 56.4 %
BH CV ECHO MEAS - EF(MOD-SP4): 60.8 %
BH CV ECHO MEAS - EF(TEICH): 59 %
BH CV ECHO MEAS - ESV(CUBED): 29.8 ML
BH CV ECHO MEAS - ESV(MOD-SP2): 61 ML
BH CV ECHO MEAS - ESV(MOD-SP4): 60 ML
BH CV ECHO MEAS - ESV(TEICH): 37.9 ML
BH CV ECHO MEAS - FS: 31.1 %
BH CV ECHO MEAS - IVS/LVPW: 1
BH CV ECHO MEAS - IVSD: 1.1 CM
BH CV ECHO MEAS - LA DIMENSION: 3.6 CM
BH CV ECHO MEAS - LA/AO: 1.2
BH CV ECHO MEAS - LV DIASTOLIC VOL/BSA (35-75): 72.4 ML/M^2
BH CV ECHO MEAS - LV MASS(C)D: 175 GRAMS
BH CV ECHO MEAS - LV MASS(C)DI: 82.8 GRAMS/M^2
BH CV ECHO MEAS - LV MAX PG: 3.8 MMHG
BH CV ECHO MEAS - LV MEAN PG: 2 MMHG
BH CV ECHO MEAS - LV SYSTOLIC VOL/BSA (12-30): 28.4 ML/M^2
BH CV ECHO MEAS - LV V1 MAX: 97.8 CM/SEC
BH CV ECHO MEAS - LV V1 MEAN: 72.1 CM/SEC
BH CV ECHO MEAS - LV V1 VTI: 20.8 CM
BH CV ECHO MEAS - LVIDD: 4.5 CM
BH CV ECHO MEAS - LVIDS: 3.1 CM
BH CV ECHO MEAS - LVLD AP2: 9.2 CM
BH CV ECHO MEAS - LVLD AP4: 9 CM
BH CV ECHO MEAS - LVLS AP2: 7.8 CM
BH CV ECHO MEAS - LVLS AP4: 7.1 CM
BH CV ECHO MEAS - LVOT AREA (M): 3.1 CM^2
BH CV ECHO MEAS - LVOT AREA: 3.1 CM^2
BH CV ECHO MEAS - LVOT DIAM: 2 CM
BH CV ECHO MEAS - LVPWD: 1.1 CM
BH CV ECHO MEAS - MR MAX PG: 62.7 MMHG
BH CV ECHO MEAS - MR MAX VEL: 396 CM/SEC
BH CV ECHO MEAS - MV A MAX VEL: 76 CM/SEC
BH CV ECHO MEAS - MV DEC SLOPE: 360 CM/SEC^2
BH CV ECHO MEAS - MV E MAX VEL: 118 CM/SEC
BH CV ECHO MEAS - MV E/A: 1.6
BH CV ECHO MEAS - MV MAX PG: 3.8 MMHG
BH CV ECHO MEAS - MV MEAN PG: 1 MMHG
BH CV ECHO MEAS - MV P1/2T MAX VEL: 98.9 CM/SEC
BH CV ECHO MEAS - MV P1/2T: 80.5 MSEC
BH CV ECHO MEAS - MV V2 MAX: 98.1 CM/SEC
BH CV ECHO MEAS - MV V2 MEAN: 38.7 CM/SEC
BH CV ECHO MEAS - MV V2 VTI: 34.3 CM
BH CV ECHO MEAS - MVA P1/2T LCG: 2.2 CM^2
BH CV ECHO MEAS - MVA(P1/2T): 2.7 CM^2
BH CV ECHO MEAS - MVA(VTI): 1.9 CM^2
BH CV ECHO MEAS - PA MAX PG (FULL): 1.2 MMHG
BH CV ECHO MEAS - PA MAX PG: 3.5 MMHG
BH CV ECHO MEAS - PA MEAN PG (FULL): 0 MMHG
BH CV ECHO MEAS - PA MEAN PG: 2 MMHG
BH CV ECHO MEAS - PA V2 MAX: 93.7 CM/SEC
BH CV ECHO MEAS - PA V2 MEAN: 71.7 CM/SEC
BH CV ECHO MEAS - PA V2 VTI: 21.2 CM
BH CV ECHO MEAS - PI END-D VEL: 107 CM/SEC
BH CV ECHO MEAS - RV MAX PG: 2.3 MMHG
BH CV ECHO MEAS - RV MEAN PG: 2 MMHG
BH CV ECHO MEAS - RV V1 MAX: 75.2 CM/SEC
BH CV ECHO MEAS - RV V1 MEAN: 62.8 CM/SEC
BH CV ECHO MEAS - RV V1 VTI: 21.2 CM
BH CV ECHO MEAS - RVDD: 3.8 CM
BH CV ECHO MEAS - SI(AO): 86.8 ML/M^2
BH CV ECHO MEAS - SI(CUBED): 29 ML/M^2
BH CV ECHO MEAS - SI(LVOT): 30.9 ML/M^2
BH CV ECHO MEAS - SI(MOD-SP2): 37.4 ML/M^2
BH CV ECHO MEAS - SI(MOD-SP4): 44 ML/M^2
BH CV ECHO MEAS - SI(TEICH): 25.8 ML/M^2
BH CV ECHO MEAS - SV(AO): 183.4 ML
BH CV ECHO MEAS - SV(CUBED): 61.3 ML
BH CV ECHO MEAS - SV(LVOT): 65.3 ML
BH CV ECHO MEAS - SV(MOD-SP2): 79 ML
BH CV ECHO MEAS - SV(MOD-SP4): 93 ML
BH CV ECHO MEAS - SV(TEICH): 54.5 ML
BH CV ECHO MEAS - TR MAX VEL: 236 CM/SEC
BH CV VAS BP LEFT ARM: NORMAL MMHG

## 2020-12-29 NOTE — PROGRESS NOTES
Echo results per Dr Ennis.     Heart function good  Valves ok  Small pfo    Discussed with patient

## 2020-12-31 ENCOUNTER — HOSPITAL ENCOUNTER (OUTPATIENT)
Dept: CARDIOLOGY | Facility: HOSPITAL | Age: 40
End: 2020-12-31

## 2020-12-31 ENCOUNTER — APPOINTMENT (OUTPATIENT)
Dept: NUCLEAR MEDICINE | Facility: HOSPITAL | Age: 40
End: 2020-12-31

## 2021-01-06 DIAGNOSIS — K92.1 BLOOD IN STOOL: Primary | ICD-10-CM

## 2021-01-13 ENCOUNTER — DOCUMENTATION (OUTPATIENT)
Dept: CARDIOLOGY | Facility: CLINIC | Age: 41
End: 2021-01-13

## 2021-01-13 NOTE — PROGRESS NOTES
Billie from radiology called stating that Mr. Parham appt needed to be changed due to the doctor being out.  A letter was mailed to patient with the new date and time.

## 2021-01-15 RX ORDER — PAROXETINE HYDROCHLORIDE 20 MG/1
20 TABLET, FILM COATED ORAL EVERY MORNING
Qty: 30 TABLET | Refills: 3 | Status: SHIPPED | OUTPATIENT
Start: 2021-01-15 | End: 2021-01-28

## 2021-01-15 RX ORDER — MONTELUKAST SODIUM 10 MG/1
10 TABLET ORAL NIGHTLY
Qty: 30 TABLET | Refills: 3 | Status: SHIPPED | OUTPATIENT
Start: 2021-01-15 | End: 2021-04-09 | Stop reason: SDUPTHER

## 2021-01-15 RX ORDER — BUSPIRONE HYDROCHLORIDE 10 MG/1
10 TABLET ORAL 3 TIMES DAILY
Qty: 90 TABLET | Refills: 3 | Status: SHIPPED | OUTPATIENT
Start: 2021-01-15 | End: 2021-01-28 | Stop reason: ALTCHOICE

## 2021-01-15 RX ORDER — ERGOCALCIFEROL 1.25 MG/1
50000 CAPSULE ORAL WEEKLY
Qty: 4 CAPSULE | Refills: 3 | Status: SHIPPED | OUTPATIENT
Start: 2021-01-15 | End: 2021-04-20 | Stop reason: SDUPTHER

## 2021-01-15 RX ORDER — OMEPRAZOLE 40 MG/1
40 CAPSULE, DELAYED RELEASE ORAL DAILY
Qty: 30 CAPSULE | Refills: 3 | Status: SHIPPED | OUTPATIENT
Start: 2021-01-15 | End: 2021-04-20 | Stop reason: SDUPTHER

## 2021-01-28 ENCOUNTER — OFFICE VISIT (OUTPATIENT)
Dept: FAMILY MEDICINE CLINIC | Facility: CLINIC | Age: 41
End: 2021-01-28

## 2021-01-28 ENCOUNTER — LAB (OUTPATIENT)
Dept: LAB | Facility: HOSPITAL | Age: 41
End: 2021-01-28

## 2021-01-28 VITALS
RESPIRATION RATE: 20 BRPM | SYSTOLIC BLOOD PRESSURE: 120 MMHG | BODY MASS INDEX: 29.49 KG/M2 | DIASTOLIC BLOOD PRESSURE: 84 MMHG | OXYGEN SATURATION: 99 % | TEMPERATURE: 98.4 F | HEIGHT: 70 IN | WEIGHT: 206 LBS | HEART RATE: 81 BPM

## 2021-01-28 DIAGNOSIS — K92.1 BLOOD IN STOOL: ICD-10-CM

## 2021-01-28 DIAGNOSIS — R07.9 CHEST PAIN IN ADULT: ICD-10-CM

## 2021-01-28 DIAGNOSIS — R10.12 LUQ PAIN: ICD-10-CM

## 2021-01-28 DIAGNOSIS — F41.1 GENERALIZED ANXIETY DISORDER: Primary | Chronic | ICD-10-CM

## 2021-01-28 DIAGNOSIS — F41.8 ANXIETY ABOUT HEALTH: ICD-10-CM

## 2021-01-28 PROCEDURE — 82150 ASSAY OF AMYLASE: CPT

## 2021-01-28 PROCEDURE — 84484 ASSAY OF TROPONIN QUANT: CPT

## 2021-01-28 PROCEDURE — 85027 COMPLETE CBC AUTOMATED: CPT

## 2021-01-28 PROCEDURE — 80053 COMPREHEN METABOLIC PANEL: CPT

## 2021-01-28 PROCEDURE — 99214 OFFICE O/P EST MOD 30 MIN: CPT | Performed by: NURSE PRACTITIONER

## 2021-01-28 PROCEDURE — 83690 ASSAY OF LIPASE: CPT

## 2021-01-28 RX ORDER — PAROXETINE 30 MG/1
30 TABLET, FILM COATED ORAL EVERY MORNING
Qty: 30 TABLET | Refills: 0 | Status: SHIPPED | OUTPATIENT
Start: 2021-01-28 | End: 2021-03-09

## 2021-01-28 RX ORDER — DOXEPIN HYDROCHLORIDE 50 MG/1
CAPSULE ORAL
COMMUNITY
Start: 2020-11-30 | End: 2021-01-28

## 2021-01-28 RX ORDER — HYDROXYZINE HYDROCHLORIDE 25 MG/1
TABLET, FILM COATED ORAL
Qty: 120 TABLET | Refills: 0 | Status: SHIPPED | OUTPATIENT
Start: 2021-01-28 | End: 2021-02-18

## 2021-01-28 NOTE — PROGRESS NOTES
"Chief Complaint  No chief complaint on file.    Subjective          Jordin Parham presents to [facility] for   FP Same Day/Walk [facility] Clinic    PCP: Dr. Amezquita    CC: \"anxiety, nausea, fatigue, pain in left side, nervous, blood in stool, chest pain\"      Presents with exacerbation of his anxiety symptoms. Reports he is getting  in a few days and anxious about the amount of people that weill be there.  Reports that anxiety is beginning to interfere with his job.  Having a lot of physical symptoms as well.  Unable to sleep at night.  Was on Doxepin, but hasn't had filled since November from pharmacy. Doesn't believe it was helping much when he was taking it.  Taking Paxil daily and can tell if he misses a day or two, but not controlling his symptoms.  Has Buspar PRN, but reports no improvement in symptoms. Has been referred to mental health in the past, but not gone, but ready to consider this now.  Has never had counseling in the past, but willing to try this at this point.  Has been having chest pain for months, seeing cardiology.  Has stress test scheduled, but reports insurance will not pay for this, so he'd like to recheck with cardiology sooner to discuss concerns.  Concerns about his health are further increasing his anxiety symptoms.  Unable to sit still.  Reports he had attention problems when younger, but parents never took him to be evaluated for ADHD, so never has had diagnosis.  C/O concentration and memory problems.  Also c/o bloody stools, some dark for the last month or so.  Order has been placed by PCP for occult blood stool samples, but he hasn't provided these yet.  C/O LUQ pain and discomfort.  No recent fevers.  Labs from Dec 2020 reviewed and essentially WNL.      Chest Pain   Chronicity: persistent. The current episode started more than 1 month ago. The onset quality is sudden. The problem occurs daily. The problem has been waxing and " waning. The pain is present in the substernal region. The quality of the pain is described as tightness. The pain does not radiate. Associated symptoms include abdominal pain (LUQ), diaphoresis, dizziness, exertional chest pressure, leg pain, lower extremity edema, malaise/fatigue and nausea. Pertinent negatives include no back pain, claudication, cough, fever, headaches, hemoptysis, irregular heartbeat, near-syncope, numbness, orthopnea, palpitations, PND, shortness of breath, sputum production, syncope, vomiting or weakness. Risk factors include stress and male gender. Prior diagnostic workup includes echocardiogram (seeing cardiology).   Rectal Bleeding  Chronicity: persistent. The current episode started more than 1 month ago. The problem occurs intermittently. The problem has been waxing and waning. Associated symptoms include abdominal pain (LUQ), anorexia, chest pain, diaphoresis, fatigue and nausea. Pertinent negatives include no arthralgias, change in bowel habit, chills, congestion, coughing, fever, headaches, joint swelling, myalgias, neck pain, numbness, rash, sore throat, swollen glands, urinary symptoms, vertigo, visual change, vomiting or weakness. Nothing aggravates the symptoms. He has tried nothing for the symptoms.   Anxiety  Presents for follow-up visit. Symptoms include chest pain, decreased concentration, depressed mood, dizziness, excessive worry, insomnia, irritability, malaise, nausea, nervous/anxious behavior, panic and restlessness. Patient reports no palpitations, shortness of breath or suicidal ideas. Symptoms occur constantly. The severity of symptoms is interfering with daily activities and causing significant distress. The quality of sleep is poor.     Side effects of treatment include GI discomfort and headaches.       Objective   Vital Signs:   /84 (BP Location: Left arm, Patient Position: Sitting, Cuff Size: Adult)   Pulse 81   Temp 98.4 °F (36.9 °C) (Temporal)   Resp 20   " Ht 177.8 cm (70\")   Wt 93.4 kg (206 lb)   SpO2 99%   BMI 29.56 kg/m²     Physical Exam  Vitals signs and nursing note reviewed.   Constitutional:       General: Distressed:  very anxious.      Appearance: Normal appearance. He is not ill-appearing.   HENT:      Head: Normocephalic and atraumatic.   Eyes:      General: No scleral icterus.        Right eye: No discharge.         Left eye: No discharge.      Conjunctiva/sclera: Conjunctivae normal.   Neck:      Musculoskeletal: Neck supple. No muscular tenderness.      Comments: No thyromegaly    Cardiovascular:      Rate and Rhythm: Normal rate and regular rhythm.      Comments: No peripheral edema noted   Pulmonary:      Effort: Pulmonary effort is normal. No respiratory distress.      Breath sounds: Normal breath sounds. No wheezing, rhonchi or rales.   Abdominal:      General: Bowel sounds are normal.      Palpations: Abdomen is soft.      Tenderness: There is abdominal tenderness ( mild LUQ tenderness). There is no right CVA tenderness, left CVA tenderness, guarding or rebound.   Lymphadenopathy:      Cervical: No cervical adenopathy.   Skin:     General: Skin is warm and dry.   Neurological:      General: No focal deficit present.      Mental Status: He is alert and oriented to person, place, and time.   Psychiatric:         Attention and Perception: Attention normal.         Mood and Affect: Mood is anxious.         Speech: Speech normal.         Behavior: Behavior normal.         Thought Content: Thought content normal.         Cognition and Memory: Memory is impaired. He exhibits impaired recent memory ( difficulty with meds, previous visits, history).        Result Review :     CMP    CMP 6/25/20 9/1/20 12/1/20   BUN 11 11 10   Creatinine 0.89 1.06 0.87   eGFR Non African Am 95 77 97   Sodium 140 141 140   Potassium 4.7 5.0 4.2   Chloride 104 102 104   Calcium 9.4 9.7 9.1   Albumin 4.40 4.60 4.80   Total Bilirubin 0.4 0.5 1.1   Alkaline Phosphatase 77 66 " 76   AST (SGOT) 30 15 17   ALT (SGPT) 56 (A) 22 16   (A) Abnormal value            CBC    CBC 6/25/20 9/1/20 12/1/20   WBC 7.68 6.53 6.66   RBC 5.66 5.80 5.72   Hemoglobin 16.0 16.2 16.3   Hematocrit 46.8 48.5 48.1   MCV 82.7 83.6 84.1   MCH 28.3 27.9 28.5   MCHC 34.2 33.4 33.9   RDW 13.1 13.1 13.9   Platelets 220 223 202           Lipid Panel    Lipid Panel 6/25/20 12/1/20   Triglycerides 188 (A) 150   HDL Cholesterol 51 52   VLDL Cholesterol 37.6 27   LDL Cholesterol  134 (A) 165 (A)   LDL/HDL Ratio 2.64 3.12   (A) Abnormal value                HgB    HGB 6/25/20 9/1/20 12/1/20   Hemoglobin 16.0 16.2 16.3           Cardiology consult notes; Endoscopy/Colonoscopy notes from 2019 reviewed         Assessment and Plan    Problem List Items Addressed This Visit        Gastrointestinal Abdominal     Blood in stool    Relevant Orders    CBC No Differential    Comprehensive metabolic panel      Other Visit Diagnoses     Generalized anxiety disorder  (Chronic)   -  Primary    Relevant Medications    hydrOXYzine (ATARAX) 25 MG tablet    PARoxetine (Paxil) 30 MG tablet    Other Relevant Orders    Ambulatory Referral to Behavioral Health    Chest pain in adult        seeing Cardiology      Relevant Orders    Troponin    LUQ pain        Relevant Orders    CBC No Differential    Comprehensive metabolic panel    Amylase    Lipase    Anxiety about health        Relevant Orders    Ambulatory Referral to Behavioral Health        Discussed with Dr. Amezquita (PCP).  Will d/c Buspar, start Hydroxyzine PRN and at night for sleep  Increase Paxil to 30 mg daily.  Do not stop abruptly.   Referral to Behavioral Health placed.      F/U with Cardiology as scheduled--will call and see if earlier opening is available for patient    Labs as above--will notify with results when available  Encouraged to do occult blood samples as ordered by PCP as well    See PCP or RTC if symptoms persist/worsen  See PCP for routine f/u visit and management of  chronic medical conditions      This document has been electronically signed by YULIET Peguero on January 28, 2021 15:55 CST,.

## 2021-01-29 LAB
ALBUMIN SERPL-MCNC: 4.6 G/DL (ref 3.5–5.2)
ALBUMIN/GLOB SERPL: 1.6 G/DL
ALP SERPL-CCNC: 79 U/L (ref 39–117)
ALT SERPL W P-5'-P-CCNC: 17 U/L (ref 1–41)
AMYLASE SERPL-CCNC: 62 U/L (ref 28–100)
ANION GAP SERPL CALCULATED.3IONS-SCNC: 8.8 MMOL/L (ref 5–15)
AST SERPL-CCNC: 18 U/L (ref 1–40)
BILIRUB SERPL-MCNC: 0.7 MG/DL (ref 0–1.2)
BUN SERPL-MCNC: 12 MG/DL (ref 6–20)
BUN/CREAT SERPL: 10.8 (ref 7–25)
CALCIUM SPEC-SCNC: 9.2 MG/DL (ref 8.6–10.5)
CHLORIDE SERPL-SCNC: 102 MMOL/L (ref 98–107)
CO2 SERPL-SCNC: 27.2 MMOL/L (ref 22–29)
CREAT SERPL-MCNC: 1.11 MG/DL (ref 0.76–1.27)
DEPRECATED RDW RBC AUTO: 39.8 FL (ref 37–54)
ERYTHROCYTE [DISTWIDTH] IN BLOOD BY AUTOMATED COUNT: 13.3 % (ref 12.3–15.4)
GFR SERPL CREATININE-BSD FRML MDRD: 73 ML/MIN/1.73
GLOBULIN UR ELPH-MCNC: 2.9 GM/DL
GLUCOSE SERPL-MCNC: 76 MG/DL (ref 65–99)
HCT VFR BLD AUTO: 49.7 % (ref 37.5–51)
HGB BLD-MCNC: 17.2 G/DL (ref 13–17.7)
LIPASE SERPL-CCNC: 53 U/L (ref 13–60)
MCH RBC QN AUTO: 28.9 PG (ref 26.6–33)
MCHC RBC AUTO-ENTMCNC: 34.6 G/DL (ref 31.5–35.7)
MCV RBC AUTO: 83.4 FL (ref 79–97)
PLATELET # BLD AUTO: 227 10*3/MM3 (ref 140–450)
PMV BLD AUTO: 10.5 FL (ref 6–12)
POTASSIUM SERPL-SCNC: 4.6 MMOL/L (ref 3.5–5.2)
PROT SERPL-MCNC: 7.5 G/DL (ref 6–8.5)
RBC # BLD AUTO: 5.96 10*6/MM3 (ref 4.14–5.8)
SODIUM SERPL-SCNC: 138 MMOL/L (ref 136–145)
TROPONIN T SERPL-MCNC: <0.01 NG/ML (ref 0–0.03)
WBC # BLD AUTO: 7.1 10*3/MM3 (ref 3.4–10.8)

## 2021-02-01 ENCOUNTER — LAB (OUTPATIENT)
Dept: LAB | Facility: HOSPITAL | Age: 41
End: 2021-02-01

## 2021-02-01 DIAGNOSIS — K92.1 BLOOD IN STOOL: Primary | ICD-10-CM

## 2021-02-01 PROCEDURE — 82274 ASSAY TEST FOR BLOOD FECAL: CPT

## 2021-02-02 ENCOUNTER — TELEPHONE (OUTPATIENT)
Dept: FAMILY MEDICINE CLINIC | Facility: CLINIC | Age: 41
End: 2021-02-02

## 2021-02-02 LAB — HEMOCCULT STL QL IA: NEGATIVE

## 2021-02-02 NOTE — TELEPHONE ENCOUNTER
----- Message from Jhon Amezquita MD sent at 2/2/2021  3:21 PM CST -----  Please let pt know occult stool test negative     Recheck on next visit. Thanks

## 2021-02-03 ENCOUNTER — APPOINTMENT (OUTPATIENT)
Dept: NUCLEAR MEDICINE | Facility: HOSPITAL | Age: 41
End: 2021-02-03

## 2021-02-03 ENCOUNTER — HOSPITAL ENCOUNTER (OUTPATIENT)
Dept: CARDIOLOGY | Facility: HOSPITAL | Age: 41
End: 2021-02-03

## 2021-02-15 ENCOUNTER — OFFICE VISIT (OUTPATIENT)
Dept: FAMILY MEDICINE CLINIC | Facility: CLINIC | Age: 41
End: 2021-02-15

## 2021-02-15 VITALS — BODY MASS INDEX: 29.56 KG/M2 | HEIGHT: 70 IN

## 2021-02-15 DIAGNOSIS — F41.0 PANIC ATTACKS: ICD-10-CM

## 2021-02-15 DIAGNOSIS — R11.2 NAUSEA AND VOMITING, INTRACTABILITY OF VOMITING NOT SPECIFIED, UNSPECIFIED VOMITING TYPE: ICD-10-CM

## 2021-02-15 DIAGNOSIS — F41.1 GAD (GENERALIZED ANXIETY DISORDER): ICD-10-CM

## 2021-02-15 DIAGNOSIS — R05.9 COUGH: ICD-10-CM

## 2021-02-15 DIAGNOSIS — J06.9 UPPER RESPIRATORY TRACT INFECTION, UNSPECIFIED TYPE: Primary | ICD-10-CM

## 2021-02-15 PROCEDURE — 99443 PR PHYS/QHP TELEPHONE EVALUATION 21-30 MIN: CPT | Performed by: FAMILY MEDICINE

## 2021-02-15 RX ORDER — EPINEPHRINE 0.3 MG/.3ML
0.3 INJECTION SUBCUTANEOUS ONCE
Qty: 1 EACH | Refills: 3 | Status: SHIPPED | OUTPATIENT
Start: 2021-02-15 | End: 2021-02-15

## 2021-02-15 RX ORDER — ONDANSETRON HYDROCHLORIDE 8 MG/1
8 TABLET, FILM COATED ORAL EVERY 8 HOURS PRN
Qty: 90 TABLET | Refills: 3 | Status: SHIPPED | OUTPATIENT
Start: 2021-02-15 | End: 2021-04-20 | Stop reason: SDUPTHER

## 2021-02-15 RX ORDER — ALBUTEROL SULFATE 90 UG/1
2 AEROSOL, METERED RESPIRATORY (INHALATION) EVERY 4 HOURS PRN
Qty: 18 G | Refills: 3 | Status: SHIPPED | OUTPATIENT
Start: 2021-02-15 | End: 2021-04-20

## 2021-02-15 RX ORDER — AZITHROMYCIN 250 MG/1
TABLET, FILM COATED ORAL
Qty: 6 TABLET | Refills: 0 | Status: SHIPPED | OUTPATIENT
Start: 2021-02-15 | End: 2021-02-22

## 2021-02-15 NOTE — PROGRESS NOTES
Chief Complaint  Cough    Subjective          Jordin Parham presents to [facility]     Pt is 40 yo male with management  of obesity, COOKIE, GERD , mood disorder, history of H pylori infection, VItamin D deficiency,  Renal impairment, History of illicit drug use, fatty liver disease, gastritis,  Eosinophilic esophagitis, internal/external hemorrhoids, sp hemorrhoidectomy      11/30/20 in office visit for recheck on pt's above medical issues. He continue to take his medication for depression and anxiety. Pt continues to take his medicaitons for COOKIE, GERD, vitamin D deficiency. His main concern is chest pain that  Started about a few weeks ago. Pt went to [facility] ER and supposedly had EKG and chest x-ray do not have results here today. State chest pain radiates from central chest to left arm. He has numbness and tingling in both arms. He states chest pain occurs with rest and acvitiy. It may last for a few minutes. No syncopal episodes. He has had dizziness and nausea. The chest pain occurs everyday . Pt has a family history of heart disease in mother and uncle. He would also like to see sleep medicine. He gets up after 4 hours and gasping for breath. He has not had a sleep study before     2/15/21 telemedicine visit for recheck on pt's above medical issues.pt agreed to telemedicine visit today  Pt was recently seen by [facility] on 1/28/21 for blood in stool COOKIE, chest pain. Anxiety and LUQ pain. He was referred to behavioral health.:He has an appt with behavioral health on 2/18/21  He had labwork done on 1/28/21 that shwoed normal amylase/lipase, CMP was normal troponin was negative. CBC showed stable hemoglobin and WBC. Occult stool is negative. Pt did see Cardiololgy for chest pain and Echocardiogram and aterial doppler was ordered. Pt today states he has been having symptoms of cough/URI. He has been symptoms for 3-4 days. He has tried mucinex with no  "relief. He has no fever. He has not been around anyone who has had COVID. He did test positive for COVID in December 2020. He does not smoke tobacco but does smoke marijuana.     URI   Associated symptoms include coughing.   Cough  Associated symptoms include shortness of breath.     Review of Systems   Constitutional: Positive for activity change and fatigue.   Respiratory: Positive for cough and shortness of breath.    Neurological: Positive for weakness and numbness.     Objective   Vital Signs:   Ht 177.8 cm (70\")   BMI 29.56 kg/m²     Physical Exam  Vitals signs and nursing note reviewed.   Neurological:      Mental Status: He is alert and oriented to person, place, and time.   Psychiatric:         Mood and Affect: Mood normal.         Behavior: Behavior normal.        Result Review :                 Assessment and Plan    Diagnoses and all orders for this visit:    1. Upper respiratory tract infection, unspecified type (Primary)    2. Cough    3. COOKIE (generalized anxiety disorder)    4. Nausea and vomiting, intractability of vomiting not specified, unspecified vomiting type    5. Panic attacks    Other orders  -     ondansetron (Zofran) 8 MG tablet; Take 1 tablet by mouth Every 8 (Eight) Hours As Needed for Nausea or Vomiting.  Dispense: 90 tablet; Refill: 3  -     azithromycin (Zithromax Z-Luis) 250 MG tablet; Take 2 tablets the first day, then 1 tablet daily for 4 days.  Dispense: 6 tablet; Refill: 0  -     albuterol sulfate  (90 Base) MCG/ACT inhaler; Inhale 2 puffs Every 4 (Four) Hours As Needed for Wheezing or Shortness of Air.  Dispense: 18 g; Refill: 3  -     EPINEPHrine (EpiPen 2-Luis) 0.3 MG/0.3ML solution auto-injector injection; Inject 0.3 mL under the skin into the appropriate area as directed 1 (One) Time for 1 dose.  Dispense: 1 each; Refill: 3         -URI/cough  - recommend probiotics, mucinex, rest hydration, azithromycin, vitamin C, vitamin D and probiotics.  -nausea/vomiting -  zofran " 8 mg PO every 8 hours   -chest pain - Cardiology following. Last Tropnin was negative. Echocardiogram was ordered   -hand pain/wrist pain - suspect carpal tunnel. Will refer to Neurology information provided.  Start on naproxen 500 mg PO BID. . Drug information provided. Recommend wrist brace but pt declined.. Consider Orthopedic referral in future   -renal cyst - monitor.   -rash was t on triamcinolone cream topcial BID  -eosinophilic esophagitis/alphal gal allergy  -GI following, advised pt to refrain from beef, pork or lamb products   -insomnia - was on doxepin 25 mg at bedtime   -obesity  counseled weight loss >5 minutes BMI at 30.85   -fatty liver -GI following. Recommend heart healthy diet, weight loss   -pruritis - vistaril 25 mg PO TID along with lotrisone cream BID  -vitamin D deficiency -vitamin D once a week  -HLP - recommend DASH diet, heart healthy diet.  -renal impairment -  Will monitor kidney function   -GERD -continue prilosec. Possible gastritis. Will need last EGD and endoscopy. Start on carafate 1 gram tablet QID. Will have colonoscopy and   -allergic rhintis - flonase nasal psray   -mood disorder/insomnia  -on paxil but wants to get off it. Stop paxil but taper dose.    -panic attack/COOKIE - on paxil 30 mg PO q daily. On vistaril every 8 hours PRN pt referred to Behavioral Health   -recommended counseling but pt decline for now   -obesity - counseled pt to lose weight >5 minutes, diet information provided  -advised pt to be safe and call with questions and concerns. All questions addressed tdoay.   -advised pt to go to ER or call 911 if symptoms worrisome or severe  -advised pt to followup with specialist and referrals  -advised pt to be safe during COVID-19 pandemic  This visit has been rescheduled as a phone visit to comply with patient safety concerns in accordance with CDC recommendations. Total time of discussion was 25 minutes.  -recheck in 4 weeks         This document has been  electronically signed by Jhon Amezquita MD on February 15, 2021 11:08 CST          s     Follow Up   No follow-ups on file.  Patient was given instructions and counseling regarding his condition or for health maintenance advice. Please see specific information pulled into the AVS if appropriate.

## 2021-02-18 ENCOUNTER — TELEMEDICINE (OUTPATIENT)
Dept: PSYCHIATRY | Facility: CLINIC | Age: 41
End: 2021-02-18

## 2021-02-18 DIAGNOSIS — F19.11 HISTORY OF SUBSTANCE ABUSE (HCC): ICD-10-CM

## 2021-02-18 DIAGNOSIS — F41.1 GENERALIZED ANXIETY DISORDER: Primary | ICD-10-CM

## 2021-02-18 DIAGNOSIS — F41.0 PANIC ATTACKS: ICD-10-CM

## 2021-02-18 DIAGNOSIS — F12.90 MARIJUANA USE, CONTINUOUS: ICD-10-CM

## 2021-02-18 DIAGNOSIS — G47.00 INSOMNIA, UNSPECIFIED TYPE: ICD-10-CM

## 2021-02-18 PROCEDURE — 90792 PSYCH DIAG EVAL W/MED SRVCS: CPT | Performed by: NURSE PRACTITIONER

## 2021-02-18 RX ORDER — MIRTAZAPINE 7.5 MG/1
7.5 TABLET, FILM COATED ORAL NIGHTLY
Qty: 30 TABLET | Refills: 0 | Status: SHIPPED | OUTPATIENT
Start: 2021-02-18 | End: 2021-03-09 | Stop reason: SDUPTHER

## 2021-02-18 RX ORDER — PAROXETINE 30 MG/1
30 TABLET, FILM COATED ORAL DAILY
Qty: 30 TABLET | Refills: 0 | Status: SHIPPED | OUTPATIENT
Start: 2021-02-18 | End: 2021-03-09 | Stop reason: SDUPTHER

## 2021-02-18 RX ORDER — BUSPIRONE HYDROCHLORIDE 10 MG/1
10 TABLET ORAL 3 TIMES DAILY
COMMUNITY
End: 2021-04-22 | Stop reason: SDUPTHER

## 2021-02-18 RX ORDER — PROPRANOLOL HYDROCHLORIDE 10 MG/1
10 TABLET ORAL 2 TIMES DAILY PRN
Qty: 60 TABLET | Refills: 0 | Status: SHIPPED | OUTPATIENT
Start: 2021-02-18 | End: 2021-03-09
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
## 2021-02-22 ENCOUNTER — OFFICE VISIT (OUTPATIENT)
Dept: FAMILY MEDICINE CLINIC | Facility: CLINIC | Age: 41
End: 2021-02-22

## 2021-02-22 VITALS
WEIGHT: 209.5 LBS | BODY MASS INDEX: 29.99 KG/M2 | HEART RATE: 82 BPM | HEIGHT: 70 IN | SYSTOLIC BLOOD PRESSURE: 116 MMHG | RESPIRATION RATE: 20 BRPM | OXYGEN SATURATION: 98 % | TEMPERATURE: 97.5 F | DIASTOLIC BLOOD PRESSURE: 70 MMHG

## 2021-02-22 DIAGNOSIS — G25.81 RESTLESS LEG SYNDROME: Primary | Chronic | ICD-10-CM

## 2021-02-22 DIAGNOSIS — F41.9 ANXIETY: ICD-10-CM

## 2021-02-22 PROCEDURE — 99214 OFFICE O/P EST MOD 30 MIN: CPT | Performed by: NURSE PRACTITIONER

## 2021-02-22 RX ORDER — ROPINIROLE 0.5 MG/1
TABLET, FILM COATED ORAL
Qty: 60 TABLET | Refills: 0 | Status: SHIPPED | OUTPATIENT
Start: 2021-02-22 | End: 2021-04-02 | Stop reason: SDUPTHER

## 2021-02-22 NOTE — PROGRESS NOTES
"Chief Complaint  No chief complaint on file.    Subjective          Jordin Parham presents to [facility]  [facility]    PCP: Dr. Amezquita    CC: \"restless legs\"    Had recent URI treated with Zithromax.  Still has some lingering cough and occasionally needs inhaler at night.  No fever.      Leg Pain   Incident onset: chronic issue that has recently been exacerbated by addtion of some new anxiety meds. The incident occurred at home. There was no injury mechanism (has constant urge to move legs all the time, worse at night, keeps him awake). Pain location: no pain. Quality: constant urge to move legs. The pain has been constant since onset. Associated symptoms include numbness (occasional) and tingling (occasional). Pertinent negatives include no inability to bear weight, loss of motion, loss of sensation or muscle weakness. He reports no foreign bodies present. Exacerbated by: recent addition of some of his anxiety meds have exacerbated symptoms. Treatments tried: never been prescribed anything for RLS in the past or tried anything OTC.       Review of Systems   Constitutional: Negative.    Respiratory: Negative.    Cardiovascular: Negative.    Genitourinary: Negative for difficulty urinating.   Skin: Negative.    Neurological: Positive for tingling (occasional) and numbness (occasional).   Psychiatric/Behavioral: Positive for sleep disturbance. The patient is nervous/anxious.      Objective   Vital Signs:   /70 (BP Location: Left arm, Patient Position: Sitting, Cuff Size: Adult)   Pulse 82   Temp 97.5 °F (36.4 °C) (Temporal)   Resp 20   Ht 177.8 cm (70\")   Wt 95 kg (209 lb 8 oz)   SpO2 98%   BMI 30.06 kg/m²     Physical Exam  Vitals signs and nursing note reviewed.   Constitutional:       General: He is not in acute distress.     Appearance: Normal appearance. He is not ill-appearing.   Cardiovascular:      Rate and Rhythm: Normal rate and regular " rhythm.   Pulmonary:      Effort: Pulmonary effort is normal. No respiratory distress.      Breath sounds: Normal breath sounds. No wheezing, rhonchi or rales.   Skin:     General: Skin is warm and dry.      Findings: No erythema.   Neurological:      General: No focal deficit present.      Mental Status: He is alert and oriented to person, place, and time.   Psychiatric:         Attention and Perception: Attention normal.         Mood and Affect: Mood is anxious ( mild, much less than at previous visit with me on 1-28).         Speech: Speech normal.         Behavior: Behavior normal.         Thought Content: Thought content normal.         Cognition and Memory: Cognition normal.        Result Review :                 Assessment and Plan    Diagnoses and all orders for this visit:    1. Restless leg syndrome (Primary)  Comments:  chronic--not previously treated, recently exacerbated  Orders:  -     rOPINIRole (Requip) 0.5 MG tablet; 1/2 tab qpm x 2 days, then 1 tab po qpm x 5 days, then 2 tabs qpm daily thereafter. Take 1 hour before bedtime.  Dispense: 60 tablet; Refill: 0    2. Anxiety      Rx for Requip provided with tapering instructions to 1 mg at night with f/u in 1 month with PCP for recheck.      Continue f/u with behavioral health regarding anxiety treatment/management.  If RLS continues to exacerbated with med use despite addtion of Requip, may need to consider alternate anxiety treatments.     Patient's Body mass index is 30.06 kg/m². BMI is above normal parameters. Recommendations include: referral to primary care.    Recheck with PCP in 1 month  See PCP for routine f/u visit and management of chronic medical conditions      This document has been electronically signed by YULIET Peguero on February 22, 2021 13:25 CST,.

## 2021-02-22 NOTE — PATIENT INSTRUCTIONS
Restless Legs Syndrome  Restless legs syndrome is a condition that causes uncomfortable feelings or sensations in the legs, especially while sitting or lying down. The sensations usually cause an overwhelming urge to move the legs. The arms can also sometimes be affected.  The condition can range from mild to severe. The symptoms often interfere with a person's ability to sleep.  What are the causes?  The cause of this condition is not known.  What increases the risk?  The following factors may make you more likely to develop this condition:  · Being older than 50.  · Pregnancy.  · Being a woman. In general, the condition is more common in women than in men.  · A family history of the condition.  · Having iron deficiency.  · Overuse of caffeine, nicotine, or alcohol.  · Certain medical conditions, such as kidney disease, Parkinson's disease, or nerve damage.  · Certain medicines, such as those for high blood pressure, nausea, colds, allergies, depression, and some heart conditions.  What are the signs or symptoms?  The main symptom of this condition is uncomfortable sensations in the legs, such as:  · Pulling.  · Tingling.  · Prickling.  · Throbbing.  · Crawling.  · Burning.  Usually, the sensations:  · Affect both sides of the body.  · Are worse when you sit or lie down.  · Are worse at night. These may wake you up or make it difficult to fall asleep.  · Make you have a strong urge to move your legs.  · Are temporarily relieved by moving your legs.  The arms can also be affected, but this is rare. People who have this condition often have tiredness during the day because of their lack of sleep at night.  How is this diagnosed?  This condition may be diagnosed based on:  · Your symptoms.  · Blood tests.  In some cases, you may be monitored in a sleep lab by a specialist (a sleep study). This can detect any disruptions in your sleep.  How is this treated?  This condition is treated by managing the symptoms. This may  include:  · Lifestyle changes, such as exercising, using relaxation techniques, and avoiding caffeine, alcohol, or tobacco.  · Medicines. Anti-seizure medicines may be tried first.  Follow these instructions at home:         General instructions  · Take over-the-counter and prescription medicines only as told by your health care provider.  · Use methods to help relieve the uncomfortable sensations, such as:  ? Massaging your legs.  ? Walking or stretching.  ? Taking a cold or hot bath.  · Keep all follow-up visits as told by your health care provider. This is important.  Lifestyle  · Practice good sleep habits. For example, go to bed and get up at the same time every day. Most adults should get 7-9 hours of sleep each night.  · Exercise regularly. Try to get at least 30 minutes of exercise most days of the week.  · Practice ways of relaxing, such as yoga or meditation.  · Avoid caffeine and alcohol.  · Do not use any products that contain nicotine or tobacco, such as cigarettes and e-cigarettes. If you need help quitting, ask your health care provider.  Contact a health care provider if:  · Your symptoms get worse or they do not improve with treatment.  Summary  · Restless legs syndrome is a condition that causes uncomfortable feelings or sensations in the legs, especially while sitting or lying down.  · The symptoms often interfere with a person's ability to sleep.  · This condition is treated by managing the symptoms. You may need to make lifestyle changes or take medicines.  This information is not intended to replace advice given to you by your health care provider. Make sure you discuss any questions you have with your health care provider.  Document Revised: 01/07/2019 Document Reviewed: 01/07/2019  Elsevier Patient Education © 2020 [employer]

## 2021-03-03 DIAGNOSIS — F41.1 GENERALIZED ANXIETY DISORDER: ICD-10-CM

## 2021-03-03 RX ORDER — PAROXETINE 30 MG/1
30 TABLET, FILM COATED ORAL DAILY
Qty: 30 TABLET | Refills: 0 | Status: CANCELLED | OUTPATIENT
Start: 2021-03-03 | End: 2021-04-02

## 2021-03-09 ENCOUNTER — TELEMEDICINE (OUTPATIENT)
Dept: PSYCHIATRY | Facility: CLINIC | Age: 41
End: 2021-03-09

## 2021-03-09 DIAGNOSIS — F12.90 MARIJUANA USE, CONTINUOUS: ICD-10-CM

## 2021-03-09 DIAGNOSIS — F19.11 HISTORY OF SUBSTANCE ABUSE (HCC): ICD-10-CM

## 2021-03-09 DIAGNOSIS — G47.00 INSOMNIA, UNSPECIFIED TYPE: Chronic | ICD-10-CM

## 2021-03-09 DIAGNOSIS — F41.0 PANIC ATTACKS: ICD-10-CM

## 2021-03-09 DIAGNOSIS — F41.1 GENERALIZED ANXIETY DISORDER: Primary | Chronic | ICD-10-CM

## 2021-03-09 DIAGNOSIS — F33.1 MAJOR DEPRESSIVE DISORDER, RECURRENT EPISODE, MODERATE (HCC): ICD-10-CM

## 2021-03-09 PROCEDURE — 99214 OFFICE O/P EST MOD 30 MIN: CPT | Performed by: NURSE PRACTITIONER

## 2021-03-09 RX ORDER — PAROXETINE 30 MG/1
30 TABLET, FILM COATED ORAL DAILY
Qty: 30 TABLET | Refills: 1 | Status: SHIPPED | OUTPATIENT
Start: 2021-03-09 | End: 2021-03-31 | Stop reason: SDUPTHER

## 2021-03-09 RX ORDER — MIRTAZAPINE 7.5 MG/1
7.5 TABLET, FILM COATED ORAL NIGHTLY
Qty: 30 TABLET | Refills: 0 | Status: SHIPPED | OUTPATIENT
Start: 2021-03-09 | End: 2021-03-31 | Stop reason: SDUPTHER

## 2021-03-09 RX ORDER — LAMOTRIGINE 25 MG/1
TABLET ORAL
Qty: 45 TABLET | Refills: 0 | Status: SHIPPED | OUTPATIENT
Start: 2021-03-09 | End: 2021-03-31 | Stop reason: SDUPTHER

## 2021-03-09 NOTE — PROGRESS NOTES
This provider is located at [facility], [address] [place] [address].The Patient is seen remotely at home, [address] [place] [address] via [facility].  Patient is being seen via telehealth and confirm that they are in a secure environment for this session. The patient's condition being diagnosed/treated is appropriate for telemedicine. The provider identified himself/herself: herself as well as her credentials.   The patient and spouse gave consent to be seen remotely, and when consent is given they understand that the consent allows for patient identifiable information to be sent to a third party as needed.   They may refuse to be seen remotely at any time. The electronic data is encrypted and password protected, and the patient has been advised of the potential risks to privacy not withstanding such measures.    You have chosen to receive care through a telehealth visit.  Do you consent to use a video/audio connection for your medical care today? Yes      Chief Complaint  Anxiety and Depression    Subjective          Jordin Parham presents to [facility] for medication management.     History of Present Illness  Patient presents today with his wife who he is agreeable with being present during the visit.  Patient states his sleep is much better as he is averaging 7 hours and has been diagnosed with restless leg syndrome and has been on Requip which has improved according to the wife and the patient.  Patient states that he had 1 panic attack since he last saw me as it was brought on by having to move with no help.  Patient states the propanolol has not been helpful and has not noticed any difference with his anxiety. The patient reports the following symptoms of anxiety: constant anxiety/worry, restlessness/on edge, difficulty concentrating and irritability and have caused impairment in important areas of functioning.  When asking the  patient about his depression I reported that his PHQ 9 was lower and asked if his depression has improved he stated yes but feels as if his moods are up and down.  He states 1 minute he will be fired in the next minute he is sitting down crying as well as angry.  Patient and his wife verbalized that they believe his mood changes throughout the day.  Patient states his anxiety and mood changes are the most significant things for him at this time.  Patient notes that his appetite is still decreased but wife states it has slowly been improving.  Patient denied any side effects to the medications.  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  Patient denies any SI/HI/AH/VH.            Objective   Vital Signs:   There were no vitals taken for this visit.  Due to the remote nature of this encounter (virtual encounter), vitals were unable to be obtained.  Height stated at 70 inches.  Weight stated at 209 pounds.    PHQ-9 Depression Screening  Little interest or pleasure in doing things?     Feeling down, depressed, or hopeless? (P) 1   Trouble falling or staying asleep, or sleeping too much? (P) 0   Feeling tired or having little energy? (P) 1   Poor appetite or overeating? (P) 1   Feeling bad about yourself - or that you are a failure or have let yourself or your family down? (P) 0   Trouble concentrating on things, such as reading the newspaper or watching television? (P) 0   Moving or speaking so slowly that other people could have noticed? Or the opposite - being so fidgety or restless that you have been moving around a lot more than usual? (P) 1   Thoughts that you would be better off dead, or of hurting yourself in some way? (P) 0   PHQ-9 Total Score (P) 4   If you checked off any problems, how difficult have these problems made it for you to do your work, take care of things  at home, or get along with other people? (P) Very difficult     PHQ-9 Total Score: (P) 4        Feeling nervous, anxious or on edge: Not at all  Not being able to stop or control worrying: Nearly every day  Worrying too much about different things: Nearly every day  Trouble Relaxing: Nearly every day  Being so restless that it is hard to sit still: Nearly every day  Feeling afraid as if something awful might happen: Nearly every day  Becoming easily annoyed or irritable: More than half the days  COOKIE 7 Total Score: 17  If you checked any problems, how difficult have these problems made it for you to do your work, take care of things at home, or get along with other people: Somewhat difficult      PROMIS scale screening tool that patient filled out virtually reviewed by this APRN at today's encounter.      PHQ-9 Score:   PHQ-9 Total Score: (P) 4     Patient screened positive for depression based on a PHQ-9 score of 4 on 3/9/2021. Follow-up recommendations include: see notes and medication additions and changes.        Mental Status Exam:   Hygiene:   good  Cooperation:  Cooperative  Eye Contact:  Fair  Psychomotor Behavior:  Restless  Affect:  Appropriate  Mood: anxious  Speech:  Normal  Thought Process:  Goal directed and Linear  Thought Content:  Normal  Suicidal:  None  Homicidal:  None  Hallucinations:  None  Delusion:  None  Memory:  Intact  Orientation:  Person, Place, Time and Situation  Reliability:  fair  Insight:  Fair  Judgement:  Fair  Impulse Control:  Fair  Physical/Medical Issues:  Yes fatty liver, restless leg     Current Medications:   Current Outpatient Medications   Medication Sig Dispense Refill   • albuterol sulfate  (90 Base) MCG/ACT inhaler Inhale 2 puffs Every 4 (Four) Hours As Needed for Wheezing or Shortness of Air. 18 g 3   • busPIRone (BUSPAR) 10 MG tablet Take 10 mg by mouth 3 (Three) Times a Day.     • lamoTRIgine (LaMICtal) 25 MG tablet Take 1 tablet for 2 weeks if no side effects  or rash take 2 tablets daily. 45 tablet 0   • mirtazapine (REMERON) 7.5 MG tablet Take 1 tablet by mouth Every Night. 30 tablet 0   • montelukast (Singulair) 10 MG tablet Take 1 tablet by mouth Every Night. 30 tablet 3   • omeprazole (priLOSEC) 40 MG capsule Take 1 capsule by mouth Daily. 30 capsule 3   • ondansetron (Zofran) 8 MG tablet Take 1 tablet by mouth Every 8 (Eight) Hours As Needed for Nausea or Vomiting. 90 tablet 3   • PARoxetine (PAXIL) 30 MG tablet Take 1 tablet by mouth Daily for 60 days. 30 tablet 1   • rOPINIRole (Requip) 0.5 MG tablet 1/2 tab qpm x 2 days, then 1 tab po qpm x 5 days, then 2 tabs qpm daily thereafter. Take 1 hour before bedtime. 60 tablet 0   • vitamin D (ERGOCALCIFEROL) 1.25 MG (07531 UT) capsule capsule Take 1 capsule by mouth 1 (One) Time Per Week. 4 capsule 3     No current facility-administered medications for this visit.       Physical Exam  Nursing note reviewed.   Constitutional:       Appearance: Normal appearance.   Neurological:      Mental Status: He is alert.   Psychiatric:         Attention and Perception: Perception normal. He is inattentive.         Mood and Affect: Mood is anxious and depressed. Affect is blunt.         Speech: Speech normal.         Behavior: Behavior is cooperative.         Thought Content: Thought content normal.         Cognition and Memory: Cognition and memory normal.        Result Review :     The following data was reviewed by: YULIET Asif on 03/09/2021:  Common labs    Common Labsle 9/1/20 9/1/20 9/1/20 12/1/20 12/1/20 12/1/20 12/1/20 1/28/21 1/28/21    1016 1016 1016 0836 0836 0836 0836 1412 1412   Glucose  104 (A)    98   76   BUN  11    10   12   Creatinine  1.06    0.87   1.11   eGFR Non  Am  77    97   73   Sodium  141    140   138   Potassium  5.0    4.2   4.6   Chloride  102    104   102   Calcium  9.7    9.1   9.2   Albumin  4.60    4.80   4.60   Total Bilirubin  0.5    1.1   0.7   Alkaline Phosphatase  66    76    79   AST (SGOT)  15    17   18   ALT (SGPT)  22    16   17   WBC 6.53   6.66    7.10    Hemoglobin 16.2   16.3    17.2    Hematocrit 48.5   48.1    49.7    Platelets 223   202    227    Total Cholesterol       244 (A)     Triglycerides       150     HDL Cholesterol       52     LDL Cholesterol        165 (A)     Hemoglobin A1C     5.30       Uric Acid   5.3         (A) Abnormal value            CMP    CMP 9/1/20 12/1/20 1/28/21   Glucose 104 (A) 98 76   BUN 11 10 12   Creatinine 1.06 0.87 1.11   eGFR Non African Am 77 97 73   Sodium 141 140 138   Potassium 5.0 4.2 4.6   Chloride 102 104 102   Calcium 9.7 9.1 9.2   Albumin 4.60 4.80 4.60   Total Bilirubin 0.5 1.1 0.7   Alkaline Phosphatase 66 76 79   AST (SGOT) 15 17 18   ALT (SGPT) 22 16 17   (A) Abnormal value            CBC    CBC 9/1/20 12/1/20 1/28/21   WBC 6.53 6.66 7.10   RBC 5.80 5.72 5.96 (A)   Hemoglobin 16.2 16.3 17.2   Hematocrit 48.5 48.1 49.7   MCV 83.6 84.1 83.4   MCH 27.9 28.5 28.9   MCHC 33.4 33.9 34.6   RDW 13.1 13.9 13.3   Platelets 223 202 227   (A) Abnormal value            Lipid Panel    Lipid Panel 6/25/20 12/1/20   Total Cholesterol 223 (A) 244 (A)   Triglycerides 188 (A) 150   HDL Cholesterol 51 52   VLDL Cholesterol 37.6 27   LDL Cholesterol  134 (A) 165 (A)   LDL/HDL Ratio 2.64 3.12   (A) Abnormal value                Data reviewed: PCP notes        Assessment and Plan    Problem List Items Addressed This Visit        Mental Health    Panic attacks       Sleep    Insomnia    Relevant Medications    mirtazapine (REMERON) 7.5 MG tablet      Other Visit Diagnoses     Generalized anxiety disorder  (Chronic)   -  Primary    Relevant Medications    PARoxetine (PAXIL) 30 MG tablet    mirtazapine (REMERON) 7.5 MG tablet    lamoTRIgine (LaMICtal) 25 MG tablet    Major depressive disorder, recurrent episode, moderate (CMS/HCC)        Relevant Medications    PARoxetine (PAXIL) 30 MG tablet    mirtazapine (REMERON) 7.5 MG tablet    lamoTRIgine  (LaMICtal) 25 MG tablet    Marijuana use, continuous        History of substance abuse (CMS/HCC)        Relevant Medications    lamoTRIgine (LaMICtal) 25 MG tablet            TREATMENT PLAN/GOALS: Continue supportive psychotherapy efforts and medications as indicated. Treatment and medication options discussed during today's visit. Patient ackowledged and verbally consented to continue with current treatment plan and was educated on the importance of compliance with treatment and follow-up appointments.    MEDICATION ISSUES:  We discussed risks, benefits, and side effects of the above medications and the patient was agreeable with the plan. Patient was educated on the importance of compliance with treatment and follow-up appointments.  Patient is agreeable to call the office with any worsening of symptoms or onset of side effects. Patient is agreeable to call 911 or go to the nearest ER should he/she begin having SI/HI. We will add Lamictal in an effort to stabilize mood.  The patient was reminded to immediately come to the hospital should there be any loss of control.  Explanation was given to her regarding Lamictal and the potential for Leonidas Carlos Enrique syndrome and significant rash.  Patient was encouraged to check skin prior to beginning.  Patient was encouraged to report any rash and to immediately stop medication.    Begin lamotrigine 25 mg daily for 2 weeks and no side effects or rash take 2 tablets daily totaling 50 mg.  -Continue paroxetine 30 mg daily for depression and anxiety.  -Continue mirtazapine 7.5 mg at night for sleep.  -Discontinue propanolol as ineffective.  -Restart buspirone but encouraged the patient to take 3 times a day instead of daily to see maximum benefits at 10 mg patient and wife verbalized understanding.     Counseled patient regarding multimodal approach with healthy nutrition, healthy sleep, regular physical activity, social activities, counseling, and medications.      Coping skills  reviewed and encouraged positive framing of thoughts     Assisted patient in processing above session content; acknowledged and normalized patient’s thoughts, feelings, and concerns.  Applied  positive coping skills and behavior management in session.  Allowed patient to freely discuss issues without interruption or judgment. Provided safe, confidential environment to facilitate the development of positive therapeutic relationship and encourage open, honest communication. Assisted patient in identifying risk factors which would indicate the need for higher level of care including thoughts to harm self or others and/or self-harming behavior and encouraged patient to contact this office, call 911, or present to the nearest emergency room should any of these events occur. Discussed crisis intervention services and means to access.     MEDS ORDERED DURING VISIT:  New Medications Ordered This Visit   Medications   • PARoxetine (PAXIL) 30 MG tablet     Sig: Take 1 tablet by mouth Daily for 60 days.     Dispense:  30 tablet     Refill:  1   • mirtazapine (REMERON) 7.5 MG tablet     Sig: Take 1 tablet by mouth Every Night.     Dispense:  30 tablet     Refill:  0   • lamoTRIgine (LaMICtal) 25 MG tablet     Sig: Take 1 tablet for 2 weeks if no side effects or rash take 2 tablets daily.     Dispense:  45 tablet     Refill:  0           Follow Up   Return in about 3 weeks (around 3/30/2021), or if symptoms worsen or fail to improve, for Recheck.    Patient was given instructions and counseling regarding his condition or for health maintenance advice. Please see specific information pulled into the AVS if appropriate.     This document has been electronically signed by YULIET Asif  March 9, 2021 11:05 EST    Part of this note may be an electronic transcription/translation of spoken language to printed text using the Dragon Dictation System.

## 2021-03-31 DIAGNOSIS — G47.00 INSOMNIA, UNSPECIFIED TYPE: Chronic | ICD-10-CM

## 2021-03-31 DIAGNOSIS — G25.81 RESTLESS LEG SYNDROME: Chronic | ICD-10-CM

## 2021-03-31 DIAGNOSIS — F41.1 GENERALIZED ANXIETY DISORDER: Chronic | ICD-10-CM

## 2021-03-31 DIAGNOSIS — F19.11 HISTORY OF SUBSTANCE ABUSE (HCC): ICD-10-CM

## 2021-03-31 DIAGNOSIS — F33.1 MAJOR DEPRESSIVE DISORDER, RECURRENT EPISODE, MODERATE (HCC): ICD-10-CM

## 2021-03-31 RX ORDER — MIRTAZAPINE 7.5 MG/1
7.5 TABLET, FILM COATED ORAL NIGHTLY
Qty: 30 TABLET | Refills: 0 | Status: SHIPPED | OUTPATIENT
Start: 2021-03-31 | End: 2021-04-22 | Stop reason: SDUPTHER

## 2021-03-31 RX ORDER — LAMOTRIGINE 25 MG/1
50 TABLET ORAL DAILY
Qty: 60 TABLET | Refills: 0 | Status: SHIPPED | OUTPATIENT
Start: 2021-03-31 | End: 2021-04-22 | Stop reason: SDUPTHER

## 2021-03-31 RX ORDER — PAROXETINE 30 MG/1
30 TABLET, FILM COATED ORAL DAILY
Qty: 30 TABLET | Refills: 0 | Status: SHIPPED | OUTPATIENT
Start: 2021-03-31 | End: 2021-04-09 | Stop reason: SDUPTHER

## 2021-04-02 ENCOUNTER — TELEPHONE (OUTPATIENT)
Dept: PSYCHIATRY | Facility: CLINIC | Age: 41
End: 2021-04-02

## 2021-04-02 ENCOUNTER — TELEPHONE (OUTPATIENT)
Dept: FAMILY MEDICINE CLINIC | Facility: CLINIC | Age: 41
End: 2021-04-02

## 2021-04-02 DIAGNOSIS — F41.1 GENERALIZED ANXIETY DISORDER: Chronic | ICD-10-CM

## 2021-04-02 DIAGNOSIS — G25.81 RESTLESS LEG SYNDROME: Chronic | ICD-10-CM

## 2021-04-02 RX ORDER — ROPINIROLE 1 MG/1
TABLET, FILM COATED ORAL
Qty: 7 TABLET | Refills: 0 | Status: SHIPPED | OUTPATIENT
Start: 2021-04-02 | End: 2021-04-09 | Stop reason: SDUPTHER

## 2021-04-02 RX ORDER — HYDROXYZINE HYDROCHLORIDE 25 MG/1
TABLET, FILM COATED ORAL
Qty: 120 TABLET | Refills: 0 | OUTPATIENT
Start: 2021-04-02

## 2021-04-02 NOTE — TELEPHONE ENCOUNTER
[facility] called and stated the pt's wife called their office and said she was told by our office that Jess Hussein had to refill his Requip because we did not fill it. I informed her that I had not spoke with pt or his wife and that it was not documented that anyone had spoken to them. I informed her pt had an appointment for today that was cancelled via the automated remind call and that he would need an appointment since that medication was prescribed in our same day clinic. She stated she would call and inform them of this.

## 2021-04-02 NOTE — TELEPHONE ENCOUNTER
"Patients Wife called the office upset that his : \"restless leg medication has not been sent in \". I made wife aware that Thelma doesn't prescribed that medication she states that \" She was told that the Therapist had to be the one that approved and sent it in \".      I called the PCP office and spoke with  Dr Amezquita nurse , patient had an appt for today and was canceled by via my chart he will need an appt in order to get refills .      Wife and patient was made aware that he needs an appt to get refills on his Requip.        "

## 2021-04-02 NOTE — TELEPHONE ENCOUNTER
Has a appointment on 4/9/21   FAMILY HISTORY:  No family history of cardiovascular disease  No family history of chronic obstructive pulmonary disease  No family history of hypertension  No family history of mental disorder

## 2021-04-09 ENCOUNTER — TELEPHONE (OUTPATIENT)
Dept: FAMILY MEDICINE CLINIC | Facility: CLINIC | Age: 41
End: 2021-04-09

## 2021-04-09 DIAGNOSIS — F41.1 GENERALIZED ANXIETY DISORDER: Chronic | ICD-10-CM

## 2021-04-09 DIAGNOSIS — G25.81 RESTLESS LEG SYNDROME: Chronic | ICD-10-CM

## 2021-04-09 RX ORDER — MONTELUKAST SODIUM 10 MG/1
10 TABLET ORAL NIGHTLY
Qty: 30 TABLET | Refills: 3 | Status: SHIPPED | OUTPATIENT
Start: 2021-04-09 | End: 2021-04-20 | Stop reason: SDUPTHER

## 2021-04-09 RX ORDER — PAROXETINE 30 MG/1
30 TABLET, FILM COATED ORAL DAILY
Qty: 30 TABLET | Refills: 0 | Status: SHIPPED | OUTPATIENT
Start: 2021-04-09 | End: 2021-04-20 | Stop reason: SDUPTHER

## 2021-04-09 RX ORDER — ROPINIROLE 1 MG/1
TABLET, FILM COATED ORAL
Qty: 7 TABLET | Refills: 0 | Status: SHIPPED | OUTPATIENT
Start: 2021-04-09 | End: 2021-04-14 | Stop reason: SDUPTHER

## 2021-04-09 NOTE — TELEPHONE ENCOUNTER
Please contact patient to see exactly what medication he is referring to. Just by looking at his chart, he has two different medication he is suppose to take at night.   
When calling to reschedule his appointment for today he asked if someone else could call in his sleep medication.  
Patient has a known recent history of cdiff infection;  from february - march;  patient was on a regimen of metronidazole 500mg [id number]  3/5-3/14  Pending cdiff toxin testing;
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
## 2021-04-14 DIAGNOSIS — G25.81 RESTLESS LEG SYNDROME: Chronic | ICD-10-CM

## 2021-04-15 RX ORDER — ROPINIROLE 1 MG/1
TABLET, FILM COATED ORAL
Qty: 30 TABLET | Refills: 0 | Status: SHIPPED | OUTPATIENT
Start: 2021-04-15 | End: 2021-04-20 | Stop reason: SDUPTHER

## 2021-04-16 DIAGNOSIS — G47.00 INSOMNIA, UNSPECIFIED TYPE: Chronic | ICD-10-CM

## 2021-04-16 RX ORDER — MIRTAZAPINE 7.5 MG/1
7.5 TABLET, FILM COATED ORAL NIGHTLY
Qty: 30 TABLET | Refills: 0 | Status: CANCELLED | OUTPATIENT
Start: 2021-04-16

## 2021-04-20 ENCOUNTER — OFFICE VISIT (OUTPATIENT)
Dept: FAMILY MEDICINE CLINIC | Facility: CLINIC | Age: 41
End: 2021-04-20

## 2021-04-20 ENCOUNTER — LAB (OUTPATIENT)
Dept: LAB | Facility: HOSPITAL | Age: 41
End: 2021-04-20

## 2021-04-20 VITALS
HEART RATE: 72 BPM | OXYGEN SATURATION: 96 % | TEMPERATURE: 97.8 F | BODY MASS INDEX: 29.12 KG/M2 | HEIGHT: 71 IN | SYSTOLIC BLOOD PRESSURE: 118 MMHG | DIASTOLIC BLOOD PRESSURE: 64 MMHG | WEIGHT: 208 LBS

## 2021-04-20 DIAGNOSIS — S09.90XA TRAUMATIC INJURY OF HEAD, INITIAL ENCOUNTER: Primary | ICD-10-CM

## 2021-04-20 DIAGNOSIS — E55.9 VITAMIN D DEFICIENCY: ICD-10-CM

## 2021-04-20 DIAGNOSIS — F41.1 GAD (GENERALIZED ANXIETY DISORDER): ICD-10-CM

## 2021-04-20 DIAGNOSIS — E78.2 MIXED HYPERLIPIDEMIA: ICD-10-CM

## 2021-04-20 DIAGNOSIS — G25.81 RESTLESS LEG SYNDROME: Chronic | ICD-10-CM

## 2021-04-20 DIAGNOSIS — F39 MOOD DISORDER (HCC): ICD-10-CM

## 2021-04-20 DIAGNOSIS — S06.0X0A CONCUSSION WITHOUT LOSS OF CONSCIOUSNESS, INITIAL ENCOUNTER: ICD-10-CM

## 2021-04-20 DIAGNOSIS — G25.81 RESTLESS LEGS: ICD-10-CM

## 2021-04-20 DIAGNOSIS — F41.1 GENERALIZED ANXIETY DISORDER: Chronic | ICD-10-CM

## 2021-04-20 DIAGNOSIS — F33.1 MODERATE EPISODE OF RECURRENT MAJOR DEPRESSIVE DISORDER (HCC): ICD-10-CM

## 2021-04-20 DIAGNOSIS — R51.9 NONINTRACTABLE HEADACHE, UNSPECIFIED CHRONICITY PATTERN, UNSPECIFIED HEADACHE TYPE: ICD-10-CM

## 2021-04-20 DIAGNOSIS — R11.2 NAUSEA AND VOMITING, INTRACTABILITY OF VOMITING NOT SPECIFIED, UNSPECIFIED VOMITING TYPE: ICD-10-CM

## 2021-04-20 DIAGNOSIS — E66.3 OVERWEIGHT: ICD-10-CM
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
## 2021-04-20 DIAGNOSIS — R42 DIZZINESS: ICD-10-CM

## 2021-04-20 DIAGNOSIS — G47.00 INSOMNIA, UNSPECIFIED TYPE: ICD-10-CM

## 2021-04-20 LAB
25(OH)D3 SERPL-MCNC: 32.8 NG/ML (ref 30–100)
ALBUMIN SERPL-MCNC: 4.7 G/DL (ref 3.5–5.2)
ALBUMIN/GLOB SERPL: 1.7 G/DL
ALP SERPL-CCNC: 89 U/L (ref 39–117)
ALT SERPL W P-5'-P-CCNC: 13 U/L (ref 1–41)
ANION GAP SERPL CALCULATED.3IONS-SCNC: 11.1 MMOL/L (ref 5–15)
AST SERPL-CCNC: 19 U/L (ref 1–40)
BASOPHILS # BLD AUTO: 0.09 10*3/MM3 (ref 0–0.2)
BASOPHILS NFR BLD AUTO: 1.4 % (ref 0–1.5)
BILIRUB SERPL-MCNC: 0.6 MG/DL (ref 0–1.2)
BUN SERPL-MCNC: 15 MG/DL (ref 6–20)
BUN/CREAT SERPL: 15.2 (ref 7–25)
CALCIUM SPEC-SCNC: 9.4 MG/DL (ref 8.6–10.5)
CHLORIDE SERPL-SCNC: 100 MMOL/L (ref 98–107)
CHOLEST SERPL-MCNC: 254 MG/DL (ref 0–200)
CO2 SERPL-SCNC: 26.9 MMOL/L (ref 22–29)
CREAT SERPL-MCNC: 0.99 MG/DL (ref 0.76–1.27)
DEPRECATED RDW RBC AUTO: 39.8 FL (ref 37–54)
EOSINOPHIL # BLD AUTO: 0.2 10*3/MM3 (ref 0–0.4)
EOSINOPHIL NFR BLD AUTO: 3.1 % (ref 0.3–6.2)
ERYTHROCYTE [DISTWIDTH] IN BLOOD BY AUTOMATED COUNT: 13 % (ref 12.3–15.4)
FERRITIN SERPL-MCNC: 133 NG/ML (ref 30–400)
GFR SERPL CREATININE-BSD FRML MDRD: 83 ML/MIN/1.73
GLOBULIN UR ELPH-MCNC: 2.7 GM/DL
GLUCOSE SERPL-MCNC: 95 MG/DL (ref 65–99)
HCT VFR BLD AUTO: 50.5 % (ref 37.5–51)
HDLC SERPL-MCNC: 43 MG/DL (ref 40–60)
HGB BLD-MCNC: 17 G/DL (ref 13–17.7)
IMM GRANULOCYTES # BLD AUTO: 0.05 10*3/MM3 (ref 0–0.05)
IMM GRANULOCYTES NFR BLD AUTO: 0.8 % (ref 0–0.5)
IRON 24H UR-MRATE: 62 MCG/DL (ref 59–158)
IRON SATN MFR SERPL: 18 % (ref 20–50)
LDLC SERPL CALC-MCNC: 174 MG/DL (ref 0–100)
LDLC/HDLC SERPL: 3.98 {RATIO}
LYMPHOCYTES # BLD AUTO: 1.21 10*3/MM3 (ref 0.7–3.1)
LYMPHOCYTES NFR BLD AUTO: 18.7 % (ref 19.6–45.3)
MCH RBC QN AUTO: 28.6 PG (ref 26.6–33)
MCHC RBC AUTO-ENTMCNC: 33.7 G/DL (ref 31.5–35.7)
MCV RBC AUTO: 85 FL (ref 79–97)
MONOCYTES # BLD AUTO: 0.71 10*3/MM3 (ref 0.1–0.9)
MONOCYTES NFR BLD AUTO: 11 % (ref 5–12)
NEUTROPHILS NFR BLD AUTO: 4.2 10*3/MM3 (ref 1.7–7)
NEUTROPHILS NFR BLD AUTO: 65 % (ref 42.7–76)
NRBC BLD AUTO-RTO: 0 /100 WBC (ref 0–0.2)
PLATELET # BLD AUTO: 249 10*3/MM3 (ref 140–450)
PMV BLD AUTO: 10.3 FL (ref 6–12)
POTASSIUM SERPL-SCNC: 5.1 MMOL/L (ref 3.5–5.2)
PROT SERPL-MCNC: 7.4 G/DL (ref 6–8.5)
RBC # BLD AUTO: 5.94 10*6/MM3 (ref 4.14–5.8)
SODIUM SERPL-SCNC: 138 MMOL/L (ref 136–145)
T4 FREE SERPL-MCNC: 0.78 NG/DL (ref 0.93–1.7)
TIBC SERPL-MCNC: 353 MCG/DL (ref 298–536)
TRANSFERRIN SERPL-MCNC: 237 MG/DL (ref 200–360)
TRIGL SERPL-MCNC: 200 MG/DL (ref 0–150)
TSH SERPL DL<=0.05 MIU/L-ACNC: 0.78 UIU/ML (ref 0.27–4.2)
VLDLC SERPL-MCNC: 37 MG/DL (ref 5–40)
WBC # BLD AUTO: 6.46 10*3/MM3 (ref 3.4–10.8)

## 2021-04-20 PROCEDURE — 99214 OFFICE O/P EST MOD 30 MIN: CPT | Performed by: FAMILY MEDICINE

## 2021-04-20 PROCEDURE — 82728 ASSAY OF FERRITIN: CPT

## 2021-04-20 PROCEDURE — 80061 LIPID PANEL: CPT

## 2021-04-20 PROCEDURE — 83540 ASSAY OF IRON: CPT

## 2021-04-20 PROCEDURE — 84439 ASSAY OF FREE THYROXINE: CPT

## 2021-04-20 PROCEDURE — 80053 COMPREHEN METABOLIC PANEL: CPT

## 2021-04-20 PROCEDURE — 85025 COMPLETE CBC W/AUTO DIFF WBC: CPT

## 2021-04-20 PROCEDURE — 82306 VITAMIN D 25 HYDROXY: CPT

## 2021-04-20 PROCEDURE — 84466 ASSAY OF TRANSFERRIN: CPT

## 2021-04-20 PROCEDURE — 84443 ASSAY THYROID STIM HORMONE: CPT

## 2021-04-20 RX ORDER — OMEPRAZOLE 40 MG/1
40 CAPSULE, DELAYED RELEASE ORAL DAILY
Qty: 30 CAPSULE | Refills: 3 | Status: SHIPPED | OUTPATIENT
Start: 2021-04-20 | End: 2021-07-26

## 2021-04-20 RX ORDER — PAROXETINE 30 MG/1
30 TABLET, FILM COATED ORAL DAILY
Qty: 30 TABLET | Refills: 0 | Status: SHIPPED | OUTPATIENT
Start: 2021-04-20 | End: 2021-04-22 | Stop reason: SDUPTHER

## 2021-04-20 RX ORDER — ROPINIROLE 1 MG/1
TABLET, FILM COATED ORAL
Qty: 30 TABLET | Refills: 0 | Status: SHIPPED | OUTPATIENT
Start: 2021-04-20 | End: 2021-05-11 | Stop reason: SDUPTHER

## 2021-04-20 RX ORDER — MONTELUKAST SODIUM 10 MG/1
10 TABLET ORAL NIGHTLY
Qty: 30 TABLET | Refills: 3 | Status: SHIPPED | OUTPATIENT
Start: 2021-04-20 | End: 2021-06-06 | Stop reason: SDUPTHER

## 2021-04-20 RX ORDER — ONDANSETRON HYDROCHLORIDE 8 MG/1
8 TABLET, FILM COATED ORAL EVERY 8 HOURS PRN
Qty: 90 TABLET | Refills: 3 | Status: SHIPPED | OUTPATIENT
Start: 2021-04-20 | End: 2021-11-14 | Stop reason: SDUPTHER

## 2021-04-20 RX ORDER — ERGOCALCIFEROL 1.25 MG/1
50000 CAPSULE ORAL WEEKLY
Qty: 4 CAPSULE | Refills: 3 | Status: SHIPPED | OUTPATIENT
Start: 2021-04-20 | End: 2021-05-02 | Stop reason: SDUPTHER

## 2021-04-20 NOTE — PATIENT INSTRUCTIONS
MIDNITE melatonin at [facility] 1 hour before bed.  Take whole tablet chewable. 8 dollars    Get labwork and x-ray of skull and head today    Will get MRI of brain at imaging center    Recheck in 4 weeks    Call Gastroenteorlogy, Cardiology and Sleep Medicine for appts

## 2021-04-21 ENCOUNTER — TELEPHONE (OUTPATIENT)
Dept: FAMILY MEDICINE CLINIC | Facility: CLINIC | Age: 41
End: 2021-04-21

## 2021-04-21 NOTE — TELEPHONE ENCOUNTER
----- Message from Jhon Amezquita MD sent at 4/20/2021  9:37 PM CDT -----  Please call pt     X-ray of facial bones normal

## 2021-04-21 NOTE — TELEPHONE ENCOUNTER
----- Message from Jhon Amezquita MD sent at 4/20/2021  9:37 PM CDT -----  Please call pt     X-ray of skull normal

## 2021-04-22 ENCOUNTER — TELEPHONE (OUTPATIENT)
Dept: FAMILY MEDICINE CLINIC | Facility: CLINIC | Age: 41
End: 2021-04-22

## 2021-04-22 DIAGNOSIS — F33.1 MAJOR DEPRESSIVE DISORDER, RECURRENT EPISODE, MODERATE (HCC): ICD-10-CM

## 2021-04-22 DIAGNOSIS — G47.00 INSOMNIA, UNSPECIFIED TYPE: Chronic | ICD-10-CM

## 2021-04-22 DIAGNOSIS — F41.1 GENERALIZED ANXIETY DISORDER: Chronic | ICD-10-CM

## 2021-04-22 DIAGNOSIS — F19.11 HISTORY OF SUBSTANCE ABUSE (HCC): ICD-10-CM

## 2021-04-22 RX ORDER — FERROUS SULFATE 325(65) MG
325 TABLET ORAL
Qty: 30 TABLET | Refills: 3 | Status: SHIPPED | OUTPATIENT
Start: 2021-04-22 | End: 2021-07-26

## 2021-04-22 RX ORDER — LAMOTRIGINE 25 MG/1
50 TABLET ORAL DAILY
Qty: 60 TABLET | Refills: 0 | Status: SHIPPED | OUTPATIENT
Start: 2021-04-22 | End: 2021-05-02 | Stop reason: SDUPTHER

## 2021-04-22 RX ORDER — PAROXETINE 30 MG/1
30 TABLET, FILM COATED ORAL DAILY
Qty: 30 TABLET | Refills: 0 | Status: SHIPPED | OUTPATIENT
Start: 2021-04-22 | End: 2021-05-02 | Stop reason: SDUPTHER

## 2021-04-22 RX ORDER — BUSPIRONE HYDROCHLORIDE 10 MG/1
10 TABLET ORAL 3 TIMES DAILY
Qty: 90 TABLET | Refills: 0 | Status: SHIPPED | OUTPATIENT
Start: 2021-04-22 | End: 2021-05-25 | Stop reason: SDUPTHER

## 2021-04-22 RX ORDER — MIRTAZAPINE 7.5 MG/1
7.5 TABLET, FILM COATED ORAL NIGHTLY
Qty: 30 TABLET | Refills: 0 | Status: SHIPPED | OUTPATIENT
Start: 2021-04-22 | End: 2021-05-25 | Stop reason: SDUPTHER

## 2021-04-22 RX ORDER — LEVOTHYROXINE SODIUM 0.03 MG/1
25 TABLET ORAL DAILY
Qty: 30 TABLET | Refills: 3 | Status: SHIPPED | OUTPATIENT
Start: 2021-04-22 | End: 2021-07-26

## 2021-04-22 RX ORDER — ATORVASTATIN CALCIUM 20 MG/1
20 TABLET, FILM COATED ORAL NIGHTLY
Qty: 30 TABLET | Refills: 3 | Status: SHIPPED | OUTPATIENT
Start: 2021-04-22 | End: 2021-08-31 | Stop reason: SDUPTHER

## 2021-04-22 RX ORDER — HYDROXYZINE HYDROCHLORIDE 25 MG/1
25 TABLET, FILM COATED ORAL 3 TIMES DAILY PRN
Qty: 90 TABLET | Refills: 1 | Status: SHIPPED | OUTPATIENT
Start: 2021-04-22 | End: 2021-05-25 | Stop reason: SDUPTHER

## 2021-04-22 NOTE — TELEPHONE ENCOUNTER
----- Message from Jhon Amezquita MD sent at 4/21/2021  9:01 PM CDT -----  Please call pt     On lipid panel his LDL is high at 174 and triglycerides high at 200 with total cholesterol up at 254. Recommend pt start on lipitor 20 mg PO qhs to help reduce risk of cardiovascular disease if pt agreeable give 30 pills and 3 refills. Also recommend pt start taking CoQ10 OTC    On iron profile iron saturation low at 18 and recommend pt start taking ferrous sulfate 325 mg PO q daily give 30 pills and 3 refills may cause black stools and constipation take with vitamin C for better absorption     On thyroid studies his TSH is normal but T4 is low. Pt has hypothyroidism and start on synthroid 25 mcg PO q daily give 30 pills and 3 refills. Will help with fatigue and energy    Recheck on next visit thanks

## 2021-04-30 ENCOUNTER — HOSPITAL ENCOUNTER (OUTPATIENT)
Dept: MRI IMAGING | Facility: HOSPITAL | Age: 41
Discharge: HOME | End: 2021-04-30
Admitting: FAMILY MEDICINE
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
## 2021-04-30 DIAGNOSIS — R51.9 NONINTRACTABLE HEADACHE, UNSPECIFIED CHRONICITY PATTERN, UNSPECIFIED HEADACHE TYPE: ICD-10-CM

## 2021-04-30 DIAGNOSIS — R42 DIZZINESS: ICD-10-CM

## 2021-04-30 DIAGNOSIS — S09.90XA TRAUMATIC INJURY OF HEAD, INITIAL ENCOUNTER: ICD-10-CM

## 2021-04-30 PROCEDURE — 70551 MRI BRAIN STEM W/O DYE: CPT

## 2021-05-02 DIAGNOSIS — F19.11 HISTORY OF SUBSTANCE ABUSE (HCC): ICD-10-CM

## 2021-05-02 DIAGNOSIS — F33.1 MAJOR DEPRESSIVE DISORDER, RECURRENT EPISODE, MODERATE (HCC): ICD-10-CM

## 2021-05-02 DIAGNOSIS — F41.1 GENERALIZED ANXIETY DISORDER: Chronic | ICD-10-CM

## 2021-05-03 ENCOUNTER — TELEPHONE (OUTPATIENT)
Dept: FAMILY MEDICINE CLINIC | Facility: CLINIC | Age: 41
End: 2021-05-03

## 2021-05-03 RX ORDER — ERGOCALCIFEROL 1.25 MG/1
50000 CAPSULE ORAL WEEKLY
Qty: 4 CAPSULE | Refills: 3 | Status: SHIPPED | OUTPATIENT
Start: 2021-05-03 | End: 2021-06-06 | Stop reason: SDUPTHER

## 2021-05-03 RX ORDER — PAROXETINE 30 MG/1
30 TABLET, FILM COATED ORAL DAILY
Qty: 30 TABLET | Refills: 0 | Status: SHIPPED | OUTPATIENT
Start: 2021-05-03 | End: 2021-05-25 | Stop reason: SDUPTHER

## 2021-05-03 RX ORDER — LAMOTRIGINE 25 MG/1
50 TABLET ORAL DAILY
Qty: 60 TABLET | Refills: 0 | Status: SHIPPED | OUTPATIENT
Start: 2021-05-03 | End: 2021-05-25 | Stop reason: SDUPTHER
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
## 2021-05-11 DIAGNOSIS — G25.81 RESTLESS LEG SYNDROME: Chronic | ICD-10-CM

## 2021-05-12 RX ORDER — ROPINIROLE 1 MG/1
TABLET, FILM COATED ORAL
Qty: 30 TABLET | Refills: 0 | Status: SHIPPED | OUTPATIENT
Start: 2021-05-12 | End: 2021-06-06 | Stop reason: SDUPTHER

## 2021-05-25 DIAGNOSIS — F41.1 GENERALIZED ANXIETY DISORDER: Chronic | ICD-10-CM

## 2021-05-25 DIAGNOSIS — F33.1 MAJOR DEPRESSIVE DISORDER, RECURRENT EPISODE, MODERATE (HCC): ICD-10-CM

## 2021-05-25 DIAGNOSIS — G47.00 INSOMNIA, UNSPECIFIED TYPE: Chronic | ICD-10-CM

## 2021-05-25 DIAGNOSIS — F19.11 HISTORY OF SUBSTANCE ABUSE (HCC): ICD-10-CM

## 2021-05-25 RX ORDER — LAMOTRIGINE 25 MG/1
50 TABLET ORAL DAILY
Qty: 60 TABLET | Refills: 0 | Status: SHIPPED | OUTPATIENT
Start: 2021-05-25 | End: 2021-06-22 | Stop reason: SDUPTHER

## 2021-05-25 RX ORDER — BUSPIRONE HYDROCHLORIDE 10 MG/1
10 TABLET ORAL 3 TIMES DAILY
Qty: 90 TABLET | Refills: 0 | Status: SHIPPED | OUTPATIENT
Start: 2021-05-25 | End: 2021-06-22 | Stop reason: SDUPTHER

## 2021-05-25 RX ORDER — HYDROXYZINE HYDROCHLORIDE 25 MG/1
25 TABLET, FILM COATED ORAL 3 TIMES DAILY PRN
Qty: 90 TABLET | Refills: 1 | Status: SHIPPED | OUTPATIENT
Start: 2021-05-25 | End: 2021-06-22 | Stop reason: SDUPTHER

## 2021-05-25 RX ORDER — MIRTAZAPINE 7.5 MG/1
7.5 TABLET, FILM COATED ORAL NIGHTLY
Qty: 30 TABLET | Refills: 0 | Status: SHIPPED | OUTPATIENT
Start: 2021-05-25 | End: 2021-06-22 | Stop reason: SDUPTHER

## 2021-05-25 RX ORDER — PAROXETINE 30 MG/1
30 TABLET, FILM COATED ORAL DAILY
Qty: 30 TABLET | Refills: 0 | Status: SHIPPED | OUTPATIENT
Start: 2021-05-25 | End: 2021-06-22 | Stop reason: SDUPTHER

## 2021-06-06 DIAGNOSIS — G25.81 RESTLESS LEG SYNDROME: Chronic | ICD-10-CM

## 2021-06-07 RX ORDER — ERGOCALCIFEROL 1.25 MG/1
50000 CAPSULE ORAL WEEKLY
Qty: 4 CAPSULE | Refills: 3 | Status: SHIPPED | OUTPATIENT
Start: 2021-06-07 | End: 2021-09-26 | Stop reason: SDUPTHER

## 2021-06-07 RX ORDER — ROPINIROLE 1 MG/1
TABLET, FILM COATED ORAL
Qty: 30 TABLET | Refills: 0 | Status: SHIPPED | OUTPATIENT
Start: 2021-06-07 | End: 2021-07-11 | Stop reason: SDUPTHER

## 2021-06-07 RX ORDER — MONTELUKAST SODIUM 10 MG/1
10 TABLET ORAL NIGHTLY
Qty: 30 TABLET | Refills: 3 | Status: SHIPPED | OUTPATIENT
Start: 2021-06-07 | End: 2021-09-07

## 2021-06-22 ENCOUNTER — TELEMEDICINE (OUTPATIENT)
Dept: PSYCHIATRY | Facility: CLINIC | Age: 41
End: 2021-06-22

## 2021-06-22 DIAGNOSIS — F33.1 MAJOR DEPRESSIVE DISORDER, RECURRENT EPISODE, MODERATE (HCC): Primary | ICD-10-CM

## 2021-06-22 DIAGNOSIS — F12.90 MARIJUANA USE, CONTINUOUS: ICD-10-CM

## 2021-06-22 DIAGNOSIS — G47.00 INSOMNIA, UNSPECIFIED TYPE: ICD-10-CM

## 2021-06-22 DIAGNOSIS — F19.11 HISTORY OF SUBSTANCE ABUSE (HCC): ICD-10-CM

## 2021-06-22 DIAGNOSIS — F41.1 GENERALIZED ANXIETY DISORDER: ICD-10-CM

## 2021-06-22 PROCEDURE — 99214 OFFICE O/P EST MOD 30 MIN: CPT | Performed by: NURSE PRACTITIONER

## 2021-06-22 RX ORDER — MIRTAZAPINE 7.5 MG/1
7.5 TABLET, FILM COATED ORAL NIGHTLY
Qty: 30 TABLET | Refills: 0 | Status: SHIPPED | OUTPATIENT
Start: 2021-06-22 | End: 2021-07-25 | Stop reason: SDUPTHER

## 2021-06-22 RX ORDER — PAROXETINE 30 MG/1
30 TABLET, FILM COATED ORAL DAILY
Qty: 30 TABLET | Refills: 0 | Status: SHIPPED | OUTPATIENT
Start: 2021-06-22 | End: 2021-08-10 | Stop reason: SDUPTHER

## 2021-06-22 RX ORDER — LAMOTRIGINE 25 MG/1
50 TABLET ORAL DAILY
Qty: 60 TABLET | Refills: 0 | Status: SHIPPED | OUTPATIENT
Start: 2021-06-22 | End: 2021-08-10 | Stop reason: SDUPTHER

## 2021-06-22 RX ORDER — BUSPIRONE HYDROCHLORIDE 15 MG/1
15 TABLET ORAL 3 TIMES DAILY
Qty: 90 TABLET | Refills: 0 | Status: SHIPPED | OUTPATIENT
Start: 2021-06-22 | End: 2021-08-10 | Stop reason: SDUPTHER

## 2021-06-22 RX ORDER — HYDROXYZINE HYDROCHLORIDE 25 MG/1
25 TABLET, FILM COATED ORAL 3 TIMES DAILY PRN
Qty: 90 TABLET | Refills: 1 | Status: SHIPPED | OUTPATIENT
Start: 2021-06-22 | End: 2021-08-10 | Stop reason: SDUPTHER

## 2021-06-22 NOTE — PROGRESS NOTES
"This provider is located at [facility], [address] [place] [address].The Patient is seen remotely at home, [address] [place] [address] via [employer].  Patient is being seen via telehealth and confirm that they are in a secure environment for this session. The patient's condition being diagnosed/treated is appropriate for telemedicine. The provider identified himself/herself: herself as well as her credentials.   The patient and spouse gave consent to be seen remotely, and when consent is given they understand that the consent allows for patient identifiable information to be sent to a third party as needed.   They may refuse to be seen remotely at any time. The electronic data is encrypted and password protected, and the patient has been advised of the potential risks to privacy not withstanding such measures.    You have chosen to receive care through a telehealth visit.  Do you consent to use a video/audio connection for your medical care today? Yes      Chief Complaint  Depression and anxiety     Subjective    Jordin Parham presents to [facility] for medication management.     History of Present Illness   Patient presents today reporting that his mood is been good.  He states he still gets angry but he has been better.  He denies any hopelessness or helplessness but states his mood does change but he has been under a lot of stressors they are remodeling and living in a 1 room area.  He states his daughter is having to stay with her mother while he is trying to get to [place].  When asking the patient how he is feeling in regard to his symptoms he repeats \"I do not know\" several times.  Patient states he has noticed that he feels down less often.  Patient states however that his anxiety is still heightened and constantly worried and on edge with racing thoughts and nothing seems to help.  Patient states that when he is an " uncomfortable situation he just wants to get out and regrets it later.  Patient also notes that his anxiety has made him physically sick to his stomach as he states he has lost 8 pounds in the last month.  Patient states he is not smoking as much marijuana and only smoking at night.  He reports his appetite is decreased.  He states his sleep varies.  Encourage patient to contact his PCP as there may be other underlying issues besides his anxiety causing his weight loss.  Patient denies any SI/HI/AH/VH.  Denies any side effects or rash to the medications.        Objective   Vital Signs:   There were no vitals taken for this visit.  Due to the remote nature of this encounter (virtual encounter), vitals were unable to be obtained.  Height stated at 70 inches.  Weight stated at 209 pounds.      PHQ-9 Score:   PHQ-9 Total Score:       Patient screened positive for depression based on a PHQ-9 score of 8 on 4/22/2021. Follow-up recommendations include: see notes and medication additions and changes.        Mental Status Exam:   Hygiene:   good  Cooperation:  Cooperative  Eye Contact:  Fair  Psychomotor Behavior:  Restless  Affect:  Appropriate  Mood: anxious  Speech:  Normal  Thought Process:  Goal directed and Linear  Thought Content:  Normal  Suicidal:  None  Homicidal:  None  Hallucinations:  None  Delusion:  None  Memory:  Intact  Orientation:  Person, Place, Time and Situation  Reliability:  fair  Insight:  Fair  Judgement:  Fair  Impulse Control:  Fair  Physical/Medical Issues:  Yes fatty liver, restless leg     Current Medications:   Current Outpatient Medications   Medication Sig Dispense Refill   • atorvastatin (Lipitor) 20 MG tablet Take 1 tablet by mouth Every Night. 30 tablet 3   • busPIRone (BUSPAR) 15 MG tablet Take 1 tablet by mouth 3 (Three) Times a Day. 90 tablet 0   • ferrous sulfate (FerrouSul) 325 (65 FE) MG tablet Take 1 tablet by mouth Daily With Breakfast. 30 tablet 3   • fluticasone (FLONASE) 50  MCG/ACT nasal spray 2 sprays into each nostril Daily for 30 days. 16 g 11   • fluticasone (FLONASE) 50 MCG/ACT nasal spray 2 sprays into the nostril(s) as directed by provider Daily for 30 days. 1 bottle 3   • hydrOXYzine (ATARAX) 25 MG tablet Take 1 tablet by mouth 3 (Three) Times a Day As Needed for Anxiety. 90 tablet 1   • lamoTRIgine (LaMICtal) 25 MG tablet Take 2 tablets by mouth Daily. 60 tablet 0   • levothyroxine (SYNTHROID, LEVOTHROID) 25 MCG tablet Take 1 tablet by mouth Daily. 30 tablet 3   • mirtazapine (REMERON) 7.5 MG tablet Take 1 tablet by mouth Every Night. 30 tablet 0   • montelukast (Singulair) 10 MG tablet Take 1 tablet by mouth Every Night. 30 tablet 3   • omeprazole (priLOSEC) 40 MG capsule Take 1 capsule by mouth Daily. 30 capsule 3   • ondansetron (Zofran) 8 MG tablet Take 1 tablet by mouth Every 8 (Eight) Hours As Needed for Nausea or Vomiting. 90 tablet 3   • PARoxetine (PAXIL) 30 MG tablet Take 1 tablet by mouth Daily for 30 days. 30 tablet 0   • rOPINIRole (Requip) 1 MG tablet Take 1 hour before bedtime. 30 tablet 0   • vitamin D (ERGOCALCIFEROL) 1.25 MG (06572 UT) capsule capsule Take 1 capsule by mouth 1 (One) Time Per Week. 4 capsule 3     No current facility-administered medications for this visit.       Physical Exam  Nursing note reviewed.   Constitutional:       Appearance: Normal appearance.   Neurological:      Mental Status: He is alert.   Psychiatric:         Attention and Perception: Perception normal. He is inattentive.         Mood and Affect: Mood is anxious. Mood is not depressed. Affect is not blunt.         Speech: Speech normal.         Behavior: Behavior is agitated. Behavior is cooperative.         Thought Content: Thought content normal.         Cognition and Memory: Cognition and memory normal.        Result Review :     The following data was reviewed by: YULIET Asif on 03/09/2021:  Common labs    Common Labsle 12/1/20 12/1/20 12/1/20 12/1/20 1/28/21  1/28/21 4/20/21 4/20/21 4/20/21    0836 0836 0836 0836 1412 1412 0952 0952 0952   Glucose   98   76  95    BUN   10   12  15    Creatinine   0.87   1.11  0.99    eGFR Non African Am   97   73  83    Sodium   140   138  138    Potassium   4.2   4.6  5.1    Chloride   104   102  100    Calcium   9.1   9.2  9.4    Albumin   4.80   4.60  4.70    Total Bilirubin   1.1   0.7  0.6    Alkaline Phosphatase   76   79  89    AST (SGOT)   17   18  19    ALT (SGPT)   16   17  13    WBC 6.66    7.10  6.46     Hemoglobin 16.3    17.2  17.0     Hematocrit 48.1    49.7  50.5     Platelets 202    227  249     Total Cholesterol    244 (A)     254 (A)   Triglycerides    150     200 (A)   HDL Cholesterol    52     43   LDL Cholesterol     165 (A)     174 (A)   Hemoglobin A1C  5.30          (A) Abnormal value            CMP    CMP 12/1/20 1/28/21 4/20/21   Glucose 98 76 95   BUN 10 12 15   Creatinine 0.87 1.11 0.99   eGFR Non African Am 97 73 83   Sodium 140 138 138   Potassium 4.2 4.6 5.1   Chloride 104 102 100   Calcium 9.1 9.2 9.4   Albumin 4.80 4.60 4.70   Total Bilirubin 1.1 0.7 0.6   Alkaline Phosphatase 76 79 89   AST (SGOT) 17 18 19   ALT (SGPT) 16 17 13           CBC    CBC 12/1/20 1/28/21 4/20/21   WBC 6.66 7.10 6.46   RBC 5.72 5.96 (A) 5.94 (A)   Hemoglobin 16.3 17.2 17.0   Hematocrit 48.1 49.7 50.5   MCV 84.1 83.4 85.0   MCH 28.5 28.9 28.6   MCHC 33.9 34.6 33.7   RDW 13.9 13.3 13.0   Platelets 202 227 249   (A) Abnormal value            Lipid Panel    Lipid Panel 12/1/20 4/20/21   Total Cholesterol 244 (A) 254 (A)   Triglycerides 150 200 (A)   HDL Cholesterol 52 43   VLDL Cholesterol 27 37   LDL Cholesterol  165 (A) 174 (A)   LDL/HDL Ratio 3.12 3.98   (A) Abnormal value            TSH    TSH 4/20/21   TSH 0.777           Data reviewed: PCP notes        Assessment and Plan    Problem List Items Addressed This Visit        Sleep    Insomnia    Relevant Medications    mirtazapine (REMERON) 7.5 MG tablet      Other Visit  Diagnoses     Major depressive disorder, recurrent episode, moderate (CMS/HCC)    -  Primary    Relevant Medications    busPIRone (BUSPAR) 15 MG tablet    hydrOXYzine (ATARAX) 25 MG tablet    PARoxetine (PAXIL) 30 MG tablet    mirtazapine (REMERON) 7.5 MG tablet    lamoTRIgine (LaMICtal) 25 MG tablet    Generalized anxiety disorder        Relevant Medications    busPIRone (BUSPAR) 15 MG tablet    hydrOXYzine (ATARAX) 25 MG tablet    PARoxetine (PAXIL) 30 MG tablet    mirtazapine (REMERON) 7.5 MG tablet    lamoTRIgine (LaMICtal) 25 MG tablet    History of substance abuse (CMS/HCC)        Relevant Medications    lamoTRIgine (LaMICtal) 25 MG tablet    Marijuana use, continuous                TREATMENT PLAN/GOALS: Continue supportive psychotherapy efforts and medications as indicated. Treatment and medication options discussed during today's visit. Patient ackowledged and verbally consented to continue with current treatment plan and was educated on the importance of compliance with treatment and follow-up appointments.    MEDICATION ISSUES:  We discussed risks, benefits, and side effects of the above medications and the patient was agreeable with the plan. Patient was educated on the importance of compliance with treatment and follow-up appointments.  Patient is agreeable to call the office with any worsening of symptoms or onset of side effects. Patient is agreeable to call 911 or go to the nearest ER should he/she begin having SI/HI. We will add Lamictal in an effort to stabilize mood.  The patient was reminded to immediately come to the hospital should there be any loss of control.  Explanation was given to her regarding Lamictal and the potential for Leonidas Carlos Enrique syndrome and significant rash.  Patient was encouraged to check skin prior to beginning.  Patient was encouraged to report any rash and to immediately stop medication.    -Continue lamotrigine 50 mg daily for mood.  -Continue paroxetine 30 mg daily for  depression and anxiety.  -Continue mirtazapine 7.5 mg at night for sleep.  -Increase BuSpar to 15 mg 3 times a day for anxiety encourage patient that if it made anything worse to contact the clinic for sooner appointment he verbalized understanding.     Counseled patient regarding multimodal approach with healthy nutrition, healthy sleep, regular physical activity, social activities, counseling, and medications.      Coping skills reviewed and encouraged positive framing of thoughts     Assisted patient in processing above session content; acknowledged and normalized patient’s thoughts, feelings, and concerns.  Applied  positive coping skills and behavior management in session.  Allowed patient to freely discuss issues without interruption or judgment. Provided safe, confidential environment to facilitate the development of positive therapeutic relationship and encourage open, honest communication. Assisted patient in identifying risk factors which would indicate the need for higher level of care including thoughts to harm self or others and/or self-harming behavior and encouraged patient to contact this office, call 911, or present to the nearest emergency room should any of these events occur. Discussed crisis intervention services and means to access.     MEDS ORDERED DURING VISIT:  New Medications Ordered This Visit   Medications   • busPIRone (BUSPAR) 15 MG tablet     Sig: Take 1 tablet by mouth 3 (Three) Times a Day.     Dispense:  90 tablet     Refill:  0   • hydrOXYzine (ATARAX) 25 MG tablet     Sig: Take 1 tablet by mouth 3 (Three) Times a Day As Needed for Anxiety.     Dispense:  90 tablet     Refill:  1   • PARoxetine (PAXIL) 30 MG tablet     Sig: Take 1 tablet by mouth Daily for 30 days.     Dispense:  30 tablet     Refill:  0   • mirtazapine (REMERON) 7.5 MG tablet     Sig: Take 1 tablet by mouth Every Night.     Dispense:  30 tablet     Refill:  0   • lamoTRIgine (LaMICtal) 25 MG tablet     Sig: Take 2  tablets by mouth Daily.     Dispense:  60 tablet     Refill:  0           Follow Up   Return in about 4 weeks (around 7/20/2021), or if symptoms worsen or fail to improve, for Recheck.    Patient was given instructions and counseling regarding his condition or for health maintenance advice. Please see specific information pulled into the AVS if appropriate.     This document has been electronically signed by YULIET Asif  June 22, 2021 16:32 EDT    Part of this note may be an electronic transcription/translation of spoken language to printed text using the Dragon Dictation System.    Some of the data in this electronic note has been brought forward from a previous encounter, any necessary changes have been made, it has been reviewed by this APRN, and it is accurate.

## 2021-07-11 DIAGNOSIS — G25.81 RESTLESS LEG SYNDROME: Chronic | ICD-10-CM

## 2021-07-12 RX ORDER — ROPINIROLE 1 MG/1
TABLET, FILM COATED ORAL
Qty: 30 TABLET | Refills: 0 | Status: SHIPPED | OUTPATIENT
Start: 2021-07-12 | End: 2021-08-08 | Stop reason: SDUPTHER

## 2021-07-25 DIAGNOSIS — G47.00 INSOMNIA, UNSPECIFIED TYPE: ICD-10-CM

## 2021-07-26 RX ORDER — PNV NO.95/FERROUS FUM/FOLIC AC 28MG-0.8MG
TABLET ORAL
Qty: 30 TABLET | Refills: 0 | Status: SHIPPED | OUTPATIENT
Start: 2021-07-26 | End: 2021-10-05 | Stop reason: SDUPTHER

## 2021-07-26 RX ORDER — OMEPRAZOLE 40 MG/1
CAPSULE, DELAYED RELEASE ORAL
Qty: 30 CAPSULE | Refills: 0 | Status: SHIPPED | OUTPATIENT
Start: 2021-07-26 | End: 2021-08-31 | Stop reason: SDUPTHER

## 2021-07-26 RX ORDER — LEVOTHYROXINE SODIUM 0.03 MG/1
TABLET ORAL
Qty: 30 TABLET | Refills: 0 | Status: SHIPPED | OUTPATIENT
Start: 2021-07-26 | End: 2021-08-16 | Stop reason: DRUGHIGH

## 2021-07-26 RX ORDER — MIRTAZAPINE 7.5 MG/1
7.5 TABLET, FILM COATED ORAL NIGHTLY
Qty: 30 TABLET | Refills: 0 | Status: SHIPPED | OUTPATIENT
Start: 2021-07-26 | End: 2021-08-10
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
## 2021-08-06 ENCOUNTER — TELEPHONE (OUTPATIENT)
Dept: PSYCHIATRY | Facility: CLINIC | Age: 41
End: 2021-08-06

## 2021-08-06 NOTE — TELEPHONE ENCOUNTER
"Patient's wife called and stated that she was worried about her . States that she thinks his meds are not working anymore, that he has been \"acting like his old self.\" Wife states that the patient has been madera, impulsive with spending money, and short with her. Wife states that the patient is \"spending money like crazy and not paying the bills\". Wife states that she feels like everything she does \"sets him off\" and their kids had even noticed a different in him,  but she knows he is taking his meds because she lays them out for him in the mornings. Patient was a no show to his last appointment. I called patient to follow up and rescheduled last appointment.   "

## 2021-08-08 DIAGNOSIS — G25.81 RESTLESS LEG SYNDROME: Chronic | ICD-10-CM

## 2021-08-09 RX ORDER — ROPINIROLE 1 MG/1
TABLET, FILM COATED ORAL
Qty: 30 TABLET | Refills: 0 | Status: SHIPPED | OUTPATIENT
Start: 2021-08-09 | End: 2021-09-12 | Stop reason: SDUPTHER

## 2021-08-10 ENCOUNTER — TELEMEDICINE (OUTPATIENT)
Dept: PSYCHIATRY | Facility: CLINIC | Age: 41
End: 2021-08-10

## 2021-08-10 DIAGNOSIS — F19.11 HISTORY OF SUBSTANCE ABUSE (HCC): ICD-10-CM

## 2021-08-10 DIAGNOSIS — F31.12 BIPOLAR AFFECTIVE DISORDER, CURRENTLY MANIC, MODERATE (HCC): ICD-10-CM

## 2021-08-10 DIAGNOSIS — G47.00 INSOMNIA, UNSPECIFIED TYPE: ICD-10-CM

## 2021-08-10 DIAGNOSIS — F41.1 GENERALIZED ANXIETY DISORDER: Chronic | ICD-10-CM

## 2021-08-10 PROCEDURE — 99214 OFFICE O/P EST MOD 30 MIN: CPT | Performed by: NURSE PRACTITIONER

## 2021-08-10 RX ORDER — QUETIAPINE FUMARATE 100 MG/1
100 TABLET, FILM COATED ORAL NIGHTLY
Qty: 30 TABLET | Refills: 0 | Status: SHIPPED | OUTPATIENT
Start: 2021-08-10 | End: 2021-08-13

## 2021-08-10 RX ORDER — LAMOTRIGINE 100 MG/1
100 TABLET ORAL DAILY
Qty: 30 TABLET | Refills: 0 | Status: SHIPPED | OUTPATIENT
Start: 2021-08-10 | End: 2021-09-12 | Stop reason: SDUPTHER

## 2021-08-10 RX ORDER — HYDROXYZINE HYDROCHLORIDE 25 MG/1
25 TABLET, FILM COATED ORAL 3 TIMES DAILY PRN
Qty: 90 TABLET | Refills: 1 | Status: SHIPPED | OUTPATIENT
Start: 2021-08-10 | End: 2022-01-10 | Stop reason: SDUPTHER

## 2021-08-10 RX ORDER — BUSPIRONE HYDROCHLORIDE 15 MG/1
15 TABLET ORAL 3 TIMES DAILY
Qty: 90 TABLET | Refills: 0 | Status: SHIPPED | OUTPATIENT
Start: 2021-08-10 | End: 2021-09-22 | Stop reason: SDUPTHER

## 2021-08-10 RX ORDER — PAROXETINE 30 MG/1
30 TABLET, FILM COATED ORAL DAILY
Qty: 30 TABLET | Refills: 0 | Status: SHIPPED | OUTPATIENT
Start: 2021-08-10 | End: 2021-09-12 | Stop reason: SDUPTHER
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
## 2021-08-13 ENCOUNTER — LAB (OUTPATIENT)
Dept: LAB | Facility: HOSPITAL | Age: 41
End: 2021-08-13

## 2021-08-13 ENCOUNTER — OFFICE VISIT (OUTPATIENT)
Dept: FAMILY MEDICINE CLINIC | Facility: CLINIC | Age: 41
End: 2021-08-13

## 2021-08-13 VITALS
SYSTOLIC BLOOD PRESSURE: 116 MMHG | TEMPERATURE: 97.9 F | HEIGHT: 70 IN | BODY MASS INDEX: 28.35 KG/M2 | OXYGEN SATURATION: 98 % | HEART RATE: 86 BPM | DIASTOLIC BLOOD PRESSURE: 68 MMHG | WEIGHT: 198 LBS

## 2021-08-13 DIAGNOSIS — F41.0 PANIC ATTACKS: ICD-10-CM

## 2021-08-13 DIAGNOSIS — R94.6 ABNORMAL RESULTS OF THYROID FUNCTION STUDIES: ICD-10-CM

## 2021-08-13 DIAGNOSIS — F41.1 GAD (GENERALIZED ANXIETY DISORDER): ICD-10-CM

## 2021-08-13 DIAGNOSIS — E78.2 MIXED HYPERLIPIDEMIA: ICD-10-CM

## 2021-08-13 DIAGNOSIS — G25.81 RESTLESS LEG SYNDROME: Primary | ICD-10-CM

## 2021-08-13 DIAGNOSIS — K76.0 FATTY LIVER: ICD-10-CM

## 2021-08-13 DIAGNOSIS — K21.9 GASTROESOPHAGEAL REFLUX DISEASE, UNSPECIFIED WHETHER ESOPHAGITIS PRESENT: ICD-10-CM

## 2021-08-13 DIAGNOSIS — E55.9 VITAMIN D DEFICIENCY: ICD-10-CM

## 2021-08-13 DIAGNOSIS — E66.3 OVERWEIGHT: ICD-10-CM

## 2021-08-13 DIAGNOSIS — J02.9 ACUTE PHARYNGITIS, UNSPECIFIED ETIOLOGY: ICD-10-CM

## 2021-08-13 DIAGNOSIS — G25.81 RESTLESS LEG SYNDROME: ICD-10-CM

## 2021-08-13 PROCEDURE — 99214 OFFICE O/P EST MOD 30 MIN: CPT | Performed by: FAMILY MEDICINE

## 2021-08-13 PROCEDURE — 84466 ASSAY OF TRANSFERRIN: CPT

## 2021-08-13 PROCEDURE — 85025 COMPLETE CBC W/AUTO DIFF WBC: CPT

## 2021-08-13 PROCEDURE — 83735 ASSAY OF MAGNESIUM: CPT

## 2021-08-13 PROCEDURE — 80053 COMPREHEN METABOLIC PANEL: CPT

## 2021-08-13 PROCEDURE — 84481 FREE ASSAY (FT-3): CPT

## 2021-08-13 PROCEDURE — 82306 VITAMIN D 25 HYDROXY: CPT

## 2021-08-13 PROCEDURE — 84443 ASSAY THYROID STIM HORMONE: CPT

## 2021-08-13 PROCEDURE — 80061 LIPID PANEL: CPT

## 2021-08-13 PROCEDURE — 84439 ASSAY OF FREE THYROXINE: CPT

## 2021-08-13 PROCEDURE — 82607 VITAMIN B-12: CPT

## 2021-08-13 PROCEDURE — 83540 ASSAY OF IRON: CPT

## 2021-08-13 PROCEDURE — 82728 ASSAY OF FERRITIN: CPT

## 2021-08-13 RX ORDER — AZITHROMYCIN 250 MG/1
TABLET, FILM COATED ORAL
Qty: 6 TABLET | Refills: 0 | Status: SHIPPED | OUTPATIENT
Start: 2021-08-13 | End: 2021-09-28

## 2021-08-13 NOTE — PATIENT INSTRUCTIONS
Lamotrigine tablets  What is this medicine?  LAMOTRIGINE (la KASSIE tri jeen) is used to control seizures in adults and children with epilepsy and Lennox-Gastaut syndrome. It is also used in adults to treat bipolar disorder.  This medicine may be used for other purposes; ask your health care provider or pharmacist if you have questions.  COMMON BRAND NAME(S): Lamictal, Subvenite  What should I tell my health care provider before I take this medicine?  They need to know if you have any of these conditions:  · heart disease  · history of irregular heartbeat  · immune system problems  · kidney disease  · liver disease  · low levels of folic acid in the blood  · lupus  · mental illness  · suicidal thoughts, plans, or attempt; a previous suicide attempt by you or a family member  · an unusual or allergic reaction to lamotrigine or other seizure medications, other medicines, foods, dyes, or preservatives  · pregnant or trying to get pregnant  · breast-feeding  How should I use this medicine?  Take this medicine by mouth with a glass of water. Follow the directions on the prescription label. Do not chew these tablets. If this medicine upsets your stomach, take it with food or milk. Take your doses at regular intervals. Do not take your medicine more often than directed.  A special MedGuide will be given to you by the pharmacist with each new prescription and refill. Be sure to read this information carefully each time.  Talk to your pediatrician regarding the use of this medicine in children. While this drug may be prescribed for children as young as 2 years for selected conditions, precautions do apply.  Overdosage: If you think you have taken too much of this medicine contact a poison control center or emergency room at once.  NOTE: This medicine is only for you. Do not share this medicine with others.  What if I miss a dose?  If you miss a dose, take it as soon as you can. If it is almost time for your next dose, take only  that dose. Do not take double or extra doses.  What may interact with this medicine?  · atazanavir  · birth control pills  · certain medicines for irregular heartbeat  · certain medicines for seizures like carbamazepine, phenobarbital, phenytoin, primidone, valproic acid  · lopinavir  · rifampin  · ritonavir  This list may not describe all possible interactions. Give your health care provider a list of all the medicines, herbs, non-prescription drugs, or dietary supplements you use. Also tell them if you smoke, drink alcohol, or use illegal drugs. Some items may interact with your medicine.  What should I watch for while using this medicine?  Visit your doctor or health care providerfor regular checks on your progress. If you take this medicine for seizures, wear a Medic Alert bracelet or necklace. Carry an identification card with information about your condition, medicines, and doctor or health care provider.  It is important to take this medicine exactly as directed. When first starting treatment, your dose will need to be adjusted slowly. It may take weeks or months before your dose is stable. You should contact your doctor or health care providerif your seizures get worse or if you have any new types of seizures. Do not stop taking this medicine unless instructed by your doctor or health care provider. Stopping your medicine suddenly can increase your seizures or their severity.  This medicine may cause serious skin reactions. They can happen weeks to months after starting the medicine. Contact your health care provider right away if you notice fevers or flu-like symptoms with a rash. The rash may be red or purple and then turn into blisters or peeling of the skin. Or, you might notice a red rash with swelling of the face, lips or lymph nodes in your neck or under your arms.  You may get drowsy, dizzy, or have blurred vision. Do not drive, use machinery, or do anything that needs mental alertness until you know  how this medicine affects you. To reduce dizzy or fainting spells, do not sit or stand up quickly, especially if you are an older patient. Alcohol can increase drowsiness and dizziness. Avoid alcoholic drinks.  If you are taking this medicine for bipolar disorder, it is important to report any changes in your mood to your doctor or health care provider. If your condition gets worse, you get mentally depressed, feel very hyperactive or manic, have difficulty sleeping, or have thoughts of hurting yourself or committing suicide, you need to get help from your health care providerright away. If you are a caregiver for someone taking this medicine for bipolar disorder, you should also report these behavioral changes right away. The use of this medicine may increase the chance of suicidal thoughts or actions. Pay special attention to how you are responding while on this medicine.  Your mouth may get dry. Chewing sugarless gum or sucking hard candy, and drinking plenty of water may help. Contact your doctor if the problem does not go away or is severe.  Women who become pregnant while using this medicine may enroll in the [place] Antiepileptic Drug Pregnancy Registry by calling 1-781.524.6275. This registry collects information about the safety of antiepileptic drug use during pregnancy.  This medicine may cause a decrease in folic acid. You should make sure that you get enough folic acid while you are taking this medicine. Discuss the foods you eat and the vitamins you take with your health care provider.  What side effects may I notice from receiving this medicine?  Side effects that you should report to your doctor or health care professional as soon as possible:  · allergic reactions (skin rash, itching or hives; swelling of the face, lips, or tongue)  · aseptic meningitis (stiff neck; sensitivity to light; headache; drowsiness; fever; nausea, vomiting; rash)  · changes in vision  · heartbeat rhythm changes  (trouble breathing; chest pain; dizziness; fast, irregular heartbeat; feeling faint or lightheaded, falls)  · infection (fever, chills, cough, sore throat, pain or trouble passing urine)  · liver injury (dark yellow or brown urine; general ill feeling or flu-like symptoms; loss of appetite, right upper belly pain; unusually weak or tired, yellowing of the eyes or skin)  · low red blood cell counts (trouble breathing; feeling faint; lightheaded, falls; unusually weak or tired)  · rash, fever, and swollen lymph nodes  · redness, blistering, peeling, or loosening of the skin, including inside the mouth  · seizures  · suicidal thoughts, mood changes  · unusual bruising or bleeding  Side effects that usually do not require medical attention (report to your doctor or health care professional if they continue or are bothersome):  · diarrhea  · dizziness  · nausea  · stomach pain  · tremors  · vomiting  This list may not describe all possible side effects. Call your doctor for medical advice about side effects. You may report side effects to FDA at 1-967-FDA-1007.  Where should I keep my medicine?  Keep out of the reach of children and pets.  Store at 25 degrees C (77 degrees F). Protect from light. Get rid of any unused medicine after the expiration date.  To get rid of medicines that are no longer needed or have :  · Take the medicine to a medicine take-back program. Check with your pharmacy or law enforcement to find a location.  · If you cannot return the medicine, check the label or package insert to see if the medicine should be thrown out in the garbage or flushed down the toilet. If you are not sure, ask your health care provider. If it is safe to put it in the trash, empty the medicine out of the container. Mix the medicine with cat litter, dirt, coffee grounds, or other unwanted substance. Seal the mixture in a bag or container. Put it in the trash.  NOTE: This sheet is a summary. It may not cover all  possible information. If you have questions about this medicine, talk to your doctor, pharmacist, or health care provider.  © 2021 [employer] (2021-04-01 10:26:40)    Pharyngitis    Pharyngitis is redness, pain, and swelling (inflammation) of the throat (pharynx). It is a very common cause of sore throat. Pharyngitis can be caused by a bacteria, but it is usually caused by a virus. Most cases of pharyngitis get better on their own without treatment.  What are the causes?  This condition may be caused by:  · Infection by viruses (viral). Viral pharyngitis spreads from person to person (is contagious) through coughing, sneezing, and sharing of personal items or utensils such as cups, forks, spoons, and toothbrushes.  · Infection by bacteria (bacterial). Bacterial pharyngitis may be spread by touching the nose or face after coming in contact with the bacteria, or through more intimate contact, such as kissing.  · Allergies. Allergies can cause buildup of mucus in the throat (post-nasal drip), leading to inflammation and irritation. Allergies can also cause blocked nasal passages, forcing breathing through the mouth, which dries and irritates the throat.  What increases the risk?  You are more likely to develop this condition if:  · You are 5-24 years old.  · You are exposed to crowded environments such as , school, or dormitory living.  · You live in a cold climate.  · You have a weakened disease-fighting (immune) system.  What are the signs or symptoms?  Symptoms of this condition vary by the cause (viral, bacterial, or allergies) and can include:  · Sore throat.  · Fatigue.  · Low-grade fever.  · Headache.  · Joint pain and muscle aches.  · Skin rashes.  · Swollen glands in the throat (lymph nodes).  · Plaque-like film on the throat or tonsils. This is often a symptom of bacterial pharyngitis.  · Vomiting.  · Stuffy nose (nasal congestion).  · Cough.  · Red, itchy eyes (conjunctivitis).  · Loss of  appetite.  How is this diagnosed?  This condition is often diagnosed based on your medical history and a physical exam. Your health care provider will ask you questions about your illness and your symptoms. A swab of your throat may be done to check for bacteria (rapid strep test). Other lab tests may also be done, depending on the suspected cause, but these are rare.  How is this treated?  This condition usually gets better in 3-4 days without medicine. Bacterial pharyngitis may be treated with antibiotic medicines.  Follow these instructions at home:  · Take over-the-counter and prescription medicines only as told by your health care provider.  ? If you were prescribed an antibiotic medicine, take it as told by your health care provider. Do not stop taking the antibiotic even if you start to feel better.  ? Do not give children aspirin because of the association with Reye syndrome.  · Drink enough water and fluids to keep your urine clear or pale yellow.  · Get a lot of rest.  · Gargle with a salt-water mixture 3-4 times a day or as needed. To make a salt-water mixture, completely dissolve ½-1 tsp of salt in 1 cup of warm water.  · If your health care provider approves, you may use throat lozenges or sprays to soothe your throat.  Contact a health care provider if:  · You have large, tender lumps in your neck.  · You have a rash.  · You cough up green, yellow-brown, or bloody spit.  Get help right away if:  · Your neck becomes stiff.  · You drool or are unable to swallow liquids.  · You cannot drink or take medicines without vomiting.  · You have severe pain that does not go away, even after you take medicine.  · You have trouble breathing, and it is not caused by a stuffy nose.  · You have new pain and swelling in your joints such as the knees, ankles, wrists, or elbows.  Summary  · Pharyngitis is redness, pain, and swelling (inflammation) of the throat (pharynx).  · While pharyngitis can be caused by a bacteria,  the most common causes are viral.  · Most cases of pharyngitis get better on their own without treatment.  · Bacterial pharyngitis is treated with antibiotic medicines.  This information is not intended to replace advice given to you by your health care provider. Make sure you discuss any questions you have with your health care provider.  Document Revised: 11/30/2018 Document Reviewed: 01/23/2018  [employer] Patient Education © 2021 [employer]  Lamotrigine tablets  What is this medicine?  LAMOTRIGINE (la KASSIE tri jeen) is used to control seizures in adults and children with epilepsy and Lennox-Gastaut syndrome. It is also used in adults to treat bipolar disorder.  This medicine may be used for other purposes; ask your health care provider or pharmacist if you have questions.  COMMON BRAND NAME(S): Lamictal, Subvenite  What should I tell my health care provider before I take this medicine?  They need to know if you have any of these conditions:  · heart disease  · history of irregular heartbeat  · immune system problems  · kidney disease  · liver disease  · low levels of folic acid in the blood  · lupus  · mental illness  · suicidal thoughts, plans, or attempt; a previous suicide attempt by you or a family member  · an unusual or allergic reaction to lamotrigine or other seizure medications, other medicines, foods, dyes, or preservatives  · pregnant or trying to get pregnant  · breast-feeding  How should I use this medicine?  Take this medicine by mouth with a glass of water. Follow the directions on the prescription label. Do not chew these tablets. If this medicine upsets your stomach, take it with food or milk. Take your doses at regular intervals. Do not take your medicine more often than directed.  A special MedGuide will be given to you by the pharmacist with each new prescription and refill. Be sure to read this information carefully each time.  Talk to your pediatrician regarding the use of this medicine in  children. While this drug may be prescribed for children as young as 2 years for selected conditions, precautions do apply.  Overdosage: If you think you have taken too much of this medicine contact a poison control center or emergency room at once.  NOTE: This medicine is only for you. Do not share this medicine with others.  What if I miss a dose?  If you miss a dose, take it as soon as you can. If it is almost time for your next dose, take only that dose. Do not take double or extra doses.  What may interact with this medicine?  · atazanavir  · birth control pills  · certain medicines for irregular heartbeat  · certain medicines for seizures like carbamazepine, phenobarbital, phenytoin, primidone, valproic acid  · lopinavir  · rifampin  · ritonavir  This list may not describe all possible interactions. Give your health care provider a list of all the medicines, herbs, non-prescription drugs, or dietary supplements you use. Also tell them if you smoke, drink alcohol, or use illegal drugs. Some items may interact with your medicine.  What should I watch for while using this medicine?  Visit your doctor or health care providerfor regular checks on your progress. If you take this medicine for seizures, wear a Medic Alert bracelet or necklace. Carry an identification card with information about your condition, medicines, and doctor or health care provider.  It is important to take this medicine exactly as directed. When first starting treatment, your dose will need to be adjusted slowly. It may take weeks or months before your dose is stable. You should contact your doctor or health care providerif your seizures get worse or if you have any new types of seizures. Do not stop taking this medicine unless instructed by your doctor or health care provider. Stopping your medicine suddenly can increase your seizures or their severity.  This medicine may cause serious skin reactions. They can happen weeks to months after  starting the medicine. Contact your health care provider right away if you notice fevers or flu-like symptoms with a rash. The rash may be red or purple and then turn into blisters or peeling of the skin. Or, you might notice a red rash with swelling of the face, lips or lymph nodes in your neck or under your arms.  You may get drowsy, dizzy, or have blurred vision. Do not drive, use machinery, or do anything that needs mental alertness until you know how this medicine affects you. To reduce dizzy or fainting spells, do not sit or stand up quickly, especially if you are an older patient. Alcohol can increase drowsiness and dizziness. Avoid alcoholic drinks.  If you are taking this medicine for bipolar disorder, it is important to report any changes in your mood to your doctor or health care provider. If your condition gets worse, you get mentally depressed, feel very hyperactive or manic, have difficulty sleeping, or have thoughts of hurting yourself or committing suicide, you need to get help from your health care providerright away. If you are a caregiver for someone taking this medicine for bipolar disorder, you should also report these behavioral changes right away. The use of this medicine may increase the chance of suicidal thoughts or actions. Pay special attention to how you are responding while on this medicine.  Your mouth may get dry. Chewing sugarless gum or sucking hard candy, and drinking plenty of water may help. Contact your doctor if the problem does not go away or is severe.  Women who become pregnant while using this medicine may enroll in the [place] Antiepileptic Drug Pregnancy Registry by calling 1-948.870.8877. This registry collects information about the safety of antiepileptic drug use during pregnancy.  This medicine may cause a decrease in folic acid. You should make sure that you get enough folic acid while you are taking this medicine. Discuss the foods you eat and the vitamins  you take with your health care provider.  What side effects may I notice from receiving this medicine?  Side effects that you should report to your doctor or health care professional as soon as possible:  · allergic reactions (skin rash, itching or hives; swelling of the face, lips, or tongue)  · aseptic meningitis (stiff neck; sensitivity to light; headache; drowsiness; fever; nausea, vomiting; rash)  · changes in vision  · heartbeat rhythm changes (trouble breathing; chest pain; dizziness; fast, irregular heartbeat; feeling faint or lightheaded, falls)  · infection (fever, chills, cough, sore throat, pain or trouble passing urine)  · liver injury (dark yellow or brown urine; general ill feeling or flu-like symptoms; loss of appetite, right upper belly pain; unusually weak or tired, yellowing of the eyes or skin)  · low red blood cell counts (trouble breathing; feeling faint; lightheaded, falls; unusually weak or tired)  · rash, fever, and swollen lymph nodes  · redness, blistering, peeling, or loosening of the skin, including inside the mouth  · seizures  · suicidal thoughts, mood changes  · unusual bruising or bleeding  Side effects that usually do not require medical attention (report to your doctor or health care professional if they continue or are bothersome):  · diarrhea  · dizziness  · nausea  · stomach pain  · tremors  · vomiting  This list may not describe all possible side effects. Call your doctor for medical advice about side effects. You may report side effects to FDA at 0-869-CNT-6705.  Where should I keep my medicine?  Keep out of the reach of children and pets.  Store at 25 degrees C (77 degrees F). Protect from light. Get rid of any unused medicine after the expiration date.  To get rid of medicines that are no longer needed or have :  · Take the medicine to a medicine take-back program. Check with your pharmacy or law enforcement to find a location.  · If you cannot return the medicine,  check the label or package insert to see if the medicine should be thrown out in the garbage or flushed down the toilet. If you are not sure, ask your health care provider. If it is safe to put it in the trash, empty the medicine out of the container. Mix the medicine with cat litter, dirt, coffee grounds, or other unwanted substance. Seal the mixture in a bag or container. Put it in the trash.  NOTE: This sheet is a summary. It may not cover all possible information. If you have questions about this medicine, talk to your doctor, pharmacist, or health care provider.  © 2021 [employer] (2021-04-01 10:26:40)

## 2021-08-14 LAB
25(OH)D3 SERPL-MCNC: 42.9 NG/ML (ref 30–100)
ALBUMIN SERPL-MCNC: 4.7 G/DL (ref 3.5–5.2)
ALBUMIN/GLOB SERPL: 1.9 G/DL
ALP SERPL-CCNC: 90 U/L (ref 39–117)
ALT SERPL W P-5'-P-CCNC: 18 U/L (ref 1–41)
ANION GAP SERPL CALCULATED.3IONS-SCNC: 10.4 MMOL/L (ref 5–15)
AST SERPL-CCNC: 20 U/L (ref 1–40)
BASOPHILS # BLD AUTO: 0.06 10*3/MM3 (ref 0–0.2)
BASOPHILS NFR BLD AUTO: 0.8 % (ref 0–1.5)
BILIRUB SERPL-MCNC: 0.5 MG/DL (ref 0–1.2)
BUN SERPL-MCNC: 9 MG/DL (ref 6–20)
BUN/CREAT SERPL: 9.7 (ref 7–25)
CALCIUM SPEC-SCNC: 9.1 MG/DL (ref 8.6–10.5)
CHLORIDE SERPL-SCNC: 103 MMOL/L (ref 98–107)
CHOLEST SERPL-MCNC: 143 MG/DL (ref 0–200)
CO2 SERPL-SCNC: 25.6 MMOL/L (ref 22–29)
CREAT SERPL-MCNC: 0.93 MG/DL (ref 0.76–1.27)
DEPRECATED RDW RBC AUTO: 39 FL (ref 37–54)
EOSINOPHIL # BLD AUTO: 0.3 10*3/MM3 (ref 0–0.4)
EOSINOPHIL NFR BLD AUTO: 4.1 % (ref 0.3–6.2)
ERYTHROCYTE [DISTWIDTH] IN BLOOD BY AUTOMATED COUNT: 12.7 % (ref 12.3–15.4)
FERRITIN SERPL-MCNC: 128 NG/ML (ref 30–400)
GFR SERPL CREATININE-BSD FRML MDRD: 90 ML/MIN/1.73
GLOBULIN UR ELPH-MCNC: 2.5 GM/DL
GLUCOSE SERPL-MCNC: 87 MG/DL (ref 65–99)
HCT VFR BLD AUTO: 46.3 % (ref 37.5–51)
HDLC SERPL-MCNC: 50 MG/DL (ref 40–60)
HGB BLD-MCNC: 15.3 G/DL (ref 13–17.7)
IMM GRANULOCYTES # BLD AUTO: 0.01 10*3/MM3 (ref 0–0.05)
IMM GRANULOCYTES NFR BLD AUTO: 0.1 % (ref 0–0.5)
IRON 24H UR-MRATE: 68 MCG/DL (ref 59–158)
IRON SATN MFR SERPL: 22 % (ref 20–50)
LDLC SERPL CALC-MCNC: 78 MG/DL (ref 0–100)
LDLC/HDLC SERPL: 1.56 {RATIO}
LYMPHOCYTES # BLD AUTO: 1.56 10*3/MM3 (ref 0.7–3.1)
LYMPHOCYTES NFR BLD AUTO: 21.3 % (ref 19.6–45.3)
MAGNESIUM SERPL-MCNC: 2.1 MG/DL (ref 1.6–2.6)
MCH RBC QN AUTO: 28 PG (ref 26.6–33)
MCHC RBC AUTO-ENTMCNC: 33 G/DL (ref 31.5–35.7)
MCV RBC AUTO: 84.6 FL (ref 79–97)
MONOCYTES # BLD AUTO: 0.76 10*3/MM3 (ref 0.1–0.9)
MONOCYTES NFR BLD AUTO: 10.4 % (ref 5–12)
NEUTROPHILS NFR BLD AUTO: 4.65 10*3/MM3 (ref 1.7–7)
NEUTROPHILS NFR BLD AUTO: 63.3 % (ref 42.7–76)
NRBC BLD AUTO-RTO: 0 /100 WBC (ref 0–0.2)
PLATELET # BLD AUTO: 212 10*3/MM3 (ref 140–450)
PMV BLD AUTO: 10.4 FL (ref 6–12)
POTASSIUM SERPL-SCNC: 4.3 MMOL/L (ref 3.5–5.2)
PROT SERPL-MCNC: 7.2 G/DL (ref 6–8.5)
RBC # BLD AUTO: 5.47 10*6/MM3 (ref 4.14–5.8)
SODIUM SERPL-SCNC: 139 MMOL/L (ref 136–145)
T3FREE SERPL-MCNC: 2.72 PG/ML (ref 2–4.4)
T4 FREE SERPL-MCNC: 0.78 NG/DL (ref 0.93–1.7)
TIBC SERPL-MCNC: 311 MCG/DL (ref 298–536)
TRANSFERRIN SERPL-MCNC: 209 MG/DL (ref 200–360)
TRIGL SERPL-MCNC: 74 MG/DL (ref 0–150)
TSH SERPL DL<=0.05 MIU/L-ACNC: 0.5 UIU/ML (ref 0.27–4.2)
VIT B12 BLD-MCNC: 284 PG/ML (ref 211–946)
VLDLC SERPL-MCNC: 15 MG/DL (ref 5–40)
WBC # BLD AUTO: 7.34 10*3/MM3 (ref 3.4–10.8)

## 2021-08-16 ENCOUNTER — TELEPHONE (OUTPATIENT)
Dept: PSYCHIATRY | Facility: CLINIC | Age: 41
End: 2021-08-16

## 2021-08-16 ENCOUNTER — TELEPHONE (OUTPATIENT)
Dept: FAMILY MEDICINE CLINIC | Facility: CLINIC | Age: 41
End: 2021-08-16

## 2021-08-16 RX ORDER — CHOLECALCIFEROL (VITAMIN D3) 125 MCG
500 CAPSULE ORAL DAILY
Qty: 30 TABLET | Refills: 3 | Status: SHIPPED | OUTPATIENT
Start: 2021-08-16 | End: 2022-01-10 | Stop reason: SDUPTHER

## 2021-08-16 RX ORDER — RISPERIDONE 1 MG/1
1 TABLET ORAL NIGHTLY
Qty: 30 TABLET | Refills: 0 | Status: SHIPPED | OUTPATIENT
Start: 2021-08-16 | End: 2022-01-10 | Stop reason: SDUPTHER

## 2021-08-16 RX ORDER — LEVOTHYROXINE SODIUM 0.05 MG/1
50 TABLET ORAL DAILY
Qty: 30 TABLET | Refills: 3 | Status: SHIPPED | OUTPATIENT
Start: 2021-08-16 | End: 2021-09-12 | Stop reason: SDUPTHER

## 2021-08-16 NOTE — TELEPHONE ENCOUNTER
Patient's wife called, stating that the patient wanted to let us know that he was having trouble taking the Seroquel. Patient's wife states that he has restless leg syndrome and it bothered his legs too much at night. Patient wants to know if there is something else you could call in?

## 2021-08-16 NOTE — TELEPHONE ENCOUNTER
Ok please have him stop the Seroquel and I sent in Risperdal 1mg at night for sleep and mood. If any worsening symptoms or side effects that are the same as Seroquel please have them call. This should not make the restless leg worse. Please tell them to keep the appointment on the 25th.

## 2021-08-16 NOTE — TELEPHONE ENCOUNTER
----- Message from Jhon Amezquita MD sent at 8/16/2021 10:03 AM CDT -----  Please call pt    All labwork stable     Except vitamin B12 is low and recommend pt start taking Vitamin B12 500 mcg PO q daily give 30 pills and 3 refills    On thyroid studies T4 is low and go up on synthroid from 25 to 50 mcg PO q daily. Give 30 pills and 3 refills. Please make sure pt is taking this separately from prilosec about 30 minutes apart from each other    Recheck on next visit thanks

## 2021-08-31 RX ORDER — ATORVASTATIN CALCIUM 20 MG/1
20 TABLET, FILM COATED ORAL NIGHTLY
Qty: 30 TABLET | Refills: 3 | Status: SHIPPED | OUTPATIENT
Start: 2021-08-31 | End: 2021-10-04 | Stop reason: SDUPTHER

## 2021-08-31 RX ORDER — OMEPRAZOLE 40 MG/1
40 CAPSULE, DELAYED RELEASE ORAL DAILY
Qty: 30 CAPSULE | Refills: 0 | Status: SHIPPED | OUTPATIENT
Start: 2021-08-31 | End: 2021-09-26 | Stop reason: SDUPTHER

## 2021-09-07 RX ORDER — MONTELUKAST SODIUM 10 MG/1
TABLET ORAL
Qty: 30 TABLET | Refills: 0 | Status: SHIPPED | OUTPATIENT
Start: 2021-09-07 | End: 2021-10-10 | Stop reason: SDUPTHER

## 2021-09-08 NOTE — PROGRESS NOTES
Chief Complaint  Earache (right ear)    Subjective          Jordin Parham presents to [facility] - [place]     Pt is 38 yo male with management  of obesity, COOKIE, major depression, panic attacks GERD , mood disorder, history of H pylori infection, VItamin D deficiency,  Renal impairment, History of illicit drug use, fatty liver disease, gastritis,  Eosinophilic esophagitis, internal/external hemorrhoids, sp hemorrhoidectomy        8/13/21 in office visit for recheck on pt's above medical issues. Pt talked to behavioral health on 6/22/21  And 8/10/21. Pt continues tot dominga his mediations for major depression, panic attacks. COOKIE, mood disorder, restless leg syndrom. Vitamin D deficiency. Pt was started on seroquel and lamictal but he cannot tolerate seroquel. He lost 10 lbs since last visit . He has had decreased appetite. He is due for new labwork. His last thyroid test in April showed low T4.  His stomach is better since watching his diet since he cannot tolerate pork and beef.  He has been having sore throat for past few weeks. Denies any fever      9/14/21 in office visit for recheck on pt's above medical issues. Pt continues to take his medicaitons for hypothyroidism, major depression/ COOKIE, HLP, iron deficiency, mood disorder panic attacks, restless legs. Pt had labwork done on 8/13/21 that shoed normal iron vitamin D and b12 levels. Lipid panel was stable CBC showed normal hemoglobin and WBC. CMP showed stable GFR and kidney function. On thyroid function TSH was normal but T4 low at  0.78. He states that right side of Jaw is hurting and affects his right ear. He does wear dentures but currently does see a Dentist         Earache   There is pain in the right ear. This is a recurrent problem. The current episode started more than 1 month ago. The problem occurs constantly. The problem has been unchanged. There has been no fever. The pain is at a severity of 4/10.  The pain is moderate. Pertinent negatives include no abdominal pain, coughing, diarrhea, ear discharge, headaches, hearing loss, neck pain, rash, rhinorrhea, sore throat or vomiting. He has tried nothing for the symptoms. The treatment provided no relief. There is no history of a chronic ear infection, hearing loss or a tympanostomy tube.   Hypothyroidism  This is a recurrent problem. The current episode started more than 1 month ago. The problem occurs constantly. The problem has been unchanged. Associated symptoms include fatigue. Pertinent negatives include no abdominal pain, anorexia, arthralgias, change in bowel habit, chest pain, chills, congestion, coughing, fever, headaches, joint swelling, myalgias, nausea, neck pain, numbness, rash, sore throat, swollen glands, urinary symptoms, vertigo, visual change, vomiting or weakness. Nothing aggravates the symptoms. Treatments tried: synthroid  The treatment provided no relief.   Jaw Pain  This is a recurrent problem. The current episode started more than 1 month ago. The problem occurs constantly. The problem has been unchanged. Associated symptoms include fatigue. Pertinent negatives include no abdominal pain, anorexia, arthralgias, change in bowel habit, chest pain, chills, congestion, coughing, fever, headaches, joint swelling, myalgias, nausea, neck pain, numbness, rash, sore throat, swollen glands, urinary symptoms, vertigo, visual change, vomiting or weakness. Nothing aggravates the symptoms. He has tried nothing for the symptoms. The treatment provided no relief.   Heartburn  He complains of abdominal pain and heartburn. He reports no belching, no chest pain, no choking, no coughing, no dysphagia, no early satiety, no globus sensation, no hoarse voice, no nausea, no sore throat, no stridor, no tooth decay, no water brash or no wheezing. This is a recurrent problem. The current episode started more than 1 month ago. The problem occurs occasionally. The  problem has been waxing and waning. The heartburn does not wake him from sleep. The heartburn does not limit his activity. The heartburn doesn't change with position. The symptoms are aggravated by certain foods. Pertinent negatives include no fatigue, melena or weight loss. Risk factors include lack of exercise. He has tried a PPI for the symptoms. The treatment provided moderate relief. Past procedures include an EGD. Past procedures do not include an abdominal ultrasound, esophageal manometry, H. pylori antibody titer or a UGI.   Anxiety  Presents for follow-up visit. Symptoms include decreased concentration, depressed mood, excessive worry, irritability, malaise, nervous/anxious behavior, obsessions and restlessness. Patient reports no chest pain, compulsions, confusion, dizziness, dry mouth, feeling of choking, hyperventilation, impotence, insomnia, muscle tension, nausea, palpitations, panic, shortness of breath or suicidal ideas. The severity of symptoms is moderate. The quality of sleep is fair.      His past medical history is significant for depression. Compliance with medications is %.   Depression  Visit Type: follow-up  Patient presents with the following symptoms: decreased concentration, depressed mood, excessive worry, irritability, malaise, nervousness/anxiety, obsessions and restlessness.  Patient is not experiencing: anhedonia, chest pain, choking sensation, compulsions, confusion, dizziness, dry mouth, fatigue, feelings of hopelessness, feelings of worthlessness, hypersomnia, hyperventilation, impotence, insomnia, memory impairment, muscle tension, nausea, palpitations, panic, psychomotor agitation, shortness of breath, suicidal ideas, suicidal planning, thoughts of death, weight gain and weight loss.  Severity: moderate   Sleep quality: fair  Compliance with medications:  %     GI Problem  The primary symptoms include abdominal pain. Primary symptoms do not include fever, weight  loss, fatigue, nausea, vomiting, diarrhea, melena, hematemesis, jaundice, hematochezia, dysuria, myalgias, arthralgias or rash.   The illness does not include chills, anorexia, dysphagia, odynophagia, bloating, constipation, tenesmus, back pain or itching. Significant associated medical issues include GERD. Associated medical issues do not include inflammatory bowel disease, gallstones, liver disease, alcohol abuse, PUD, gastric bypass, bowel resection, irritable bowel syndrome, hemorrhoids or diverticulitis.   Dizziness  This is a chronic problem. The current episode started more than 1 year ago. The problem occurs constantly. The problem has been unchanged. Associated symptoms include abdominal pain and numbness. Pertinent negatives include no anorexia, arthralgias, change in bowel habit, chest pain, chills, congestion, coughing, diaphoresis, fatigue, fever, headaches, joint swelling, myalgias, nausea, neck pain, rash, sore throat, swollen glands, urinary symptoms, vertigo, visual change, vomiting or weakness. Nothing aggravates the symptoms. He has tried nothing for the symptoms. The treatment provided no relief.   Headache   This is a recurrent problem. The current episode started more than 1 month ago. The problem occurs constantly. The pain is located in the right unilateral region. The pain does not radiate. The pain quality is not similar to prior headaches. The quality of the pain is described as aching. The pain is at a severity of 0/10. The pain is moderate. Associated symptoms include abdominal pain and numbness. Pertinent negatives include no abnormal behavior, anorexia, back pain, blurred vision, coughing, dizziness, ear pain, facial sweating, fever, hearing loss, insomnia, loss of balance, muscle aches, nausea, neck pain, scalp tenderness, seizures, sinus pressure, sore throat, swollen glands, tingling, tinnitus, visual change, vomiting, weakness or weight loss. Nothing aggravates the symptoms. He has  "tried nothing for the symptoms. The treatment provided no relief.      Objective   Vital Signs:   /74 (BP Location: Right arm, Patient Position: Sitting, Cuff Size: Adult)   Pulse 68   Temp 98.2 °F (36.8 °C) (Temporal)   Resp 20   Ht 177.8 cm (70\")   Wt 90.7 kg (200 lb)   SpO2 98%   BMI 28.70 kg/m²       Physical Exam  Vitals and nursing note reviewed.   Constitutional:       Appearance: He is well-developed. He is not diaphoretic.   HENT:      Head: Normocephalic and atraumatic.      Jaw: Tenderness present.        Right Ear: External ear normal.   Eyes:      Conjunctiva/sclera: Conjunctivae normal.      Pupils: Pupils are equal, round, and reactive to light.   Cardiovascular:      Rate and Rhythm: Normal rate and regular rhythm.      Heart sounds: Normal heart sounds. No murmur heard.     Pulmonary:      Effort: Pulmonary effort is normal. No respiratory distress.      Breath sounds: Normal breath sounds.   Abdominal:      General: Bowel sounds are normal. There is no distension.      Palpations: Abdomen is soft.      Tenderness: There is abdominal tenderness.      Comments: Obese abdomen    Musculoskeletal:         General: Tenderness present. No deformity. Normal range of motion.      Cervical back: Normal range of motion and neck supple.   Skin:     General: Skin is warm.      Coloration: Skin is not pale.      Findings: No erythema or rash.   Neurological:      Mental Status: He is alert and oriented to person, place, and time.      Cranial Nerves: No cranial nerve deficit.   Psychiatric:         Behavior: Behavior normal.        Result Review :                 Assessment and Plan    Diagnoses and all orders for this visit:    1. Dislocation of temporomandibular joint, initial encounter (Primary)  -     XR Mandible 4+ View; Future    2. Mood disorder (CMS/HCC)    3. Restless leg syndrome    4. Vitamin D deficiency    5. Gastroesophageal reflux disease with esophagitis without hemorrhage    6. COOKIE " (generalized anxiety disorder)    7. Panic attack    8. Allergy to alpha-gal    9. Ear pain, bilateral    10. Overweight    11. Hypothyroidism, unspecified type    12. Jaw pain  -     XR Mandible 4+ View; Future    13. Insomnia, unspecified type                  -recommend labwork  -recommend COVID-19 vaccination  -recommend tdap vaccination   -jaw pain/TMJ - get x-ray of jaw.  Gave informaton today along with exercise. Recommend Tylenol PRN   -insomnia - recommend MIDNITE melatonin   -nausea/vomiting -  zofran 8 mg PO every 8 hours   -renal cyst - continue monitor.   -eosinophilic esophagitis/alphal gal allergy/fatty liver   -GI following, advised pt to refrain from beef, pork or lamb products   -insomnia- recommend MIDNITE MELATONIN at [facility]   -restless leg syndrome - on requip 1 mg at bedtime  -hyopthyroidism - on synthroid 50 mcg PO q daily.    -iron deficiency - on ferrous sulfate 325 mg daily.    -overweight  counseled weight loss >5 minutes BMI at 28.70  -vitamin D deficiency -vitamin D once a week  -vitamin B12 deficiency - vitamin B12 supplement daily.    -HLP - recommend DASH diet, heart healthy diet.  -renal impairment -  Will monitor kidney function d   -allergic rhintis - flonase nasal spray on singulair 10 mg at bedtime   -panic attack/COOKIE/mood disorder  -  behavioral health following on paxil 30 mgdaily on risperdal 1 mg at bedtime on atarax 25 mg PO TID prn on lamictal 100 mg daily on buspar 15 mg PO TID   -advised pt to be safe and call with questions and concerns. All questions addressed tdoay.   -advised pt to go to ER or call 911 if symptoms worrisome or severe  -advised pt to followup with specialist and referrals  -advised pt to be safe during COVID-19 pandemic  I spent 30  minutes caring for Jordin on this date of service. This time includes time spent by me in the following activities: preparing for the visit, reviewing tests, obtaining and/or reviewing a separately obtained history,  performing a medically appropriate examination and/or evaluation, counseling and educating the patient/family/caregiver, ordering medications, tests, or procedures, referring and communicating with other health care professionals, documenting information in the medical record, independently interpreting results and communicating that information with the patient/family/caregiver and care coordination        This document has been electronically signed by Jhon Amezquita MD on September 14, 2021 12:04 CDT        Follow Up   No follow-ups on file.  Patient was given instructions and counseling regarding his condition or for health maintenance advice. Please see specific information pulled into the AVS if appropriate.

## 2021-09-12 DIAGNOSIS — F41.1 GENERALIZED ANXIETY DISORDER: Chronic | ICD-10-CM

## 2021-09-12 DIAGNOSIS — G25.81 RESTLESS LEG SYNDROME: Chronic | ICD-10-CM

## 2021-09-12 DIAGNOSIS — F19.11 HISTORY OF SUBSTANCE ABUSE (HCC): ICD-10-CM

## 2021-09-12 DIAGNOSIS — F31.12 BIPOLAR AFFECTIVE DISORDER, CURRENTLY MANIC, MODERATE (HCC): ICD-10-CM

## 2021-09-13 RX ORDER — ROPINIROLE 1 MG/1
TABLET, FILM COATED ORAL
Qty: 30 TABLET | Refills: 0 | Status: SHIPPED | OUTPATIENT
Start: 2021-09-13 | End: 2021-10-10 | Stop reason: SDUPTHER

## 2021-09-13 RX ORDER — LAMOTRIGINE 100 MG/1
100 TABLET ORAL DAILY
Qty: 30 TABLET | Refills: 0 | Status: SHIPPED | OUTPATIENT
Start: 2021-09-13 | End: 2021-10-21 | Stop reason: SDUPTHER

## 2021-09-13 RX ORDER — PAROXETINE 30 MG/1
30 TABLET, FILM COATED ORAL DAILY
Qty: 30 TABLET | Refills: 0 | Status: SHIPPED | OUTPATIENT
Start: 2021-09-13 | End: 2021-10-10 | Stop reason: SDUPTHER

## 2021-09-13 RX ORDER — LEVOTHYROXINE SODIUM 0.05 MG/1
50 TABLET ORAL DAILY
Qty: 30 TABLET | Refills: 0 | Status: SHIPPED | OUTPATIENT
Start: 2021-09-13 | End: 2021-10-10 | Stop reason: SDUPTHER
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
## 2021-09-14 ENCOUNTER — OFFICE VISIT (OUTPATIENT)
Dept: FAMILY MEDICINE CLINIC | Facility: CLINIC | Age: 41
End: 2021-09-14

## 2021-09-14 VITALS
TEMPERATURE: 98.2 F | BODY MASS INDEX: 28.63 KG/M2 | HEART RATE: 68 BPM | OXYGEN SATURATION: 98 % | SYSTOLIC BLOOD PRESSURE: 102 MMHG | WEIGHT: 200 LBS | DIASTOLIC BLOOD PRESSURE: 74 MMHG | RESPIRATION RATE: 20 BRPM | HEIGHT: 70 IN

## 2021-09-14 DIAGNOSIS — S03.00XA DISLOCATION OF TEMPOROMANDIBULAR JOINT, INITIAL ENCOUNTER: Primary | ICD-10-CM

## 2021-09-14 DIAGNOSIS — H92.03 EAR PAIN, BILATERAL: ICD-10-CM

## 2021-09-14 DIAGNOSIS — F41.1 GAD (GENERALIZED ANXIETY DISORDER): ICD-10-CM

## 2021-09-14 DIAGNOSIS — F41.0 PANIC ATTACK: ICD-10-CM

## 2021-09-14 DIAGNOSIS — G25.81 RESTLESS LEG SYNDROME: ICD-10-CM

## 2021-09-14 DIAGNOSIS — E55.9 VITAMIN D DEFICIENCY: ICD-10-CM

## 2021-09-14 DIAGNOSIS — K21.00 GASTROESOPHAGEAL REFLUX DISEASE WITH ESOPHAGITIS WITHOUT HEMORRHAGE: ICD-10-CM

## 2021-09-14 DIAGNOSIS — E03.9 HYPOTHYROIDISM, UNSPECIFIED TYPE: ICD-10-CM

## 2021-09-14 DIAGNOSIS — G47.00 INSOMNIA, UNSPECIFIED TYPE: ICD-10-CM

## 2021-09-14 DIAGNOSIS — Z91.018 ALLERGY TO ALPHA-GAL: ICD-10-CM

## 2021-09-14 DIAGNOSIS — F39 MOOD DISORDER (HCC): ICD-10-CM

## 2021-09-14 DIAGNOSIS — R68.84 JAW PAIN: ICD-10-CM

## 2021-09-14 DIAGNOSIS — E66.3 OVERWEIGHT: ICD-10-CM

## 2021-09-14 PROCEDURE — 99214 OFFICE O/P EST MOD 30 MIN: CPT | Performed by: FAMILY MEDICINE

## 2021-09-14 NOTE — PATIENT INSTRUCTIONS
MIDNITE melatonin at [facility]       Call and reschedule appt with behavioral health    Centrum multivitamin     Tylenol as needed for jaw pain     Try jaw exercises at home     Jaw Dislocation    Jaw dislocation is displacement of the joint at the place where the upper jawbone (maxilla) and the lower jawbone (mandible) meet (temporomandibular joint). Soon after the dislocation, the jaw muscles tighten. This makes it difficult to close the mouth normally. This may also cause pain in the jaw.  What are the causes?  Common causes of this condition include:  · A hard, direct hit or injury (trauma) to the jaw.  · Opening the mouth too wide. This can be caused by:  ? Eating.  ? Yawning.  ? Vomiting.  ? Having a dental procedure.  ? Receiving medicine by mouth to make you fall asleep (general anesthetic).  What increases the risk?  You are more likely to develop this condition if you:  · Have dislocated your jaw before.  · Participate in contact sports.  · Have a jaw that is loose or can move beyond the normal range.  What are the signs or symptoms?  Symptoms of this condition vary depending on the severity of the dislocation. Symptoms may include:  · Severe pain.  · Not being able to move your jaw or close your mouth completely.  · Feeling that your teeth do not line up as they usually do when you bite.  · Drooling.  · Difficulty speaking or swallowing.  How is this diagnosed?  This condition is diagnosed based on your medical history and a physical exam. Your health care provider will ask you to move your jaw, and he or she will feel your temporomandibular joints. Your health care provider will also check the inside of your mouth for:  · Breaks (fractures) in your jaw bone.  · Cuts (lacerations).  How is this treated?  The most common treatment for this condition is to have your health care provider move your jaw back into place by hand (manual reduction). After moving your jaw back into place, your health care  provider may apply a bandage that wraps around your head and jaw to stabilize your jaw while it heals. Your health care provider may recommend:  · NSAIDs to reduce pain and swelling.  · Medicines to relax your muscles.  · A neck brace to support your lower jawbone.  Follow these instructions at home:  Managing pain, stiffness, and swelling    · If directed, put ice on the injured area.  ? Put ice in a plastic bag.  ? Place a towel between your skin and the bag.  ? Leave the ice on for 20 minutes, 2-3 times a day.    If you have a brace or bandage:  · Wear the brace or bandage as told by your health care provider. Remove it only as told by your health care provider.  · Keep the brace or bandage clean.  · If the brace or bandage is not waterproof:  ? Do not let it get wet.  ? Cover it with a watertight covering when you take a bath or shower.  Eating and drinking  · Follow instructions from your health care provider about eating or drinking restrictions while your jaw is healing. These may include eating:  ? Soft foods.  ? Liquefied foods.  ? Food that has been cut into small pieces.  Activity  · Do not open your mouth wide until your health care provider says that it is safe for you to do that.  · Rest your jaw as told by your health care provider.  · Avoid activities similar to the one that caused your injury.  · If you need to sneeze or yawn, place a hand under your jaw to prevent it from opening too wide.  General instructions  · Take over-the-counter and prescription medicines only as told by your health care provider.  · Do not take baths, swim, or use a hot tub until your health care provider approves. Ask your health care provider if you may take showers. You may only be allowed to take sponge baths.  · Keep all follow-up visits as told by your health care provider. This is important.  Contact a health care provider if:  · Your pain gets worse or does not get better with medicines.  Get help right away  if:  · Your jaw becomes dislocated again.  · You have difficulty breathing.  Summary  · Jaw dislocation is displacement of the joint at the place where the upper jawbone (maxilla) and the lower jawbone (mandible) meet (temporomandibular joint).  · Jaw dislocation causes tightening of the jaw muscles, difficulty closing your mouth, and pain in your jaw.  · The most common treatment for this condition is to have your health care provider move your jaw back into place by hand (manual reduction).  This information is not intended to replace advice given to you by your health care provider. Make sure you discuss any questions you have with your health care provider.  Document Revised: 10/16/2019 Document Reviewed: 10/16/2019  [employer] Patient Education © 2021 [employer]    Jaw Range of Motion Exercises  Jaw range of motion exercises are exercises that help your jaw move better. Exercises that help you have good posture (postural exercises) also help relieve jaw discomfort. These are often done along with range of motion exercises. These exercises can help prevent or improve:  · Difficulty opening your mouth.  · Pain in your jaw while it is open or closed.  · Temporomandibular joint (TMJ) pain.  · Headache caused by jaw tension.  Take other actions to prevent or relieve jaw pain, such as:  · Avoiding things that cause or increase jaw pain. This may include:  ? Chewing gum or eating hard foods.  ? Clenching your jaw or teeth, grinding your teeth, or keeping tension in your jaw muscles.  ? Opening your mouth wide, such as for a big yawn.  ? Leaning on your jaw, such as resting your jaw in your hand while leaning on a desk.  · Putting ice on your jaw.  ? Put ice in a plastic bag.  ? Place a towel between your skin and the bag.  ? Leave the ice on for 10-15 minutes, 2-3 times a day.  Only do jaw exercises that your health care provider approves of. Only move your jaw as far as it can comfortably go in each direction. Do  "not move your jaw into positions that cause pain.  Range of motion exercises  Repeat each of these exercises 8 times, 1-2 times a day, or as told by your health care provider.  Exercise A: Forward protrusion  1. Push your jaw forward. Hold this position for 1-2 seconds.  2. Allow your jaw to return to its normal position and rest it there for 1-2 seconds.  Exercise B: Controlled opening  1. Stand or sit in front of a mirror. Place your tongue on the roof of your mouth, just behind your top teeth.  2. Keeping your tongue on the roof of your mouth, slowly open and close your mouth.  3. While you open and close your mouth, watch your jaw in the mirror. Try to keep your jaw from moving to one side or the other.  Exercise C: Right and left motion  1. Move your jaw right. Hold this position for 1-2 seconds. Allow your jaw to return to its normal position, and rest it there for 1-2 seconds.  2. Move your jaw left. Hold this position for 1-2 seconds. Allow your jaw to return to its normal position, and rest it there for 1-2 seconds.  Postural exercises  Exercise A: Chin tucks  1. You can do this exercise sitting, standing, or lying down.  2. Move your head straight back, keeping your head level. You can guide the movement by placing your fingers on your chin to push your jaw back in an even motion. You should be able to feel a double chin form at the end of the motion.  3. Hold this position for 5 seconds. Repeat 10-15 times.  Exercise B: Shoulder blade squeeze  1. Sit or stand.  2. Bend your elbows to about 90 degrees, which is the shape of a capital letter \"L.\" Keep your upper arms by your body.  3. Squeeze your shoulder blades down and back, as though you were trying to touch your elbows behind you. Do not shrug your shoulders or move your head.  4. Hold this position for 5 seconds. Repeat 10-15 times.  Exercise C: Chest stretch  1. Stand facing a corner.  2. Put both of your hands and your forearms on the wall, with " your arms wide apart.  3. Make sure your arms are at a 90-degree angle to your body. This means that you should hold your arms straight out from your body, level with the floor.  4. Step in toward the corner. Do not lean in.  5. Hold this position for 30 seconds. Repeat 3 times.  Contact a health care provider if you have:  · Jaw pain that is new or gets worse.  · Clicking or popping sounds while doing the exercises.  Get help right away if:  · Your jaw is stuck in one place and you cannot move it.  · You cannot open or close your mouth.  This information is not intended to replace advice given to you by your health care provider. Make sure you discuss any questions you have with your health care provider.  Document Revised: 04/10/2020 Document Reviewed: 11/14/2018  [employer] Patient Education © 2021 [employer]

## 2021-09-22 DIAGNOSIS — F41.1 GENERALIZED ANXIETY DISORDER: Chronic | ICD-10-CM

## 2021-09-22 RX ORDER — BUSPIRONE HYDROCHLORIDE 15 MG/1
15 TABLET ORAL 3 TIMES DAILY
Qty: 90 TABLET | Refills: 0 | Status: SHIPPED | OUTPATIENT
Start: 2021-09-22 | End: 2021-11-14 | Stop reason: SDUPTHER

## 2021-09-22 NOTE — TELEPHONE ENCOUNTER
Pt was contacted by phone and also sent [employer] message , looks like [facility] at 11:11 am today

## 2021-09-27 RX ORDER — ERGOCALCIFEROL 1.25 MG/1
50000 CAPSULE ORAL WEEKLY
Qty: 4 CAPSULE | Refills: 3 | Status: SHIPPED | OUTPATIENT
Start: 2021-09-27 | End: 2022-01-10 | Stop reason: SDUPTHER

## 2021-09-27 RX ORDER — OMEPRAZOLE 40 MG/1
40 CAPSULE, DELAYED RELEASE ORAL DAILY
Qty: 30 CAPSULE | Refills: 0 | Status: SHIPPED | OUTPATIENT
Start: 2021-09-27 | End: 2021-11-03 | Stop reason: SDUPTHER

## 2021-09-27 NOTE — TELEPHONE ENCOUNTER
Rx Refill Note  Requested Prescriptions     Pending Prescriptions Disp Refills   • vitamin D (ERGOCALCIFEROL) 1.25 MG (66045 UT) capsule capsule 4 capsule 3     Sig: Take 1 capsule by mouth 1 (One) Time Per Week.   • omeprazole (priLOSEC) 40 MG capsule 30 capsule 0     Sig: Take 1 capsule by mouth Daily.      Last office visit with prescribing clinician: 9/14/2021      Next office visit with prescribing clinician: 9/28/2021       {TIP  Please add Last Relevant Lab Date if appropriate  {TIP  Is Refill Pharmacy correct?  Daija Pena MA  09/27/21, 11:09 CDT

## 2021-09-28 ENCOUNTER — OFFICE VISIT (OUTPATIENT)
Dept: FAMILY MEDICINE CLINIC | Facility: CLINIC | Age: 41
End: 2021-09-28

## 2021-09-28 VITALS
DIASTOLIC BLOOD PRESSURE: 70 MMHG | BODY MASS INDEX: 28.27 KG/M2 | HEART RATE: 60 BPM | OXYGEN SATURATION: 98 % | TEMPERATURE: 98.6 F | HEIGHT: 70 IN | RESPIRATION RATE: 20 BRPM | WEIGHT: 197.5 LBS | SYSTOLIC BLOOD PRESSURE: 96 MMHG

## 2021-09-28 DIAGNOSIS — K76.0 FATTY LIVER: ICD-10-CM

## 2021-09-28 DIAGNOSIS — F39 MOOD DISORDER (HCC): ICD-10-CM

## 2021-09-28 DIAGNOSIS — K21.00 GASTROESOPHAGEAL REFLUX DISEASE WITH ESOPHAGITIS WITHOUT HEMORRHAGE: ICD-10-CM

## 2021-09-28 DIAGNOSIS — E66.3 OVERWEIGHT: ICD-10-CM

## 2021-09-28 DIAGNOSIS — G47.00 INSOMNIA, UNSPECIFIED TYPE: ICD-10-CM

## 2021-09-28 DIAGNOSIS — F41.1 GAD (GENERALIZED ANXIETY DISORDER): ICD-10-CM

## 2021-09-28 DIAGNOSIS — E78.2 MIXED HYPERLIPIDEMIA: ICD-10-CM

## 2021-09-28 DIAGNOSIS — E55.9 VITAMIN D DEFICIENCY: ICD-10-CM

## 2021-09-28 DIAGNOSIS — G25.81 RESTLESS LEG SYNDROME: Primary | ICD-10-CM

## 2021-09-28 PROCEDURE — 90471 IMMUNIZATION ADMIN: CPT | Performed by: FAMILY MEDICINE

## 2021-09-28 PROCEDURE — 99214 OFFICE O/P EST MOD 30 MIN: CPT | Performed by: FAMILY MEDICINE

## 2021-09-28 PROCEDURE — 90686 IIV4 VACC NO PRSV 0.5 ML IM: CPT | Performed by: FAMILY MEDICINE

## 2021-09-28 RX ORDER — TRAZODONE HYDROCHLORIDE 50 MG/1
50 TABLET ORAL NIGHTLY
Qty: 30 TABLET | Refills: 3 | Status: SHIPPED | OUTPATIENT
Start: 2021-09-28 | End: 2022-01-10 | Stop reason: SDUPTHER

## 2021-09-28 NOTE — PATIENT INSTRUCTIONS
Do not take vistaril or atarax at bedtime    Take trazodone 50 mg at bedtime instead    -recheck in 6 weeks    Call and make appt with behavioral health     Trazodone tablets  What is this medicine?  TRAZODONE (TRAZ oh done) is used to treat depression.  This medicine may be used for other purposes; ask your health care provider or pharmacist if you have questions.  COMMON BRAND NAME(S): Marifer  What should I tell my health care provider before I take this medicine?  They need to know if you have any of these conditions:  · attempted suicide or thinking about it  · bipolar disorder  · bleeding problems  · glaucoma  · heart disease, or previous heart attack  · irregular heart beat  · kidney or liver disease  · low levels of sodium in the blood  · an unusual or allergic reaction to trazodone, other medicines, foods, dyes or preservatives  · pregnant or trying to get pregnant  · breast-feeding  How should I use this medicine?  Take this medicine by mouth with a glass of water. Follow the directions on the prescription label. Take this medicine shortly after a meal or a light snack. Take your medicine at regular intervals. Do not take your medicine more often than directed. Do not stop taking this medicine suddenly except upon the advice of your doctor. Stopping this medicine too quickly may cause serious side effects or your condition may worsen.  A special MedGuide will be given to you by the pharmacist with each prescription and refill. Be sure to read this information carefully each time.  Talk to your pediatrician regarding the use of this medicine in children. Special care may be needed.  Overdosage: If you think you have taken too much of this medicine contact a poison control center or emergency room at once.  NOTE: This medicine is only for you. Do not share this medicine with others.  What if I miss a dose?  If you miss a dose, take it as soon as you can. If it is almost time for your next dose, take only  that dose. Do not take double or extra doses.  What may interact with this medicine?  Do not take this medicine with any of the following medications:  · certain medicines for fungal infections like fluconazole, itraconazole, ketoconazole, posaconazole, voriconazole  · cisapride  · dronedarone  · linezolid  · MAOIs like Carbex, Eldepryl, Marplan, Nardil, and Parnate  · mesoridazine  · methylene blue (injected into a vein)  · pimozide  · saquinavir  · thioridazine  This medicine may also interact with the following medications:  · alcohol  · antiviral medicines for HIV or AIDS  · aspirin and aspirin-like medicines  · barbiturates like phenobarbital  · certain medicines for blood pressure, heart disease, irregular heart beat  · certain medicines for depression, anxiety, or psychotic disturbances  · certain medicines for migraine headache like almotriptan, eletriptan, frovatriptan, naratriptan, rizatriptan, sumatriptan, zolmitriptan  · certain medicines for seizures like carbamazepine and phenytoin  · certain medicines for sleep  · certain medicines that treat or prevent blood clots like dalteparin, enoxaparin, warfarin  · digoxin  · fentanyl  · lithium  · NSAIDS, medicines for pain and inflammation, like ibuprofen or naproxen  · other medicines that prolong the QT interval (cause an abnormal heart rhythm) like dofetilide  · rasagiline  · supplements like [place]'s wort, kava kava, valerian  · tramadol  · tryptophan  This list may not describe all possible interactions. Give your health care provider a list of all the medicines, herbs, non-prescription drugs, or dietary supplements you use. Also tell them if you smoke, drink alcohol, or use illegal drugs. Some items may interact with your medicine.  What should I watch for while using this medicine?  Tell your doctor if your symptoms do not get better or if they get worse. Visit your doctor or health care professional for regular checks on your progress. Because it  may take several weeks to see the full effects of this medicine, it is important to continue your treatment as prescribed by your doctor.  Patients and their families should watch out for new or worsening thoughts of suicide or depression. Also watch out for sudden changes in feelings such as feeling anxious, agitated, panicky, irritable, hostile, aggressive, impulsive, severely restless, overly excited and hyperactive, or not being able to sleep. If this happens, especially at the beginning of treatment or after a change in dose, call your health care professional.  You may get drowsy or dizzy. Do not drive, use machinery, or do anything that needs mental alertness until you know how this medicine affects you. Do not stand or sit up quickly, especially if you are an older patient. This reduces the risk of dizzy or fainting spells. Alcohol may interfere with the effect of this medicine. Avoid alcoholic drinks.  This medicine may cause dry eyes and blurred vision. If you wear contact lenses you may feel some discomfort. Lubricating drops may help. See your eye doctor if the problem does not go away or is severe.  Your mouth may get dry. Chewing sugarless gum, sucking hard candy and drinking plenty of water may help. Contact your doctor if the problem does not go away or is severe.  What side effects may I notice from receiving this medicine?  Side effects that you should report to your doctor or health care professional as soon as possible:  · allergic reactions like skin rash, itching or hives, swelling of the face, lips, or tongue  · elevated mood, decreased need for sleep, racing thoughts, impulsive behavior  · confusion  · fast, irregular heartbeat  · feeling faint or lightheaded, falls  · feeling agitated, angry, or irritable  · loss of balance or coordination  · painful or prolonged erections  · restlessness, pacing, inability to keep still  · suicidal thoughts or other mood changes  · tremors  · trouble  sleeping  · seizures  · unusual bleeding or bruising  Side effects that usually do not require medical attention (report to your doctor or health care professional if they continue or are bothersome):  · change in sex drive or performance  · change in appetite or weight  · constipation  · headache  · muscle aches or pains  · nausea  This list may not describe all possible side effects. Call your doctor for medical advice about side effects. You may report side effects to FDA at 4-226-YOB-0319.  Where should I keep my medicine?  Keep out of the reach of children.  Store at room temperature between 15 and 30 degrees C (59 to 86 degrees F). Protect from light. Keep container tightly closed. Throw away any unused medicine after the expiration date.  NOTE: This sheet is a summary. It may not cover all possible information. If you have questions about this medicine, talk to your doctor, pharmacist, or health care provider.  © 2021 [employer] (2019-12-10 11:46:46)  Trazodone tablets  What is this medicine?  TRAZODONE (TRAZ oh done) is used to treat depression.  This medicine may be used for other purposes; ask your health care provider or pharmacist if you have questions.  COMMON BRAND NAME(S): Marifer  What should I tell my health care provider before I take this medicine?  They need to know if you have any of these conditions:  · attempted suicide or thinking about it  · bipolar disorder  · bleeding problems  · glaucoma  · heart disease, or previous heart attack  · irregular heart beat  · kidney or liver disease  · low levels of sodium in the blood  · an unusual or allergic reaction to trazodone, other medicines, foods, dyes or preservatives  · pregnant or trying to get pregnant  · breast-feeding  How should I use this medicine?  Take this medicine by mouth with a glass of water. Follow the directions on the prescription label. Take this medicine shortly after a meal or a light snack. Take your medicine at  regular intervals. Do not take your medicine more often than directed. Do not stop taking this medicine suddenly except upon the advice of your doctor. Stopping this medicine too quickly may cause serious side effects or your condition may worsen.  A special MedGuide will be given to you by the pharmacist with each prescription and refill. Be sure to read this information carefully each time.  Talk to your pediatrician regarding the use of this medicine in children. Special care may be needed.  Overdosage: If you think you have taken too much of this medicine contact a poison control center or emergency room at once.  NOTE: This medicine is only for you. Do not share this medicine with others.  What if I miss a dose?  If you miss a dose, take it as soon as you can. If it is almost time for your next dose, take only that dose. Do not take double or extra doses.  What may interact with this medicine?  Do not take this medicine with any of the following medications:  · certain medicines for fungal infections like fluconazole, itraconazole, ketoconazole, posaconazole, voriconazole  · cisapride  · dronedarone  · linezolid  · MAOIs like Carbex, Eldepryl, Marplan, Nardil, and Parnate  · mesoridazine  · methylene blue (injected into a vein)  · pimozide  · saquinavir  · thioridazine  This medicine may also interact with the following medications:  · alcohol  · antiviral medicines for HIV or AIDS  · aspirin and aspirin-like medicines  · barbiturates like phenobarbital  · certain medicines for blood pressure, heart disease, irregular heart beat  · certain medicines for depression, anxiety, or psychotic disturbances  · certain medicines for migraine headache like almotriptan, eletriptan, frovatriptan, naratriptan, rizatriptan, sumatriptan, zolmitriptan  · certain medicines for seizures like carbamazepine and phenytoin  · certain medicines for sleep  · certain medicines that treat or prevent blood clots like dalteparin,  enoxaparin, warfarin  · digoxin  · fentanyl  · lithium  · NSAIDS, medicines for pain and inflammation, like ibuprofen or naproxen  · other medicines that prolong the QT interval (cause an abnormal heart rhythm) like dofetilide  · rasagiline  · supplements like [place]'s wort, kava kava, valerian  · tramadol  · tryptophan  This list may not describe all possible interactions. Give your health care provider a list of all the medicines, herbs, non-prescription drugs, or dietary supplements you use. Also tell them if you smoke, drink alcohol, or use illegal drugs. Some items may interact with your medicine.  What should I watch for while using this medicine?  Tell your doctor if your symptoms do not get better or if they get worse. Visit your doctor or health care professional for regular checks on your progress. Because it may take several weeks to see the full effects of this medicine, it is important to continue your treatment as prescribed by your doctor.  Patients and their families should watch out for new or worsening thoughts of suicide or depression. Also watch out for sudden changes in feelings such as feeling anxious, agitated, panicky, irritable, hostile, aggressive, impulsive, severely restless, overly excited and hyperactive, or not being able to sleep. If this happens, especially at the beginning of treatment or after a change in dose, call your health care professional.  You may get drowsy or dizzy. Do not drive, use machinery, or do anything that needs mental alertness until you know how this medicine affects you. Do not stand or sit up quickly, especially if you are an older patient. This reduces the risk of dizzy or fainting spells. Alcohol may interfere with the effect of this medicine. Avoid alcoholic drinks.  This medicine may cause dry eyes and blurred vision. If you wear contact lenses you may feel some discomfort. Lubricating drops may help. See your eye doctor if the problem does not go away  or is severe.  Your mouth may get dry. Chewing sugarless gum, sucking hard candy and drinking plenty of water may help. Contact your doctor if the problem does not go away or is severe.  What side effects may I notice from receiving this medicine?  Side effects that you should report to your doctor or health care professional as soon as possible:  · allergic reactions like skin rash, itching or hives, swelling of the face, lips, or tongue  · elevated mood, decreased need for sleep, racing thoughts, impulsive behavior  · confusion  · fast, irregular heartbeat  · feeling faint or lightheaded, falls  · feeling agitated, angry, or irritable  · loss of balance or coordination  · painful or prolonged erections  · restlessness, pacing, inability to keep still  · suicidal thoughts or other mood changes  · tremors  · trouble sleeping  · seizures  · unusual bleeding or bruising  Side effects that usually do not require medical attention (report to your doctor or health care professional if they continue or are bothersome):  · change in sex drive or performance  · change in appetite or weight  · constipation  · headache  · muscle aches or pains  · nausea  This list may not describe all possible side effects. Call your doctor for medical advice about side effects. You may report side effects to FDA at 5-990-FDA-4437.  Where should I keep my medicine?  Keep out of the reach of children.  Store at room temperature between 15 and 30 degrees C (59 to 86 degrees F). Protect from light. Keep container tightly closed. Throw away any unused medicine after the expiration date.  NOTE: This sheet is a summary. It may not cover all possible information. If you have questions about this medicine, talk to your doctor, pharmacist, or health care provider.  © 2021 [employer] (2019-12-10 11:46:46)  Trazodone tablets  What is this medicine?  TRAZODONE (TRAZ oh done) is used to treat depression.  This medicine may be used for other  purposes; ask your health care provider or pharmacist if you have questions.  COMMON BRAND NAME(S): Marifer  What should I tell my health care provider before I take this medicine?  They need to know if you have any of these conditions:  · attempted suicide or thinking about it  · bipolar disorder  · bleeding problems  · glaucoma  · heart disease, or previous heart attack  · irregular heart beat  · kidney or liver disease  · low levels of sodium in the blood  · an unusual or allergic reaction to trazodone, other medicines, foods, dyes or preservatives  · pregnant or trying to get pregnant  · breast-feeding  How should I use this medicine?  Take this medicine by mouth with a glass of water. Follow the directions on the prescription label. Take this medicine shortly after a meal or a light snack. Take your medicine at regular intervals. Do not take your medicine more often than directed. Do not stop taking this medicine suddenly except upon the advice of your doctor. Stopping this medicine too quickly may cause serious side effects or your condition may worsen.  A special MedGuide will be given to you by the pharmacist with each prescription and refill. Be sure to read this information carefully each time.  Talk to your pediatrician regarding the use of this medicine in children. Special care may be needed.  Overdosage: If you think you have taken too much of this medicine contact a poison control center or emergency room at once.  NOTE: This medicine is only for you. Do not share this medicine with others.  What if I miss a dose?  If you miss a dose, take it as soon as you can. If it is almost time for your next dose, take only that dose. Do not take double or extra doses.  What may interact with this medicine?  Do not take this medicine with any of the following medications:  · certain medicines for fungal infections like fluconazole, itraconazole, ketoconazole, posaconazole,  voriconazole  · cisapride  · dronedarone  · linezolid  · MAOIs like Carbex, Eldepryl, Marplan, Nardil, and Parnate  · mesoridazine  · methylene blue (injected into a vein)  · pimozide  · saquinavir  · thioridazine  This medicine may also interact with the following medications:  · alcohol  · antiviral medicines for HIV or AIDS  · aspirin and aspirin-like medicines  · barbiturates like phenobarbital  · certain medicines for blood pressure, heart disease, irregular heart beat  · certain medicines for depression, anxiety, or psychotic disturbances  · certain medicines for migraine headache like almotriptan, eletriptan, frovatriptan, naratriptan, rizatriptan, sumatriptan, zolmitriptan  · certain medicines for seizures like carbamazepine and phenytoin  · certain medicines for sleep  · certain medicines that treat or prevent blood clots like dalteparin, enoxaparin, warfarin  · digoxin  · fentanyl  · lithium  · NSAIDS, medicines for pain and inflammation, like ibuprofen or naproxen  · other medicines that prolong the QT interval (cause an abnormal heart rhythm) like dofetilide  · rasagiline  · supplements like [place]'s wort, kava kava, valerian  · tramadol  · tryptophan  This list may not describe all possible interactions. Give your health care provider a list of all the medicines, herbs, non-prescription drugs, or dietary supplements you use. Also tell them if you smoke, drink alcohol, or use illegal drugs. Some items may interact with your medicine.  What should I watch for while using this medicine?  Tell your doctor if your symptoms do not get better or if they get worse. Visit your doctor or health care professional for regular checks on your progress. Because it may take several weeks to see the full effects of this medicine, it is important to continue your treatment as prescribed by your doctor.  Patients and their families should watch out for new or worsening thoughts of suicide or depression. Also watch out  for sudden changes in feelings such as feeling anxious, agitated, panicky, irritable, hostile, aggressive, impulsive, severely restless, overly excited and hyperactive, or not being able to sleep. If this happens, especially at the beginning of treatment or after a change in dose, call your health care professional.  You may get drowsy or dizzy. Do not drive, use machinery, or do anything that needs mental alertness until you know how this medicine affects you. Do not stand or sit up quickly, especially if you are an older patient. This reduces the risk of dizzy or fainting spells. Alcohol may interfere with the effect of this medicine. Avoid alcoholic drinks.  This medicine may cause dry eyes and blurred vision. If you wear contact lenses you may feel some discomfort. Lubricating drops may help. See your eye doctor if the problem does not go away or is severe.  Your mouth may get dry. Chewing sugarless gum, sucking hard candy and drinking plenty of water may help. Contact your doctor if the problem does not go away or is severe.  What side effects may I notice from receiving this medicine?  Side effects that you should report to your doctor or health care professional as soon as possible:  · allergic reactions like skin rash, itching or hives, swelling of the face, lips, or tongue  · elevated mood, decreased need for sleep, racing thoughts, impulsive behavior  · confusion  · fast, irregular heartbeat  · feeling faint or lightheaded, falls  · feeling agitated, angry, or irritable  · loss of balance or coordination  · painful or prolonged erections  · restlessness, pacing, inability to keep still  · suicidal thoughts or other mood changes  · tremors  · trouble sleeping  · seizures  · unusual bleeding or bruising  Side effects that usually do not require medical attention (report to your doctor or health care professional if they continue or are bothersome):  · change in sex drive or performance  · change in appetite  or weight  · constipation  · headache  · muscle aches or pains  · nausea  This list may not describe all possible side effects. Call your doctor for medical advice about side effects. You may report side effects to FDA at 0-213-ZKB-7415.  Where should I keep my medicine?  Keep out of the reach of children.  Store at room temperature between 15 and 30 degrees C (59 to 86 degrees F). Protect from light. Keep container tightly closed. Throw away any unused medicine after the expiration date.  NOTE: This sheet is a summary. It may not cover all possible information. If you have questions about this medicine, talk to your doctor, pharmacist, or health care provider.  © 2021 [employer] (2019-12-10 11:46:46)

## 2021-10-04 RX ORDER — ATORVASTATIN CALCIUM 20 MG/1
20 TABLET, FILM COATED ORAL NIGHTLY
Qty: 30 TABLET | Refills: 3 | Status: SHIPPED | OUTPATIENT
Start: 2021-10-04 | End: 2021-10-05 | Stop reason: SDUPTHER

## 2021-10-05 RX ORDER — ATORVASTATIN CALCIUM 20 MG/1
20 TABLET, FILM COATED ORAL NIGHTLY
Qty: 30 TABLET | Refills: 3 | Status: SHIPPED | OUTPATIENT
Start: 2021-10-05 | End: 2022-01-10 | Stop reason: SDUPTHER

## 2021-10-05 RX ORDER — PNV NO.95/FERROUS FUM/FOLIC AC 28MG-0.8MG
1 TABLET ORAL
Qty: 30 TABLET | Refills: 3 | Status: SHIPPED | OUTPATIENT
Start: 2021-10-05 | End: 2022-01-10 | Stop reason: SDUPTHER

## 2021-10-10 DIAGNOSIS — G25.81 RESTLESS LEG SYNDROME: Chronic | ICD-10-CM

## 2021-10-10 DIAGNOSIS — F41.1 GENERALIZED ANXIETY DISORDER: Chronic | ICD-10-CM

## 2021-10-11 RX ORDER — PAROXETINE 30 MG/1
30 TABLET, FILM COATED ORAL DAILY
Qty: 30 TABLET | Refills: 0 | Status: SHIPPED | OUTPATIENT
Start: 2021-10-11 | End: 2021-10-21 | Stop reason: SDUPTHER

## 2021-10-11 RX ORDER — MONTELUKAST SODIUM 10 MG/1
10 TABLET ORAL NIGHTLY
Qty: 30 TABLET | Refills: 0 | Status: SHIPPED | OUTPATIENT
Start: 2021-10-11 | End: 2021-10-21 | Stop reason: SDUPTHER

## 2021-10-11 RX ORDER — ROPINIROLE 1 MG/1
TABLET, FILM COATED ORAL
Qty: 30 TABLET | Refills: 0 | Status: SHIPPED | OUTPATIENT
Start: 2021-10-11 | End: 2021-10-19

## 2021-10-11 RX ORDER — LEVOTHYROXINE SODIUM 0.05 MG/1
50 TABLET ORAL DAILY
Qty: 30 TABLET | Refills: 0 | Status: SHIPPED | OUTPATIENT
Start: 2021-10-11 | End: 2021-10-21 | Stop reason: SDUPTHER

## 2021-10-18 DIAGNOSIS — G25.81 RESTLESS LEG SYNDROME: Chronic | ICD-10-CM

## 2021-10-19 RX ORDER — ROPINIROLE 1 MG/1
TABLET, FILM COATED ORAL
Qty: 30 TABLET | Refills: 0 | Status: SHIPPED | OUTPATIENT
Start: 2021-10-19 | End: 2021-11-14 | Stop reason: SDUPTHER

## 2021-10-21 DIAGNOSIS — F41.1 GENERALIZED ANXIETY DISORDER: Chronic | ICD-10-CM

## 2021-10-21 DIAGNOSIS — F19.11 HISTORY OF SUBSTANCE ABUSE (HCC): ICD-10-CM

## 2021-10-21 DIAGNOSIS — F31.12 BIPOLAR AFFECTIVE DISORDER, CURRENTLY MANIC, MODERATE (HCC): ICD-10-CM

## 2021-10-21 RX ORDER — LEVOTHYROXINE SODIUM 0.05 MG/1
50 TABLET ORAL DAILY
Qty: 30 TABLET | Refills: 0 | Status: SHIPPED | OUTPATIENT
Start: 2021-10-21 | End: 2021-11-14 | Stop reason: SDUPTHER

## 2021-10-21 RX ORDER — LAMOTRIGINE 100 MG/1
100 TABLET ORAL DAILY
Qty: 30 TABLET | Refills: 0 | Status: SHIPPED | OUTPATIENT
Start: 2021-10-21 | End: 2022-01-10 | Stop reason: SDUPTHER

## 2021-10-21 RX ORDER — MONTELUKAST SODIUM 10 MG/1
10 TABLET ORAL NIGHTLY
Qty: 30 TABLET | Refills: 0 | Status: SHIPPED | OUTPATIENT
Start: 2021-10-21 | End: 2021-11-14 | Stop reason: SDUPTHER

## 2021-10-21 RX ORDER — PAROXETINE 30 MG/1
30 TABLET, FILM COATED ORAL DAILY
Qty: 30 TABLET | Refills: 0 | Status: SHIPPED | OUTPATIENT
Start: 2021-10-21 | End: 2021-11-14 | Stop reason: SDUPTHER

## 2021-11-04 RX ORDER — OMEPRAZOLE 40 MG/1
40 CAPSULE, DELAYED RELEASE ORAL DAILY
Qty: 30 CAPSULE | Refills: 0 | Status: SHIPPED | OUTPATIENT
Start: 2021-11-04 | End: 2021-11-29 | Stop reason: SDUPTHER

## 2021-11-14 DIAGNOSIS — F41.1 GENERALIZED ANXIETY DISORDER: Chronic | ICD-10-CM

## 2021-11-14 DIAGNOSIS — G25.81 RESTLESS LEG SYNDROME: Chronic | ICD-10-CM

## 2021-11-15 RX ORDER — MONTELUKAST SODIUM 10 MG/1
10 TABLET ORAL NIGHTLY
Qty: 30 TABLET | Refills: 3 | Status: SHIPPED | OUTPATIENT
Start: 2021-11-15 | End: 2022-01-10 | Stop reason: SDUPTHER

## 2021-11-15 RX ORDER — PAROXETINE 30 MG/1
30 TABLET, FILM COATED ORAL DAILY
Qty: 90 TABLET | Refills: 0 | Status: SHIPPED | OUTPATIENT
Start: 2021-11-15 | End: 2022-01-10 | Stop reason: SDUPTHER

## 2021-11-15 RX ORDER — ROPINIROLE 1 MG/1
TABLET, FILM COATED ORAL
Qty: 30 TABLET | Refills: 3 | Status: SHIPPED | OUTPATIENT
Start: 2021-11-15 | End: 2022-01-10 | Stop reason: SDUPTHER

## 2021-11-15 RX ORDER — BUSPIRONE HYDROCHLORIDE 15 MG/1
15 TABLET ORAL 3 TIMES DAILY
Qty: 90 TABLET | Refills: 2 | Status: SHIPPED | OUTPATIENT
Start: 2021-11-15 | End: 2022-01-10 | Stop reason: SDUPTHER

## 2021-11-15 RX ORDER — ONDANSETRON HYDROCHLORIDE 8 MG/1
8 TABLET, FILM COATED ORAL EVERY 8 HOURS PRN
Qty: 90 TABLET | Refills: 3 | Status: SHIPPED | OUTPATIENT
Start: 2021-11-15 | End: 2022-01-10 | Stop reason: SDUPTHER

## 2021-11-15 RX ORDER — LEVOTHYROXINE SODIUM 0.05 MG/1
50 TABLET ORAL DAILY
Qty: 30 TABLET | Refills: 3 | Status: SHIPPED | OUTPATIENT
Start: 2021-11-15 | End: 2022-01-10 | Stop reason: SDUPTHER

## 2021-11-29 RX ORDER — OMEPRAZOLE 40 MG/1
40 CAPSULE, DELAYED RELEASE ORAL DAILY
Qty: 30 CAPSULE | Refills: 3 | Status: SHIPPED | OUTPATIENT
Start: 2021-11-29 | End: 2022-01-10 | Stop reason: SDUPTHER

## 2021-12-06 ENCOUNTER — OFFICE VISIT (OUTPATIENT)
Dept: FAMILY MEDICINE CLINIC | Facility: CLINIC | Age: 41
End: 2021-12-06

## 2021-12-06 ENCOUNTER — LAB (OUTPATIENT)
Dept: LAB | Facility: HOSPITAL | Age: 41
End: 2021-12-06

## 2021-12-06 VITALS
HEART RATE: 79 BPM | OXYGEN SATURATION: 98 % | RESPIRATION RATE: 20 BRPM | SYSTOLIC BLOOD PRESSURE: 100 MMHG | BODY MASS INDEX: 27.08 KG/M2 | TEMPERATURE: 98 F | HEIGHT: 70 IN | WEIGHT: 189.13 LBS | DIASTOLIC BLOOD PRESSURE: 74 MMHG

## 2021-12-06 DIAGNOSIS — F41.1 GAD (GENERALIZED ANXIETY DISORDER): Chronic | ICD-10-CM

## 2021-12-06 DIAGNOSIS — R10.12 LUQ PAIN: ICD-10-CM

## 2021-12-06 DIAGNOSIS — G47.00 INSOMNIA, UNSPECIFIED TYPE: Chronic | ICD-10-CM

## 2021-12-06 DIAGNOSIS — R63.4 WEIGHT LOSS: ICD-10-CM

## 2021-12-06 DIAGNOSIS — R10.12 LUQ PAIN: Primary | ICD-10-CM

## 2021-12-06 DIAGNOSIS — F41.0 PANIC ATTACKS: Chronic | ICD-10-CM

## 2021-12-06 DIAGNOSIS — R30.0 DYSURIA: ICD-10-CM

## 2021-12-06 LAB
ALBUMIN SERPL-MCNC: 4.9 G/DL (ref 3.5–5.2)
ALBUMIN/GLOB SERPL: 1.7 G/DL
ALP SERPL-CCNC: 81 U/L (ref 39–117)
ALT SERPL W P-5'-P-CCNC: 19 U/L (ref 1–41)
AMYLASE SERPL-CCNC: 54 U/L (ref 28–100)
ANION GAP SERPL CALCULATED.3IONS-SCNC: 11.8 MMOL/L (ref 5–15)
AST SERPL-CCNC: 18 U/L (ref 1–40)
BACTERIA UR QL AUTO: ABNORMAL /HPF
BILIRUB SERPL-MCNC: 0.9 MG/DL (ref 0–1.2)
BILIRUB UR QL STRIP: NEGATIVE
BUN SERPL-MCNC: 13 MG/DL (ref 6–20)
BUN/CREAT SERPL: 13.1 (ref 7–25)
CALCIUM SPEC-SCNC: 9.7 MG/DL (ref 8.6–10.5)
CHLORIDE SERPL-SCNC: 102 MMOL/L (ref 98–107)
CLARITY UR: CLEAR
CO2 SERPL-SCNC: 27.2 MMOL/L (ref 22–29)
COLOR UR: YELLOW
CREAT SERPL-MCNC: 0.99 MG/DL (ref 0.76–1.27)
DEPRECATED RDW RBC AUTO: 40.8 FL (ref 37–54)
ERYTHROCYTE [DISTWIDTH] IN BLOOD BY AUTOMATED COUNT: 13.2 % (ref 12.3–15.4)
GFR SERPL CREATININE-BSD FRML MDRD: 83 ML/MIN/1.73
GLOBULIN UR ELPH-MCNC: 2.9 GM/DL
GLUCOSE SERPL-MCNC: 105 MG/DL (ref 65–99)
GLUCOSE UR STRIP-MCNC: NEGATIVE MG/DL
HCT VFR BLD AUTO: 49.3 % (ref 37.5–51)
HGB BLD-MCNC: 16.7 G/DL (ref 13–17.7)
HGB UR QL STRIP.AUTO: ABNORMAL
HYALINE CASTS UR QL AUTO: ABNORMAL /LPF
KETONES UR QL STRIP: NEGATIVE
LEUKOCYTE ESTERASE UR QL STRIP.AUTO: NEGATIVE
LIPASE SERPL-CCNC: 35 U/L (ref 13–60)
MCH RBC QN AUTO: 28.9 PG (ref 26.6–33)
MCHC RBC AUTO-ENTMCNC: 33.9 G/DL (ref 31.5–35.7)
MCV RBC AUTO: 85.3 FL (ref 79–97)
NITRITE UR QL STRIP: NEGATIVE
PH UR STRIP.AUTO: 7 [PH] (ref 5–8)
PLATELET # BLD AUTO: 212 10*3/MM3 (ref 140–450)
PMV BLD AUTO: 10.8 FL (ref 6–12)
POTASSIUM SERPL-SCNC: 4.7 MMOL/L (ref 3.5–5.2)
PROT SERPL-MCNC: 7.8 G/DL (ref 6–8.5)
PROT UR QL STRIP: NEGATIVE
RBC # BLD AUTO: 5.78 10*6/MM3 (ref 4.14–5.8)
RBC # UR STRIP: ABNORMAL /HPF
REF LAB TEST METHOD: ABNORMAL
SODIUM SERPL-SCNC: 141 MMOL/L (ref 136–145)
SP GR UR STRIP: 1.01 (ref 1–1.03)
SQUAMOUS #/AREA URNS HPF: ABNORMAL /HPF
UROBILINOGEN UR QL STRIP: ABNORMAL
WBC # UR STRIP: ABNORMAL /HPF
WBC NRBC COR # BLD: 6.89 10*3/MM3 (ref 3.4–10.8)

## 2021-12-06 PROCEDURE — 82150 ASSAY OF AMYLASE: CPT

## 2021-12-06 PROCEDURE — 87661 TRICHOMONAS VAGINALIS AMPLIF: CPT | Performed by: NURSE PRACTITIONER

## 2021-12-06 PROCEDURE — 87591 N.GONORRHOEAE DNA AMP PROB: CPT | Performed by: NURSE PRACTITIONER

## 2021-12-06 PROCEDURE — 87491 CHLMYD TRACH DNA AMP PROBE: CPT | Performed by: NURSE PRACTITIONER

## 2021-12-06 PROCEDURE — 99215 OFFICE O/P EST HI 40 MIN: CPT | Performed by: NURSE PRACTITIONER

## 2021-12-06 PROCEDURE — 85027 COMPLETE CBC AUTOMATED: CPT

## 2021-12-06 PROCEDURE — 81001 URINALYSIS AUTO W/SCOPE: CPT | Performed by: NURSE PRACTITIONER

## 2021-12-06 PROCEDURE — 83690 ASSAY OF LIPASE: CPT

## 2021-12-06 PROCEDURE — 80053 COMPREHEN METABOLIC PANEL: CPT

## 2021-12-06 NOTE — PROGRESS NOTES
"Chief Complaint  stomach pains x yesterday    Subjective          Jordin Parham presents to [facility] - [facility]    PCP: Dr. Amezquita    CC: \"nausea, stomach ache; anxiety\"    Has two main concerns today:     1.  Stomach pain, mostly LUQ, has been long standing, but seems to be getting worse.  Hx of H. Pylori in 2019 and received treatment.  Also previous hx of alpha gal.  Last had upper/lower scope in 2019 with noted mildly severe esophagitis and internal/external hemorrhoids.  Biopsies at that time were negative.  He was found to have antibodies to H. Pylori on blood work and allergy to beef on alpha gal panel.  Concerned about changes in stool, reports they are no longer solid and worsening LUQ pain.  Has nausea regularly.  Feels hungry all the time, reports pain is worse on empty stomach.  Doesn't feel he should be losing weight and he is eating regularly.  Also care anywhere note from Dr. Caal in 2020, but no other subsequent gastro f/u has been noted and patient believes that was the last time he was seen.  Had labs in August that were essentially WNL.  Has not had CT abdomen or US performed since 2019--showed fatty liver, renal cysts at that time. Denies NSAID, alcohol, tobacco or excessive caffeine use. Currently on Omeprazole daily and Zofran PRN.     2.  Hx of anxiety, panic attacks and insomnia.  Currently on several medications, but anxiety is not well controlled.  Has missed several appointments with behavioral health as he reports he often forgets to log in to his [facility] for appointments.  Requesting to have in person appointments as he thinks he will remember these better.  Seen by [facility] years ago for anger management.  Never seen at [facility], agreeable to go to either one.  Reports he is having increased work and family stress.  Continues to xavier insomnia as well, despite being on several " "medications to help.       Review of Systems   Constitutional: Positive for unexpected weight change (down 8 pounds since September). Negative for appetite change and fatigue.   HENT: Negative.    Eyes: Negative.    Respiratory: Negative.    Cardiovascular: Negative.    Gastrointestinal: Positive for abdominal pain (LUQ) and nausea (has Zofran, normally takes at least once daily). Negative for blood in stool and constipation. Diarrhea:  reports stools are never solid, always loose.   Genitourinary: Positive for dysuria and frequency. Negative for decreased urine volume, penile discharge, penile pain and urgency.        Has possible concern for STD, would like to be checked     Musculoskeletal: Negative.    Skin: Negative.    Neurological: Negative.    Psychiatric/Behavioral: Positive for decreased concentration and sleep disturbance. Negative for hallucinations, self-injury and suicidal ideas. The patient is nervous/anxious.         +stress        Objective   Vital Signs:   /74 (BP Location: Left arm, Patient Position: Sitting, Cuff Size: Large Adult)   Pulse 79   Temp 98 °F (36.7 °C) (Temporal)   Resp 20   Ht 177.8 cm (70\")   Wt 85.8 kg (189 lb 2 oz)   SpO2 98%   BMI 27.14 kg/m²       Physical Exam  Vitals and nursing note reviewed.   Constitutional:       General: He is in acute distress (anxious).      Appearance: Normal appearance. He is not ill-appearing.   HENT:      Head: Normocephalic and atraumatic.      Mouth/Throat:      Mouth: Mucous membranes are moist.      Pharynx: Oropharynx is clear. No oropharyngeal exudate or posterior oropharyngeal erythema.   Cardiovascular:      Rate and Rhythm: Normal rate and regular rhythm.   Pulmonary:      Effort: Pulmonary effort is normal. No respiratory distress.      Breath sounds: Normal breath sounds. No wheezing, rhonchi or rales.   Abdominal:      General: Bowel sounds are normal.      Palpations: Abdomen is soft.      Tenderness: There is abdominal " tenderness (LLQ). There is no right CVA tenderness, left CVA tenderness, guarding or rebound.   Musculoskeletal:      Cervical back: Neck supple.   Skin:     General: Skin is warm and dry.   Neurological:      General: No focal deficit present.      Mental Status: He is alert and oriented to person, place, and time.   Psychiatric:         Mood and Affect: Mood is anxious.         Speech: Speech normal.         Behavior: Behavior is cooperative.         Thought Content: Thought content normal.         Cognition and Memory: Cognition normal.      Comments: Very anxious, constantly shaking legs during OV          Result Review :     Common labs    Common Labsle 1/28/21 1/28/21 4/20/21 4/20/21 4/20/21 8/13/21 8/13/21 8/13/21    1412 1412 0952 0952 0952 1205 1205 1205   Glucose  76  95  87     BUN  12  15  9     Creatinine  1.11  0.99  0.93     eGFR Non  Am  73  83  90     Sodium  138  138  139     Potassium  4.6  5.1  4.3     Chloride  102  100  103     Calcium  9.2  9.4  9.1     Albumin  4.60  4.70  4.70     Total Bilirubin  0.7  0.6  0.5     Alkaline Phosphatase  79  89  90     AST (SGOT)  18  19  20     ALT (SGPT)  17  13  18     WBC 7.10  6.46     7.34   Hemoglobin 17.2  17.0     15.3   Hematocrit 49.7  50.5     46.3   Platelets 227  249     212   Total Cholesterol     254 (A)  143    Triglycerides     200 (A)  74    HDL Cholesterol     43  50    LDL Cholesterol      174 (A)  78    (A) Abnormal value                      Assessment and Plan    Diagnoses and all orders for this visit:    1. LUQ pain (Primary)  -     US Abdomen Complete; Future  -     CBC No Differential; Future  -     Comprehensive metabolic panel; Future  -     Amylase; Future  -     Lipase; Future    2. Dysuria  -     Urinalysis With Culture If Indicated - Urine, Clean Catch  -     Cancel: Chlamydia trachomatis, Neisseria gonorrhoeae, Trichomonas vaginalis, PCR - Urine, Urine, Clean Catch  -     Chlamydia trachomatis, Neisseria gonorrhoeae,  Trichomonas vaginalis, PCR - Urine, Urine, Clean Catch    3. Weight loss  -     US Abdomen Complete; Future  -     CBC No Differential; Future  -     Comprehensive metabolic panel; Future  -     Amylase; Future  -     Lipase; Future    4. Panic attacks  -     Ambulatory Referral to Behavioral Health    5. Insomnia, unspecified type  -     Ambulatory Referral to Behavioral Health    6. COOKIE (generalized anxiety disorder)  -     Ambulatory Referral to Behavioral Health      No changes in medications at this time--continue with Omeprazole, Zofran PRN  Repeat above labs, referral for abdominal US placed--will f/u with results when available  If labs, US normal, would recommend f/u with [facility]--may need new referral     Referral to behavioral health for in person visits--[place] or [place]  Continue with current medications for now  No homicidal/suicidal ideations voiced    STD screening as above as patient has some concern    Declines need for RTW note    See PCP if symptoms persist/worsen  See PCP for routine f/u visit and management of chronic medical conditions      This document has been electronically signed by YULIET Peguero on December 6, 2021 09:57 CST,.    I spent 41 minutes caring for Jordin on this date of service. This time includes time spent by me in the following activities:preparing for the visit, reviewing tests, obtaining and/or reviewing a separately obtained history, performing a medically appropriate examination and/or evaluation , counseling and educating the patient/family/caregiver, ordering medications, tests, or procedures, referring and communicating with other health care professionals  and documenting information in the medical record

## 2021-12-07 LAB
C TRACH RRNA CVX QL NAA+PROBE: NEGATIVE
N GONORRHOEA RRNA SPEC QL NAA+PROBE: NEGATIVE

## 2021-12-16 ENCOUNTER — TELEPHONE (OUTPATIENT)
Dept: FAMILY MEDICINE CLINIC | Facility: CLINIC | Age: 41
End: 2021-12-16

## 2021-12-16 NOTE — TELEPHONE ENCOUNTER
US abdomen dated 12-:    Liver and gallbladder are WNL.    There are renal cysts noted bilaterally.     The spleen has some calcified granulomas (benign finding), but otherwise WNL.      Does he want to see urology for renal cysts?

## 2021-12-20 DIAGNOSIS — R63.4 WEIGHT LOSS: ICD-10-CM

## 2021-12-20 DIAGNOSIS — R10.12 LUQ PAIN: ICD-10-CM

## 2022-01-10 DIAGNOSIS — F41.1 GENERALIZED ANXIETY DISORDER: Chronic | ICD-10-CM

## 2022-01-10 DIAGNOSIS — G25.81 RESTLESS LEG SYNDROME: Chronic | ICD-10-CM

## 2022-01-10 DIAGNOSIS — F31.12 BIPOLAR AFFECTIVE DISORDER, CURRENTLY MANIC, MODERATE: ICD-10-CM

## 2022-01-10 DIAGNOSIS — F19.11 HISTORY OF SUBSTANCE ABUSE: ICD-10-CM

## 2022-01-11 RX ORDER — RISPERIDONE 1 MG/1
1 TABLET ORAL NIGHTLY
Qty: 30 TABLET | Refills: 0 | Status: SHIPPED | OUTPATIENT
Start: 2022-01-11 | End: 2022-02-11 | Stop reason: SDUPTHER

## 2022-01-11 RX ORDER — OMEPRAZOLE 40 MG/1
40 CAPSULE, DELAYED RELEASE ORAL DAILY
Qty: 30 CAPSULE | Refills: 3 | Status: SHIPPED | OUTPATIENT
Start: 2022-01-11 | End: 2022-02-04 | Stop reason: SDUPTHER

## 2022-01-11 RX ORDER — LAMOTRIGINE 100 MG/1
100 TABLET ORAL DAILY
Qty: 30 TABLET | Refills: 0 | Status: SHIPPED | OUTPATIENT
Start: 2022-01-11 | End: 2022-06-17

## 2022-01-11 RX ORDER — PNV NO.95/FERROUS FUM/FOLIC AC 28MG-0.8MG
1 TABLET ORAL
Qty: 30 TABLET | Refills: 3 | Status: SHIPPED | OUTPATIENT
Start: 2022-01-11 | End: 2022-02-04 | Stop reason: SDUPTHER

## 2022-01-11 RX ORDER — ATORVASTATIN CALCIUM 20 MG/1
20 TABLET, FILM COATED ORAL NIGHTLY
Qty: 30 TABLET | Refills: 3 | Status: SHIPPED | OUTPATIENT
Start: 2022-01-11 | End: 2022-02-04 | Stop reason: SDUPTHER

## 2022-01-11 RX ORDER — TRAZODONE HYDROCHLORIDE 50 MG/1
50 TABLET ORAL NIGHTLY
Qty: 30 TABLET | Refills: 3 | Status: SHIPPED | OUTPATIENT
Start: 2022-01-11 | End: 2022-02-04 | Stop reason: SDUPTHER

## 2022-01-11 RX ORDER — BUSPIRONE HYDROCHLORIDE 15 MG/1
15 TABLET ORAL 3 TIMES DAILY
Qty: 90 TABLET | Refills: 0 | Status: SHIPPED | OUTPATIENT
Start: 2022-01-11 | End: 2022-06-17

## 2022-01-11 RX ORDER — LEVOTHYROXINE SODIUM 0.05 MG/1
50 TABLET ORAL DAILY
Qty: 30 TABLET | Refills: 3 | Status: SHIPPED | OUTPATIENT
Start: 2022-01-11 | End: 2022-02-04 | Stop reason: SDUPTHER

## 2022-01-11 RX ORDER — CHOLECALCIFEROL (VITAMIN D3) 125 MCG
500 CAPSULE ORAL DAILY
Qty: 30 TABLET | Refills: 3 | Status: SHIPPED | OUTPATIENT
Start: 2022-01-11 | End: 2022-02-04 | Stop reason: SDUPTHER

## 2022-01-11 RX ORDER — ONDANSETRON HYDROCHLORIDE 8 MG/1
8 TABLET, FILM COATED ORAL EVERY 8 HOURS PRN
Qty: 90 TABLET | Refills: 3 | Status: SHIPPED | OUTPATIENT
Start: 2022-01-11 | End: 2022-02-04 | Stop reason: SDUPTHER

## 2022-01-11 RX ORDER — ROPINIROLE 1 MG/1
TABLET, FILM COATED ORAL
Qty: 30 TABLET | Refills: 3 | Status: SHIPPED | OUTPATIENT
Start: 2022-01-11 | End: 2022-02-04 | Stop reason: SDUPTHER

## 2022-01-11 RX ORDER — HYDROXYZINE HYDROCHLORIDE 25 MG/1
25 TABLET, FILM COATED ORAL 3 TIMES DAILY PRN
Qty: 90 TABLET | Refills: 0 | Status: SHIPPED | OUTPATIENT
Start: 2022-01-11 | End: 2022-06-17

## 2022-01-11 RX ORDER — MONTELUKAST SODIUM 10 MG/1
10 TABLET ORAL NIGHTLY
Qty: 30 TABLET | Refills: 3 | Status: SHIPPED | OUTPATIENT
Start: 2022-01-11 | End: 2022-02-04 | Stop reason: SDUPTHER

## 2022-01-11 RX ORDER — ERGOCALCIFEROL 1.25 MG/1
50000 CAPSULE ORAL WEEKLY
Qty: 4 CAPSULE | Refills: 3 | Status: SHIPPED | OUTPATIENT
Start: 2022-01-11 | End: 2022-02-04 | Stop reason: SDUPTHER

## 2022-01-11 RX ORDER — PAROXETINE 30 MG/1
30 TABLET, FILM COATED ORAL DAILY
Qty: 30 TABLET | Refills: 0 | Status: SHIPPED | OUTPATIENT
Start: 2022-01-11 | End: 2022-06-17

## 2022-01-11 NOTE — TELEPHONE ENCOUNTER
Pt needed to follow-up in person with someone as I have not seen him since Aug. He always had connection issues or the time difference.

## 2022-02-04 DIAGNOSIS — F41.1 GENERALIZED ANXIETY DISORDER: Chronic | ICD-10-CM

## 2022-02-04 DIAGNOSIS — G25.81 RESTLESS LEG SYNDROME: Chronic | ICD-10-CM

## 2022-02-04 DIAGNOSIS — F31.12 BIPOLAR AFFECTIVE DISORDER, CURRENTLY MANIC, MODERATE: ICD-10-CM

## 2022-02-04 DIAGNOSIS — F19.11 HISTORY OF SUBSTANCE ABUSE: ICD-10-CM

## 2022-02-04 RX ORDER — PNV NO.95/FERROUS FUM/FOLIC AC 28MG-0.8MG
1 TABLET ORAL
Qty: 30 TABLET | Refills: 3 | Status: SHIPPED | OUTPATIENT
Start: 2022-02-04 | End: 2022-03-27 | Stop reason: SDUPTHER

## 2022-02-04 RX ORDER — MONTELUKAST SODIUM 10 MG/1
10 TABLET ORAL NIGHTLY
Qty: 30 TABLET | Refills: 3 | Status: SHIPPED | OUTPATIENT
Start: 2022-02-04 | End: 2022-03-27 | Stop reason: SDUPTHER

## 2022-02-04 RX ORDER — TRAZODONE HYDROCHLORIDE 50 MG/1
50 TABLET ORAL NIGHTLY
Qty: 30 TABLET | Refills: 3 | Status: SHIPPED | OUTPATIENT
Start: 2022-02-04 | End: 2022-03-27 | Stop reason: SDUPTHER

## 2022-02-04 RX ORDER — FLUTICASONE PROPIONATE 50 MCG
2 SPRAY, SUSPENSION (ML) NASAL DAILY
Qty: 16 G | Refills: 3 | Status: SHIPPED | OUTPATIENT
Start: 2022-02-04 | End: 2022-05-10 | Stop reason: SDUPTHER

## 2022-02-04 RX ORDER — ONDANSETRON HYDROCHLORIDE 8 MG/1
8 TABLET, FILM COATED ORAL EVERY 8 HOURS PRN
Qty: 90 TABLET | Refills: 3 | Status: SHIPPED | OUTPATIENT
Start: 2022-02-04 | End: 2022-03-27 | Stop reason: SDUPTHER

## 2022-02-04 RX ORDER — LEVOTHYROXINE SODIUM 0.05 MG/1
50 TABLET ORAL DAILY
Qty: 30 TABLET | Refills: 3 | Status: SHIPPED | OUTPATIENT
Start: 2022-02-04 | End: 2022-02-11 | Stop reason: SDUPTHER

## 2022-02-04 RX ORDER — ATORVASTATIN CALCIUM 20 MG/1
20 TABLET, FILM COATED ORAL NIGHTLY
Qty: 30 TABLET | Refills: 3 | Status: SHIPPED | OUTPATIENT
Start: 2022-02-04 | End: 2022-03-27 | Stop reason: SDUPTHER

## 2022-02-04 RX ORDER — OMEPRAZOLE 40 MG/1
40 CAPSULE, DELAYED RELEASE ORAL DAILY
Qty: 30 CAPSULE | Refills: 3 | Status: SHIPPED | OUTPATIENT
Start: 2022-02-04 | End: 2022-03-27 | Stop reason: SDUPTHER

## 2022-02-04 RX ORDER — ROPINIROLE 1 MG/1
TABLET, FILM COATED ORAL
Qty: 30 TABLET | Refills: 3 | Status: SHIPPED | OUTPATIENT
Start: 2022-02-04 | End: 2022-03-27 | Stop reason: SDUPTHER

## 2022-02-04 RX ORDER — ERGOCALCIFEROL 1.25 MG/1
50000 CAPSULE ORAL WEEKLY
Qty: 4 CAPSULE | Refills: 3 | Status: SHIPPED | OUTPATIENT
Start: 2022-02-04 | End: 2022-03-27 | Stop reason: SDUPTHER

## 2022-02-04 RX ORDER — CHOLECALCIFEROL (VITAMIN D3) 125 MCG
500 CAPSULE ORAL DAILY
Qty: 30 TABLET | Refills: 3 | Status: SHIPPED | OUTPATIENT
Start: 2022-02-04 | End: 2022-03-27 | Stop reason: SDUPTHER

## 2022-02-07 RX ORDER — HYDROXYZINE HYDROCHLORIDE 25 MG/1
25 TABLET, FILM COATED ORAL 3 TIMES DAILY PRN
Qty: 90 TABLET | Refills: 0 | OUTPATIENT
Start: 2022-02-07

## 2022-02-07 RX ORDER — BUSPIRONE HYDROCHLORIDE 15 MG/1
15 TABLET ORAL 3 TIMES DAILY
Qty: 90 TABLET | Refills: 0 | OUTPATIENT
Start: 2022-02-07

## 2022-02-07 RX ORDER — PAROXETINE 30 MG/1
30 TABLET, FILM COATED ORAL DAILY
Qty: 30 TABLET | Refills: 0 | OUTPATIENT
Start: 2022-02-07 | End: 2022-03-09

## 2022-02-07 RX ORDER — LAMOTRIGINE 100 MG/1
100 TABLET ORAL DAILY
Qty: 30 TABLET | Refills: 0 | OUTPATIENT
Start: 2022-02-07

## 2022-02-07 RX ORDER — RISPERIDONE 1 MG/1
1 TABLET ORAL NIGHTLY
Qty: 30 TABLET | Refills: 0 | OUTPATIENT
Start: 2022-02-07

## 2022-02-07 NOTE — TELEPHONE ENCOUNTER
Called patient and LVM cancelling appointment and info regarding him not being rescheduled with this office.  Apologies about scheduling

## 2022-02-07 NOTE — TELEPHONE ENCOUNTER
Patient hasn't been seen since August with multiple no shows as well as poor connection and issues with time zone difference. See previous note that he was supposed to be scheduled in person. Also see previous FYI.

## 2022-02-07 NOTE — TELEPHONE ENCOUNTER
This patient was not supposed to be scheduled due to poor connection and not been seen since Aug. See FYI.

## 2022-02-11 RX ORDER — RISPERIDONE 1 MG/1
1 TABLET ORAL NIGHTLY
Qty: 30 TABLET | Refills: 1 | Status: SHIPPED | OUTPATIENT
Start: 2022-02-11 | End: 2022-03-27 | Stop reason: SDUPTHER

## 2022-02-11 RX ORDER — LEVOTHYROXINE SODIUM 0.05 MG/1
50 TABLET ORAL DAILY
Qty: 30 TABLET | Refills: 1 | Status: SHIPPED | OUTPATIENT
Start: 2022-02-11 | End: 2022-03-22 | Stop reason: SDUPTHER

## 2022-02-11 NOTE — TELEPHONE ENCOUNTER
Incoming Refill Request  {Tip  DIRECTIONS Type Rx details below. Pend Rx from patient's med list if dosage and other details match request. Jump to Medication Section:23      Medication requested (name and dose): risperiDONE (RisperDAL) 1 MG tablet,  levothyroxine (Synthroid) 50 MCG tablet,    Pharmacy where request should be sent: [facility]    Additional details provided by patient: NO    Best call back number:   [phone]    Does the patient have less than a 3 day supply:  [x] Yes  [] No    Karen Briceno  02/11/22, 10:16 CST

## 2022-03-22 ENCOUNTER — OFFICE VISIT (OUTPATIENT)
Dept: FAMILY MEDICINE CLINIC | Facility: CLINIC | Age: 42
End: 2022-03-22

## 2022-03-22 ENCOUNTER — LAB (OUTPATIENT)
Dept: LAB | Facility: HOSPITAL | Age: 42
End: 2022-03-22

## 2022-03-22 ENCOUNTER — TELEPHONE (OUTPATIENT)
Dept: FAMILY MEDICINE CLINIC | Facility: CLINIC | Age: 42
End: 2022-03-22

## 2022-03-22 VITALS
OXYGEN SATURATION: 98 % | TEMPERATURE: 97.5 F | DIASTOLIC BLOOD PRESSURE: 80 MMHG | BODY MASS INDEX: 29.52 KG/M2 | WEIGHT: 206.2 LBS | SYSTOLIC BLOOD PRESSURE: 120 MMHG | HEART RATE: 55 BPM | HEIGHT: 70 IN

## 2022-03-22 DIAGNOSIS — R20.0 NUMBNESS OF TOES: ICD-10-CM

## 2022-03-22 DIAGNOSIS — Z11.3 ROUTINE SCREENING FOR STI (SEXUALLY TRANSMITTED INFECTION): ICD-10-CM

## 2022-03-22 DIAGNOSIS — N50.82 SCROTAL PAIN: Primary | ICD-10-CM

## 2022-03-22 DIAGNOSIS — E03.9 HYPOTHYROIDISM, UNSPECIFIED TYPE: ICD-10-CM

## 2022-03-22 DIAGNOSIS — E78.2 MIXED HYPERLIPIDEMIA: ICD-10-CM

## 2022-03-22 LAB
ALBUMIN SERPL-MCNC: 4.7 G/DL (ref 3.5–5.2)
ALBUMIN/GLOB SERPL: 2.1 G/DL
ALP SERPL-CCNC: 69 U/L (ref 39–117)
ALT SERPL W P-5'-P-CCNC: 19 U/L (ref 1–41)
ANION GAP SERPL CALCULATED.3IONS-SCNC: 7 MMOL/L (ref 5–15)
AST SERPL-CCNC: 15 U/L (ref 1–40)
BILIRUB BLD-MCNC: NEGATIVE MG/DL
BILIRUB SERPL-MCNC: 0.8 MG/DL (ref 0–1.2)
BUN SERPL-MCNC: 10 MG/DL (ref 6–20)
BUN/CREAT SERPL: 9.9 (ref 7–25)
CALCIUM SPEC-SCNC: 9.2 MG/DL (ref 8.6–10.5)
CHLORIDE SERPL-SCNC: 103 MMOL/L (ref 98–107)
CLARITY, POC: ABNORMAL
CO2 SERPL-SCNC: 28 MMOL/L (ref 22–29)
COLOR UR: ABNORMAL
CREAT SERPL-MCNC: 1.01 MG/DL (ref 0.76–1.27)
EGFRCR SERPLBLD CKD-EPI 2021: 95.2 ML/MIN/1.73
EXPIRATION DATE: ABNORMAL
GLOBULIN UR ELPH-MCNC: 2.2 GM/DL
GLUCOSE BLDC GLUCOMTR-MCNC: 108 MG/DL (ref 70–130)
GLUCOSE SERPL-MCNC: 97 MG/DL (ref 65–99)
GLUCOSE UR STRIP-MCNC: NEGATIVE MG/DL
HAV IGM SERPL QL IA: NORMAL
HBA1C MFR BLD: 5.5 %
HBV CORE IGM SERPL QL IA: NORMAL
HBV SURFACE AG SERPL QL IA: NORMAL
HCV AB SER DONR QL: NORMAL
HIV1+2 AB SER QL: NORMAL
KETONES UR QL: NEGATIVE
LEUKOCYTE EST, POC: NEGATIVE
Lab: ABNORMAL
NITRITE UR-MCNC: NEGATIVE MG/ML
PH UR: 8 [PH] (ref 5–8)
POTASSIUM SERPL-SCNC: 4.5 MMOL/L (ref 3.5–5.2)
PROT SERPL-MCNC: 6.9 G/DL (ref 6–8.5)
PROT UR STRIP-MCNC: ABNORMAL MG/DL
RBC # UR STRIP: NEGATIVE /UL
RPR SER QL: NORMAL
SODIUM SERPL-SCNC: 138 MMOL/L (ref 136–145)
SP GR UR: 1.01 (ref 1–1.03)
TSH SERPL DL<=0.05 MIU/L-ACNC: 1.41 UIU/ML (ref 0.27–4.2)
UROBILINOGEN UR QL: NORMAL

## 2022-03-22 PROCEDURE — 80053 COMPREHEN METABOLIC PANEL: CPT

## 2022-03-22 PROCEDURE — 84443 ASSAY THYROID STIM HORMONE: CPT

## 2022-03-22 PROCEDURE — 99214 OFFICE O/P EST MOD 30 MIN: CPT | Performed by: NURSE PRACTITIONER

## 2022-03-22 PROCEDURE — 82962 GLUCOSE BLOOD TEST: CPT | Performed by: NURSE PRACTITIONER

## 2022-03-22 PROCEDURE — 81003 URINALYSIS AUTO W/O SCOPE: CPT | Performed by: NURSE PRACTITIONER

## 2022-03-22 PROCEDURE — 80074 ACUTE HEPATITIS PANEL: CPT

## 2022-03-22 PROCEDURE — 86592 SYPHILIS TEST NON-TREP QUAL: CPT

## 2022-03-22 PROCEDURE — G0432 EIA HIV-1/HIV-2 SCREEN: HCPCS

## 2022-03-22 PROCEDURE — 83036 HEMOGLOBIN GLYCOSYLATED A1C: CPT | Performed by: NURSE PRACTITIONER

## 2022-03-22 PROCEDURE — 87591 N.GONORRHOEAE DNA AMP PROB: CPT

## 2022-03-22 PROCEDURE — 87661 TRICHOMONAS VAGINALIS AMPLIF: CPT

## 2022-03-22 PROCEDURE — 87491 CHLMYD TRACH DNA AMP PROBE: CPT

## 2022-03-22 RX ORDER — LEVOTHYROXINE SODIUM 0.05 MG/1
50 TABLET ORAL
Qty: 90 TABLET | Refills: 0 | Status: SHIPPED | OUTPATIENT
Start: 2022-03-22 | End: 2022-05-10 | Stop reason: SDUPTHER

## 2022-03-22 NOTE — PROGRESS NOTES
"Chief Complaint  Toe Pain, numb toes, and Groin Pain    Subjective    History of Present Illness {CC  Problem List  Visit  Diagnosis   Encounters  Notes  Medications  Labs  Result Review Imaging  Media :23}     Jordin Parham presents to [facility] - [place] for   C/o left great toe \"goes numb at night and is cold\" x 1-2 months; also reports scrotal pain x 3-4 weeks - reports right testicle seems swollen but denies discoloration. Pt reports he is concerned he has diabetes and would like his A1c checked - last ate yesterday. Review of records indicates pt was seen in walk in clinic on 12/6/21 and US abdomen complete was ordered d/t LUQ pain and weight loss - result noted simple renal cysts and mild prominence left renal collecting system - pt was informed via [employer] with noted that pt viewed result note - referral to urology was recommended but pt never responded to message - pt is indicating that he is agreeable to seeing urology. Denies dysuria, decreased urination or painful urination. Voices no other c/o or concerns today.        Objective     Physical Exam  Vitals and nursing note reviewed.   Constitutional:       General: He is not in acute distress.     Appearance: Normal appearance. He is not ill-appearing.   HENT:      Head: Normocephalic and atraumatic.   Eyes:      Conjunctiva/sclera: Conjunctivae normal.      Pupils: Pupils are equal, round, and reactive to light.   Cardiovascular:      Rate and Rhythm: Normal rate and regular rhythm.      Heart sounds: Normal heart sounds.   Pulmonary:      Effort: Pulmonary effort is normal.      Breath sounds: Normal breath sounds.   Abdominal:      General: There is no distension.      Palpations: Abdomen is soft. There is no mass.      Tenderness: There is no abdominal tenderness. There is no right CVA tenderness, left CVA tenderness, guarding or rebound.      Hernia: No hernia is present. "   Genitourinary:     Testes:         Right: Tenderness present.         Left: Tenderness and swelling present.      Comments: No palpable groin tenderness  Musculoskeletal:         General: Tenderness (left great toe) present. No swelling or deformity. Normal range of motion.      Cervical back: Normal range of motion and neck supple.      Comments: No abnormalities seen of left great toe on exam   Skin:     General: Skin is warm and dry.      Findings: No bruising or erythema.   Neurological:      General: No focal deficit present.      Mental Status: He is alert and oriented to person, place, and time.   Psychiatric:         Mood and Affect: Mood normal.         Behavior: Behavior normal.        Result Review  Data Reviewed:{ Labs  Result Review  Imaging  Med Tab  Media :23}   The following data was reviewed by (Optional):[id number]}  Common labs    Common Labsle 8/13/21 8/13/21 8/13/21 12/6/21 12/6/21 3/22/22 3/22/22    1205 1205 1205 0910 0910 1026 1043   Glucose 87    105 (A)  97   BUN 9    13  10   Creatinine 0.93    0.99  1.01   eGFR Non African Am 90    83     Sodium 139    141  138   Potassium 4.3    4.7  4.5   Chloride 103    102  103   Calcium 9.1    9.7  9.2   Albumin 4.70    4.90  4.70   Total Bilirubin 0.5    0.9  0.8   Alkaline Phosphatase 90    81  69   AST (SGOT) 20    18  15   ALT (SGPT) 18    19  19   WBC   7.34 6.89      Hemoglobin   15.3 16.7      Hematocrit   46.3 49.3      Platelets   212 212      Total Cholesterol  143        Triglycerides  74        HDL Cholesterol  50        LDL Cholesterol   78        Hemoglobin A1C      5.5    (A) Abnormal value          No Images in the past 120 days found..  Brief Urine Lab Results  (Last result in the past 365 days)      Color   Clarity   Blood   Leuk Est   Nitrite   Protein   CREAT   Urine HCG        03/22/22 1027 Dark Yellow   Cloudy   Negative   Negative   Negative   Trace                    Vital Signs:   /80 (BP Location: Left arm, Patient  "Position: Sitting, Cuff Size: Adult)   Pulse 55   Temp 97.5 °F (36.4 °C)   Ht 177.8 cm (70\")   Wt 93.5 kg (206 lb 3.2 oz)   SpO2 98%   BMI 29.59 kg/m²          Assessment and Plan {CC Problem List  Visit Diagnosis  ROS  Review (Popup)  Health Maintenance  Quality  BestPractice  Medications  SmartSets  SnapShot Encounters  Media :23}   Problem List Items Addressed This Visit        Cardiac and Vasculature    Mixed hyperlipidemia    Relevant Orders    Comprehensive metabolic panel (Completed)      Other Visit Diagnoses     Scrotal pain    -  Primary    Relevant Orders    POCT urinalysis dipstick, automated (Completed)    US scrotum and testicles    Numbness of toes        Relevant Orders    POCT Glucose (Completed)    POCT glycated hemoglobin, total (Completed)    XR Spine Lumbar 4+ View    Routine screening for STI (sexually transmitted infection)        Relevant Orders    Hepatitis panel, acute (Completed)    HIV-1/O/2 ANTIGEN/ANTIBODY, 4TH GENERATION (Completed)    RPR (Completed)    Chlamydia trachomatis, Neisseria gonorrhoeae, Trichomonas vaginalis, PCR - Urine, Urine, Clean Catch    Hypothyroidism, unspecified type        Relevant Medications    levothyroxine (Synthroid) 50 MCG tablet    Other Relevant Orders    TSH (Completed)         Diagnosis Plan   1. Scrotal pain  POCT urinalysis dipstick, automated    US scrotum and testicles   2. Numbness of toes  POCT Glucose    POCT glycated hemoglobin, total    XR Spine Lumbar 4+ View   3. Routine screening for STI (sexually transmitted infection)  Hepatitis panel, acute    HIV-1/O/2 ANTIGEN/ANTIBODY, 4TH GENERATION    RPR    Chlamydia trachomatis, Neisseria gonorrhoeae, Trichomonas vaginalis, PCR - Urine, Urine, Clean Catch   4. Hypothyroidism, unspecified type  levothyroxine (Synthroid) 50 MCG tablet    TSH   5. Mixed hyperlipidemia  Comprehensive metabolic panel     - Scrotal pain - POCT UA normal - US of scrotum/testicles ordered - advised pt to " go to ED if pain worsens.   - Numbness of left great toe - normal exam of toe today - FSBS 108; A1c 5.5 - Xray of lumbar spine ordered for eval of underlying cause.  - STI screening - Hep panel, HIV, RPR, GC, chlamydia, trich testing ordered  - Hypothyroid - refill of levothyroxine submitted - cont current dosing - TSH lab ordered  - HLP - continue atorvastatin at current dosing - CMP ordered  - RTC PRN or f/u with PCP, Dr. Amezquita, if sx persist/worsen - advised to ED for severe worsening of scrotal pain.    Follow Up {Instructions Charge Capture  Follow-up Communications :23}   Return if symptoms worsen or fail to improve.  Patient was given instructions and counseling regarding his condition or for health maintenance advice. Please see specific information pulled into the AVS if appropriate        This document has been electronically signed by YULIET Dominguez on March 22, 2022 22:21 CDT    This document has been electronically signed by YULIET Dominguez on March 22, 2022 22:17 CDT

## 2022-03-23 ENCOUNTER — TELEPHONE (OUTPATIENT)
Dept: FAMILY MEDICINE CLINIC | Facility: CLINIC | Age: 42
End: 2022-03-23

## 2022-03-23 DIAGNOSIS — N50.82 SCROTAL PAIN: Primary | ICD-10-CM

## 2022-03-23 LAB
C TRACH RRNA CVX QL NAA+PROBE: NEGATIVE
N GONORRHOEA RRNA SPEC QL NAA+PROBE: NEGATIVE

## 2022-03-23 NOTE — TELEPHONE ENCOUNTER
Called patient with the follow results from his Scrotum ultrasound. Ultrasound was normal, recommend referral to Urology.   Patient agreed to referral. He stated it doesn't matter who he is referred to.

## 2022-03-27 DIAGNOSIS — G25.81 RESTLESS LEG SYNDROME: Chronic | ICD-10-CM

## 2022-03-28 DIAGNOSIS — N50.82 SCROTAL PAIN: ICD-10-CM

## 2022-03-28 RX ORDER — PNV NO.95/FERROUS FUM/FOLIC AC 28MG-0.8MG
1 TABLET ORAL
Qty: 30 TABLET | Refills: 1 | Status: SHIPPED | OUTPATIENT
Start: 2022-03-28 | End: 2022-05-10 | Stop reason: SDUPTHER

## 2022-03-28 RX ORDER — MONTELUKAST SODIUM 10 MG/1
10 TABLET ORAL NIGHTLY
Qty: 30 TABLET | Refills: 1 | Status: SHIPPED | OUTPATIENT
Start: 2022-03-28 | End: 2022-05-10 | Stop reason: SDUPTHER

## 2022-03-28 RX ORDER — ATORVASTATIN CALCIUM 20 MG/1
20 TABLET, FILM COATED ORAL NIGHTLY
Qty: 30 TABLET | Refills: 1 | Status: SHIPPED | OUTPATIENT
Start: 2022-03-28 | End: 2022-05-10 | Stop reason: SDUPTHER

## 2022-03-28 RX ORDER — CHOLECALCIFEROL (VITAMIN D3) 125 MCG
500 CAPSULE ORAL DAILY
Qty: 30 TABLET | Refills: 1 | Status: SHIPPED | OUTPATIENT
Start: 2022-03-28 | End: 2022-05-10 | Stop reason: SDUPTHER

## 2022-03-28 RX ORDER — ERGOCALCIFEROL 1.25 MG/1
50000 CAPSULE ORAL WEEKLY
Qty: 4 CAPSULE | Refills: 1 | Status: SHIPPED | OUTPATIENT
Start: 2022-03-28 | End: 2022-05-10 | Stop reason: SDUPTHER

## 2022-03-28 RX ORDER — RISPERIDONE 1 MG/1
1 TABLET ORAL NIGHTLY
Qty: 30 TABLET | Refills: 1 | Status: SHIPPED | OUTPATIENT
Start: 2022-03-28 | End: 2022-05-10 | Stop reason: SDUPTHER

## 2022-03-28 RX ORDER — ONDANSETRON HYDROCHLORIDE 8 MG/1
8 TABLET, FILM COATED ORAL EVERY 8 HOURS PRN
Qty: 90 TABLET | Refills: 1 | Status: SHIPPED | OUTPATIENT
Start: 2022-03-28 | End: 2022-05-10 | Stop reason: SDUPTHER

## 2022-03-28 RX ORDER — OMEPRAZOLE 40 MG/1
40 CAPSULE, DELAYED RELEASE ORAL DAILY
Qty: 30 CAPSULE | Refills: 1 | Status: SHIPPED | OUTPATIENT
Start: 2022-03-28 | End: 2022-05-10 | Stop reason: SDUPTHER

## 2022-03-28 RX ORDER — ROPINIROLE 1 MG/1
TABLET, FILM COATED ORAL
Qty: 30 TABLET | Refills: 1 | Status: SHIPPED | OUTPATIENT
Start: 2022-03-28 | End: 2022-05-10 | Stop reason: SDUPTHER

## 2022-03-28 RX ORDER — TRAZODONE HYDROCHLORIDE 50 MG/1
50 TABLET ORAL NIGHTLY
Qty: 30 TABLET | Refills: 1 | Status: SHIPPED | OUTPATIENT
Start: 2022-03-28 | End: 2022-05-10 | Stop reason: SDUPTHER

## 2022-04-01 ENCOUNTER — LAB (OUTPATIENT)
Dept: LAB | Facility: HOSPITAL | Age: 42
End: 2022-04-01

## 2022-04-01 ENCOUNTER — OFFICE VISIT (OUTPATIENT)
Dept: FAMILY MEDICINE CLINIC | Facility: CLINIC | Age: 42
End: 2022-04-01

## 2022-04-01 VITALS
HEART RATE: 87 BPM | HEIGHT: 70 IN | TEMPERATURE: 98.2 F | DIASTOLIC BLOOD PRESSURE: 78 MMHG | SYSTOLIC BLOOD PRESSURE: 100 MMHG | OXYGEN SATURATION: 98 % | WEIGHT: 195 LBS | BODY MASS INDEX: 27.92 KG/M2

## 2022-04-01 DIAGNOSIS — M25.50 CHRONIC JOINT PAIN: Chronic | ICD-10-CM

## 2022-04-01 DIAGNOSIS — T78.1XXA ALLERGIC REACTION TO ALPHA-GAL: ICD-10-CM

## 2022-04-01 DIAGNOSIS — H60.501 ACUTE OTITIS EXTERNA OF RIGHT EAR, UNSPECIFIED TYPE: ICD-10-CM

## 2022-04-01 DIAGNOSIS — H61.21 IMPACTED CERUMEN OF RIGHT EAR: ICD-10-CM

## 2022-04-01 DIAGNOSIS — R53.82 CHRONIC FATIGUE: Chronic | ICD-10-CM

## 2022-04-01 DIAGNOSIS — G89.29 CHRONIC JOINT PAIN: Chronic | ICD-10-CM

## 2022-04-01 DIAGNOSIS — T78.1XXA ALLERGIC REACTION TO ALPHA-GAL: Primary | ICD-10-CM

## 2022-04-01 DIAGNOSIS — J02.9 SORE THROAT: ICD-10-CM

## 2022-04-01 LAB
EXPIRATION DATE: NORMAL
INTERNAL CONTROL: NORMAL
Lab: NORMAL
S PYO AG THROAT QL: NEGATIVE

## 2022-04-01 PROCEDURE — 82785 ASSAY OF IGE: CPT

## 2022-04-01 PROCEDURE — 86003 ALLG SPEC IGE CRUDE XTRC EA: CPT

## 2022-04-01 PROCEDURE — 86757 RICKETTSIA ANTIBODY: CPT

## 2022-04-01 PROCEDURE — 86008 ALLG SPEC IGE RECOMB EA: CPT

## 2022-04-01 PROCEDURE — 86618 LYME DISEASE ANTIBODY: CPT

## 2022-04-01 PROCEDURE — 99215 OFFICE O/P EST HI 40 MIN: CPT | Performed by: NURSE PRACTITIONER

## 2022-04-01 PROCEDURE — 87880 STREP A ASSAY W/OPTIC: CPT | Performed by: NURSE PRACTITIONER

## 2022-04-01 RX ORDER — EPINEPHRINE 0.3 MG/.3ML
0.3 INJECTION SUBCUTANEOUS ONCE
Qty: 1 EACH | Refills: 0 | Status: SHIPPED | OUTPATIENT
Start: 2022-04-01 | End: 2022-04-01

## 2022-04-01 RX ORDER — HYDROCORTISONE AND ACETIC ACID 20.75; 10.375 MG/ML; MG/ML
5 SOLUTION AURICULAR (OTIC) 3 TIMES DAILY
Qty: 6 ML | Refills: 0 | Status: SHIPPED | OUTPATIENT
Start: 2022-04-01 | End: 2022-04-08

## 2022-04-01 NOTE — PROGRESS NOTES
"Chief Complaint  Illness (Sore throat, rt ear feels full, SOB X 1 wk, pain left side )    Subjective          Jordin Parham presents to [facility] PRIMARY CARE - [facility] Same Day/Walk in [facility]    PCP: Dr. Amezquita    CC: \"fullness in right ear; sore/swollen throat, shortness of breath, luq pain\"    C/O right ear fullness/pain/swelling for the last week.  No drainage.  Wears earplugs at work.  Denies problems with left ear.  Denies cold/allergy/sinus symptoms.  Did have mild sore throat and noted a \"white spot\" on throat.     Has had sore throat on/off, but this is normally associated with the shortness of breath and LUQ pain that he believes may be related to his alphagal allergy.  Last episode was last night with trouble breathing, swallowing and severe LUQ pain.  Started after eating some Mexican.  Reports he ate chicken, but unsure if it was cooked with or near beef.  Last Alpha gal labs in 2020 showed allergy to beef and pork.  Does not have an epi pen.  Reports symptoms eventually resolved and is feeling better today.  Does not follow a strict diet, but does try to monitor what he eats.  Has not seen gastro or allergist for his alpha gal.  Had abdominal US in Dec 2021 that showed some simple renal cysts, but otherwise okay.     C/O chronic joint pain and fatigue.  Had RA/CONSTANTIN levels checked in 2020 that were normal.  Reports he had a rash around his waistline for a long time, but had accupuncture in 2021 to help with the alpha gal and rash went aware.  Concerned about possible LYME or RMSF, has never been checked.  Reports history of multiple tick bites over the years.       Review of Systems   Constitutional: Positive for fatigue ( chronic). Negative for appetite change and fever.   HENT: Positive for ear pain (pressure/fullness on right), sore throat and trouble swallowing. Negative for congestion, dental problem, ear discharge, facial swelling, hearing loss, " "rhinorrhea, sinus pressure, sinus pain and sneezing.    Eyes: Negative.    Respiratory: Positive for cough, chest tightness and shortness of breath. Negative for wheezing.    Cardiovascular: Negative.    Gastrointestinal: Positive for abdominal pain (LUQ), nausea and vomiting. Negative for blood in stool, constipation and diarrhea.   Genitourinary: Negative.    Musculoskeletal: Positive for arthralgias (chronic--hands/shoulders/ankles) and joint swelling (off/on--hands, shoulders, ankles).   Skin: Positive for rash (hx of rash around waistline, went away with accupuncture in 2021).   Neurological: Negative for dizziness and headaches.        Objective   Vital Signs:   /78 (BP Location: Left arm, Patient Position: Sitting)   Pulse 87   Temp 98.2 °F (36.8 °C)   Ht 177.8 cm (70\")   Wt 88.5 kg (195 lb)   SpO2 98%   BMI 27.98 kg/m²       Physical Exam  Vitals and nursing note reviewed.   Constitutional:       General: He is not in acute distress.     Appearance: Normal appearance. He is not ill-appearing.   HENT:      Head: Normocephalic and atraumatic.      Right Ear: Swelling and tenderness present. There is impacted cerumen.      Left Ear: Tympanic membrane and ear canal normal.      Nose: Nose normal.      Right Sinus: No maxillary sinus tenderness or frontal sinus tenderness.      Left Sinus: No maxillary sinus tenderness or frontal sinus tenderness.      Mouth/Throat:      Mouth: No angioedema.      Pharynx: Posterior oropharyngeal erythema ( mild) present. No pharyngeal swelling, oropharyngeal exudate or uvula swelling.     Eyes:      General: No scleral icterus.        Right eye: No discharge.         Left eye: No discharge.      Conjunctiva/sclera: Conjunctivae normal.   Cardiovascular:      Rate and Rhythm: Normal rate and regular rhythm.   Pulmonary:      Effort: Pulmonary effort is normal. No respiratory distress.      Breath sounds: Normal breath sounds. No wheezing, rhonchi or rales. "   Abdominal:      General: Bowel sounds are normal.      Tenderness: There is no abdominal tenderness. There is no right CVA tenderness, left CVA tenderness, guarding or rebound.   Musculoskeletal:      Cervical back: Neck supple. No tenderness.   Lymphadenopathy:      Cervical: No cervical adenopathy.   Skin:     General: Skin is warm and dry.   Neurological:      General: No focal deficit present.      Mental Status: He is alert and oriented to person, place, and time.   Psychiatric:         Mood and Affect: Mood normal.         Thought Content: Thought content normal.          Result Review :     Common labs    Common Labsle 8/13/21 8/13/21 8/13/21 12/6/21 12/6/21 3/22/22 3/22/22    1205 1205 1205 0910 0910 1026 1043   Glucose 87    105 (A)  97   BUN 9    13  10   Creatinine 0.93    0.99  1.01   eGFR Non African Am 90    83     Sodium 139    141  138   Potassium 4.3    4.7  4.5   Chloride 103    102  103   Calcium 9.1    9.7  9.2   Albumin 4.70    4.90  4.70   Total Bilirubin 0.5    0.9  0.8   Alkaline Phosphatase 90    81  69   AST (SGOT) 20    18  15   ALT (SGPT) 18    19  19   WBC   7.34 6.89      Hemoglobin   15.3 16.7      Hematocrit   46.3 49.3      Platelets   212 212      Total Cholesterol  143        Triglycerides  74        HDL Cholesterol  50        LDL Cholesterol   78        Hemoglobin A1C      5.5    (A) Abnormal value            TSH    TSH 4/20/21 8/13/21 3/22/22   TSH 0.777 0.503 1.410         Contains abnormal data Alpha - Gal Panel  Order: [id number]   Status: Final result     Visible to patient: Yes (seen)     Next appt: None     Dx: Allergy to alpha-gal    Specimen Information: Blood         2 Result Notes    Component   Ref Range & Units 1 yr ago   (9/1/20) 2 yr ago   (11/8/19) 2 yr ago   (11/8/19)   Beef   <0.35 kU/L 1.14 High   0.62 High      Class Description  2  1  Comment CM    [place]   <0.35 kU/L 0.14  <0.10     Class Interpretation  0/1  0     Pork   <0.35 kU/L 0.61 High   0.18     Class  Interpretation  1  0/1 CM     Comment: The test method is the [employer] ImmunoCAP allergen-specific   IgE system. CLASS INTERPRETATION   <0.10 kU/L= 0,   Negative; 0.10 - 0.34 kU/L= 0/1, Equivocal/Borderline;   0.35 - 0.69  kU/L=1, Low Positive; 0.70 - 3.49 kU/L=2,   Moderate Positive;  3.50  - 17.49 kU/L=3, High Positive;   17.50 - 49.99 kU/L= 4, Very High Positive; 50.00 - 99.99   kU/L= 5, Very High Positive;   >99.99 kU/L=6, Very High   Positive   *This test was developed and its performance   characteristics determined by [facility]. It has not   been cleared or approved by the U.S. Food and Drug   Administration.   Alpha Gal IgE   <0.10 kU/L 1.14 High   0.40 High  CM     Comment: Previous reports (EBONY 2009;123:426-433) have demonstrated   that patients with IgE antibodies to   ocyawdpxd-z-4,3-galactose are at risk for delayed   anaphylaxis, angioedema, or urticaria following   consumption of beef, pork, or lamb.   Resulting Agency [facility]             Narrative  Performed by: [facility]  Performed at:  [facility]   [address] [place], [place]  [id number]   : Dione Dominguez PhD, Phone:  [phone]      Specimen Collected: 09/01/20 10:16 Last Resulted: 09/08/20 10:08           Recent Results (from the past 24 hour(s))   POC Rapid Strep A    Collection Time: 04/01/22 10:23 AM    Specimen: Swab   Result Value Ref Range    Rapid Strep A Screen Negative Negative, VALID, INVALID, Not Performed    Internal Control Passed Passed    Lot Number [id number]     Expiration Date 10/27/2023                              Assessment and Plan    Diagnoses and all orders for this visit:    1. Allergic reaction to alpha-gal (Primary)  -     EPINEPHrine (EpiPen 2-Luis) 0.3 MG/0.3ML solution auto-injector injection; Inject 0.3 mL under the skin into the appropriate area as directed 1 (One) Time for 1 dose.  Dispense: 1 each; Refill: 0  -     Alpha-Gal IgE Panel; Future    2. Sore  throat  -     POC Rapid Strep A    3. Chronic joint pain  Comments:  ankles, ahoulders, hands  Orders:  -     [place] SF (IgG / M); Future  -     Lyme, Total Antibody Test / Reflex; Future    4. Chronic fatigue  -     [place] SF (IgG / M); Future  -     Lyme, Total Antibody Test / Reflex; Future    5. Acute otitis externa of right ear, unspecified type  -     acetic acid-hydrocortisone (VOSOL-HC) 1-2 % otic solution; Administer 5 drops to the right ear 3 (Three) Times a Day for 7 days.  Dispense: 6 mL; Refill: 0    6. Impacted cerumen of right ear  -     acetic acid-hydrocortisone (VOSOL-HC) 1-2 % otic solution; Administer 5 drops to the right ear 3 (Three) Times a Day for 7 days.  Dispense: 6 mL; Refill: 0      Suspect dyspnea, abdominal pain/n/v, trouble swallowing/throat pain that is intermittent may be related to alpha gal allergy.  Will recheck levels today.  Stressed importance of avoidance of beef and pork for now.  Rx for Epi pen provided PRN with instructions to be evaluated in ER immediately if ever needed.     Will check lyme, RMSF titers today.     Rx for Volsol HC for ear.   If fullness persist, return in 7-10 days and we can attempt to irrigate for cerumen removal.      See PCP or RTC if symptoms persist/worsen  See PCP for routine f/u visit and management of chronic medical conditions      This document has been electronically signed by YULIET Peguero on April 1, 2022 12:09 CDT,.    I spent 40 minutes caring for Jordin on this date of service. This time includes time spent by me in the following activities:preparing for the visit, reviewing tests, performing a medically appropriate examination and/or evaluation , counseling and educating the patient/family/caregiver, ordering medications, tests, or procedures and documenting information in the medical record  .

## 2022-04-05 LAB — B BURGDOR IGG+IGM SER-ACNC: <0.91 ISR (ref 0–0.9)

## 2022-04-06 LAB
R RICKETTSI IGG SER QL IA: POSITIVE
R RICKETTSI IGG TITR SER IF: NORMAL {TITER}
R RICKETTSI IGM SER-ACNC: 0.32 INDEX (ref 0–0.89)

## 2022-04-09 LAB
ALPHA-GAL IGE QN: <0.1 KU/L
BEEF IGE QN: <0.1 KU/L
CONV CLASS DESCRIPTION: NORMAL
IGE SERPL-ACNC: 44 IU/ML (ref 6–495)
LAMB IGE QN: <0.1 KU/L
PORK IGE QN: <0.1 KU/L

## 2022-04-15 ENCOUNTER — OFFICE VISIT (OUTPATIENT)
Dept: FAMILY MEDICINE CLINIC | Facility: CLINIC | Age: 42
End: 2022-04-15

## 2022-04-15 VITALS
DIASTOLIC BLOOD PRESSURE: 70 MMHG | WEIGHT: 204 LBS | HEIGHT: 70 IN | BODY MASS INDEX: 29.2 KG/M2 | SYSTOLIC BLOOD PRESSURE: 100 MMHG | TEMPERATURE: 98.2 F | HEART RATE: 87 BPM | OXYGEN SATURATION: 99 %

## 2022-04-15 DIAGNOSIS — H61.21 EXCESSIVE CERUMEN IN EAR CANAL, RIGHT: Primary | ICD-10-CM

## 2022-04-15 PROCEDURE — 99213 OFFICE O/P EST LOW 20 MIN: CPT | Performed by: NURSE PRACTITIONER

## 2022-04-15 NOTE — PROGRESS NOTES
"Chief Complaint  Earache (Rt X 2 wks)    Subjective          Jordin Parham presents to [facility] PRIMARY CARE - [facility] Same Day/Walk in Clinic    PCP: Dr. Amezquita    CC: \"earache\"    Returns for recheck.  Seen by me on 4-1 for right OE and cerumen impaction.  Started on acetic acid drops, minimal improvement.  Asked to RTC for ear irrigation if no improvement.  C/O pressure/fullness only, no real pain. Denies noticing any ear drainage.     Ear Fullness   There is pain in the right ear. Chronicity: persistent. The current episode started 1 to 4 weeks ago. The problem occurs constantly. The problem has been unchanged. There has been no fever. The patient is experiencing no pain. Associated symptoms include hearing loss ( muffled). Pertinent negatives include no abdominal pain, coughing, diarrhea, ear discharge, headaches, neck pain, rash, rhinorrhea, sore throat or vomiting. Treatments tried: acetic acid drops. The treatment provided mild relief.       Review of Systems   Constitutional: Negative.    HENT: Positive for hearing loss ( muffled). Negative for congestion, ear discharge, ear pain ( fullness), nosebleeds, postnasal drip, rhinorrhea, sinus pressure, sneezing, sore throat and trouble swallowing.    Eyes: Negative.    Respiratory: Negative.  Negative for cough.    Cardiovascular: Negative.    Gastrointestinal: Negative.  Negative for abdominal pain, diarrhea and vomiting.   Genitourinary: Negative.    Musculoskeletal: Negative for neck pain.   Skin: Negative.  Negative for rash.   Neurological: Negative for dizziness and headaches.        Objective   Vital Signs:   /70 (BP Location: Right arm, Patient Position: Sitting)   Pulse 87   Temp 98.2 °F (36.8 °C)   Ht 177.8 cm (70\")   Wt 92.5 kg (204 lb)   SpO2 99%   BMI 29.27 kg/m²       Physical Exam  Vitals and nursing note reviewed.   Constitutional:       General: He is not in acute distress.     " Appearance: Normal appearance. He is not ill-appearing.   HENT:      Head: Normocephalic and atraumatic.      Right Ear: Tympanic membrane normal. There is impacted cerumen (not fully impacted, but excessive cerumen noted).      Left Ear: Tympanic membrane and ear canal normal.      Ears:      Comments: Ear irrigation per MA with 1:1 solution warm water/peroxoide performed, tolerated well. Complete removal of brown cerumen noted.   Musculoskeletal:      Cervical back: Neck supple. No tenderness.   Lymphadenopathy:      Cervical: No cervical adenopathy.   Skin:     General: Skin is warm and dry.   Neurological:      General: No focal deficit present.      Mental Status: He is alert and oriented to person, place, and time.   Psychiatric:         Mood and Affect: Mood normal.         Thought Content: Thought content normal.          Result Review :                 Assessment and Plan    Diagnoses and all orders for this visit:    1. Excessive cerumen in ear canal, right (Primary)      Cerumen removal with irrigation in office.    Encouraged to use Acetic acid drops or Debrox OTC when ears begin to feel full, if needed, may RTC for irrigation    See PCP or RTC if symptoms persist/worsen  See PCP for routine f/u visit and management of chronic medical conditions      This document has been electronically signed by YULIET Peguero on April 15, 2022 09:39 CDT,.

## 2022-05-10 DIAGNOSIS — E03.9 HYPOTHYROIDISM, UNSPECIFIED TYPE: ICD-10-CM

## 2022-05-10 DIAGNOSIS — G25.81 RESTLESS LEG SYNDROME: Chronic | ICD-10-CM

## 2022-05-11 RX ORDER — ATORVASTATIN CALCIUM 20 MG/1
20 TABLET, FILM COATED ORAL NIGHTLY
Qty: 30 TABLET | Refills: 1 | Status: SHIPPED | OUTPATIENT
Start: 2022-05-11 | End: 2022-06-20 | Stop reason: SDUPTHER

## 2022-05-11 RX ORDER — ONDANSETRON HYDROCHLORIDE 8 MG/1
8 TABLET, FILM COATED ORAL EVERY 8 HOURS PRN
Qty: 90 TABLET | Refills: 1 | Status: SHIPPED | OUTPATIENT
Start: 2022-05-11 | End: 2022-06-20

## 2022-05-11 RX ORDER — FLUTICASONE PROPIONATE 50 MCG
2 SPRAY, SUSPENSION (ML) NASAL DAILY
Qty: 16 G | Refills: 1 | Status: SHIPPED | OUTPATIENT
Start: 2022-05-11 | End: 2022-06-20

## 2022-05-11 RX ORDER — CHOLECALCIFEROL (VITAMIN D3) 125 MCG
500 CAPSULE ORAL DAILY
Qty: 30 TABLET | Refills: 1 | Status: SHIPPED | OUTPATIENT
Start: 2022-05-11 | End: 2022-06-20 | Stop reason: SDUPTHER

## 2022-05-11 RX ORDER — ROPINIROLE 1 MG/1
TABLET, FILM COATED ORAL
Qty: 30 TABLET | Refills: 1 | Status: SHIPPED | OUTPATIENT
Start: 2022-05-11 | End: 2022-06-20 | Stop reason: SDUPTHER

## 2022-05-11 RX ORDER — RISPERIDONE 1 MG/1
1 TABLET ORAL NIGHTLY
Qty: 30 TABLET | Refills: 1 | Status: SHIPPED | OUTPATIENT
Start: 2022-05-11 | End: 2022-06-20 | Stop reason: SDUPTHER

## 2022-05-11 RX ORDER — OMEPRAZOLE 40 MG/1
40 CAPSULE, DELAYED RELEASE ORAL DAILY
Qty: 30 CAPSULE | Refills: 1 | Status: SHIPPED | OUTPATIENT
Start: 2022-05-11 | End: 2022-06-17

## 2022-05-11 RX ORDER — ERGOCALCIFEROL 1.25 MG/1
50000 CAPSULE ORAL WEEKLY
Qty: 4 CAPSULE | Refills: 1 | Status: SHIPPED | OUTPATIENT
Start: 2022-05-11 | End: 2022-06-20 | Stop reason: SDUPTHER

## 2022-05-11 RX ORDER — LEVOTHYROXINE SODIUM 0.05 MG/1
50 TABLET ORAL
Qty: 90 TABLET | Refills: 0 | Status: SHIPPED | OUTPATIENT
Start: 2022-05-11 | End: 2022-06-20 | Stop reason: SDUPTHER

## 2022-05-11 RX ORDER — TRAZODONE HYDROCHLORIDE 50 MG/1
50 TABLET ORAL NIGHTLY
Qty: 30 TABLET | Refills: 1 | Status: SHIPPED | OUTPATIENT
Start: 2022-05-11 | End: 2022-06-17

## 2022-05-11 RX ORDER — MONTELUKAST SODIUM 10 MG/1
10 TABLET ORAL NIGHTLY
Qty: 30 TABLET | Refills: 1 | Status: SHIPPED | OUTPATIENT
Start: 2022-05-11 | End: 2022-06-20 | Stop reason: SDUPTHER

## 2022-05-11 RX ORDER — PNV NO.95/FERROUS FUM/FOLIC AC 28MG-0.8MG
1 TABLET ORAL
Qty: 30 TABLET | Refills: 1 | Status: SHIPPED | OUTPATIENT
Start: 2022-05-11 | End: 2022-06-20 | Stop reason: SDUPTHER

## 2022-05-11 NOTE — TELEPHONE ENCOUNTER
Rx Refill Note  Requested Prescriptions     Pending Prescriptions Disp Refills   • fluticasone (Flonase) 50 MCG/ACT nasal spray 16 g 1     Si sprays into the nostril(s) as directed by provider Daily for 30 days. NO ADDITIONAL REFILLS WITHOUT APPOINTMENT WITH PROVIDER   • vitamin B-12 (CYANOCOBALAMIN) 500 MCG tablet 30 tablet 1     Sig: Take 1 tablet by mouth Daily. NO ADDITIONAL REFILLS WITHOUT APPOINTMENT WITH PROVIDER   • vitamin D (ERGOCALCIFEROL) 1.25 MG (58307 UT) capsule capsule 4 capsule 1     Sig: Take 1 capsule by mouth 1 (One) Time Per Week. NO ADDITIONAL REFILLS WITHOUT APPOINTMENT WITH PROVIDER   • traZODone (DESYREL) 50 MG tablet 30 tablet 1     Sig: Take 1 tablet by mouth Every Night. NO ADDITIONAL REFILLS WITHOUT APPOINTMENT WITH PROVIDER   • ferrous sulfate 325 (65 Fe) MG tablet 30 tablet 1     Sig: Take 1 tablet by mouth Daily With Breakfast. NO ADDITIONAL REFILLS WITHOUT APPOINTMENT WITH PROVIDER   • atorvastatin (Lipitor) 20 MG tablet 30 tablet 1     Sig: Take 1 tablet by mouth Every Night. NO ADDITIONAL REFILLS WITHOUT APPOINTMENT WITH PROVIDER   • ondansetron (Zofran) 8 MG tablet 90 tablet 1     Sig: Take 1 tablet by mouth Every 8 (Eight) Hours As Needed for Nausea or Vomiting. NO ADDITIONAL REFILLS WITHOUT APPOINTMENT WITH PROVIDER   • rOPINIRole (REQUIP) 1 MG tablet 30 tablet 1     Sig: TAKE 1 TABLET DAILY BEFORE BEDTIME NO ADDITIONAL REFILLS WITHOUT APPOINTMENT WITH PROVIDER   • montelukast (SINGULAIR) 10 MG tablet 30 tablet 1     Sig: Take 1 tablet by mouth Every Night. NO ADDITIONAL REFILLS WITHOUT APPOINTMENT WITH PROVIDER   • omeprazole (priLOSEC) 40 MG capsule 30 capsule 1     Sig: Take 1 capsule by mouth Daily. NO ADDITIONAL REFILLS WITHOUT APPOINTMENT WITH PROVIDER      Last office visit with prescribing clinician: Visit date not found      Next office visit with prescribing clinician: Visit date not found   {TIP  Encounters:    NO ADDITIONAL REFILLS WITHOUT APPOINTMENT WITH  PROVIDER    {TIP  Please add Last Relevant Lab Date if appropriate  {TIP  Is Refill Pharmacy correct? YES  Albina Sanchez, BOONE  05/11/22, 08:27 CDT

## 2022-05-11 NOTE — TELEPHONE ENCOUNTER
Rx Refill Note  Requested Prescriptions     Pending Prescriptions Disp Refills   • risperiDONE (RisperDAL) 1 MG tablet 30 tablet 1     Sig: Take 1 tablet by mouth Every Night. NO ADDITIONAL REFILLS WITHOUT AN APPOINTMENT WITH PROVIDER      Last office visit with prescribing clinician: 9/28/2021      Next office visit with prescribing clinician: Visit date not found   {TIP  Encounters:    NO ADDITIONAL REFILLS WITHOUT AN APPT WITH DR BROWN    {TIP  Please add Last Relevant Lab Date if appropriate  {TIP  Is Refill Pharmacy correct? YES  Albina Sanchez LPN  05/11/22, 08:33 CDT

## 2022-05-17 RX ORDER — AZITHROMYCIN 250 MG/1
TABLET, FILM COATED ORAL
Qty: 6 TABLET | Refills: 0 | Status: SHIPPED | OUTPATIENT
Start: 2022-05-17 | End: 2022-05-20

## 2022-05-17 NOTE — TELEPHONE ENCOUNTER
Patient has ear ache on right side. Would like to know if Dr. Jhon Amezquita will prescribe him something for it as he has to work until 6pm today and the Same Day is closed.  Call back number: [phone]

## 2022-05-17 NOTE — TELEPHONE ENCOUNTER
Jhon Amezquita MD  You 2 hours ago (8:51 AM)       Can send in Zpak 250 mg for 1st two day and 1 pill or next 4 days give 6 pills and no refills. He will need to followup in the future. Thanks     Message text

## 2022-05-20 ENCOUNTER — OFFICE VISIT (OUTPATIENT)
Dept: FAMILY MEDICINE CLINIC | Facility: CLINIC | Age: 42
End: 2022-05-20

## 2022-05-20 VITALS
SYSTOLIC BLOOD PRESSURE: 100 MMHG | HEART RATE: 78 BPM | HEIGHT: 70 IN | OXYGEN SATURATION: 98 % | BODY MASS INDEX: 29.49 KG/M2 | TEMPERATURE: 98.7 F | DIASTOLIC BLOOD PRESSURE: 74 MMHG | WEIGHT: 206 LBS

## 2022-05-20 DIAGNOSIS — H60.91 RECURRENT OTITIS EXTERNA OF RIGHT EAR: Primary | ICD-10-CM

## 2022-05-20 DIAGNOSIS — H92.01 RIGHT EAR PAIN: ICD-10-CM

## 2022-05-20 DIAGNOSIS — H69.81 DYSFUNCTION OF RIGHT EUSTACHIAN TUBE: ICD-10-CM

## 2022-05-20 PROCEDURE — 99213 OFFICE O/P EST LOW 20 MIN: CPT | Performed by: NURSE PRACTITIONER

## 2022-05-20 NOTE — PROGRESS NOTES
"Chief Complaint  Earache (Right ear feels stopped up painful cant sleep)    Subjective          Jordin Parham presents to [facility] PRIMARY CARE - [facility] Same [facility]Walk [facility]    PCP: Dr. Amezquita    CC: \"earache\"    Earache   There is pain in the right ear. This is a recurrent problem. The current episode started more than 1 month ago. The problem occurs constantly (for the last 3 days). The problem has been waxing and waning (gets better, then worse). There has been no fever. The pain is at a severity of 5/10 (pain worse at night, keeping him awake). Associated symptoms include hearing loss (muffled, popping). Pertinent negatives include no abdominal pain, coughing, diarrhea, ear discharge, headaches, neck pain, rash, rhinorrhea, sore throat or vomiting. Associated symptoms comments: +nasal congestion, right  . Treatments tried: has tried acetic acid drops; ear irrigation; taking singulair, but not using his flonase. The treatment provided mild (reports symptoms improve, then worsen again--present x 6+ weeks) relief.       Review of Systems   Constitutional: Negative.    HENT: Positive for congestion (right), ear pain, hearing loss (muffled, popping) and sneezing. Negative for ear discharge, postnasal drip, rhinorrhea, sinus pressure, sinus pain, sore throat and trouble swallowing.    Eyes: Negative.    Respiratory: Negative.  Negative for cough.    Cardiovascular: Negative.    Gastrointestinal: Negative.  Negative for abdominal pain, diarrhea and vomiting.   Genitourinary: Negative.    Musculoskeletal: Negative for neck pain.   Skin: Negative.  Negative for rash.   Neurological: Negative for dizziness and headaches.        Objective   Vital Signs:   /74 (BP Location: Right arm, Patient Position: Sitting)   Pulse 78   Temp 98.7 °F (37.1 °C)   Ht 177.8 cm (70\")   Wt 93.4 kg (206 lb)   SpO2 98%   BMI 29.56 kg/m²       Physical Exam  Vitals and nursing note " reviewed.   Constitutional:       General: He is not in acute distress.     Appearance: Normal appearance. He is not ill-appearing.   HENT:      Head: Normocephalic and atraumatic.      Right Ear: Swelling and tenderness ( along eust tube; tragus) present. There is no impacted cerumen. Tympanic membrane is not erythematous (dull).      Left Ear: Ear canal normal. Tympanic membrane is not erythematous ( dull).      Nose: Congestion (mild congestion, right nare) present. No rhinorrhea.      Mouth/Throat:      Mouth: Mucous membranes are moist.      Pharynx: Oropharynx is clear. No oropharyngeal exudate or posterior oropharyngeal erythema.   Eyes:      General:         Right eye: No discharge.         Left eye: No discharge.      Conjunctiva/sclera: Conjunctivae normal.   Cardiovascular:      Rate and Rhythm: Normal rate and regular rhythm.   Pulmonary:      Effort: Pulmonary effort is normal. No respiratory distress.      Breath sounds: Normal breath sounds. No wheezing, rhonchi or rales.   Musculoskeletal:      Cervical back: Neck supple. No tenderness (right eust tube).   Lymphadenopathy:      Cervical: No cervical adenopathy.   Skin:     General: Skin is warm and dry.   Neurological:      General: No focal deficit present.      Mental Status: He is alert and oriented to person, place, and time.   Psychiatric:         Mood and Affect: Mood normal.         Thought Content: Thought content normal.          Result Review :                 Assessment and Plan    Diagnoses and all orders for this visit:    1. Recurrent otitis externa of right ear (Primary)  -     neomycin-polymyxin-hydrocortisone (CORTISPORIN) 3.5-55477-1 otic solution; Administer 3 drops to the right ear 4 (Four) Times a Day for 7 days.  Dispense: 5 mL; Refill: 0  -     Ambulatory Referral to ENT (Otolaryngology)    2. Dysfunction of right eustachian tube  -     Ambulatory Referral to ENT (Otolaryngology)    3. Right ear pain  -     Ambulatory Referral  to ENT (Otolaryngology)    Continue with Singulair, restart Flonase daily--already has Rx  Rx for Cortisporin x 7 days provided  Referral to ENT at patient request    See PCP or RTC if symptoms persist/worsen  See PCP for routine f/u visit and management of chronic medical conditions      This document has been electronically signed by YULIET Peguero on May 20, 2022 08:28 CDT,.

## 2022-06-17 ENCOUNTER — OFFICE VISIT (OUTPATIENT)
Dept: FAMILY MEDICINE CLINIC | Facility: CLINIC | Age: 42
End: 2022-06-17

## 2022-06-17 VITALS
SYSTOLIC BLOOD PRESSURE: 110 MMHG | WEIGHT: 213.8 LBS | OXYGEN SATURATION: 97 % | TEMPERATURE: 98.2 F | HEART RATE: 75 BPM | BODY MASS INDEX: 30.61 KG/M2 | DIASTOLIC BLOOD PRESSURE: 80 MMHG | HEIGHT: 70 IN

## 2022-06-17 DIAGNOSIS — K21.9 GASTROESOPHAGEAL REFLUX DISEASE, UNSPECIFIED WHETHER ESOPHAGITIS PRESENT: Chronic | ICD-10-CM

## 2022-06-17 DIAGNOSIS — G47.00 INSOMNIA, UNSPECIFIED TYPE: Primary | Chronic | ICD-10-CM

## 2022-06-17 DIAGNOSIS — Z91.018 ALLERGY TO ALPHA-GAL: ICD-10-CM

## 2022-06-17 PROCEDURE — 99214 OFFICE O/P EST MOD 30 MIN: CPT | Performed by: NURSE PRACTITIONER

## 2022-06-17 RX ORDER — CHOLECALCIFEROL (VITAMIN D3) 125 MCG
500 CAPSULE ORAL DAILY
Qty: 30 TABLET | Refills: 0 | Status: CANCELLED | OUTPATIENT
Start: 2022-06-17

## 2022-06-17 RX ORDER — PNV NO.95/FERROUS FUM/FOLIC AC 28MG-0.8MG
1 TABLET ORAL
Qty: 30 TABLET | Refills: 0 | Status: CANCELLED | OUTPATIENT
Start: 2022-06-17

## 2022-06-17 RX ORDER — ERGOCALCIFEROL 1.25 MG/1
50000 CAPSULE ORAL WEEKLY
Qty: 4 CAPSULE | Refills: 0 | Status: CANCELLED | OUTPATIENT
Start: 2022-06-17

## 2022-06-17 RX ORDER — OMEPRAZOLE 40 MG/1
40 CAPSULE, DELAYED RELEASE ORAL DAILY
Qty: 30 CAPSULE | Refills: 0 | Status: CANCELLED | OUTPATIENT
Start: 2022-06-17

## 2022-06-17 RX ORDER — TRAZODONE HYDROCHLORIDE 50 MG/1
50 TABLET ORAL NIGHTLY
Qty: 30 TABLET | Refills: 0 | Status: CANCELLED | OUTPATIENT
Start: 2022-06-17

## 2022-06-17 RX ORDER — RISPERIDONE 1 MG/1
1 TABLET ORAL NIGHTLY
Qty: 30 TABLET | Refills: 0 | Status: CANCELLED | OUTPATIENT
Start: 2022-06-17

## 2022-06-17 RX ORDER — ROPINIROLE 1 MG/1
TABLET, FILM COATED ORAL
Qty: 30 TABLET | Refills: 0 | Status: CANCELLED | OUTPATIENT
Start: 2022-06-17

## 2022-06-17 RX ORDER — ATORVASTATIN CALCIUM 20 MG/1
20 TABLET, FILM COATED ORAL NIGHTLY
Qty: 30 TABLET | Refills: 0 | Status: CANCELLED | OUTPATIENT
Start: 2022-06-17

## 2022-06-17 RX ORDER — MONTELUKAST SODIUM 10 MG/1
10 TABLET ORAL NIGHTLY
Qty: 30 TABLET | Refills: 0 | Status: CANCELLED | OUTPATIENT
Start: 2022-06-17

## 2022-06-17 RX ORDER — TRAZODONE HYDROCHLORIDE 100 MG/1
100 TABLET ORAL NIGHTLY
Qty: 30 TABLET | Refills: 0 | Status: SHIPPED | OUTPATIENT
Start: 2022-06-17 | End: 2022-06-20 | Stop reason: SDUPTHER

## 2022-06-17 NOTE — PROGRESS NOTES
"Chief Complaint  Allergic Reaction    Subjective     {Problem List  Visit Diagnosis   Encounters  Notes  Medications  Labs  Result Review Imaging  Media :23}     Jordin Parham presents to [facility] - [facility]    PCP: Dr. Amezquita--recently missed f/u appt, needs to reschedule    CC: \"throat felt swollen this AM, feels better now; need refills on meds\"    Hx of alpha gal, but last labs were normal.  Feels like he still has some periodic swelling in throat.  Had some issues this AM, but has since resolved.  Reports burning in throat frequently, has GERD, currently on Omeprazole 40 mg daily, but doesn't seem to be keeping symptoms under control.  Non smoker, but does drink caffeine, energy drinks which seems to exacerbate symptoms at times.  EGD in 2019 showed esophagitis/gastritis.   Taking meds daily, but symptoms still present.  Occasionally upper abdominal pain, but mostly heartburn and throat irritation.      C/O chronic insomnia.  Currently on Trazodone 50 mg and feels like he may need to increase dosing.  Not sleeping as well as he was before, but still feels like the Trazodone helps, just may need an increase in dosing.       Review of Systems   Constitutional: Negative.    HENT: Positive for sore throat ( irritated) and trouble swallowing (this AM, better now). Negative for congestion, ear discharge, ear pain, sinus pressure, sinus pain and voice change.    Eyes: Negative.    Respiratory: Negative.    Cardiovascular: Negative.    Gastrointestinal: Positive for abdominal pain (upper, mild). Negative for blood in stool, constipation, diarrhea, nausea and vomiting.        +heartburn     Genitourinary: Negative.    Musculoskeletal: Negative.    Skin: Negative.    Neurological: Negative for dizziness and headaches.   Psychiatric/Behavioral: Positive for sleep disturbance (insomnia).        Objective   Vital Signs:   /80 " "(BP Location: Left arm, Patient Position: Sitting)   Pulse 75   Temp 98.2 °F (36.8 °C)   Ht 177.8 cm (70\")   Wt 97 kg (213 lb 12.8 oz)   SpO2 97%   BMI 30.68 kg/m²       Physical Exam  Vitals and nursing note reviewed.   Constitutional:       General: He is not in acute distress.     Appearance: Normal appearance. He is not ill-appearing.   HENT:      Head: Normocephalic and atraumatic.      Right Ear: A middle ear effusion is present.      Left Ear: A middle ear effusion is present.      Ears:      Comments: Canals waxy     Nose: Nose normal.      Right Sinus: No maxillary sinus tenderness or frontal sinus tenderness.      Left Sinus: No maxillary sinus tenderness or frontal sinus tenderness.      Mouth/Throat:      Mouth: Mucous membranes are moist.      Pharynx: No pharyngeal swelling, oropharyngeal exudate, posterior oropharyngeal erythema or uvula swelling.      Comments: +cobblestone appearance, clear PND    No angioedema or uvular swelling noted  Eyes:      General:         Right eye: No discharge.         Left eye: No discharge.      Conjunctiva/sclera: Conjunctivae normal.   Cardiovascular:      Rate and Rhythm: Normal rate and regular rhythm.   Pulmonary:      Effort: Pulmonary effort is normal. No respiratory distress.      Breath sounds: Normal breath sounds. No wheezing, rhonchi or rales.   Abdominal:      General: Bowel sounds are normal.      Palpations: Abdomen is soft.      Tenderness: There is abdominal tenderness (mild epigastric, LUQ TTP). There is no right CVA tenderness, left CVA tenderness, guarding or rebound.   Musculoskeletal:      Cervical back: Neck supple. No tenderness.   Lymphadenopathy:      Cervical: No cervical adenopathy.   Skin:     General: Skin is warm and dry.   Neurological:      General: No focal deficit present.      Mental Status: He is alert and oriented to person, place, and time.   Psychiatric:         Attention and Perception: Attention normal.         Mood and " Affect: Mood normal.         Speech: Speech normal.         Behavior: Behavior normal. Behavior is cooperative.         Thought Content: Thought content normal.          Result Review :     Common labs    Common Labsle 8/13/21 8/13/21 8/13/21 12/6/21 12/6/21 3/22/22 3/22/22    1205 1205 1205 0910 0910 1026 1043   Glucose 87    105 (A)  97   BUN 9    13  10   Creatinine 0.93    0.99  1.01   eGFR Non African Am 90    83     Sodium 139    141  138   Potassium 4.3    4.7  4.5   Chloride 103    102  103   Calcium 9.1    9.7  9.2   Albumin 4.70    4.90  4.70   Total Bilirubin 0.5    0.9  0.8   Alkaline Phosphatase 90    81  69   AST (SGOT) 20    18  15   ALT (SGPT) 18    19  19   WBC   7.34 6.89      Hemoglobin   15.3 16.7      Hematocrit   46.3 49.3      Platelets   212 212      Total Cholesterol  143        Triglycerides  74        HDL Cholesterol  50        LDL Cholesterol   78        Hemoglobin A1C      5.5    (A) Abnormal value            Data reviewed: GI studies EGD 2019 reviewed          Assessment and Plan    Diagnoses and all orders for this visit:    1. Insomnia, unspecified type (Primary)  -     traZODone (DESYREL) 100 MG tablet; Take 1 tablet by mouth Every Night.  Dispense: 30 tablet; Refill: 0    2. Gastroesophageal reflux disease, unspecified whether esophagitis present  -     esomeprazole (nexIUM) 20 MG capsule; Take 1 capsule by mouth Every Morning Before Breakfast. May increase to two capsules if needed daily--DC Omeprazole  Dispense: 60 capsule; Refill: 0    3. Allergy to alpha-gal      Will continue with Trazodone, but increase to 100 mg nightly to see if better results for insomnia.  Do not stop taking abruptly.     D/C Omeprazole, will change to Esomeprazole.  Dietary changes/avoidances discussed.      Reschedule f/u with PCP prior to leaving today for recheck of above problems and ongoing management.      No symptoms of allergic reaction currently noted.  Reports symptoms have resolved.        This  document has been electronically signed by YULIET Peguero on June 17, 2022 11:14 CDT,.      I spent 35 minutes caring for Jordin on this date of service. This time includes time spent by me in the following activities:preparing for the visit, reviewing tests, performing a medically appropriate examination and/or evaluation , counseling and educating the patient/family/caregiver, ordering medications, tests, or procedures and documenting information in the medical record

## 2022-06-17 NOTE — PROGRESS NOTES
Subjective:  Jordin Parham is a 41 y.o. male who presents for       Patient Active Problem List   Diagnosis   • Class 1 obesity with body mass index (BMI) of 30.0 to 30.9 in adult   • Gastroesophageal reflux disease   • COOKIE (generalized anxiety disorder)   • Left upper quadrant pain   • Low libido   • Mood disorder (HCC)   • H. pylori infection   • Renal impairment   • Mixed hyperlipidemia   • Vitamin D deficiency   • History of drug use   • Fatty liver   • Renal cyst   • Change in bowel habits   • Mucus in stool   • Allergy to alpha-gal   • Bronchitis   • Upper respiratory tract infection   • Eosinophilic esophagitis   • Onychomycosis   • Bilateral hand pain   • Bilateral wrist pain   • Overweight   • Pruritic rash   • Insomnia   • Arthritis of right hand   • Bilateral swelling of feet   • Blood in stool   • Cough   • Panic attacks   • Restless leg syndrome   • Restless legs   • Panic attack   • Ear pain, bilateral   • Class 1 obesity without serious comorbidity with body mass index (BMI) of 30.0 to 30.9 in adult           Current Outpatient Medications:   •  atorvastatin (Lipitor) 20 MG tablet, Take 1 tablet by mouth Every Night., Disp: 30 tablet, Rfl: 1  •  esomeprazole (nexIUM) 20 MG capsule, Take 1 capsule by mouth Every Morning Before Breakfast. May increase to two capsules if needed daily--DC Omeprazole, Disp: 60 capsule, Rfl: 0  •  ferrous sulfate 325 (65 Fe) MG tablet, Take 1 tablet by mouth Daily With Breakfast., Disp: 30 tablet, Rfl: 1  •  levothyroxine (Synthroid) 50 MCG tablet, Take 1 tablet by mouth Every Morning., Disp: 90 tablet, Rfl: 0  •  montelukast (SINGULAIR) 10 MG tablet, Take 1 tablet by mouth Every Night., Disp: 30 tablet, Rfl: 1  •  risperiDONE (RisperDAL) 1 MG tablet, Take 1 tablet by mouth Every Night., Disp: 30 tablet, Rfl: 1  •  rOPINIRole (REQUIP) 1 MG tablet, TAKE 1 TABLET DAILY BEFORE BEDTIME, Disp: 30 tablet, Rfl: 1  •  traZODone (DESYREL) 100 MG tablet, Take 1 tablet by  mouth Every Night., Disp: 30 tablet, Rfl: 1  •  vitamin B-12 (CYANOCOBALAMIN) 500 MCG tablet, Take 1 tablet by mouth Daily., Disp: 30 tablet, Rfl: 1  •  vitamin D (ERGOCALCIFEROL) 1.25 MG (67366 UT) capsule capsule, Take 1 capsule by mouth 1 (One) Time Per Week., Disp: 4 capsule, Rfl: 3      Pt is 40 yo male with management  of obesity, COOKIE, major depression, panic attacks GERD , mood disorder, history of H pylori infection, VItamin D deficiency,  Renal impairment, History of illicit drug use, fatty liver disease, gastritis,  Eosinophilic esophagitis, internal/external hemorrhoids, sp hemorrhoidectomy      9/28/21 in office visit for recheck on pt's above medical issues. Pt was treated for sore throat last visit and given information on TMJ. ;pt continues to take his medications for his psych issues. Hypothyroidism, allergic rhinitis, restless legs, vitamin D and b12 deficiency. Pt did not show up from appt with behavioral health  On 9/27/21.  Pt is doing fair although his sleep could improve.  He gets about 4 hours of sleep. He wakes up frequently at bedtime despite taking vistaril. He took vistaril at bedtime and it caused headaches     6/20/22 in office visit for recheck. Pt has been to walk in clinic several times. With most recent visit at 6/17/22 for insomnia/GERD on 5/20/22 for recurrent otitis externa.  And 4/15/22 for ear issues.  Pt continues to take his medications for GERD, restless legs, iron deficiency, panic attack, insomnia, hypothyroidism, HLP. Pt doing good overall no chest pain no dizziness. His stomach is feeling better. His main concern is fatigue and low libido. He has a hard time having or inititating an erection.       Obesity  This is a chronic problem. The current episode started more than 1 year ago. The problem occurs constantly. Associated symptoms include abdominal pain, fatigue, numbness and weakness. Pertinent negatives include no anorexia, arthralgias, change in bowel habit, chest  pain, chills, congestion, coughing, diaphoresis, fever, headaches, joint swelling, myalgias, nausea, neck pain, sore throat, swollen glands, urinary symptoms, vertigo, visual change or vomiting. Nothing aggravates the symptoms. He has tried nothing for the symptoms. The treatment provided no relief.   Fatigue  This is a chronic problem. The current episode started more than 1 month ago. The problem occurs constantly. The problem has been unchanged. Associated symptoms include abdominal pain, fatigue, numbness and weakness. Pertinent negatives include no anorexia, arthralgias, change in bowel habit, chest pain, chills, congestion, coughing, diaphoresis, fever, headaches, joint swelling, myalgias, nausea, neck pain, sore throat, swollen glands, urinary symptoms, vertigo, visual change or vomiting. Nothing aggravates the symptoms. He has tried nothing for the symptoms. The treatment provided no relief.   Heartburn  He complains of abdominal pain and heartburn. He reports no belching, no chest pain, no choking, no coughing, no dysphagia, no early satiety, no globus sensation, no hoarse voice, no nausea, no sore throat, no stridor, no tooth decay, no water brash or no wheezing. This is a recurrent problem. The current episode started more than 1 month ago. The problem occurs occasionally. The problem has been waxing and waning. The heartburn does not wake him from sleep. The heartburn does not limit his activity. The heartburn doesn't change with position. The symptoms are aggravated by certain foods. Pertinent negatives include no fatigue, melena or weight loss. Risk factors include lack of exercise. He has tried a PPI for the symptoms. The treatment provided moderate relief. Past procedures include an EGD. Past procedures do not include an abdominal ultrasound, esophageal manometry, H. pylori antibody titer or a UGI.   Anxiety  Presents for follow-up visit. Symptoms include decreased concentration, depressed  mood, excessive worry, irritability, malaise, nervous/anxious behavior, obsessions and restlessness. Patient reports no chest pain, compulsions, confusion, dizziness, dry mouth, feeling of choking, hyperventilation, impotence, insomnia, muscle tension, nausea, palpitations, panic, shortness of breath or suicidal ideas. The severity of symptoms is moderate. The quality of sleep is fair.      His past medical history is significant for depression. Compliance with medications is %.   Depression  Visit Type: follow-up  Patient presents with the following symptoms: decreased concentration, depressed mood, excessive worry, irritability, malaise, nervousness/anxiety, obsessions and restlessness.  Patient is not experiencing: anhedonia, chest pain, choking sensation, compulsions, confusion, dizziness, dry mouth, fatigue, feelings of hopelessness, feelings of worthlessness, hypersomnia, hyperventilation, impotence, insomnia, memory impairment, muscle tension, nausea, palpitations, panic, psychomotor agitation, shortness of breath, suicidal ideas, suicidal planning, thoughts of death, weight gain and weight loss.  Severity: moderate   Sleep quality: fair  Compliance with medications:  %   GI Problem  The primary symptoms include abdominal pain. Primary symptoms do not include fever, weight loss, fatigue, nausea, vomiting, diarrhea, melena, hematemesis, jaundice, hematochezia, dysuria, myalgias, arthralgias or rash.   The illness does not include chills, anorexia, dysphagia, odynophagia, bloating, constipation, tenesmus, back pain or itching. Significant associated medical issues include GERD. Associated medical issues do not include inflammatory bowel disease, gallstones, liver disease, alcohol abuse, PUD, gastric bypass, bowel resection, irritable bowel syndrome, hemorrhoids or diverticulitis.   Dizziness  This is a chronic problem. The current episode started more than 1 year ago. The problem occurs constantly.  The problem has been unchanged. Associated symptoms include abdominal pain and numbness. Pertinent negatives include no anorexia, arthralgias, change in bowel habit, chest pain, chills, congestion, coughing, diaphoresis, fatigue, fever, headaches, joint swelling, myalgias, nausea, neck pain, rash, sore throat, swollen glands, urinary symptoms, vertigo, visual change, vomiting or weakness. Nothing aggravates the symptoms. He has tried nothing for the symptoms. The treatment provided no relief.   Headache   This is a recurrent problem. The current episode started more than 1 month ago. The problem occurs constantly. The pain is located in the right unilateral region. The pain does not radiate. The pain quality is not similar to prior headaches. The quality of the pain is described as aching. The pain is at a severity of 0/10. The pain is moderate. Associated symptoms include abdominal pain and numbness. Pertinent negatives include no abnormal behavior, anorexia, back pain, blurred vision, coughing, dizziness, ear pain, facial sweating, fever, hearing loss, insomnia, loss of balance, muscle aches, nausea, neck pain, scalp tenderness, seizures, sinus pressure, sore throat, swollen glands, tingling, tinnitus, visual change, vomiting, weakness or weight loss. Nothing aggravates the symptoms. He has tried nothing for the symptoms. The treatment provided no relief.     Review of Systems  Review of Systems   Constitutional: Positive for activity change and fatigue. Negative for appetite change, chills, diaphoresis and fever.   HENT: Negative for congestion, postnasal drip, rhinorrhea, sinus pressure, sinus pain, sneezing, sore throat, trouble swallowing and voice change.    Respiratory: Negative for cough, choking, chest tightness, shortness of breath, wheezing and stridor.    Cardiovascular: Negative for chest pain.   Gastrointestinal: Positive for abdominal pain. Negative for anorexia, change in bowel habit, diarrhea,  nausea and vomiting.   Musculoskeletal: Negative for arthralgias, joint swelling, myalgias and neck pain.   Allergic/Immunologic: Positive for environmental allergies and food allergies.   Neurological: Positive for weakness and numbness. Negative for vertigo and headaches.   Psychiatric/Behavioral: Positive for sleep disturbance. The patient is nervous/anxious.        Patient Active Problem List   Diagnosis   • Class 1 obesity with body mass index (BMI) of 30.0 to 30.9 in adult   • Gastroesophageal reflux disease   • COOKIE (generalized anxiety disorder)   • Left upper quadrant pain   • Low libido   • Mood disorder (HCC)   • H. pylori infection   • Renal impairment   • Mixed hyperlipidemia   • Vitamin D deficiency   • History of drug use   • Fatty liver   • Renal cyst   • Change in bowel habits   • Mucus in stool   • Allergy to alpha-gal   • Bronchitis   • Upper respiratory tract infection   • Eosinophilic esophagitis   • Onychomycosis   • Bilateral hand pain   • Bilateral wrist pain   • Overweight   • Pruritic rash   • Insomnia   • Arthritis of right hand   • Bilateral swelling of feet   • Blood in stool   • Cough   • Panic attacks   • Restless leg syndrome   • Restless legs   • Panic attack   • Ear pain, bilateral   • Class 1 obesity without serious comorbidity with body mass index (BMI) of 30.0 to 30.9 in adult     Past Surgical History:   Procedure Laterality Date   • COLONOSCOPY N/A 9/17/2019    Procedure: COLONOSCOPY;  Surgeon: Rigoberto Flower MD;  Location: [facility] ENDOSCOPY;  Service: Gastroenterology   • ENDOSCOPY N/A 9/17/2019    Procedure: ESOPHAGOGASTRODUODENOSCOPY;  Surgeon: Rigoberto Flower MD;  Location: [facility] ENDOSCOPY;  Service: Gastroenterology   • HEMORRHOIDECTOMY       Social History     Socioeconomic History   • Marital status:    Tobacco Use   • Smoking status: Never Smoker   • Smokeless tobacco: Never Used   Vaping Use   • Vaping Use: Never used   Substance and Sexual Activity   •  Alcohol use: Not Currently     Comment: rarely   • Drug use: Yes     Types: Marijuana, Methamphetamines   • Sexual activity: Defer     Family History   Problem Relation Age of Onset   • Alcohol abuse Mother    • Anxiety disorder Mother    • Arthritis Mother    • Asthma Mother    • Cancer Mother    • Depression Mother    • Hypertension Mother    • Stroke Mother    • Hyperlipidemia Father    • Thyroid disease Sister    • Alcohol abuse Sister    • Anxiety disorder Sister    • Cancer Sister    • Depression Sister    • Alcohol abuse Brother    • Anxiety disorder Brother    • Cancer Brother    • Depression Brother    • Hyperlipidemia Maternal Uncle    • Heart failure Maternal Uncle    • Heart disease Maternal Uncle      Lab on 04/01/2022   Component Date Value Ref Range Status   • RMSF IgG 04/01/2022 Positive (A) Negative Final   • RMSF IgM 04/01/2022 0.32  0.00 - 0.89 index Final                                     Negative        <0.90                                   Equivocal 0.90 - 1.10                                   Positive        >1.10   • Lyme Ab IgG/IgM 04/01/2022 <0.91  0.00 - 0.90 ISR Final                                    Negative         <0.91                                  Equivocal  0.91 - 1.09                                  Positive         >1.09  **Effective April 25, 2022 Lyme, Total Ab Test/Reflex**    will be discontinued. [facility] offers [id number] Lyme    Disease Serology w/Reflex. This new panel meets    current recommendations for Lyme disease antibody    testing, has improved turnaround time and provides    easy to interpret results.   • Class Description 04/01/2022 Comment   Final        Levels of Specific IgE       Class  Description of Class      ---------------------------  -----  --------------------                     < 0.10         0         Negative             0.10 -    0.31         0/I       Equivocal/Low             0.32 -    0.55         I         Low             0.56 -    1.40          II        Moderate             1.41 -    3.90         III       High             3.91 -   19.00         IV        Very High            19.01 -  100.00         V         Very High                    >100.00         VI        Very High   • IgE 04/01/2022 44  6 - 495 IU/mL Final   • [id number] Alpha-Gal 04/01/2022 <0.10  Class 0 kU/L Final   • Beef 04/01/2022 <0.10  Class 0 kU/L Final   • Pork 04/01/2022 <0.10  Class 0 kU/L Final   • [place]ado 04/01/2022 <0.10  Class 0 kU/L Final   • RMSF IgG 04/01/2022 <1:64  Neg <1:64 Final                                 Negative           <1:64                               Positive            1:64                               Recent/Active      >1:64  Titers of 1:64 are suggestive of past or possible  current infection. Titers >1:64 are suggestive of  recent or active infection. Approximately 9% of  specimens positive by EIA screen are negative by IFA.   Office Visit on 04/01/2022   Component Date Value Ref Range Status   • Rapid Strep A Screen 04/01/2022 Negative  Negative, VALID, INVALID, Not Performed Final   • Internal Control 04/01/2022 Passed  Passed Final   • Lot Number 04/01/2022 [id number]   Final   • Expiration Date 04/01/2022 10/27/2023   Final   Lab on 03/22/2022   Component Date Value Ref Range Status   • Glucose 03/22/2022 97  65 - 99 mg/dL Final   • BUN 03/22/2022 10  6 - 20 mg/dL Final   • Creatinine 03/22/2022 1.01  0.76 - 1.27 mg/dL Final   • Sodium 03/22/2022 138  136 - 145 mmol/L Final   • Potassium 03/22/2022 4.5  3.5 - 5.2 mmol/L Final   • Chloride 03/22/2022 103  98 - 107 mmol/L Final   • CO2 03/22/2022 28.0  22.0 - 29.0 mmol/L Final   • Calcium 03/22/2022 9.2  8.6 - 10.5 mg/dL Final   • Total Protein 03/22/2022 6.9  6.0 - 8.5 g/dL Final   • Albumin 03/22/2022 4.70  3.50 - 5.20 g/dL Final   • ALT (SGPT) 03/22/2022 19  1 - 41 U/L Final   • AST (SGOT) 03/22/2022 15  1 - 40 U/L Final   • Alkaline Phosphatase 03/22/2022 69  39 - 117 U/L Final   • Total Bilirubin  03/22/2022 0.8  0.0 - 1.2 mg/dL Final   • Globulin 03/22/2022 2.2  gm/dL Final   • A/G Ratio 03/22/2022 2.1  g/dL Final   • BUN/Creatinine Ratio 03/22/2022 9.9  7.0 - 25.0 Final   • Anion Gap 03/22/2022 7.0  5.0 - 15.0 mmol/L Final   • eGFR 03/22/2022 95.2  >60.0 mL/min/1.73 Final    National Kidney Foundation and American Society of Nephrology (ASN) Task Force recommended calculation based on the Chronic Kidney Disease Epidemiology Collaboration (CKD-EPI) equation refit without adjustment for race.   • Hepatitis B Surface Ag 03/22/2022 Non-Reactive  Non-Reactive Final   • Hep A IgM 03/22/2022 Non-Reactive  Non-Reactive Final   • Hep B C IgM 03/22/2022 Non-Reactive  Non-Reactive Final   • Hepatitis C Ab 03/22/2022 Non-Reactive  Non-Reactive Final   • HIV-1/ HIV-2 03/22/2022 Non-Reactive  Non-Reactive Final    A non-reactive test result does not preclude the possibility of exposure to HIV or infection with HIV. An antibody response to recent exposure may take several months to reach detectable levels.   • RPR 03/22/2022 Non-Reactive  Non-Reactive Final   • TSH 03/22/2022 1.410  0.270 - 4.200 uIU/mL Final   • Chlamydia DNA by PCR 03/22/2022 Negative  Negative Final   • Neisseria gonorrhoeae by PCR 03/22/2022 Negative  Negative Final   Office Visit on 03/22/2022   Component Date Value Ref Range Status   • Color 03/22/2022 Dark Yellow  Yellow, Straw, Dark Yellow, Rosi Final   • Clarity, UA 03/22/2022 Cloudy (A) Clear Final   • Specific Gravity  03/22/2022 1.015  1.005 - 1.030 Final   • pH, Urine 03/22/2022 8.0  5.0 - 8.0 Final   • Leukocytes 03/22/2022 Negative  Negative Final   • Nitrite, UA 03/22/2022 Negative  Negative Final   • Protein, POC 03/22/2022 Trace (A) Negative mg/dL Final   • Glucose, UA 03/22/2022 Negative  Negative, 1000 mg/dL (3+) mg/dL Final   • Ketones, UA 03/22/2022 Negative  Negative Final   • Urobilinogen, UA 03/22/2022 Normal  Normal Final   • Bilirubin 03/22/2022 Negative  Negative Final   •  "Blood, UA 03/22/2022 Negative  Negative Final   • Lot Number 03/22/2022 [id number]   Final   • Expiration Date 03/22/2022 07/25/2023   Final   • Glucose 03/22/2022 108  70 - 130 mg/dL Final   • Hemoglobin A1C 03/22/2022 5.5  % Final      XR Spine Lumbar 4+ View  Narrative: EXAMINATION:  XR SPINE LUMBAR COMPLETE 4+VW    CLINICAL HISTORY:  41 years Male,numbness of left toe, R20.0  Anesthesia of skin    COMPARISON:  None    FINDINGS:  Five lumbar type vertebral bodies demonstrate normal  height and alignment. Interspinous distances are preserved. Facet  articulations are aligned without appreciable facet degenerative  change. No appreciable bony neuroforaminal narrowing with oblique  imaging.  Impression: Negative exam of the lumbar spine.    Electronically signed by:  Jose Travis MD  3/23/2022 1:08 PM CDT  Workstation: [id number]    [email or web address]  Immunization History   Administered Date(s) Administered   • COVID-19 (MODERNA) 1st, 2nd, 3rd Dose Only 09/14/2021   • FluLaval/Fluarix/Fluzone >6 11/30/2020, 09/28/2021   • Td 01/04/1996   • Tdap 06/20/2019       The following portions of the patient's history were reviewed and updated as appropriate: allergies, current medications, past family history, past medical history, past social history, past surgical history and problem list.        Physical Exam  /68 (BP Location: Left arm, Patient Position: Sitting, Cuff Size: Adult)   Pulse 88   Temp 98.3 °F (36.8 °C)   Ht 177.8 cm (70\")   Wt 97.9 kg (215 lb 12.8 oz)   SpO2 98%   BMI 30.96 kg/m²     Physical Exam  Vitals and nursing note reviewed.   Constitutional:       Appearance: He is well-developed. He is not diaphoretic.   HENT:      Head: Normocephalic and atraumatic.      Right Ear: External ear normal.   Eyes:      Conjunctiva/sclera: Conjunctivae normal.      Pupils: Pupils are equal, round, and reactive to light.   Cardiovascular:      Rate and Rhythm: Normal rate and regular rhythm.      Heart " sounds: Normal heart sounds. No murmur heard.  Pulmonary:      Effort: Pulmonary effort is normal. No respiratory distress.      Breath sounds: Normal breath sounds.   Abdominal:      General: Bowel sounds are normal. There is no distension.      Palpations: Abdomen is soft.      Tenderness: There is abdominal tenderness.      Comments: Obese abdomen    Musculoskeletal:         General: Tenderness present. No deformity. Normal range of motion.      Cervical back: Normal range of motion and neck supple.   Skin:     General: Skin is warm.      Coloration: Skin is not pale.      Findings: No erythema or rash.   Neurological:      Mental Status: He is alert and oriented to person, place, and time.      Cranial Nerves: No cranial nerve deficit.   Psychiatric:         Behavior: Behavior normal.         [unfilled]   Diagnosis Plan   1. Other fatigue  Testosterone (Free & Total), LC / MS    Testosterone, F Eqlib & T LC / MS   2. Insomnia, unspecified type  traZODone (DESYREL) 100 MG tablet    CBC Auto Differential    Comprehensive Metabolic Panel    Hemoglobin A1c    Lipid Panel    TSH    T4, Free    Vitamin D 25 Hydroxy    Vitamin B12    Iron Profile    Ferritin   3. Allergy to alpha-gal  CBC Auto Differential    Comprehensive Metabolic Panel    Hemoglobin A1c    Lipid Panel    TSH    T4, Free    Vitamin D 25 Hydroxy    Vitamin B12    Iron Profile    Ferritin   4. Vitamin D deficiency  CBC Auto Differential    Comprehensive Metabolic Panel    Hemoglobin A1c    Lipid Panel    TSH    T4, Free    Vitamin D 25 Hydroxy    Vitamin B12    Iron Profile    Ferritin   5. Mixed hyperlipidemia  CBC Auto Differential    Comprehensive Metabolic Panel    Hemoglobin A1c    Lipid Panel    TSH    T4, Free    Vitamin D 25 Hydroxy    Vitamin B12    Iron Profile    Ferritin   6. Gastroesophageal reflux disease with esophagitis without hemorrhage  CBC Auto Differential    Comprehensive Metabolic Panel    Hemoglobin A1c    Lipid Panel     TSH    T4, Free    Vitamin D 25 Hydroxy    Vitamin B12    Iron Profile    Ferritin    Recurrent Gastrointestinal Distress   7. Eosinophilic esophagitis  CBC Auto Differential    Comprehensive Metabolic Panel    Hemoglobin A1c    Lipid Panel    TSH    T4, Free    Vitamin D 25 Hydroxy    Vitamin B12    Iron Profile    Ferritin    Recurrent Gastrointestinal Distress   8. COOKIE (generalized anxiety disorder)  CBC Auto Differential    Comprehensive Metabolic Panel    Hemoglobin A1c    Lipid Panel    TSH    T4, Free    Vitamin D 25 Hydroxy    Vitamin B12    Iron Profile    Ferritin   9. Panic attack  CBC Auto Differential    Comprehensive Metabolic Panel    Hemoglobin A1c    Lipid Panel    TSH    T4, Free    Vitamin D 25 Hydroxy    Vitamin B12    Iron Profile    Ferritin   10. Restless leg syndrome  rOPINIRole (REQUIP) 1 MG tablet    CBC Auto Differential    Comprehensive Metabolic Panel    Hemoglobin A1c    Lipid Panel    TSH    T4, Free    Vitamin D 25 Hydroxy    Vitamin B12    Iron Profile    Ferritin   11. Gastroesophageal reflux disease, unspecified whether esophagitis present  esomeprazole (nexIUM) 20 MG capsule    CBC Auto Differential    Comprehensive Metabolic Panel    Hemoglobin A1c    Lipid Panel    TSH    T4, Free    Vitamin D 25 Hydroxy    Vitamin B12    Iron Profile    Ferritin   12. Hypothyroidism, unspecified type  levothyroxine (Synthroid) 50 MCG tablet    CBC Auto Differential    Comprehensive Metabolic Panel    Hemoglobin A1c    Lipid Panel    TSH    T4, Free    Vitamin D 25 Hydroxy    Vitamin B12    Iron Profile    Ferritin   13. Restless leg syndrome  rOPINIRole (REQUIP) 1 MG tablet    CBC Auto Differential    Comprehensive Metabolic Panel    Hemoglobin A1c    Lipid Panel    TSH    T4, Free    Vitamin D 25 Hydroxy    Vitamin B12    Iron Profile    Ferritin    chronic--not previously treated, recently exacerbated   14. Iron deficiency  CBC Auto Differential    Comprehensive Metabolic Panel    Hemoglobin  A1c    Lipid Panel    TSH    T4, Free    Vitamin D 25 Hydroxy    Vitamin B12    Iron Profile    Ferritin   15. Annual physical exam  CBC Auto Differential    Comprehensive Metabolic Panel    Hemoglobin A1c    Lipid Panel    TSH    T4, Free    Vitamin D 25 Hydroxy    Vitamin B12    Iron Profile    Ferritin   16. Class 1 obesity without serious comorbidity with body mass index (BMI) of 30.0 to 30.9 in adult, unspecified obesity type  CBC Auto Differential    Comprehensive Metabolic Panel    Hemoglobin A1c    Lipid Panel    TSH    T4, Free    Vitamin D 24 Hydroxy    Vitamin B12    Iron Profile    Ferritin                -recommend labwork  -recommend COVID-19 vaccination  -recommend tdap vaccination/pneumonia vaccination   -insomnia - recommend MIDNITE melatonin   -nausea/vomiting -  zofran 8 mg PO every 8 hours   -renal cyst - continue monitor.   -eosinophilic esophagitis/alphal gal allergy  -GI following, advised pt to refrain from beef, pork or lamb products   -fatigue - consider sleep study. Will check testosterone level   -hypothyroidism - on synthroid 50 mcg PO q daily  -iron deficiency/restless legs - on requip 1 mg at bedtime on ferrous sulfate 325 mg PO q daily.   -obesity  counseled weight loss >5 minutes BMI at 30.96   -fatty liver -GI following. Recommend heart healthy diet, weight loss BID  -vitamin D deficiency -vitamin D once a week  -HLP - recommend DASH diet, heart healthy diet.  -renal impairment -  Will monitor kidney function   -GERD with  esophagitis, gastritis/fatty liver   -on nexium/ GI following   -insomnia- on trazodone 100 mg PO q daily.    -allergic rhintis - flonase nasal psray   -panic attack/COOKIE/mood disorder  - on paxil 30 mg PO q daily. On buspar every 8 hours PRNh. On lamictal 50mg daily.  remeron stopped now on seroquel 100 mg at bedtime and lamcital 100 mg ihsan. He will need to call for an aptp   -advised pt to be safe and call with questions and concerns. All questions addressed  tdoay.   -advised pt to go to ER or call 911 if symptoms worrisome or severe  -advised pt to followup with specialist and referrals  -advised pt to be safe during COVID-19 pandemic  I spent 30  minutes caring for Jordin on this date of service. This time includes time spent by me in the following activities: preparing for the visit, reviewing tests, obtaining and/or reviewing a separately obtained history, performing a medically appropriate examination and/or evaluation, counseling and educating the patient/family/caregiver, ordering medications, tests, or procedures, referring and communicating with other health care professionals, documenting information in the medical record, independently interpreting results and communicating that information with the patient/family/caregiver and care coordination.  -recheck in 4 weeks         This document has been electronically signed by Jhon Amezquita MD on June 20, 2022 08:54 CDT

## 2022-06-20 ENCOUNTER — OFFICE VISIT (OUTPATIENT)
Dept: FAMILY MEDICINE CLINIC | Facility: CLINIC | Age: 42
End: 2022-06-20

## 2022-06-20 VITALS
SYSTOLIC BLOOD PRESSURE: 118 MMHG | HEART RATE: 88 BPM | OXYGEN SATURATION: 98 % | TEMPERATURE: 98.3 F | DIASTOLIC BLOOD PRESSURE: 68 MMHG | HEIGHT: 70 IN | WEIGHT: 215.8 LBS | BODY MASS INDEX: 30.9 KG/M2

## 2022-06-20 DIAGNOSIS — G25.81 RESTLESS LEG SYNDROME: Chronic | ICD-10-CM

## 2022-06-20 DIAGNOSIS — K20.0 EOSINOPHILIC ESOPHAGITIS: ICD-10-CM

## 2022-06-20 DIAGNOSIS — E66.9 CLASS 1 OBESITY WITHOUT SERIOUS COMORBIDITY WITH BODY MASS INDEX (BMI) OF 30.0 TO 30.9 IN ADULT, UNSPECIFIED OBESITY TYPE: ICD-10-CM

## 2022-06-20 DIAGNOSIS — E78.2 MIXED HYPERLIPIDEMIA: ICD-10-CM

## 2022-06-20 DIAGNOSIS — Z91.018 ALLERGY TO ALPHA-GAL: ICD-10-CM

## 2022-06-20 DIAGNOSIS — E03.9 HYPOTHYROIDISM, UNSPECIFIED TYPE: ICD-10-CM

## 2022-06-20 DIAGNOSIS — E55.9 VITAMIN D DEFICIENCY: ICD-10-CM

## 2022-06-20 DIAGNOSIS — K21.9 GASTROESOPHAGEAL REFLUX DISEASE, UNSPECIFIED WHETHER ESOPHAGITIS PRESENT: Chronic | ICD-10-CM

## 2022-06-20 DIAGNOSIS — G25.81 RESTLESS LEG SYNDROME: ICD-10-CM

## 2022-06-20 DIAGNOSIS — F41.0 PANIC ATTACK: ICD-10-CM

## 2022-06-20 DIAGNOSIS — Z00.00 ANNUAL PHYSICAL EXAM: ICD-10-CM

## 2022-06-20 DIAGNOSIS — E61.1 IRON DEFICIENCY: ICD-10-CM

## 2022-06-20 DIAGNOSIS — F41.1 GAD (GENERALIZED ANXIETY DISORDER): ICD-10-CM

## 2022-06-20 DIAGNOSIS — K21.00 GASTROESOPHAGEAL REFLUX DISEASE WITH ESOPHAGITIS WITHOUT HEMORRHAGE: ICD-10-CM

## 2022-06-20 DIAGNOSIS — R53.83 OTHER FATIGUE: Primary | ICD-10-CM

## 2022-06-20 DIAGNOSIS — G47.00 INSOMNIA, UNSPECIFIED TYPE: ICD-10-CM

## 2022-06-20 PROCEDURE — 99214 OFFICE O/P EST MOD 30 MIN: CPT | Performed by: FAMILY MEDICINE

## 2022-06-20 RX ORDER — ROPINIROLE 1 MG/1
TABLET, FILM COATED ORAL
Qty: 30 TABLET | Refills: 1 | Status: SHIPPED | OUTPATIENT
Start: 2022-06-20 | End: 2022-08-07 | Stop reason: SDUPTHER

## 2022-06-20 RX ORDER — PNV NO.95/FERROUS FUM/FOLIC AC 28MG-0.8MG
1 TABLET ORAL
Qty: 30 TABLET | Refills: 1 | Status: SHIPPED | OUTPATIENT
Start: 2022-06-20 | End: 2022-08-07 | Stop reason: SDUPTHER

## 2022-06-20 RX ORDER — ERGOCALCIFEROL 1.25 MG/1
50000 CAPSULE ORAL WEEKLY
Qty: 4 CAPSULE | Refills: 3 | Status: SHIPPED | OUTPATIENT
Start: 2022-06-20 | End: 2022-12-11 | Stop reason: SDUPTHER

## 2022-06-20 RX ORDER — ATORVASTATIN CALCIUM 20 MG/1
20 TABLET, FILM COATED ORAL NIGHTLY
Qty: 30 TABLET | Refills: 1 | Status: SHIPPED | OUTPATIENT
Start: 2022-06-20 | End: 2023-03-16 | Stop reason: SDUPTHER

## 2022-06-20 RX ORDER — RISPERIDONE 1 MG/1
1 TABLET ORAL NIGHTLY
Qty: 30 TABLET | Refills: 1 | Status: SHIPPED | OUTPATIENT
Start: 2022-06-20 | End: 2022-08-21 | Stop reason: SDUPTHER

## 2022-06-20 RX ORDER — CHOLECALCIFEROL (VITAMIN D3) 125 MCG
500 CAPSULE ORAL DAILY
Qty: 30 TABLET | Refills: 1 | Status: SHIPPED | OUTPATIENT
Start: 2022-06-20 | End: 2022-08-21 | Stop reason: SDUPTHER

## 2022-06-20 RX ORDER — MONTELUKAST SODIUM 10 MG/1
10 TABLET ORAL NIGHTLY
Qty: 30 TABLET | Refills: 1 | Status: SHIPPED | OUTPATIENT
Start: 2022-06-20

## 2022-06-20 RX ORDER — TRAZODONE HYDROCHLORIDE 100 MG/1
100 TABLET ORAL NIGHTLY
Qty: 30 TABLET | Refills: 1 | Status: SHIPPED | OUTPATIENT
Start: 2022-06-20 | End: 2022-07-20 | Stop reason: SDUPTHER

## 2022-06-20 RX ORDER — LEVOTHYROXINE SODIUM 0.05 MG/1
50 TABLET ORAL
Qty: 90 TABLET | Refills: 0 | Status: SHIPPED | OUTPATIENT
Start: 2022-06-20 | End: 2022-07-20 | Stop reason: SDUPTHER

## 2022-06-21 RX ORDER — OMEPRAZOLE 40 MG/1
CAPSULE, DELAYED RELEASE ORAL
Qty: 30 CAPSULE | Refills: 0 | OUTPATIENT
Start: 2022-06-21

## 2022-06-21 NOTE — TELEPHONE ENCOUNTER
Rx Refill Note  Requested Prescriptions     Pending Prescriptions Disp Refills   • omeprazole (priLOSEC) 40 MG capsule [Pharmacy Med Name: OMEPRAZOLE DR 40 MG CAPSULE] 30 capsule 0     Sig: TAKE 1 CAPSULE ONCE DAILY. NO ADDITIONAL REFILLS WITHOUT APPOINTMENT WITH PROVIDER      Last office visit with prescribing clinician: 6/20/2022      Next office visit with prescribing clinician: 7/20/2022   {TIP  Encounters:    discontinued on 6/17/2022 by Ranjit Alfonso APRN for the following reason: Not Efficacious         Albina Sanchez LPN  06/21/22, 11:24 CDT

## 2022-06-24 ENCOUNTER — LAB (OUTPATIENT)
Dept: LAB | Facility: HOSPITAL | Age: 42
End: 2022-06-24

## 2022-06-24 DIAGNOSIS — Z00.00 ANNUAL PHYSICAL EXAM: ICD-10-CM

## 2022-06-24 DIAGNOSIS — G25.81 RESTLESS LEG SYNDROME: ICD-10-CM

## 2022-06-24 DIAGNOSIS — K21.9 GASTROESOPHAGEAL REFLUX DISEASE, UNSPECIFIED WHETHER ESOPHAGITIS PRESENT: Chronic | ICD-10-CM

## 2022-06-24 DIAGNOSIS — E03.9 HYPOTHYROIDISM, UNSPECIFIED TYPE: ICD-10-CM

## 2022-06-24 DIAGNOSIS — E66.9 CLASS 1 OBESITY WITHOUT SERIOUS COMORBIDITY WITH BODY MASS INDEX (BMI) OF 30.0 TO 30.9 IN ADULT, UNSPECIFIED OBESITY TYPE: ICD-10-CM

## 2022-06-24 DIAGNOSIS — Z91.018 ALLERGY TO ALPHA-GAL: ICD-10-CM

## 2022-06-24 DIAGNOSIS — F41.0 PANIC ATTACK: ICD-10-CM

## 2022-06-24 DIAGNOSIS — E55.9 VITAMIN D DEFICIENCY: ICD-10-CM

## 2022-06-24 DIAGNOSIS — K21.00 GASTROESOPHAGEAL REFLUX DISEASE WITH ESOPHAGITIS WITHOUT HEMORRHAGE: ICD-10-CM

## 2022-06-24 DIAGNOSIS — R53.83 OTHER FATIGUE: ICD-10-CM

## 2022-06-24 DIAGNOSIS — E61.1 IRON DEFICIENCY: ICD-10-CM

## 2022-06-24 DIAGNOSIS — K20.0 EOSINOPHILIC ESOPHAGITIS: ICD-10-CM

## 2022-06-24 DIAGNOSIS — G47.00 INSOMNIA, UNSPECIFIED TYPE: ICD-10-CM

## 2022-06-24 DIAGNOSIS — E78.2 MIXED HYPERLIPIDEMIA: ICD-10-CM

## 2022-06-24 DIAGNOSIS — F41.1 GAD (GENERALIZED ANXIETY DISORDER): ICD-10-CM

## 2022-06-24 PROCEDURE — 86003 ALLG SPEC IGE CRUDE XTRC EA: CPT

## 2022-06-24 PROCEDURE — 84403 ASSAY OF TOTAL TESTOSTERONE: CPT

## 2022-06-24 PROCEDURE — 82728 ASSAY OF FERRITIN: CPT

## 2022-06-24 PROCEDURE — 80053 COMPREHEN METABOLIC PANEL: CPT

## 2022-06-24 PROCEDURE — 84443 ASSAY THYROID STIM HORMONE: CPT

## 2022-06-24 PROCEDURE — 82306 VITAMIN D 25 HYDROXY: CPT

## 2022-06-24 PROCEDURE — 84439 ASSAY OF FREE THYROXINE: CPT

## 2022-06-24 PROCEDURE — 85025 COMPLETE CBC W/AUTO DIFF WBC: CPT

## 2022-06-24 PROCEDURE — 83540 ASSAY OF IRON: CPT

## 2022-06-24 PROCEDURE — 84402 ASSAY OF FREE TESTOSTERONE: CPT

## 2022-06-24 PROCEDURE — 80061 LIPID PANEL: CPT

## 2022-06-24 PROCEDURE — 82607 VITAMIN B-12: CPT

## 2022-06-24 PROCEDURE — 86364 TISS TRNSGLTMNASE EA IG CLAS: CPT

## 2022-06-24 PROCEDURE — 86258 DGP ANTIBODY EACH IG CLASS: CPT

## 2022-06-24 PROCEDURE — 83036 HEMOGLOBIN GLYCOSYLATED A1C: CPT

## 2022-06-24 PROCEDURE — 84466 ASSAY OF TRANSFERRIN: CPT

## 2022-06-25 LAB
25(OH)D3 SERPL-MCNC: 41.7 NG/ML (ref 30–100)
ALBUMIN SERPL-MCNC: 4.5 G/DL (ref 3.5–5.2)
ALBUMIN/GLOB SERPL: 1.8 G/DL
ALP SERPL-CCNC: 69 U/L (ref 39–117)
ALT SERPL W P-5'-P-CCNC: 28 U/L (ref 1–41)
ANION GAP SERPL CALCULATED.3IONS-SCNC: 11 MMOL/L (ref 5–15)
AST SERPL-CCNC: 24 U/L (ref 1–40)
BASOPHILS # BLD AUTO: 0.09 10*3/MM3 (ref 0–0.2)
BASOPHILS NFR BLD AUTO: 1.3 % (ref 0–1.5)
BILIRUB SERPL-MCNC: 0.5 MG/DL (ref 0–1.2)
BUN SERPL-MCNC: 9 MG/DL (ref 6–20)
BUN/CREAT SERPL: 8.3 (ref 7–25)
CALCIUM SPEC-SCNC: 9.2 MG/DL (ref 8.6–10.5)
CHLORIDE SERPL-SCNC: 104 MMOL/L (ref 98–107)
CHOLEST SERPL-MCNC: 181 MG/DL (ref 0–200)
CO2 SERPL-SCNC: 25 MMOL/L (ref 22–29)
CREAT SERPL-MCNC: 1.08 MG/DL (ref 0.76–1.27)
DEPRECATED RDW RBC AUTO: 39.9 FL (ref 37–54)
EGFRCR SERPLBLD CKD-EPI 2021: 87.9 ML/MIN/1.73
EOSINOPHIL # BLD AUTO: 0.3 10*3/MM3 (ref 0–0.4)
EOSINOPHIL NFR BLD AUTO: 4.4 % (ref 0.3–6.2)
ERYTHROCYTE [DISTWIDTH] IN BLOOD BY AUTOMATED COUNT: 13.2 % (ref 12.3–15.4)
FERRITIN SERPL-MCNC: 193 NG/ML (ref 30–400)
GLOBULIN UR ELPH-MCNC: 2.5 GM/DL
GLUCOSE SERPL-MCNC: 91 MG/DL (ref 65–99)
HBA1C MFR BLD: 5.3 % (ref 4.8–5.6)
HCT VFR BLD AUTO: 46 % (ref 37.5–51)
HDLC SERPL-MCNC: 56 MG/DL (ref 40–60)
HGB BLD-MCNC: 15.3 G/DL (ref 13–17.7)
IMM GRANULOCYTES # BLD AUTO: 0.07 10*3/MM3 (ref 0–0.05)
IMM GRANULOCYTES NFR BLD AUTO: 1 % (ref 0–0.5)
IRON 24H UR-MRATE: 90 MCG/DL (ref 59–158)
IRON SATN MFR SERPL: 29 % (ref 20–50)
LDLC SERPL CALC-MCNC: 113 MG/DL (ref 0–100)
LDLC/HDLC SERPL: 2.01 {RATIO}
LYMPHOCYTES # BLD AUTO: 1.49 10*3/MM3 (ref 0.7–3.1)
LYMPHOCYTES NFR BLD AUTO: 22 % (ref 19.6–45.3)
MCH RBC QN AUTO: 27.8 PG (ref 26.6–33)
MCHC RBC AUTO-ENTMCNC: 33.3 G/DL (ref 31.5–35.7)
MCV RBC AUTO: 83.6 FL (ref 79–97)
MONOCYTES # BLD AUTO: 0.68 10*3/MM3 (ref 0.1–0.9)
MONOCYTES NFR BLD AUTO: 10.1 % (ref 5–12)
NEUTROPHILS NFR BLD AUTO: 4.13 10*3/MM3 (ref 1.7–7)
NEUTROPHILS NFR BLD AUTO: 61.2 % (ref 42.7–76)
NRBC BLD AUTO-RTO: 0 /100 WBC (ref 0–0.2)
PLATELET # BLD AUTO: 207 10*3/MM3 (ref 140–450)
PMV BLD AUTO: 10.1 FL (ref 6–12)
POTASSIUM SERPL-SCNC: 4.2 MMOL/L (ref 3.5–5.2)
PROT SERPL-MCNC: 7 G/DL (ref 6–8.5)
RBC # BLD AUTO: 5.5 10*6/MM3 (ref 4.14–5.8)
SODIUM SERPL-SCNC: 140 MMOL/L (ref 136–145)
T4 FREE SERPL-MCNC: 0.95 NG/DL (ref 0.93–1.7)
TIBC SERPL-MCNC: 313 MCG/DL (ref 298–536)
TRANSFERRIN SERPL-MCNC: 210 MG/DL (ref 200–360)
TRIGL SERPL-MCNC: 61 MG/DL (ref 0–150)
TSH SERPL DL<=0.05 MIU/L-ACNC: 0.98 UIU/ML (ref 0.27–4.2)
VIT B12 BLD-MCNC: 545 PG/ML (ref 211–946)
VLDLC SERPL-MCNC: 12 MG/DL (ref 5–40)
WBC NRBC COR # BLD: 6.76 10*3/MM3 (ref 3.4–10.8)

## 2022-06-29 LAB
TESTOST FREE MFR SERPL: 2.64 % (ref 1.5–4.2)
TESTOST FREE SERPL-MCNC: 10.96 NG/DL (ref 5–21)
TESTOST FREE SERPL-MCNC: 13.5 PG/ML (ref 6.8–21.5)
TESTOST SERPL-MCNC: 412.8 NG/DL (ref 264–916)
TESTOST SERPL-MCNC: 415.3 NG/DL (ref 264–916)

## 2022-07-01 LAB
CODFISH IGE QN: <0.1 KU/L
CONV CLASS DESCRIPTION: ABNORMAL
COW MILK IGE QN: 0.38 KU/L
EGG WHITE IGE QN: <0.1 KU/L
GLUTEN IGE QN: 0.21 KU/L
HAZELNUT IGE QN: <0.1 KU/L
PEANUT IGE QN: <0.1 KU/L
SCALLOP IGE QN: <0.1 KU/L
SESAME SEED IGE QN: <0.1 KU/L
SHRIMP IGE QN: <0.1 KU/L
SOYBEAN IGE QN: <0.1 KU/L
WALNUT IGE QN: <0.1 KU/L
WHEAT IGE QN: 0.24 KU/L

## 2022-07-15 DIAGNOSIS — G47.00 INSOMNIA, UNSPECIFIED TYPE: ICD-10-CM

## 2022-07-15 RX ORDER — MIRTAZAPINE 7.5 MG/1
TABLET, FILM COATED ORAL
Qty: 30 TABLET | Refills: 0 | OUTPATIENT
Start: 2022-07-15

## 2022-07-15 NOTE — PROGRESS NOTES
Subjective:  Jordin Parham is a 41 y.o. male who presents for       Patient Active Problem List   Diagnosis   • Class 1 obesity with body mass index (BMI) of 30.0 to 30.9 in adult   • Gastroesophageal reflux disease   • COOKIE (generalized anxiety disorder)   • Left upper quadrant pain   • Low libido   • Mood disorder (HCC)   • H. pylori infection   • Renal impairment   • Mixed hyperlipidemia   • Vitamin D deficiency   • History of drug use   • Fatty liver   • Renal cyst   • Change in bowel habits   • Mucus in stool   • Allergy to alpha-gal   • Bronchitis   • Upper respiratory tract infection   • Eosinophilic esophagitis   • Onychomycosis   • Bilateral hand pain   • Bilateral wrist pain   • Overweight   • Pruritic rash   • Insomnia   • Arthritis of right hand   • Bilateral swelling of feet   • Blood in stool   • Cough   • Panic attacks   • Restless leg syndrome   • Restless legs   • Panic attack   • Ear pain, bilateral   • Class 1 obesity without serious comorbidity with body mass index (BMI) of 30.0 to 30.9 in adult   • Food sensitivity with gastrointestinal symptoms           Current Outpatient Medications:   •  atorvastatin (Lipitor) 20 MG tablet, Take 1 tablet by mouth Every Night., Disp: 30 tablet, Rfl: 1  •  esomeprazole (nexIUM) 20 MG capsule, Take 1 capsule by mouth Every Morning Before Breakfast. May increase to two capsules if needed daily--DC Omeprazole, Disp: 60 capsule, Rfl: 0  •  ferrous sulfate 325 (65 Fe) MG tablet, Take 1 tablet by mouth Daily With Breakfast., Disp: 30 tablet, Rfl: 1  •  levothyroxine (Synthroid) 50 MCG tablet, Take 1 tablet by mouth Every Morning., Disp: 90 tablet, Rfl: 0  •  montelukast (SINGULAIR) 10 MG tablet, Take 1 tablet by mouth Every Night., Disp: 30 tablet, Rfl: 1  •  risperiDONE (RisperDAL) 1 MG tablet, Take 1 tablet by mouth Every Night., Disp: 30 tablet, Rfl: 1  •  rOPINIRole (REQUIP) 1 MG tablet, TAKE 1 TABLET DAILY BEFORE BEDTIME, Disp: 30 tablet, Rfl: 1  •   vitamin B-12 (CYANOCOBALAMIN) 500 MCG tablet, Take 1 tablet by mouth Daily., Disp: 30 tablet, Rfl: 1  •  vitamin D (ERGOCALCIFEROL) 1.25 MG (05727 UT) capsule capsule, Take 1 capsule by mouth 1 (One) Time Per Week., Disp: 4 capsule, Rfl: 3  •  traZODone (DESYREL) 150 MG tablet, Take 1 tablet by mouth Every Night., Disp: 30 tablet, Rfl: 3    HPI       Pt is 40 yo male with management  of obesity, COOKIE, major depression, panic attacks GERD , mood disorder, history of H pylori infection, VItamin D deficiency,  Renal impairment, History of illicit drug use, fatty liver disease, gastritis,  Eosinophilic esophagitis, internal/external hemorrhoids, sp hemorrhoidectomy        6/20/22 in office visit for recheck. Pt has been to walk in clinic several times. With most recent visit at 6/17/22 for insomnia/GERD on 5/20/22 for recurrent otitis externa.  And 4/15/22 for ear issues.  Pt continues to take his medications for GERD, restless legs, iron deficiency, panic attack, insomnia, hypothyroidism, HLP. Pt doing good overall no chest pain no dizziness. His stomach is feeling better. His main concern is fatigue and low libido. He has a hard time having or inititating an erection.      7/20/22 in office visit for recheck. Pt had labwork done on 6/24/22 that showed lipid panel LDL at 113.  Had REcurrent GI panel that showed sensitivyt to gluten wheat and cow's milk  Pt states he still has issues with sleeping and takes trazodone 100 mg at bedtime. He averages about 5 hours each night. He is tired during the day. His restless legs has improved with requip. He practices good sleep hygiene.        Obesity  This is a chronic problem. The current episode started more than 1 year ago. The problem occurs constantly. Associated symptoms include abdominal pain, fatigue, numbness and weakness. Pertinent negatives include no anorexia, arthralgias, change in bowel habit, chest pain, chills, congestion, coughing, diaphoresis, fever, headaches,  joint swelling, myalgias, nausea, neck pain, sore throat, swollen glands, urinary symptoms, vertigo, visual change or vomiting. Nothing aggravates the symptoms. He has tried nothing for the symptoms. The treatment provided no relief.   Fatigue  This is a chronic problem. The current episode started more than 1 month ago. The problem occurs constantly. The problem has been unchanged. Associated symptoms include abdominal pain, fatigue, numbness and weakness. Pertinent negatives include no anorexia, arthralgias, change in bowel habit, chest pain, chills, congestion, coughing, diaphoresis, fever, headaches, joint swelling, myalgias, nausea, neck pain, sore throat, swollen glands, urinary symptoms, vertigo, visual change or vomiting. Nothing aggravates the symptoms. He has tried nothing for the symptoms. The treatment provided no relief.   Heartburn  He complains of abdominal pain and heartburn. He reports no belching, no chest pain, no choking, no coughing, no dysphagia, no early satiety, no globus sensation, no hoarse voice, no nausea, no sore throat, no stridor, no tooth decay, no water brash or no wheezing. This is a recurrent problem. The current episode started more than 1 month ago. The problem occurs occasionally. The problem has been waxing and waning. The heartburn does not wake him from sleep. The heartburn does not limit his activity. The heartburn doesn't change with position. The symptoms are aggravated by certain foods. Pertinent negatives include no fatigue, melena or weight loss. Risk factors include lack of exercise. He has tried a PPI for the symptoms. The treatment provided moderate relief. Past procedures include an EGD. Past procedures do not include an abdominal ultrasound, esophageal manometry, H. pylori antibody titer or a UGI.   Anxiety  Presents for follow-up visit. Symptoms include decreased concentration, depressed mood, excessive worry, irritability, malaise, nervous/anxious  behavior, obsessions and restlessness. Patient reports no chest pain, compulsions, confusion, dizziness, dry mouth, feeling of choking, hyperventilation, impotence, insomnia, muscle tension, nausea, palpitations, panic, shortness of breath or suicidal ideas. The severity of symptoms is moderate. The quality of sleep is fair.      His past medical history is significant for depression. Compliance with medications is %.   Depression  Visit Type: follow-up  Patient presents with the following symptoms: decreased concentration, depressed mood, excessive worry, irritability, malaise, nervousness/anxiety, obsessions and restlessness.  Patient is not experiencing: anhedonia, chest pain, choking sensation, compulsions, confusion, dizziness, dry mouth, fatigue, feelings of hopelessness, feelings of worthlessness, hypersomnia, hyperventilation, impotence, insomnia, memory impairment, muscle tension, nausea, palpitations, panic, psychomotor agitation, shortness of breath, suicidal ideas, suicidal planning, thoughts of death, weight gain and weight loss.  Severity: moderate   Sleep quality: fair  Compliance with medications:  %   GI Problem  The primary symptoms include abdominal pain. Primary symptoms do not include fever, weight loss, fatigue, nausea, vomiting, diarrhea, melena, hematemesis, jaundice, hematochezia, dysuria, myalgias, arthralgias or rash.   The illness does not include chills, anorexia, dysphagia, odynophagia, bloating, constipation, tenesmus, back pain or itching. Significant associated medical issues include GERD. Associated medical issues do not include inflammatory bowel disease, gallstones, liver disease, alcohol abuse, PUD, gastric bypass, bowel resection, irritable bowel syndrome, hemorrhoids or diverticulitis.   Dizziness  This is a chronic problem. The current episode started more than 1 year ago. The problem occurs constantly. The problem has been unchanged. Associated symptoms  include abdominal pain and numbness. Pertinent negatives include no anorexia, arthralgias, change in bowel habit, chest pain, chills, congestion, coughing, diaphoresis, fatigue, fever, headaches, joint swelling, myalgias, nausea, neck pain, rash, sore throat, swollen glands, urinary symptoms, vertigo, visual change, vomiting or weakness. Nothing aggravates the symptoms. He has tried nothing for the symptoms. The treatment provided no relief.   Headache   This is a recurrent problem. The current episode started more than 1 month ago. The problem occurs constantly. The pain is located in the right unilateral region. The pain does not radiate. The pain quality is not similar to prior headaches. The quality of the pain is described as aching. The pain is at a severity of 0/10. The pain is moderate. Associated symptoms include abdominal pain and numbness. Pertinent negatives include no abnormal behavior, anorexia, back pain, blurred vision, coughing, dizziness, ear pain, facial sweating, fever, hearing loss, insomnia, loss of balance, muscle aches, nausea, neck pain, scalp tenderness, seizures, sinus pressure, sore throat, swollen glands, tingling, tinnitus, visual change, vomiting, weakness or weight loss. Nothing aggravates the symptoms. He has tried nothing for the symptoms. The treatment provided no relief  Review of Systems  Review of Systems   Constitutional: Positive for activity change and fatigue. Negative for appetite change, chills, diaphoresis and fever.   HENT: Negative for congestion, postnasal drip, rhinorrhea, sinus pressure, sinus pain, sneezing, sore throat, trouble swallowing and voice change.    Respiratory: Negative for cough, choking, chest tightness, shortness of breath, wheezing and stridor.    Cardiovascular: Negative for chest pain.   Gastrointestinal: Positive for abdominal pain. Negative for diarrhea, nausea and vomiting.   Musculoskeletal: Positive for arthralgias.   Neurological: Positive  for weakness and numbness. Negative for headaches.   Psychiatric/Behavioral: Positive for agitation and sleep disturbance. The patient is nervous/anxious.        Patient Active Problem List   Diagnosis   • Class 1 obesity with body mass index (BMI) of 30.0 to 30.9 in adult   • Gastroesophageal reflux disease   • COOKIE (generalized anxiety disorder)   • Left upper quadrant pain   • Low libido   • Mood disorder (HCC)   • H. pylori infection   • Renal impairment   • Mixed hyperlipidemia   • Vitamin D deficiency   • History of drug use   • Fatty liver   • Renal cyst   • Change in bowel habits   • Mucus in stool   • Allergy to alpha-gal   • Bronchitis   • Upper respiratory tract infection   • Eosinophilic esophagitis   • Onychomycosis   • Bilateral hand pain   • Bilateral wrist pain   • Overweight   • Pruritic rash   • Insomnia   • Arthritis of right hand   • Bilateral swelling of feet   • Blood in stool   • Cough   • Panic attacks   • Restless leg syndrome   • Restless legs   • Panic attack   • Ear pain, bilateral   • Class 1 obesity without serious comorbidity with body mass index (BMI) of 30.0 to 30.9 in adult   • Food sensitivity with gastrointestinal symptoms     Past Surgical History:   Procedure Laterality Date   • COLONOSCOPY N/A 9/17/2019    Procedure: COLONOSCOPY;  Surgeon: Rigoberto Flower MD;  Location: [facility] ENDOSCOPY;  Service: Gastroenterology   • ENDOSCOPY N/A 9/17/2019    Procedure: ESOPHAGOGASTRODUODENOSCOPY;  Surgeon: Rigoberto Flower MD;  Location: [facility] ENDOSCOPY;  Service: Gastroenterology   • HEMORRHOIDECTOMY       Social History     Socioeconomic History   • Marital status:    Tobacco Use   • Smoking status: Never Smoker   • Smokeless tobacco: Never Used   Vaping Use   • Vaping Use: Never used   Substance and Sexual Activity   • Alcohol use: Not Currently     Comment: rarely   • Drug use: Yes     Types: Marijuana, Methamphetamines   • Sexual activity: Defer     Family History   Problem  Relation Age of Onset   • Alcohol abuse Mother    • Anxiety disorder Mother    • Arthritis Mother    • Asthma Mother    • Cancer Mother    • Depression Mother    • Hypertension Mother    • Stroke Mother    • Hyperlipidemia Father    • Thyroid disease Sister    • Alcohol abuse Sister    • Anxiety disorder Sister    • Cancer Sister    • Depression Sister    • Alcohol abuse Brother    • Anxiety disorder Brother    • Cancer Brother    • Depression Brother    • Hyperlipidemia Maternal Uncle    • Heart failure Maternal Uncle    • Heart disease Maternal Uncle      Lab on 06/24/2022   Component Date Value Ref Range Status   • WBC 06/24/2022 6.76  3.40 - 10.80 10*3/mm3 Final   • RBC 06/24/2022 5.50  4.14 - 5.80 10*6/mm3 Final   • Hemoglobin 06/24/2022 15.3  13.0 - 17.7 g/dL Final   • Hematocrit 06/24/2022 46.0  37.5 - 51.0 % Final   • MCV 06/24/2022 83.6  79.0 - 97.0 fL Final   • MCH 06/24/2022 27.8  26.6 - 33.0 pg Final   • MCHC 06/24/2022 33.3  31.5 - 35.7 g/dL Final   • RDW 06/24/2022 13.2  12.3 - 15.4 % Final   • RDW-SD 06/24/2022 39.9  37.0 - 54.0 fl Final   • MPV 06/24/2022 10.1  6.0 - 12.0 fL Final   • Platelets 06/24/2022 207  140 - 450 10*3/mm3 Final   • Neutrophil % 06/24/2022 61.2  42.7 - 76.0 % Final   • Lymphocyte % 06/24/2022 22.0  19.6 - 45.3 % Final   • Monocyte % 06/24/2022 10.1  5.0 - 12.0 % Final   • Eosinophil % 06/24/2022 4.4  0.3 - 6.2 % Final   • Basophil % 06/24/2022 1.3  0.0 - 1.5 % Final   • Immature Grans % 06/24/2022 1.0 (A) 0.0 - 0.5 % Final   • Neutrophils, Absolute 06/24/2022 4.13  1.70 - 7.00 10*3/mm3 Final   • Lymphocytes, Absolute 06/24/2022 1.49  0.70 - 3.10 10*3/mm3 Final   • Monocytes, Absolute 06/24/2022 0.68  0.10 - 0.90 10*3/mm3 Final   • Eosinophils, Absolute 06/24/2022 0.30  0.00 - 0.40 10*3/mm3 Final   • Basophils, Absolute 06/24/2022 0.09  0.00 - 0.20 10*3/mm3 Final   • Immature Grans, Absolute 06/24/2022 0.07 (A) 0.00 - 0.05 10*3/mm3 Final   • nRBC 06/24/2022 0.0  0.0 - 0.2 /100  WBC Final   • Glucose 06/24/2022 91  65 - 99 mg/dL Final   • BUN 06/24/2022 9  6 - 20 mg/dL Final   • Creatinine 06/24/2022 1.08  0.76 - 1.27 mg/dL Final   • Sodium 06/24/2022 140  136 - 145 mmol/L Final   • Potassium 06/24/2022 4.2  3.5 - 5.2 mmol/L Final   • Chloride 06/24/2022 104  98 - 107 mmol/L Final   • CO2 06/24/2022 25.0  22.0 - 29.0 mmol/L Final   • Calcium 06/24/2022 9.2  8.6 - 10.5 mg/dL Final   • Total Protein 06/24/2022 7.0  6.0 - 8.5 g/dL Final   • Albumin 06/24/2022 4.50  3.50 - 5.20 g/dL Final   • ALT (SGPT) 06/24/2022 28  1 - 41 U/L Final   • AST (SGOT) 06/24/2022 24  1 - 40 U/L Final   • Alkaline Phosphatase 06/24/2022 69  39 - 117 U/L Final   • Total Bilirubin 06/24/2022 0.5  0.0 - 1.2 mg/dL Final   • Globulin 06/24/2022 2.5  gm/dL Final   • A/G Ratio 06/24/2022 1.8  g/dL Final   • BUN/Creatinine Ratio 06/24/2022 8.3  7.0 - 25.0 Final   • Anion Gap 06/24/2022 11.0  5.0 - 15.0 mmol/L Final   • eGFR 06/24/2022 87.9  >60.0 mL/min/1.73 Final    National Kidney Foundation and American Society of Nephrology (ASN) Task Force recommended calculation based on the Chronic Kidney Disease Epidemiology Collaboration (CKD-EPI) equation refit without adjustment for race.   • Hemoglobin A1C 06/24/2022 5.30  4.80 - 5.60 % Final   • Total Cholesterol 06/24/2022 181  0 - 200 mg/dL Final   • Triglycerides 06/24/2022 61  0 - 150 mg/dL Final   • HDL Cholesterol 06/24/2022 56  40 - 60 mg/dL Final   • LDL Cholesterol  06/24/2022 113 (A) 0 - 100 mg/dL Final   • VLDL Cholesterol 06/24/2022 12  5 - 40 mg/dL Final   • LDL/HDL Ratio 06/24/2022 2.01   Final   • TSH 06/24/2022 0.976  0.270 - 4.200 uIU/mL Final   • Free T4 06/24/2022 0.95  0.93 - 1.70 ng/dL Final   • 25 Hydroxy, Vitamin D 06/24/2022 41.7  30.0 - 100.0 ng/ml Final   • Vitamin B-12 06/24/2022 545  211 - 946 pg/mL Final   • Iron 06/24/2022 90  59 - 158 mcg/dL Final   • Iron Saturation 06/24/2022 29  20 - 50 % Final   • Transferrin 06/24/2022 210  200 - 360 mg/dL  Final   • TIBC 06/24/2022 313  298 - 536 mcg/dL Final   • Ferritin 06/24/2022 193.00  30.00 - 400.00 ng/mL Final   • Testosterone, Total 06/24/2022 412.8  264.0 - 916.0 ng/dL Final    This [facility] LC/MS-MS method is currently certified by the CDC  Hormone Standardization Program (HoSt). Adult male reference  interval is based on a population of healthy nonobese males  (BMI <30) between 19 and 39 years old. Avlina, et.al. JCEM  2017,102;0782-3811. PMID: [id number].   • Testosterone, Free 06/24/2022 13.5  6.8 - 21.5 pg/mL Final   • Testosterone, Total 06/24/2022 415.3  264.0 - 916.0 ng/dL Final    This [facility] LC/MS-MS method is currently certified by the CDC  Hormone Standardization Program (HoSt). Adult male reference  interval is based on a population of healthy nonobese males  (BMI <30) between 19 and 39 years old. Alvina, et.al. EM  2017,102;3124-3762. PMID: [id number].   • Testosterone, Free 06/24/2022 10.96  5.00 - 21.00 ng/dL Final   • Testosterone, Free % 06/24/2022 2.64  1.50 - 4.20 % Final   • Class Description 06/24/2022 Comment   Final        Levels of Specific IgE       Class  Description of Class      ---------------------------  -----  --------------------                     < 0.10         0         Negative             0.10 -    0.31         0/I       Equivocal/Low             0.32 -    0.55         I         Low             0.56 -    1.40         II        Moderate             1.41 -    3.90         III       High             3.91 -   19.00         IV        Very High            19.01 -  100.00         V         Very High                    >100.00         VI        Very High   • Egg White 06/24/2022 <0.10  Class 0 kU/L Final   • Milk, Cow's 06/24/2022 0.38 (A) Class I kU/L Final   • CodFish 06/24/2022 <0.10  Class 0 kU/L Final   • Sesame Seed 06/24/2022 <0.10  Class 0 kU/L Final   • Peanut 06/24/2022 <0.10  Class 0 kU/L Final   • Soybean 06/24/2022 <0.10  Class 0 kU/L Final   • Hazelnut  06/24/2022 <0.10  Class 0 kU/L Final   • Shrimp 06/24/2022 <0.10  Class 0 kU/L Final   • Scallop 06/24/2022 <0.10  Class 0 kU/L Final   • Gluten 06/24/2022 0.21 (A) Class 0/I kU/L Final   • Mellen 06/24/2022 <0.10  Class 0 kU/L Final   • Wheat 06/24/2022 0.24 (A) Class 0/I kU/L Final   • Gliadin Deamidated Peptide Ab, IgA 06/24/2022 4  0 - 19 units Final                       Negative                   0 - 19                     Weak Positive             20 - 30                     Moderate to Strong Positive   >30   • Deaminated Gliadin Ab IgG 06/24/2022 2  0 - 19 units Final                       Negative                   0 - 19                     Weak Positive             20 - 30                     Moderate to Strong Positive   >30   • Tissue Transglutaminase IgA 06/24/2022 <2  0 - 3 U/mL Final                                  Negative        0 -  3                                Weak Positive   4 - 10                                Positive           >10   Tissue Transglutaminase (tTG) has been identified   as the endomysial antigen.  Studies have demonstr-   ated that endomysial IgA antibodies have over 99%   specificity for gluten sensitive enteropathy.   • Tissue Transglutaminase IgG 06/24/2022 <2  0 - 5 U/mL Final                                  Negative        0 - 5                                Weak Positive   6 - 9                                Positive           >9   Lab on 04/01/2022   Component Date Value Ref Range Status   • RMSF IgG 04/01/2022 Positive (A) Negative Final   • RMSF IgM 04/01/2022 0.32  0.00 - 0.89 index Final                                     Negative        <0.90                                   Equivocal 0.90 - 1.10                                   Positive        >1.10   • Lyme Ab IgG/IgM 04/01/2022 <0.91  0.00 - 0.90 ISR Final                                    Negative         <0.91                                  Equivocal  0.91 - 1.09                                   Positive         >1.09  **Effective April 25, 2022 Lyme, Total Ab Test/Reflex**    will be discontinued. [facility] offers [id number] Lyme    Disease Serology w/Reflex. This new panel meets    current recommendations for Lyme disease antibody    testing, has improved turnaround time and provides    easy to interpret results.   • Class Description 04/01/2022 Comment   Final        Levels of Specific IgE       Class  Description of Class      ---------------------------  -----  --------------------                     < 0.10         0         Negative             0.10 -    0.31         0/I       Equivocal/Low             0.32 -    0.55         I         Low             0.56 -    1.40         II        Moderate             1.41 -    3.90         III       High             3.91 -   19.00         IV        Very High            19.01 -  100.00         V         Very High                    >100.00         VI        Very High   • IgE 04/01/2022 44  6 - 495 IU/mL Final   • [id number] Alpha-Gal 04/01/2022 <0.10  Class 0 kU/L Final   • Beef 04/01/2022 <0.10  Class 0 kU/L Final   • Pork 04/01/2022 <0.10  Class 0 kU/L Final   • Amado 04/01/2022 <0.10  Class 0 kU/L Final   • RMSF IgG 04/01/2022 <1:64  Neg <1:64 Final                                 Negative           <1:64                               Positive            1:64                               Recent/Active      >1:64  Titers of 1:64 are suggestive of past or possible  current infection. Titers >1:64 are suggestive of  recent or active infection. Approximately 9% of  specimens positive by EIA screen are negative by IFA.   Office Visit on 04/01/2022   Component Date Value Ref Range Status   • Rapid Strep A Screen 04/01/2022 Negative  Negative, VALID, INVALID, Not Performed Final   • Internal Control 04/01/2022 Passed  Passed Final   • Lot Number 04/01/2022 [id number]   Final   • Expiration Date 04/01/2022 10/27/2023   Final   Lab on 03/22/2022   Component Date Value Ref Range  Status   • Glucose 03/22/2022 97  65 - 99 mg/dL Final   • BUN 03/22/2022 10  6 - 20 mg/dL Final   • Creatinine 03/22/2022 1.01  0.76 - 1.27 mg/dL Final   • Sodium 03/22/2022 138  136 - 145 mmol/L Final   • Potassium 03/22/2022 4.5  3.5 - 5.2 mmol/L Final   • Chloride 03/22/2022 103  98 - 107 mmol/L Final   • CO2 03/22/2022 28.0  22.0 - 29.0 mmol/L Final   • Calcium 03/22/2022 9.2  8.6 - 10.5 mg/dL Final   • Total Protein 03/22/2022 6.9  6.0 - 8.5 g/dL Final   • Albumin 03/22/2022 4.70  3.50 - 5.20 g/dL Final   • ALT (SGPT) 03/22/2022 19  1 - 41 U/L Final   • AST (SGOT) 03/22/2022 15  1 - 40 U/L Final   • Alkaline Phosphatase 03/22/2022 69  39 - 117 U/L Final   • Total Bilirubin 03/22/2022 0.8  0.0 - 1.2 mg/dL Final   • Globulin 03/22/2022 2.2  gm/dL Final   • A/G Ratio 03/22/2022 2.1  g/dL Final   • BUN/Creatinine Ratio 03/22/2022 9.9  7.0 - 25.0 Final   • Anion Gap 03/22/2022 7.0  5.0 - 15.0 mmol/L Final   • eGFR 03/22/2022 95.2  >60.0 mL/min/1.73 Final    National Kidney Foundation and American Society of Nephrology (ASN) Task Force recommended calculation based on the Chronic Kidney Disease Epidemiology Collaboration (CKD-EPI) equation refit without adjustment for race.   • Hepatitis B Surface Ag 03/22/2022 Non-Reactive  Non-Reactive Final   • Hep A IgM 03/22/2022 Non-Reactive  Non-Reactive Final   • Hep B C IgM 03/22/2022 Non-Reactive  Non-Reactive Final   • Hepatitis C Ab 03/22/2022 Non-Reactive  Non-Reactive Final   • HIV-1/ HIV-2 03/22/2022 Non-Reactive  Non-Reactive Final    A non-reactive test result does not preclude the possibility of exposure to HIV or infection with HIV. An antibody response to recent exposure may take several months to reach detectable levels.   • RPR 03/22/2022 Non-Reactive  Non-Reactive Final   • TSH 03/22/2022 1.410  0.270 - 4.200 uIU/mL Final   • Chlamydia DNA by PCR 03/22/2022 Negative  Negative Final   • Neisseria gonorrhoeae by PCR 03/22/2022 Negative  Negative Final   Office  Visit on 03/22/2022   Component Date Value Ref Range Status   • Color 03/22/2022 Dark Yellow  Yellow, Straw, Dark Yellow, Rosi Final   • Clarity, UA 03/22/2022 Cloudy (A) Clear Final   • Specific Gravity  03/22/2022 1.015  1.005 - 1.030 Final   • pH, Urine 03/22/2022 8.0  5.0 - 8.0 Final   • Leukocytes 03/22/2022 Negative  Negative Final   • Nitrite, UA 03/22/2022 Negative  Negative Final   • Protein, POC 03/22/2022 Trace (A) Negative mg/dL Final   • Glucose, UA 03/22/2022 Negative  Negative, 1000 mg/dL (3+) mg/dL Final   • Ketones, UA 03/22/2022 Negative  Negative Final   • Urobilinogen, UA 03/22/2022 Normal  Normal Final   • Bilirubin 03/22/2022 Negative  Negative Final   • Blood, UA 03/22/2022 Negative  Negative Final   • Lot Number 03/22/2022 [id number]   Final   • Expiration Date 03/22/2022 07/25/2023   Final   • Glucose 03/22/2022 108  70 - 130 mg/dL Final   • Hemoglobin A1C 03/22/2022 5.5  % Final      XR Spine Lumbar 4+ View  Narrative: EXAMINATION:  XR SPINE LUMBAR COMPLETE 4+VW    CLINICAL HISTORY:  41 years Male,numbness of left toe, R20.0  Anesthesia of skin    COMPARISON:  None    FINDINGS:  Five lumbar type vertebral bodies demonstrate normal  height and alignment. Interspinous distances are preserved. Facet  articulations are aligned without appreciable facet degenerative  change. No appreciable bony neuroforaminal narrowing with oblique  imaging.  Impression: Negative exam of the lumbar spine.    Electronically signed by:  Jose Travis MD  3/23/2022 1:08 PM CDT  Workstation: [id number]    [email or web address]  Immunization History   Administered Date(s) Administered   • COVID-19 (MODERNA) 1st, 2nd, 3rd Dose Only 09/14/2021   • FluLaval/Fluarix/Fluzone >6 11/30/2020, 09/28/2021   • Td 01/04/1996   • Tdap 06/20/2019       The following portions of the patient's history were reviewed and updated as appropriate: allergies, current medications, past family history, past medical history, past social  "history, past surgical history and problem list.        Physical Exam  /64 (BP Location: Right arm, Patient Position: Sitting, Cuff Size: Adult)   Pulse 73   Temp 98.5 °F (36.9 °C)   Ht 177.8 cm (70\")   Wt 101 kg (223 lb 3.2 oz)   SpO2 98%   BMI 32.03 kg/m²     Physical Exam  Vitals and nursing note reviewed.   Constitutional:       Appearance: He is well-developed. He is not diaphoretic.   HENT:      Head: Normocephalic and atraumatic.      Right Ear: External ear normal.   Eyes:      Conjunctiva/sclera: Conjunctivae normal.      Pupils: Pupils are equal, round, and reactive to light.   Cardiovascular:      Rate and Rhythm: Normal rate and regular rhythm.      Heart sounds: Normal heart sounds. No murmur heard.  Pulmonary:      Effort: Pulmonary effort is normal. No respiratory distress.      Breath sounds: Normal breath sounds.   Abdominal:      General: Bowel sounds are normal. There is no distension.      Palpations: Abdomen is soft.      Tenderness: There is no abdominal tenderness.   Musculoskeletal:         General: Tenderness present. No deformity. Normal range of motion.      Cervical back: Normal range of motion and neck supple.   Skin:     General: Skin is warm.      Coloration: Skin is not pale.      Findings: No erythema or rash.   Neurological:      Mental Status: He is alert and oriented to person, place, and time.      Cranial Nerves: No cranial nerve deficit.   Psychiatric:         Behavior: Behavior normal.         [unfilled]   Diagnosis Plan   1. Insomnia, unspecified type  Ambulatory Referral to Sleep Medicine   2. Vitamin D deficiency     3. Mixed hyperlipidemia     4. Gastroesophageal reflux disease with esophagitis without hemorrhage     5. Left upper quadrant pain     6. Panic attacks     7. Restless leg syndrome  Ambulatory Referral to Sleep Medicine   8. Allergy to alpha-gal     9. Food sensitivity with gastrointestinal symptoms     10. Hypothyroidism, unspecified type  " levothyroxine (Synthroid) 50 MCG tablet   11. Class 1 obesity with serious comorbidity and body mass index (BMI) of 32.0 to 32.9 in adult, unspecified obesity type     12. Sleep disorder  Ambulatory Referral to Sleep Medicine                   -went over labwork   -recommend COVID-19 vaccination  -recommend tdap vaccination/pneumonia vaccination   -insomnia - recommend MIDNITE melatonin on trazodone will go up on trazodone 100 to 150 mg at bedtime. Refer to sleep medicine for possbile sleep study   -nausea/vomiting -  zofran 8 mg PO every 8 hours   -renal cyst - continue monitor.   -fatigue - consider sleep study. Will check testosterone level   -hypothyroidism - on synthroid 50 mcg PO q daily  -iron deficiency/restless legs - on requip 1 mg at bedtime on ferrous sulfate 325 mg PO q daily.   -obesity  counseled weight loss >5 minutes BMI at 32.03   -vitamin D deficiency -vitamin D once a week  -HLP - recommend DASH diet, heart healthy diet.  -GERD with  esophagitis, gastritis/fatty liver/eosinophilic esophagitis/alpha gal allergy    -on nexium/ GI following   -insomnia- on trazodone 100 mg PO q daily.    -allergic rhintis - flonase nasal psray   -panic attack/COOKIE/mood disorder  - on paxil 30 mg PO q daily. On buspar every 8 hours PRNh. On lamictal 50mg daily.  remeron stopped now on seroquel 100 mg at bedtime and lamcital 100 mg ihsan. He will need to call for an aptp   -advised pt to be safe and call with questions and concerns. All questions addressed tdoay.   -advised pt to go to ER or call 911 if symptoms worrisome or severe  -advised pt to followup with specialist and referrals  -advised pt to be safe during COVID-19 pandemic  I spent 30  minutes caring for Jordin on this date of service. This time includes time spent by me in the following activities: preparing for the visit, reviewing tests, obtaining and/or reviewing a separately obtained history, performing a medically appropriate examination and/or  evaluation, counseling and educating the patient/family/caregiver, ordering medications, tests, or procedures, referring and communicating with other health care professionals, documenting information in the medical record, independently interpreting results and communicating that information with the patient/family/caregiver and care coordination.  -recheck in 6 weeks         This document has been electronically signed by Jhon Amezquita MD on July 20, 2022 13:15 CDT

## 2022-07-17 DIAGNOSIS — E03.9 HYPOTHYROIDISM, UNSPECIFIED TYPE: ICD-10-CM

## 2022-07-17 DIAGNOSIS — G47.00 INSOMNIA, UNSPECIFIED TYPE: ICD-10-CM

## 2022-07-17 RX ORDER — LEVOTHYROXINE SODIUM 0.05 MG/1
50 TABLET ORAL
Qty: 90 TABLET | Refills: 0 | Status: CANCELLED | OUTPATIENT
Start: 2022-07-17

## 2022-07-17 RX ORDER — TRAZODONE HYDROCHLORIDE 100 MG/1
100 TABLET ORAL NIGHTLY
Qty: 30 TABLET | Refills: 1 | Status: CANCELLED | OUTPATIENT
Start: 2022-07-17

## 2022-07-20 ENCOUNTER — OFFICE VISIT (OUTPATIENT)
Dept: FAMILY MEDICINE CLINIC | Facility: CLINIC | Age: 42
End: 2022-07-20

## 2022-07-20 VITALS
TEMPERATURE: 98.5 F | WEIGHT: 223.2 LBS | OXYGEN SATURATION: 98 % | BODY MASS INDEX: 31.95 KG/M2 | DIASTOLIC BLOOD PRESSURE: 64 MMHG | SYSTOLIC BLOOD PRESSURE: 106 MMHG | HEART RATE: 73 BPM | HEIGHT: 70 IN

## 2022-07-20 DIAGNOSIS — Z91.018 ALLERGY TO ALPHA-GAL: ICD-10-CM

## 2022-07-20 DIAGNOSIS — E03.9 HYPOTHYROIDISM, UNSPECIFIED TYPE: ICD-10-CM

## 2022-07-20 DIAGNOSIS — G25.81 RESTLESS LEG SYNDROME: ICD-10-CM

## 2022-07-20 DIAGNOSIS — R10.12 LEFT UPPER QUADRANT PAIN: ICD-10-CM

## 2022-07-20 DIAGNOSIS — T78.1XXA FOOD SENSITIVITY WITH GASTROINTESTINAL SYMPTOMS: ICD-10-CM

## 2022-07-20 DIAGNOSIS — G47.00 INSOMNIA, UNSPECIFIED TYPE: Primary | ICD-10-CM

## 2022-07-20 DIAGNOSIS — E55.9 VITAMIN D DEFICIENCY: ICD-10-CM

## 2022-07-20 DIAGNOSIS — E78.2 MIXED HYPERLIPIDEMIA: ICD-10-CM

## 2022-07-20 DIAGNOSIS — F41.0 PANIC ATTACKS: ICD-10-CM

## 2022-07-20 DIAGNOSIS — G47.9 SLEEP DISORDER: ICD-10-CM

## 2022-07-20 DIAGNOSIS — E66.9 CLASS 1 OBESITY WITH SERIOUS COMORBIDITY AND BODY MASS INDEX (BMI) OF 32.0 TO 32.9 IN ADULT, UNSPECIFIED OBESITY TYPE: ICD-10-CM

## 2022-07-20 DIAGNOSIS — K21.00 GASTROESOPHAGEAL REFLUX DISEASE WITH ESOPHAGITIS WITHOUT HEMORRHAGE: ICD-10-CM

## 2022-07-20 PROCEDURE — 99214 OFFICE O/P EST MOD 30 MIN: CPT | Performed by: FAMILY MEDICINE

## 2022-07-20 RX ORDER — TRAZODONE HYDROCHLORIDE 100 MG/1
100 TABLET ORAL NIGHTLY
Qty: 30 TABLET | Refills: 1 | Status: SHIPPED | OUTPATIENT
Start: 2022-07-20 | End: 2022-07-20

## 2022-07-20 RX ORDER — TRAZODONE HYDROCHLORIDE 150 MG/1
150 TABLET ORAL NIGHTLY
Qty: 30 TABLET | Refills: 3 | Status: SHIPPED | OUTPATIENT
Start: 2022-07-20 | End: 2022-10-12 | Stop reason: SDUPTHER

## 2022-07-20 RX ORDER — LEVOTHYROXINE SODIUM 0.05 MG/1
50 TABLET ORAL
Qty: 90 TABLET | Refills: 0 | Status: SHIPPED | OUTPATIENT
Start: 2022-07-20 | End: 2022-10-12 | Stop reason: SDUPTHER

## 2022-07-20 NOTE — PATIENT INSTRUCTIONS
If possible limit gluten, wheat and cow's milk     Will refer to sleep medicine    Go up on trazodone from 100 to 150 mg at bedtime. Do not  the 100 mg.      Recheck in 6 weeks

## 2022-08-07 DIAGNOSIS — G25.81 RESTLESS LEG SYNDROME: ICD-10-CM

## 2022-08-07 DIAGNOSIS — K21.9 GASTROESOPHAGEAL REFLUX DISEASE, UNSPECIFIED WHETHER ESOPHAGITIS PRESENT: Chronic | ICD-10-CM

## 2022-08-08 RX ORDER — PNV NO.95/FERROUS FUM/FOLIC AC 28MG-0.8MG
1 TABLET ORAL
Qty: 30 TABLET | Refills: 1 | Status: SHIPPED | OUTPATIENT
Start: 2022-08-08 | End: 2022-10-02 | Stop reason: SDUPTHER

## 2022-08-08 RX ORDER — ROPINIROLE 1 MG/1
TABLET, FILM COATED ORAL
Qty: 30 TABLET | Refills: 1 | Status: SHIPPED | OUTPATIENT
Start: 2022-08-08 | End: 2022-10-12 | Stop reason: SDUPTHER

## 2022-08-08 NOTE — PROGRESS NOTES
Subjective:  Jordin Parham is a 41 y.o. male who presents for       Patient Active Problem List   Diagnosis   • Class 1 obesity with body mass index (BMI) of 30.0 to 30.9 in adult   • Gastroesophageal reflux disease   • COOKIE (generalized anxiety disorder)   • Left upper quadrant pain   • Low libido   • Mood disorder (HCC)   • H. pylori infection   • Renal impairment   • Mixed hyperlipidemia   • Vitamin D deficiency   • History of drug use   • Fatty liver   • Renal cyst   • Change in bowel habits   • Mucus in stool   • Allergy to alpha-gal   • Bronchitis   • Upper respiratory tract infection   • Eosinophilic esophagitis   • Onychomycosis   • Bilateral hand pain   • Bilateral wrist pain   • Overweight   • Pruritic rash   • Insomnia   • Arthritis of right hand   • Bilateral swelling of feet   • Blood in stool   • Cough   • Panic attacks   • Restless leg syndrome   • Restless legs   • Panic attack   • Ear pain, bilateral   • Class 1 obesity without serious comorbidity with body mass index (BMI) of 30.0 to 30.9 in adult   • Food sensitivity with gastrointestinal symptoms   • Inattention   • History of ADHD   • Class 1 obesity with serious comorbidity and body mass index (BMI) of 33.0 to 33.9 in adult           Current Outpatient Medications:   •  atorvastatin (Lipitor) 20 MG tablet, Take 1 tablet by mouth Every Night., Disp: 30 tablet, Rfl: 1  •  esomeprazole (nexIUM) 20 MG capsule, Take 1 capsule by mouth Every Morning Before Breakfast. May increase to two capsules if needed daily--DC Omeprazole, Disp: 60 capsule, Rfl: 0  •  ferrous sulfate 325 (65 Fe) MG tablet, Take 1 tablet by mouth Daily With Breakfast., Disp: 30 tablet, Rfl: 1  •  levothyroxine (Synthroid) 50 MCG tablet, Take 1 tablet by mouth Every Morning., Disp: 90 tablet, Rfl: 0  •  montelukast (SINGULAIR) 10 MG tablet, Take 1 tablet by mouth Every Night., Disp: 30 tablet, Rfl: 1  •  risperiDONE (RisperDAL) 1 MG tablet, Take 1 tablet by mouth Every  Night., Disp: 30 tablet, Rfl: 1  •  rOPINIRole (REQUIP) 1 MG tablet, TAKE 1 TABLET DAILY BEFORE BEDTIME, Disp: 30 tablet, Rfl: 1  •  traZODone (DESYREL) 150 MG tablet, Take 1 tablet by mouth Every Night., Disp: 30 tablet, Rfl: 3  •  vitamin B-12 (CYANOCOBALAMIN) 500 MCG tablet, Take 1 tablet by mouth Daily., Disp: 30 tablet, Rfl: 1  •  vitamin D (ERGOCALCIFEROL) 1.25 MG (59026 UT) capsule capsule, Take 1 capsule by mouth 1 (One) Time Per Week., Disp: 4 capsule, Rfl: 3    HPI       Pt is 42 yo male with management  of obesity, COOKIE, major depression, panic attacks GERD , mood disorder, history of H pylori infection, VItamin D deficiency,  Renal impairment, History of illicit drug use, fatty liver disease, gastritis,  Eosinophilic esophagitis, internal/external hemorrhoids, sp hemorrhoidectomy      7/20/22 in office visit for recheck. Pt had labwork done on 6/24/22 that showed lipid panel LDL at 113.  Had REcurrent GI panel that showed sensitivyt to gluten wheat and cow's milk  Pt states he still has issues with sleeping and takes trazodone 100 mg at bedtime. He averages about 5 hours each night. He is tired during the day. His restless legs has improved with requip. He practices good sleep hygiene.       8/9/22 in office visit for recheck. Pt is back with wife now.  He is concerned about his concentration and was diagnosed at age 9. He was on ADHD medication in the past. He does not recall medication. He states he tried wife's Adderall that helped him.  He has difficulty with completing task.   He will not complete task and get frustrated and will walk away. Sleep is better      Obesity  This is a chronic problem. The current episode started more than 1 year ago. The problem occurs constantly. Associated symptoms include abdominal pain, fatigue, numbness and weakness. Pertinent negatives include no anorexia, arthralgias, change in bowel habit, chest  pain, chills, congestion, coughing, diaphoresis, fever, headaches, joint swelling, myalgias, nausea, neck pain, sore throat, swollen glands, urinary symptoms, vertigo, visual change or vomiting. Nothing aggravates the symptoms. He has tried nothing for the symptoms. The treatment provided no relief.   Fatigue  This is a chronic problem. The current episode started more than 1 month ago. The problem occurs constantly. The problem has been unchanged. Associated symptoms include abdominal pain, fatigue, numbness and weakness. Pertinent negatives include no anorexia, arthralgias, change in bowel habit, chest pain, chills, congestion, coughing, diaphoresis, fever, headaches, joint swelling, myalgias, nausea, neck pain, sore throat, swollen glands, urinary symptoms, vertigo, visual change or vomiting. Nothing aggravates the symptoms. He has tried nothing for the symptoms. The treatment provided no relief.   Anxiety  Presents for follow-up visit. Symptoms include decreased concentration, depressed mood, excessive worry, irritability, malaise, nervous/anxious behavior, obsessions and restlessness. Patient reports no chest pain, compulsions, confusion, dizziness, dry mouth, feeling of choking, hyperventilation, impotence, insomnia, muscle tension, nausea, palpitations, panic, shortness of breath or suicidal ideas. The severity of symptoms is moderate. The quality of sleep is fair.      His past medical history is significant for depression. Compliance with medications is %.   Depression  Visit Type: follow-up  Patient presents with the following symptoms: decreased concentration, depressed mood, excessive worry, irritability, malaise, nervousness/anxiety, obsessions and restlessness.  Patient is not experiencing: anhedonia, chest pain, choking sensation, compulsions, confusion, dizziness, dry mouth, fatigue, feelings of hopelessness, feelings of worthlessness, hypersomnia, hyperventilation, impotence, insomnia, memory  impairment, muscle tension, nausea, palpitations, panic, psychomotor agitation, shortness of breath, suicidal ideas, suicidal planning, thoughts of death, weight gain and weight loss.  Severity: moderate   Sleep quality: fair  Compliance with medications:  %   GI Problem  The primary symptoms include abdominal pain. Primary symptoms do not include fever, weight loss, fatigue, nausea, vomiting, diarrhea, melena, hematemesis, jaundice, hematochezia, dysuria, myalgias, arthralgias or rash.   The illness does not include chills, anorexia, dysphagia, odynophagia, bloating, constipation, tenesmus, back pain or itching. Significant associated medical issues include GERD. Associated medical issues do not include inflammatory bowel disease, gallstones, liver disease, alcohol abuse, PUD, gastric bypass, bowel resection, irritable bowel syndrome, hemorrhoids or diverticulitis.     Review of Systems  Review of Systems   Constitutional: Positive for activity change and fatigue. Negative for appetite change, chills, diaphoresis and fever.   HENT: Negative for congestion, postnasal drip, rhinorrhea, sinus pressure, sinus pain, sneezing, sore throat, trouble swallowing and voice change.    Respiratory: Negative for cough, choking, chest tightness, shortness of breath, wheezing and stridor.    Cardiovascular: Negative for chest pain.   Gastrointestinal: Positive for abdominal pain. Negative for diarrhea, nausea and vomiting.   Musculoskeletal: Positive for arthralgias.   Neurological: Positive for weakness and numbness. Negative for headaches.   Psychiatric/Behavioral: Positive for behavioral problems, decreased concentration and sleep disturbance. The patient is nervous/anxious.        Patient Active Problem List   Diagnosis   • Class 1 obesity with body mass index (BMI) of 30.0 to 30.9 in adult   • Gastroesophageal reflux disease   • COOKIE (generalized anxiety disorder)   • Left upper quadrant pain   • Low libido   • Mood  disorder (HCC)   • H. pylori infection   • Renal impairment   • Mixed hyperlipidemia   • Vitamin D deficiency   • History of drug use   • Fatty liver   • Renal cyst   • Change in bowel habits   • Mucus in stool   • Allergy to alpha-gal   • Bronchitis   • Upper respiratory tract infection   • Eosinophilic esophagitis   • Onychomycosis   • Bilateral hand pain   • Bilateral wrist pain   • Overweight   • Pruritic rash   • Insomnia   • Arthritis of right hand   • Bilateral swelling of feet   • Blood in stool   • Cough   • Panic attacks   • Restless leg syndrome   • Restless legs   • Panic attack   • Ear pain, bilateral   • Class 1 obesity without serious comorbidity with body mass index (BMI) of 30.0 to 30.9 in adult   • Food sensitivity with gastrointestinal symptoms   • Inattention   • History of ADHD   • Class 1 obesity with serious comorbidity and body mass index (BMI) of 33.0 to 33.9 in adult     Past Surgical History:   Procedure Laterality Date   • COLONOSCOPY N/A 9/17/2019    Procedure: COLONOSCOPY;  Surgeon: Rigoberto Flower MD;  Location: [facility] ENDOSCOPY;  Service: Gastroenterology   • ENDOSCOPY N/A 9/17/2019    Procedure: ESOPHAGOGASTRODUODENOSCOPY;  Surgeon: Rigoberto Flower MD;  Location: [facility] ENDOSCOPY;  Service: Gastroenterology   • HEMORRHOIDECTOMY       Social History     Socioeconomic History   • Marital status:    Tobacco Use   • Smoking status: Never Smoker   • Smokeless tobacco: Never Used   Vaping Use   • Vaping Use: Never used   Substance and Sexual Activity   • Alcohol use: Not Currently     Comment: rarely   • Drug use: Yes     Types: Marijuana, Methamphetamines   • Sexual activity: Defer     Family History   Problem Relation Age of Onset   • Alcohol abuse Mother    • Anxiety disorder Mother    • Arthritis Mother    • Asthma Mother    • Cancer Mother    • Depression Mother    • Hypertension Mother    • Stroke Mother    • Hyperlipidemia Father    • Thyroid disease Sister    • Alcohol  abuse Sister    • Anxiety disorder Sister    • Cancer Sister    • Depression Sister    • Alcohol abuse Brother    • Anxiety disorder Brother    • Cancer Brother    • Depression Brother    • Hyperlipidemia Maternal Uncle    • Heart failure Maternal Uncle    • Heart disease Maternal Uncle      Lab on 06/24/2022   Component Date Value Ref Range Status   • WBC 06/24/2022 6.76  3.40 - 10.80 10*3/mm3 Final   • RBC 06/24/2022 5.50  4.14 - 5.80 10*6/mm3 Final   • Hemoglobin 06/24/2022 15.3  13.0 - 17.7 g/dL Final   • Hematocrit 06/24/2022 46.0  37.5 - 51.0 % Final   • MCV 06/24/2022 83.6  79.0 - 97.0 fL Final   • MCH 06/24/2022 27.8  26.6 - 33.0 pg Final   • MCHC 06/24/2022 33.3  31.5 - 35.7 g/dL Final   • RDW 06/24/2022 13.2  12.3 - 15.4 % Final   • RDW-SD 06/24/2022 39.9  37.0 - 54.0 fl Final   • MPV 06/24/2022 10.1  6.0 - 12.0 fL Final   • Platelets 06/24/2022 207  140 - 450 10*3/mm3 Final   • Neutrophil % 06/24/2022 61.2  42.7 - 76.0 % Final   • Lymphocyte % 06/24/2022 22.0  19.6 - 45.3 % Final   • Monocyte % 06/24/2022 10.1  5.0 - 12.0 % Final   • Eosinophil % 06/24/2022 4.4  0.3 - 6.2 % Final   • Basophil % 06/24/2022 1.3  0.0 - 1.5 % Final   • Immature Grans % 06/24/2022 1.0 (A) 0.0 - 0.5 % Final   • Neutrophils, Absolute 06/24/2022 4.13  1.70 - 7.00 10*3/mm3 Final   • Lymphocytes, Absolute 06/24/2022 1.49  0.70 - 3.10 10*3/mm3 Final   • Monocytes, Absolute 06/24/2022 0.68  0.10 - 0.90 10*3/mm3 Final   • Eosinophils, Absolute 06/24/2022 0.30  0.00 - 0.40 10*3/mm3 Final   • Basophils, Absolute 06/24/2022 0.09  0.00 - 0.20 10*3/mm3 Final   • Immature Grans, Absolute 06/24/2022 0.07 (A) 0.00 - 0.05 10*3/mm3 Final   • nRBC 06/24/2022 0.0  0.0 - 0.2 /100 WBC Final   • Glucose 06/24/2022 91  65 - 99 mg/dL Final   • BUN 06/24/2022 9  6 - 20 mg/dL Final   • Creatinine 06/24/2022 1.08  0.76 - 1.27 mg/dL Final   • Sodium 06/24/2022 140  136 - 145 mmol/L Final   • Potassium 06/24/2022 4.2  3.5 - 5.2 mmol/L Final   • Chloride  06/24/2022 104  98 - 107 mmol/L Final   • CO2 06/24/2022 25.0  22.0 - 29.0 mmol/L Final   • Calcium 06/24/2022 9.2  8.6 - 10.5 mg/dL Final   • Total Protein 06/24/2022 7.0  6.0 - 8.5 g/dL Final   • Albumin 06/24/2022 4.50  3.50 - 5.20 g/dL Final   • ALT (SGPT) 06/24/2022 28  1 - 41 U/L Final   • AST (SGOT) 06/24/2022 24  1 - 40 U/L Final   • Alkaline Phosphatase 06/24/2022 69  39 - 117 U/L Final   • Total Bilirubin 06/24/2022 0.5  0.0 - 1.2 mg/dL Final   • Globulin 06/24/2022 2.5  gm/dL Final   • A/G Ratio 06/24/2022 1.8  g/dL Final   • BUN/Creatinine Ratio 06/24/2022 8.3  7.0 - 25.0 Final   • Anion Gap 06/24/2022 11.0  5.0 - 15.0 mmol/L Final   • eGFR 06/24/2022 87.9  >60.0 mL/min/1.73 Final    National Kidney Foundation and American Society of Nephrology (ASN) Task Force recommended calculation based on the Chronic Kidney Disease Epidemiology Collaboration (CKD-EPI) equation refit without adjustment for race.   • Hemoglobin A1C 06/24/2022 5.30  4.80 - 5.60 % Final   • Total Cholesterol 06/24/2022 181  0 - 200 mg/dL Final   • Triglycerides 06/24/2022 61  0 - 150 mg/dL Final   • HDL Cholesterol 06/24/2022 56  40 - 60 mg/dL Final   • LDL Cholesterol  06/24/2022 113 (A) 0 - 100 mg/dL Final   • VLDL Cholesterol 06/24/2022 12  5 - 40 mg/dL Final   • LDL/HDL Ratio 06/24/2022 2.01   Final   • TSH 06/24/2022 0.976  0.270 - 4.200 uIU/mL Final   • Free T4 06/24/2022 0.95  0.93 - 1.70 ng/dL Final   • 25 Hydroxy, Vitamin D 06/24/2022 41.7  30.0 - 100.0 ng/ml Final   • Vitamin B-12 06/24/2022 545  211 - 946 pg/mL Final   • Iron 06/24/2022 90  59 - 158 mcg/dL Final   • Iron Saturation 06/24/2022 29  20 - 50 % Final   • Transferrin 06/24/2022 210  200 - 360 mg/dL Final   • TIBC 06/24/2022 313  298 - 536 mcg/dL Final   • Ferritin 06/24/2022 193.00  30.00 - 400.00 ng/mL Final   • Testosterone, Total 06/24/2022 412.8  264.0 - 916.0 ng/dL Final    This [facility] LC/MS-MS method is currently certified by the CDC  Hormone Standardization  Program (HoSt). Adult male reference  interval is based on a population of healthy nonobese males  (BMI <30) between 19 and 39 years old. Alvina, et.al. JCEM  2017,102;2534-0065. PMID: [id number].   • Testosterone, Free 06/24/2022 13.5  6.8 - 21.5 pg/mL Final   • Testosterone, Total 06/24/2022 415.3  264.0 - 916.0 ng/dL Final    This [facility] LC/MS-MS method is currently certified by the CDC  Hormone Standardization Program (HoSt). Adult male reference  interval is based on a population of healthy nonobese males  (BMI <30) between 19 and 39 years old. Alvina, et.al. JCEM  2017,102;6843-0108. PMID: [id number].   • Testosterone, Free 06/24/2022 10.96  5.00 - 21.00 ng/dL Final   • Testosterone, Free % 06/24/2022 2.64  1.50 - 4.20 % Final   • Class Description 06/24/2022 Comment   Final        Levels of Specific IgE       Class  Description of Class      ---------------------------  -----  --------------------                     < 0.10         0         Negative             0.10 -    0.31         0/I       Equivocal/Low             0.32 -    0.55         I         Low             0.56 -    1.40         II        Moderate             1.41 -    3.90         III       High             3.91 -   19.00         IV        Very High            19.01 -  100.00         V         Very High                    >100.00         VI        Very High   • Egg White 06/24/2022 <0.10  Class 0 kU/L Final   • Milk, Cow's 06/24/2022 0.38 (A) Class I kU/L Final   • CodFish 06/24/2022 <0.10  Class 0 kU/L Final   • Sesame Seed 06/24/2022 <0.10  Class 0 kU/L Final   • Peanut 06/24/2022 <0.10  Class 0 kU/L Final   • Soybean 06/24/2022 <0.10  Class 0 kU/L Final   • Hazelnut 06/24/2022 <0.10  Class 0 kU/L Final   • Shrimp 06/24/2022 <0.10  Class 0 kU/L Final   • Scallop 06/24/2022 <0.10  Class 0 kU/L Final   • Gluten 06/24/2022 0.21 (A) Class 0/I kU/L Final   • [place] 06/24/2022 <0.10  Class 0 kU/L Final   • Wheat 06/24/2022 0.24 (A) Class 0/I kU/L  Final   • Gliadin Deamidated Peptide Ab, IgA 06/24/2022 4  0 - 19 units Final                       Negative                   0 - 19                     Weak Positive             20 - 30                     Moderate to Strong Positive   >30   • Deaminated Gliadin Ab IgG 06/24/2022 2  0 - 19 units Final                       Negative                   0 - 19                     Weak Positive             20 - 30                     Moderate to Strong Positive   >30   • Tissue Transglutaminase IgA 06/24/2022 <2  0 - 3 U/mL Final                                  Negative        0 -  3                                Weak Positive   4 - 10                                Positive           >10   Tissue Transglutaminase (tTG) has been identified   as the endomysial antigen.  Studies have demonstr-   ated that endomysial IgA antibodies have over 99%   specificity for gluten sensitive enteropathy.   • Tissue Transglutaminase IgG 06/24/2022 <2  0 - 5 U/mL Final                                  Negative        0 - 5                                Weak Positive   6 - 9                                Positive           >9   Lab on 04/01/2022   Component Date Value Ref Range Status   • RMSF IgG 04/01/2022 Positive (A) Negative Final   • RMSF IgM 04/01/2022 0.32  0.00 - 0.89 index Final                                     Negative        <0.90                                   Equivocal 0.90 - 1.10                                   Positive        >1.10   • Lyme Ab IgG/IgM 04/01/2022 <0.91  0.00 - 0.90 ISR Final                                    Negative         <0.91                                  Equivocal  0.91 - 1.09                                  Positive         >1.09  **Effective April 25, 2022 Lyme, Total Ab Test/Reflex**    will be discontinued. [facility] offers [id number] Lyme    Disease Serology w/Reflex. This new panel meets    current recommendations for Lyme disease antibody    testing, has improved turnaround time  and provides    easy to interpret results.   • Class Description 04/01/2022 Comment   Final        Levels of Specific IgE       Class  Description of Class      ---------------------------  -----  --------------------                     < 0.10         0         Negative             0.10 -    0.31         0/I       Equivocal/Low             0.32 -    0.55         I         Low             0.56 -    1.40         II        Moderate             1.41 -    3.90         III       High             3.91 -   19.00         IV        Very High            19.01 -  100.00         V         Very High                    >100.00         VI        Very High   • IgE 04/01/2022 44  6 - 495 IU/mL Final   • [id number] Alpha-Gal 04/01/2022 <0.10  Class 0 kU/L Final   • Beef 04/01/2022 <0.10  Class 0 kU/L Final   • Pork 04/01/2022 <0.10  Class 0 kU/L Final   • [place] 04/01/2022 <0.10  Class 0 kU/L Final   • RMSF IgG 04/01/2022 <1:64  Neg <1:64 Final                                 Negative           <1:64                               Positive            1:64                               Recent/Active      >1:64  Titers of 1:64 are suggestive of past or possible  current infection. Titers >1:64 are suggestive of  recent or active infection. Approximately 9% of  specimens positive by EIA screen are negative by IFA.   Office Visit on 04/01/2022   Component Date Value Ref Range Status   • Rapid Strep A Screen 04/01/2022 Negative  Negative, VALID, INVALID, Not Performed Final   • Internal Control 04/01/2022 Passed  Passed Final   • Lot Number 04/01/2022 [id number]   Final   • Expiration Date 04/01/2022 10/27/2023   Final   Lab on 03/22/2022   Component Date Value Ref Range Status   • Glucose 03/22/2022 97  65 - 99 mg/dL Final   • BUN 03/22/2022 10  6 - 20 mg/dL Final   • Creatinine 03/22/2022 1.01  0.76 - 1.27 mg/dL Final   • Sodium 03/22/2022 138  136 - 145 mmol/L Final   • Potassium 03/22/2022 4.5  3.5 - 5.2 mmol/L Final   • Chloride 03/22/2022  103  98 - 107 mmol/L Final   • CO2 03/22/2022 28.0  22.0 - 29.0 mmol/L Final   • Calcium 03/22/2022 9.2  8.6 - 10.5 mg/dL Final   • Total Protein 03/22/2022 6.9  6.0 - 8.5 g/dL Final   • Albumin 03/22/2022 4.70  3.50 - 5.20 g/dL Final   • ALT (SGPT) 03/22/2022 19  1 - 41 U/L Final   • AST (SGOT) 03/22/2022 15  1 - 40 U/L Final   • Alkaline Phosphatase 03/22/2022 69  39 - 117 U/L Final   • Total Bilirubin 03/22/2022 0.8  0.0 - 1.2 mg/dL Final   • Globulin 03/22/2022 2.2  gm/dL Final   • A/G Ratio 03/22/2022 2.1  g/dL Final   • BUN/Creatinine Ratio 03/22/2022 9.9  7.0 - 25.0 Final   • Anion Gap 03/22/2022 7.0  5.0 - 15.0 mmol/L Final   • eGFR 03/22/2022 95.2  >60.0 mL/min/1.73 Final    National Kidney Foundation and American Society of Nephrology (ASN) Task Force recommended calculation based on the Chronic Kidney Disease Epidemiology Collaboration (CKD-EPI) equation refit without adjustment for race.   • Hepatitis B Surface Ag 03/22/2022 Non-Reactive  Non-Reactive Final   • Hep A IgM 03/22/2022 Non-Reactive  Non-Reactive Final   • Hep B C IgM 03/22/2022 Non-Reactive  Non-Reactive Final   • Hepatitis C Ab 03/22/2022 Non-Reactive  Non-Reactive Final   • HIV-1/ HIV-2 03/22/2022 Non-Reactive  Non-Reactive Final    A non-reactive test result does not preclude the possibility of exposure to HIV or infection with HIV. An antibody response to recent exposure may take several months to reach detectable levels.   • RPR 03/22/2022 Non-Reactive  Non-Reactive Final   • TSH 03/22/2022 1.410  0.270 - 4.200 uIU/mL Final   • Chlamydia DNA by PCR 03/22/2022 Negative  Negative Final   • Neisseria gonorrhoeae by PCR 03/22/2022 Negative  Negative Final   Office Visit on 03/22/2022   Component Date Value Ref Range Status   • Color 03/22/2022 Dark Yellow  Yellow, Straw, Dark Yellow, Rosi Final   • Clarity, UA 03/22/2022 Cloudy (A) Clear Final   • Specific Gravity  03/22/2022 1.015  1.005 - 1.030 Final   • pH, Urine 03/22/2022 8.0  5.0 -  "8.0 Final   • Leukocytes 03/22/2022 Negative  Negative Final   • Nitrite, UA 03/22/2022 Negative  Negative Final   • Protein, POC 03/22/2022 Trace (A) Negative mg/dL Final   • Glucose, UA 03/22/2022 Negative  Negative, 1000 mg/dL (3+) mg/dL Final   • Ketones, UA 03/22/2022 Negative  Negative Final   • Urobilinogen, UA 03/22/2022 Normal  Normal Final   • Bilirubin 03/22/2022 Negative  Negative Final   • Blood, UA 03/22/2022 Negative  Negative Final   • Lot Number 03/22/2022 [id number]   Final   • Expiration Date 03/22/2022 07/25/2023   Final   • Glucose 03/22/2022 108  70 - 130 mg/dL Final   • Hemoglobin A1C 03/22/2022 5.5  % Final      XR Spine Lumbar 4+ View  Narrative: EXAMINATION:  XR SPINE LUMBAR COMPLETE 4+VW    CLINICAL HISTORY:  41 years Male,numbness of left toe, R20.0  Anesthesia of skin    COMPARISON:  None    FINDINGS:  Five lumbar type vertebral bodies demonstrate normal  height and alignment. Interspinous distances are preserved. Facet  articulations are aligned without appreciable facet degenerative  change. No appreciable bony neuroforaminal narrowing with oblique  imaging.  Impression: Negative exam of the lumbar spine.    Electronically signed by:  Jose Travis MD  3/23/2022 1:08 PM CDT  Workstation: [id number]    [email or web address]  Immunization History   Administered Date(s) Administered   • COVID-19 (MODERNA) 1st, 2nd, 3rd Dose Only 09/14/2021   • FluLaval/Fluarix/Fluzone >6 11/30/2020, 09/28/2021   • Td 01/04/1996   • Tdap 06/20/2019       The following portions of the patient's history were reviewed and updated as appropriate: allergies, current medications, past family history, past medical history, past social history, past surgical history and problem list.        Physical Exam  /58 (BP Location: Right arm, Patient Position: Sitting, Cuff Size: Adult)   Pulse 88   Temp 98.4 °F (36.9 °C)   Ht 177.8 cm (70\")   Wt 105 kg (232 lb 3.2 oz)   SpO2 99%   BMI 33.32 kg/m²     Physical " Exam  Vitals and nursing note reviewed.   Constitutional:       Appearance: He is well-developed. He is not diaphoretic.   HENT:      Head: Normocephalic and atraumatic.      Right Ear: External ear normal.   Eyes:      Conjunctiva/sclera: Conjunctivae normal.      Pupils: Pupils are equal, round, and reactive to light.   Cardiovascular:      Rate and Rhythm: Normal rate and regular rhythm.      Heart sounds: Normal heart sounds. No murmur heard.  Pulmonary:      Effort: Pulmonary effort is normal. No respiratory distress.      Breath sounds: Normal breath sounds.   Abdominal:      General: Bowel sounds are normal. There is no distension.      Palpations: Abdomen is soft.      Tenderness: There is no abdominal tenderness.      Comments: Obese abdomen    Musculoskeletal:         General: Tenderness present. No deformity. Normal range of motion.      Cervical back: Normal range of motion and neck supple.   Skin:     General: Skin is warm.      Coloration: Skin is not pale.      Findings: No erythema or rash.   Neurological:      Mental Status: He is alert and oriented to person, place, and time.      Cranial Nerves: No cranial nerve deficit.   Psychiatric:         Behavior: Behavior normal.         [unfilled]   Diagnosis Plan   1. History of ADHD     2. Mixed hyperlipidemia  CBC Auto Differential    Comprehensive Metabolic Panel    Lipid Panel    TSH    T4, Free    Vitamin D 25 Hydroxy    Vitamin B12    Iron Profile   3. Vitamin D deficiency  CBC Auto Differential    Comprehensive Metabolic Panel    Lipid Panel    TSH    T4, Free    Vitamin D 25 Hydroxy    Vitamin B12    Iron Profile   4. Gastroesophageal reflux disease with esophagitis without hemorrhage  CBC Auto Differential    Comprehensive Metabolic Panel    Lipid Panel    TSH    T4, Free    Vitamin D 25 Hydroxy    Vitamin B12    Iron Profile   5. Mood disorder (HCC)  CBC Auto Differential    Comprehensive Metabolic Panel    Lipid Panel    TSH    T4, Free     Vitamin D 25 Hydroxy    Vitamin B12    Iron Profile   6. Insomnia, unspecified type  CBC Auto Differential    Comprehensive Metabolic Panel    Lipid Panel    TSH    T4, Free    Vitamin D 25 Hydroxy    Vitamin B12    Iron Profile   7. Class 1 obesity with serious comorbidity and body mass index (BMI) of 33.0 to 33.9 in adult, unspecified obesity type     8. Inattention               -went over labwork   -recommend COVID-19 vaccination  -recommend tdap vaccination/pneumonia vaccination   -history of ADHD/inattention - recommend pt get evaluated with [facility]. Once evaluted can start on medication   -insomnia - recommend MIDNITE melatonin on trazodone will go up on trazodone 100 to 150 mg at bedtime. Refer to sleep medicine for possbile sleep study   -nausea/vomiting -  zofran 8 mg PO every 8 hours   -renal cyst - continue monitor.   -fatigue - consider sleep study. Will check testosterone level   -hypothyroidism - on synthroid 50 mcg PO q daily  -iron deficiency/restless legs - on requip 1 mg at bedtime on ferrous sulfate 325 mg PO q daily.   -obesity  counseled weight loss >5 minutes BMI at 33.32   -vitamin D deficiency -vitamin D once a week  -HLP - recommend DASH diet, heart healthy diet.  -GERD with  esophagitis, gastritis/fatty liver/eosinophilic esophagitis/alpha gal allergy    -on nexium/ GI following   -insomnia- on trazodone 100 mg PO q daily.    -allergic rhintis - flonase nasal psray   -panic attack/COOKIE/mood disorder  - on respidal 1mg PO qhs   -advised pt to be safe and call with questions and concerns. All questions addressed tdoay.   -advised pt to go to ER or call 911 if symptoms worrisome or severe  -advised pt to followup with specialist and referrals  -advised pt to be safe during COVID-19 pandemic  I spent 30  minutes caring for Jordin on this date of service. This time includes time spent by me in the following activities: preparing for the visit, reviewing tests, obtaining  and/or reviewing a separately obtained history, performing a medically appropriate examination and/or evaluation, counseling and educating the patient/family/caregiver, ordering medications, tests, or procedures, referring and communicating with other health care professionals, documenting information in the medical record, independently interpreting results and communicating that information with the patient/family/caregiver and care coordination.  -recheck in  2 months         This document has been electronically signed by Jhon Amezquita MD on August 9, 2022 15:19 CDT

## 2022-08-09 ENCOUNTER — OFFICE VISIT (OUTPATIENT)
Dept: FAMILY MEDICINE CLINIC | Facility: CLINIC | Age: 42
End: 2022-08-09

## 2022-08-09 VITALS
DIASTOLIC BLOOD PRESSURE: 58 MMHG | HEIGHT: 70 IN | TEMPERATURE: 98.4 F | OXYGEN SATURATION: 99 % | WEIGHT: 232.2 LBS | HEART RATE: 88 BPM | BODY MASS INDEX: 33.24 KG/M2 | SYSTOLIC BLOOD PRESSURE: 118 MMHG

## 2022-08-09 DIAGNOSIS — F33.1 MODERATE EPISODE OF RECURRENT MAJOR DEPRESSIVE DISORDER: ICD-10-CM

## 2022-08-09 DIAGNOSIS — F41.0 PANIC ATTACK: ICD-10-CM

## 2022-08-09 DIAGNOSIS — F39 MOOD DISORDER: ICD-10-CM

## 2022-08-09 DIAGNOSIS — Z86.59 HISTORY OF ADHD: Primary | ICD-10-CM

## 2022-08-09 DIAGNOSIS — R41.840 INATTENTION: ICD-10-CM

## 2022-08-09 DIAGNOSIS — E78.2 MIXED HYPERLIPIDEMIA: ICD-10-CM

## 2022-08-09 DIAGNOSIS — Z86.59 HISTORY OF ADHD: ICD-10-CM

## 2022-08-09 DIAGNOSIS — F41.1 GAD (GENERALIZED ANXIETY DISORDER): ICD-10-CM

## 2022-08-09 DIAGNOSIS — E66.9 CLASS 1 OBESITY WITH SERIOUS COMORBIDITY AND BODY MASS INDEX (BMI) OF 33.0 TO 33.9 IN ADULT, UNSPECIFIED OBESITY TYPE: ICD-10-CM

## 2022-08-09 DIAGNOSIS — E55.9 VITAMIN D DEFICIENCY: ICD-10-CM

## 2022-08-09 DIAGNOSIS — Z13.39 ADHD (ATTENTION DEFICIT HYPERACTIVITY DISORDER) EVALUATION: Primary | ICD-10-CM

## 2022-08-09 DIAGNOSIS — K21.00 GASTROESOPHAGEAL REFLUX DISEASE WITH ESOPHAGITIS WITHOUT HEMORRHAGE: ICD-10-CM

## 2022-08-09 DIAGNOSIS — G47.00 INSOMNIA, UNSPECIFIED TYPE: ICD-10-CM

## 2022-08-09 PROCEDURE — 99214 OFFICE O/P EST MOD 30 MIN: CPT | Performed by: FAMILY MEDICINE

## 2022-08-15 DIAGNOSIS — F41.1 GAD (GENERALIZED ANXIETY DISORDER): ICD-10-CM

## 2022-08-15 DIAGNOSIS — Z13.39 ADHD (ATTENTION DEFICIT HYPERACTIVITY DISORDER) EVALUATION: Primary | ICD-10-CM

## 2022-08-15 DIAGNOSIS — F32.1 MODERATE MAJOR DEPRESSION: ICD-10-CM

## 2022-08-15 DIAGNOSIS — F39 MOOD DISORDER: ICD-10-CM

## 2022-08-15 DIAGNOSIS — R41.840 INATTENTION: ICD-10-CM

## 2022-08-22 RX ORDER — CHOLECALCIFEROL (VITAMIN D3) 125 MCG
500 CAPSULE ORAL DAILY
Qty: 30 TABLET | Refills: 1 | Status: SHIPPED | OUTPATIENT
Start: 2022-08-22 | End: 2022-12-11 | Stop reason: SDUPTHER

## 2022-08-22 RX ORDER — RISPERIDONE 1 MG/1
1 TABLET ORAL NIGHTLY
Qty: 30 TABLET | Refills: 1 | Status: SHIPPED | OUTPATIENT
Start: 2022-08-22 | End: 2022-10-12 | Stop reason: SDUPTHER

## 2022-08-22 NOTE — TELEPHONE ENCOUNTER
Rx Refill Note  Requested Prescriptions     Pending Prescriptions Disp Refills   • risperiDONE (RisperDAL) 1 MG tablet 30 tablet 1     Sig: Take 1 tablet by mouth Every Night.   • vitamin B-12 (CYANOCOBALAMIN) 500 MCG tablet 30 tablet 1     Sig: Take 1 tablet by mouth Daily.      Last office visit with prescribing clinician: 8/9/2022      Next office visit with prescribing clinician: 10/12/2022   {TIP  Encounters    {TIP  Please add Last Relevant Lab Date if appropriate  {TIP  Is Refill Pharmacy correct? YES  Albina Sanchez LPN  08/22/22, 08:36 CDT

## 2022-08-29 DIAGNOSIS — F41.9 ANXIETY: Primary | ICD-10-CM

## 2022-08-29 RX ORDER — LORAZEPAM 0.5 MG/1
0.5 TABLET ORAL EVERY 8 HOURS PRN
Qty: 6 TABLET | Refills: 0 | Status: SHIPPED | OUTPATIENT
Start: 2022-08-29 | End: 2022-08-31

## 2022-08-29 RX ORDER — HYDROXYZINE HYDROCHLORIDE 25 MG/1
25 TABLET, FILM COATED ORAL EVERY 8 HOURS PRN
Qty: 90 TABLET | Refills: 3 | Status: SHIPPED | OUTPATIENT
Start: 2022-08-29 | End: 2023-03-27 | Stop reason: SDUPTHER

## 2022-10-04 RX ORDER — PNV NO.95/FERROUS FUM/FOLIC AC 28MG-0.8MG
1 TABLET ORAL
Qty: 30 TABLET | Refills: 1 | Status: SHIPPED | OUTPATIENT
Start: 2022-10-04 | End: 2022-12-11 | Stop reason: SDUPTHER

## 2022-10-12 ENCOUNTER — OFFICE VISIT (OUTPATIENT)
Dept: FAMILY MEDICINE CLINIC | Facility: CLINIC | Age: 42
End: 2022-10-12

## 2022-10-12 ENCOUNTER — LAB (OUTPATIENT)
Dept: LAB | Facility: HOSPITAL | Age: 42
End: 2022-10-12

## 2022-10-12 VITALS
BODY MASS INDEX: 33.21 KG/M2 | TEMPERATURE: 98.4 F | HEIGHT: 70 IN | SYSTOLIC BLOOD PRESSURE: 100 MMHG | DIASTOLIC BLOOD PRESSURE: 60 MMHG | HEART RATE: 69 BPM | OXYGEN SATURATION: 97 % | WEIGHT: 232 LBS

## 2022-10-12 DIAGNOSIS — K21.00 GASTROESOPHAGEAL REFLUX DISEASE WITH ESOPHAGITIS WITHOUT HEMORRHAGE: Primary | ICD-10-CM

## 2022-10-12 DIAGNOSIS — F41.1 GAD (GENERALIZED ANXIETY DISORDER): ICD-10-CM

## 2022-10-12 DIAGNOSIS — E55.9 VITAMIN D DEFICIENCY: ICD-10-CM

## 2022-10-12 DIAGNOSIS — G47.00 INSOMNIA, UNSPECIFIED TYPE: ICD-10-CM

## 2022-10-12 DIAGNOSIS — K20.0 EOSINOPHILIC ESOPHAGITIS: ICD-10-CM

## 2022-10-12 DIAGNOSIS — E78.2 MIXED HYPERLIPIDEMIA: ICD-10-CM

## 2022-10-12 DIAGNOSIS — R53.82 CHRONIC FATIGUE: ICD-10-CM

## 2022-10-12 DIAGNOSIS — E03.9 HYPOTHYROIDISM, UNSPECIFIED TYPE: ICD-10-CM

## 2022-10-12 DIAGNOSIS — F39 MOOD DISORDER: ICD-10-CM

## 2022-10-12 DIAGNOSIS — G25.81 RESTLESS LEG SYNDROME: ICD-10-CM

## 2022-10-12 DIAGNOSIS — T78.1XXA FOOD SENSITIVITY WITH GASTROINTESTINAL SYMPTOMS: ICD-10-CM

## 2022-10-12 DIAGNOSIS — K21.00 GASTROESOPHAGEAL REFLUX DISEASE WITH ESOPHAGITIS WITHOUT HEMORRHAGE: ICD-10-CM

## 2022-10-12 DIAGNOSIS — F41.0 PANIC ATTACKS: ICD-10-CM

## 2022-10-12 PROCEDURE — 84466 ASSAY OF TRANSFERRIN: CPT

## 2022-10-12 PROCEDURE — 82607 VITAMIN B-12: CPT

## 2022-10-12 PROCEDURE — 85025 COMPLETE CBC W/AUTO DIFF WBC: CPT

## 2022-10-12 PROCEDURE — 82306 VITAMIN D 25 HYDROXY: CPT

## 2022-10-12 PROCEDURE — 80061 LIPID PANEL: CPT

## 2022-10-12 PROCEDURE — 99214 OFFICE O/P EST MOD 30 MIN: CPT | Performed by: FAMILY MEDICINE

## 2022-10-12 PROCEDURE — 84443 ASSAY THYROID STIM HORMONE: CPT

## 2022-10-12 PROCEDURE — 83540 ASSAY OF IRON: CPT

## 2022-10-12 PROCEDURE — 80053 COMPREHEN METABOLIC PANEL: CPT

## 2022-10-12 PROCEDURE — 84439 ASSAY OF FREE THYROXINE: CPT

## 2022-10-12 RX ORDER — DEXLANSOPRAZOLE 60 MG/1
60 CAPSULE, DELAYED RELEASE ORAL DAILY
Qty: 30 CAPSULE | Refills: 3 | Status: SHIPPED | OUTPATIENT
Start: 2022-10-12 | End: 2022-11-11

## 2022-10-12 RX ORDER — SUCRALFATE ORAL 1 G/10ML
1 SUSPENSION ORAL
Qty: 414 ML | Refills: 3 | Status: SHIPPED | OUTPATIENT
Start: 2022-10-12 | End: 2023-02-08

## 2022-10-12 NOTE — PATIENT INSTRUCTIONS
Get labwork fasting    Recheck in 6 weeks     Stop nexium and start on dexilant 60 mg daily. Take on empty stomach

## 2022-10-13 LAB
25(OH)D3 SERPL-MCNC: 37.9 NG/ML (ref 30–100)
ALBUMIN SERPL-MCNC: 4.6 G/DL (ref 3.5–5.2)
ALBUMIN/GLOB SERPL: 2 G/DL
ALP SERPL-CCNC: 70 U/L (ref 39–117)
ALT SERPL W P-5'-P-CCNC: 24 U/L (ref 1–41)
ANION GAP SERPL CALCULATED.3IONS-SCNC: 13 MMOL/L (ref 5–15)
AST SERPL-CCNC: 28 U/L (ref 1–40)
BASOPHILS # BLD AUTO: 0.09 10*3/MM3 (ref 0–0.2)
BASOPHILS NFR BLD AUTO: 1.5 % (ref 0–1.5)
BILIRUB SERPL-MCNC: 0.6 MG/DL (ref 0–1.2)
BUN SERPL-MCNC: 10 MG/DL (ref 6–20)
BUN/CREAT SERPL: 9.2 (ref 7–25)
CALCIUM SPEC-SCNC: 9.2 MG/DL (ref 8.6–10.5)
CHLORIDE SERPL-SCNC: 103 MMOL/L (ref 98–107)
CHOLEST SERPL-MCNC: 164 MG/DL (ref 0–200)
CO2 SERPL-SCNC: 21 MMOL/L (ref 22–29)
CREAT SERPL-MCNC: 1.09 MG/DL (ref 0.76–1.27)
DEPRECATED RDW RBC AUTO: 38 FL (ref 37–54)
EGFRCR SERPLBLD CKD-EPI 2021: 86.9 ML/MIN/1.73
EOSINOPHIL # BLD AUTO: 0.41 10*3/MM3 (ref 0–0.4)
EOSINOPHIL NFR BLD AUTO: 6.9 % (ref 0.3–6.2)
ERYTHROCYTE [DISTWIDTH] IN BLOOD BY AUTOMATED COUNT: 12.8 % (ref 12.3–15.4)
GLOBULIN UR ELPH-MCNC: 2.3 GM/DL
GLUCOSE SERPL-MCNC: 104 MG/DL (ref 65–99)
HCT VFR BLD AUTO: 44.9 % (ref 37.5–51)
HDLC SERPL-MCNC: 33 MG/DL (ref 40–60)
HGB BLD-MCNC: 15.2 G/DL (ref 13–17.7)
IMM GRANULOCYTES # BLD AUTO: 0.02 10*3/MM3 (ref 0–0.05)
IMM GRANULOCYTES NFR BLD AUTO: 0.3 % (ref 0–0.5)
IRON 24H UR-MRATE: 108 MCG/DL (ref 59–158)
IRON SATN MFR SERPL: 36 % (ref 20–50)
LDLC SERPL CALC-MCNC: 110 MG/DL (ref 0–100)
LDLC/HDLC SERPL: 3.28 {RATIO}
LYMPHOCYTES # BLD AUTO: 1.89 10*3/MM3 (ref 0.7–3.1)
LYMPHOCYTES NFR BLD AUTO: 31.6 % (ref 19.6–45.3)
MCH RBC QN AUTO: 28.4 PG (ref 26.6–33)
MCHC RBC AUTO-ENTMCNC: 33.9 G/DL (ref 31.5–35.7)
MCV RBC AUTO: 83.8 FL (ref 79–97)
MONOCYTES # BLD AUTO: 0.54 10*3/MM3 (ref 0.1–0.9)
MONOCYTES NFR BLD AUTO: 9 % (ref 5–12)
NEUTROPHILS NFR BLD AUTO: 3.03 10*3/MM3 (ref 1.7–7)
NEUTROPHILS NFR BLD AUTO: 50.7 % (ref 42.7–76)
NRBC BLD AUTO-RTO: 0 /100 WBC (ref 0–0.2)
PLATELET # BLD AUTO: 247 10*3/MM3 (ref 140–450)
PMV BLD AUTO: 10.4 FL (ref 6–12)
POTASSIUM SERPL-SCNC: 4.2 MMOL/L (ref 3.5–5.2)
PROT SERPL-MCNC: 6.9 G/DL (ref 6–8.5)
RBC # BLD AUTO: 5.36 10*6/MM3 (ref 4.14–5.8)
SODIUM SERPL-SCNC: 137 MMOL/L (ref 136–145)
T4 FREE SERPL-MCNC: 1.25 NG/DL (ref 0.93–1.7)
TIBC SERPL-MCNC: 301 MCG/DL (ref 298–536)
TRANSFERRIN SERPL-MCNC: 202 MG/DL (ref 200–360)
TRIGL SERPL-MCNC: 113 MG/DL (ref 0–150)
TSH SERPL DL<=0.05 MIU/L-ACNC: 1.41 UIU/ML (ref 0.27–4.2)
VIT B12 BLD-MCNC: 799 PG/ML (ref 211–946)
VLDLC SERPL-MCNC: 21 MG/DL (ref 5–40)
WBC NRBC COR # BLD: 5.98 10*3/MM3 (ref 3.4–10.8)

## 2022-10-13 RX ORDER — LEVOTHYROXINE SODIUM 0.05 MG/1
50 TABLET ORAL
Qty: 90 TABLET | Refills: 0 | Status: SHIPPED | OUTPATIENT
Start: 2022-10-13 | End: 2023-01-23 | Stop reason: SDUPTHER

## 2022-10-13 RX ORDER — RISPERIDONE 1 MG/1
1 TABLET ORAL NIGHTLY
Qty: 30 TABLET | Refills: 2 | Status: SHIPPED | OUTPATIENT
Start: 2022-10-13 | End: 2023-01-15 | Stop reason: SDUPTHER

## 2022-10-13 RX ORDER — ROPINIROLE 1 MG/1
TABLET, FILM COATED ORAL
Qty: 30 TABLET | Refills: 2 | Status: SHIPPED | OUTPATIENT
Start: 2022-10-13 | End: 2022-12-12

## 2022-10-13 RX ORDER — TRAZODONE HYDROCHLORIDE 150 MG/1
150 TABLET ORAL NIGHTLY
Qty: 30 TABLET | Refills: 2 | Status: SHIPPED | OUTPATIENT
Start: 2022-10-13 | End: 2023-01-08 | Stop reason: SDUPTHER

## 2022-11-09 NOTE — PROGRESS NOTES
Subjective:  Jordin Parham is a 41 y.o. male who presents for       Patient Active Problem List   Diagnosis   • Class 1 obesity with body mass index (BMI) of 30.0 to 30.9 in adult   • Gastroesophageal reflux disease   • COOKIE (generalized anxiety disorder)   • Left upper quadrant pain   • Low libido   • Mood disorder (HCC)   • H. pylori infection   • Renal impairment   • Mixed hyperlipidemia   • Vitamin D deficiency   • History of drug use   • Fatty liver   • Renal cyst   • Change in bowel habits   • Mucus in stool   • Allergy to alpha-gal   • Bronchitis   • Upper respiratory tract infection   • Eosinophilic esophagitis   • Onychomycosis   • Bilateral hand pain   • Bilateral wrist pain   • Overweight   • Pruritic rash   • Insomnia   • Arthritis of right hand   • Bilateral swelling of feet   • Blood in stool   • Cough   • Panic attacks   • Restless leg syndrome   • Restless legs   • Panic attack   • Ear pain, bilateral   • Class 1 obesity without serious comorbidity with body mass index (BMI) of 30.0 to 30.9 in adult   • Food sensitivity with gastrointestinal symptoms   • Inattention   • History of ADHD   • Class 1 obesity with serious comorbidity and body mass index (BMI) of 33.0 to 33.9 in adult           Current Outpatient Medications:   •  atorvastatin (Lipitor) 20 MG tablet, Take 1 tablet by mouth Every Night., Disp: 30 tablet, Rfl: 1  •  ferrous sulfate 325 (65 Fe) MG tablet, Take 1 tablet by mouth Daily With Breakfast., Disp: 30 tablet, Rfl: 1  •  hydrOXYzine (ATARAX) 25 MG tablet, Take 1 tablet by mouth Every 8 (Eight) Hours As Needed for Anxiety., Disp: 90 tablet, Rfl: 3  •  levothyroxine (Synthroid) 50 MCG tablet, Take 1 tablet by mouth Every Morning., Disp: 90 tablet, Rfl: 0  •  montelukast (SINGULAIR) 10 MG tablet, Take 1 tablet by mouth Every Night., Disp: 30 tablet, Rfl: 1  •  risperiDONE (RisperDAL) 1 MG tablet, Take 1 tablet by mouth Every Night., Disp: 30 tablet, Rfl: 2  •  rOPINIRole  (REQUIP) 1 MG tablet, TAKE 1 TABLET DAILY BEFORE BEDTIME, Disp: 30 tablet, Rfl: 2  •  sucralfate (Carafate) 1 GM/10ML suspension, Take 10 mL by mouth 4 (Four) Times a Day With Meals & at Bedtime., Disp: 414 mL, Rfl: 3  •  traZODone (DESYREL) 150 MG tablet, Take 1 tablet by mouth Every Night., Disp: 30 tablet, Rfl: 2  •  vitamin B-12 (CYANOCOBALAMIN) 500 MCG tablet, Take 1 tablet by mouth Daily., Disp: 30 tablet, Rfl: 1  •  vitamin D (ERGOCALCIFEROL) 1.25 MG (40755 UT) capsule capsule, Take 1 capsule by mouth 1 (One) Time Per Week., Disp: 4 capsule, Rfl: 3    HPI     Pt is 42 yo male with management  of obesity, COOKIE, major depression, panic attacks GERD , mood disorder, history of H pylori infection, VItamin D deficiency,  Renal impairment, History of illicit drug use, fatty liver disease, gastritis,  Eosinophilic esophagitis, internal/external hemorrhoids, sp hemorrhoidectomy, history of [place] fever.      10/12/22 in office visit for recheck. Pt has yet to get labwork ordered on 8/9/22.  Pt is doing well. Other than his acid reflux is not better despite taking nexium daily.     11/29/22 in office visit for recheck. Pt was started on dexilant since last visit for his GERD and advised to make an appt with Gastroenterology. He had labowrk done on 10/12/22 that showed lipid panel LDL at 110  With HDL at 33. CMP showed stable kidney and liver function. Rest of labwork was stable. Pt states he developed dysphagia about 3 weeks ago and has difficulty swallowing both solids and liquids. He states food has difficult time going down his esophagus. His last EGD was in 2019 that showed esophagitis and gastritis.  He also gets tired easily during the day. He has not had a sleep study. Pt also would like to be tested for rheumatoid has hip pain both sides. Mother had a history of rheumatoid arthritis.       Heartburn  He complains of abdominal pain. He reports no chest pain, no choking, no coughing, no nausea, no sore  throat or no wheezing. This is a recurrent problem. The current episode started more than 1 month ago. Nothing aggravates the symptoms. Associated symptoms include fatigue. There are no known risk factors. He has tried a PPI for the symptoms. Past procedures do not include an abdominal ultrasound, an EGD, esophageal manometry, H. pylori antibody titer or a UGI. Past invasive treatments do not include gastroplasty, gastroplication or reflux surgery.    Obesity  This is a chronic problem. The current episode started more than 1 year ago. The problem occurs constantly. Associated symptoms include abdominal pain, fatigue, numbness and weakness. Pertinent negatives include no anorexia, arthralgias, change in bowel habit, chest pain, chills, congestion, coughing, diaphoresis, fever, headaches, joint swelling, myalgias, nausea, neck pain, sore throat, swollen glands, urinary symptoms, vertigo, visual change or vomiting. Nothing aggravates the symptoms. He has tried nothing for the symptoms. The treatment provided no relief.   Fatigue  This is a chronic problem. The current episode started more than 1 month ago. The problem occurs constantly. The problem has been unchanged. Associated symptoms include abdominal pain, fatigue, numbness and weakness. Pertinent negatives include no anorexia, arthralgias, change in bowel habit, chest pain, chills, congestion, coughing, diaphoresis, fever, headaches, joint swelling, myalgias, nausea, neck pain, sore throat, swollen glands, urinary symptoms, vertigo, visual change or vomiting. Nothing aggravates the symptoms. He has tried nothing for the symptoms. The treatment provided no relief.   Anxiety  Presents for follow-up visit. Symptoms include decreased concentration, depressed mood, excessive worry, irritability, malaise, nervous/anxious behavior, obsessions and restlessness. Patient reports no chest pain, compulsions, confusion, dizziness, dry mouth, feeling of  choking, hyperventilation, impotence, insomnia, muscle tension, nausea, palpitations, panic, shortness of breath or suicidal ideas. The severity of symptoms is moderate. The quality of sleep is fair.      His past medical history is significant for depression. Compliance with medications is %.   Depression  Visit Type: follow-up  Patient presents with the following symptoms: decreased concentration, depressed mood, excessive worry, irritability, malaise, nervousness/anxiety, obsessions and restlessness.  Patient is not experiencing: anhedonia, chest pain, choking sensation, compulsions, confusion, dizziness, dry mouth, fatigue, feelings of hopelessness, feelings of worthlessness, hypersomnia, hyperventilation, impotence, insomnia, memory impairment, muscle tension, nausea, palpitations, panic, psychomotor agitation, shortness of breath, suicidal ideas, suicidal planning, thoughts of death, weight gain and weight loss.  Severity: moderate   Sleep quality: fair  Compliance with medications:  %   GI Problem  The primary symptoms include abdominal pain. Primary symptoms do not include fever, weight loss, fatigue, nausea, vomiting, diarrhea, melena, hematemesis, jaundice, hematochezia, dysuria, myalgias, arthralgias or rash.   The illness does not include chills, anorexia, dysphagia, odynophagia, bloating, constipation, tenesmus, back pain or itching. Significant associated medical issues include GERD. Associated medical issues do not include inflammatory bowel disease, gallstones, liver disease, alcohol abuse, PUD, gastric bypass, bowel resection, irritable bowel syndrome, hemorrhoids or diverticulitis.     Review of Systems  Review of Systems   Constitutional: Positive for activity change and fatigue. Negative for appetite change, chills, diaphoresis and fever.   HENT: Positive for trouble swallowing. Negative for congestion, postnasal drip, rhinorrhea, sinus pressure, sinus pain, sneezing, sore throat and  voice change.    Respiratory: Negative for cough, choking, chest tightness, shortness of breath, wheezing and stridor.    Cardiovascular: Negative for chest pain.   Gastrointestinal: Positive for abdominal pain. Negative for diarrhea, nausea and vomiting.   Musculoskeletal: Positive for arthralgias.   Neurological: Positive for weakness and numbness. Negative for headaches.   Psychiatric/Behavioral: The patient is nervous/anxious.        Patient Active Problem List   Diagnosis   • Class 1 obesity with body mass index (BMI) of 30.0 to 30.9 in adult   • Gastroesophageal reflux disease   • COOKIE (generalized anxiety disorder)   • Left upper quadrant pain   • Low libido   • Mood disorder (HCC)   • H. pylori infection   • Renal impairment   • Mixed hyperlipidemia   • Vitamin D deficiency   • History of drug use   • Fatty liver   • Renal cyst   • Change in bowel habits   • Mucus in stool   • Allergy to alpha-gal   • Bronchitis   • Upper respiratory tract infection   • Eosinophilic esophagitis   • Onychomycosis   • Bilateral hand pain   • Bilateral wrist pain   • Overweight   • Pruritic rash   • Insomnia   • Arthritis of right hand   • Bilateral swelling of feet   • Blood in stool   • Cough   • Panic attacks   • Restless leg syndrome   • Restless legs   • Panic attack   • Ear pain, bilateral   • Class 1 obesity without serious comorbidity with body mass index (BMI) of 30.0 to 30.9 in adult   • Food sensitivity with gastrointestinal symptoms   • Inattention   • History of ADHD   • Class 1 obesity with serious comorbidity and body mass index (BMI) of 33.0 to 33.9 in adult     Past Surgical History:   Procedure Laterality Date   • COLONOSCOPY N/A 9/17/2019    Procedure: COLONOSCOPY;  Surgeon: Rigoberto Flower MD;  Location: [facility] ENDOSCOPY;  Service: Gastroenterology   • ENDOSCOPY N/A 9/17/2019    Procedure: ESOPHAGOGASTRODUODENOSCOPY;  Surgeon: Rigoberto Flower MD;  Location: [facility] ENDOSCOPY;  Service: Gastroenterology   •  HEMORRHOIDECTOMY       Social History     Socioeconomic History   • Marital status:    Tobacco Use   • Smoking status: Never   • Smokeless tobacco: Never   Vaping Use   • Vaping Use: Never used   Substance and Sexual Activity   • Alcohol use: Not Currently     Comment: rarely   • Drug use: Yes     Types: Marijuana, Methamphetamines   • Sexual activity: Defer     Family History   Problem Relation Age of Onset   • Alcohol abuse Mother    • Anxiety disorder Mother    • Arthritis Mother    • Asthma Mother    • Cancer Mother    • Depression Mother    • Hypertension Mother    • Stroke Mother    • Hyperlipidemia Father    • Thyroid disease Sister    • Alcohol abuse Sister    • Anxiety disorder Sister    • Cancer Sister    • Depression Sister    • Alcohol abuse Brother    • Anxiety disorder Brother    • Cancer Brother    • Depression Brother    • Hyperlipidemia Maternal Uncle    • Heart failure Maternal Uncle    • Heart disease Maternal Uncle      Lab on 10/12/2022   Component Date Value Ref Range Status   • WBC 10/12/2022 5.98  3.40 - 10.80 10*3/mm3 Final   • RBC 10/12/2022 5.36  4.14 - 5.80 10*6/mm3 Final   • Hemoglobin 10/12/2022 15.2  13.0 - 17.7 g/dL Final   • Hematocrit 10/12/2022 44.9  37.5 - 51.0 % Final   • MCV 10/12/2022 83.8  79.0 - 97.0 fL Final   • MCH 10/12/2022 28.4  26.6 - 33.0 pg Final   • MCHC 10/12/2022 33.9  31.5 - 35.7 g/dL Final   • RDW 10/12/2022 12.8  12.3 - 15.4 % Final   • RDW-SD 10/12/2022 38.0  37.0 - 54.0 fl Final   • MPV 10/12/2022 10.4  6.0 - 12.0 fL Final   • Platelets 10/12/2022 247  140 - 450 10*3/mm3 Final   • Neutrophil % 10/12/2022 50.7  42.7 - 76.0 % Final   • Lymphocyte % 10/12/2022 31.6  19.6 - 45.3 % Final   • Monocyte % 10/12/2022 9.0  5.0 - 12.0 % Final   • Eosinophil % 10/12/2022 6.9 (H)  0.3 - 6.2 % Final   • Basophil % 10/12/2022 1.5  0.0 - 1.5 % Final   • Immature Grans % 10/12/2022 0.3  0.0 - 0.5 % Final   • Neutrophils, Absolute 10/12/2022 3.03  1.70 - 7.00 10*3/mm3  Final   • Lymphocytes, Absolute 10/12/2022 1.89  0.70 - 3.10 10*3/mm3 Final   • Monocytes, Absolute 10/12/2022 0.54  0.10 - 0.90 10*3/mm3 Final   • Eosinophils, Absolute 10/12/2022 0.41 (H)  0.00 - 0.40 10*3/mm3 Final   • Basophils, Absolute 10/12/2022 0.09  0.00 - 0.20 10*3/mm3 Final   • Immature Grans, Absolute 10/12/2022 0.02  0.00 - 0.05 10*3/mm3 Final   • nRBC 10/12/2022 0.0  0.0 - 0.2 /100 WBC Final   • Glucose 10/12/2022 104 (H)  65 - 99 mg/dL Final   • BUN 10/12/2022 10  6 - 20 mg/dL Final   • Creatinine 10/12/2022 1.09  0.76 - 1.27 mg/dL Final   • Sodium 10/12/2022 137  136 - 145 mmol/L Final   • Potassium 10/12/2022 4.2  3.5 - 5.2 mmol/L Final   • Chloride 10/12/2022 103  98 - 107 mmol/L Final   • CO2 10/12/2022 21.0 (L)  22.0 - 29.0 mmol/L Final   • Calcium 10/12/2022 9.2  8.6 - 10.5 mg/dL Final   • Total Protein 10/12/2022 6.9  6.0 - 8.5 g/dL Final   • Albumin 10/12/2022 4.60  3.50 - 5.20 g/dL Final   • ALT (SGPT) 10/12/2022 24  1 - 41 U/L Final   • AST (SGOT) 10/12/2022 28  1 - 40 U/L Final   • Alkaline Phosphatase 10/12/2022 70  39 - 117 U/L Final   • Total Bilirubin 10/12/2022 0.6  0.0 - 1.2 mg/dL Final   • Globulin 10/12/2022 2.3  gm/dL Final   • A/G Ratio 10/12/2022 2.0  g/dL Final   • BUN/Creatinine Ratio 10/12/2022 9.2  7.0 - 25.0 Final   • Anion Gap 10/12/2022 13.0  5.0 - 15.0 mmol/L Final   • eGFR 10/12/2022 86.9  >60.0 mL/min/1.73 Final    National Kidney Foundation and American Society of Nephrology (ASN) Task Force recommended calculation based on the Chronic Kidney Disease Epidemiology Collaboration (CKD-EPI) equation refit without adjustment for race.   • Total Cholesterol 10/12/2022 164  0 - 200 mg/dL Final   • Triglycerides 10/12/2022 113  0 - 150 mg/dL Final   • HDL Cholesterol 10/12/2022 33 (L)  40 - 60 mg/dL Final   • LDL Cholesterol  10/12/2022 110 (H)  0 - 100 mg/dL Final   • VLDL Cholesterol 10/12/2022 21  5 - 40 mg/dL Final   • LDL/HDL Ratio 10/12/2022 3.28   Final   • TSH  10/12/2022 1.410  0.270 - 4.200 uIU/mL Final   • Free T4 10/12/2022 1.25  0.93 - 1.70 ng/dL Final   • 25 Hydroxy, Vitamin D 10/12/2022 37.9  30.0 - 100.0 ng/ml Final   • Vitamin B-12 10/12/2022 799  211 - 946 pg/mL Final   • Iron 10/12/2022 108  59 - 158 mcg/dL Final   • Iron Saturation 10/12/2022 36  20 - 50 % Final   • Transferrin 10/12/2022 202  200 - 360 mg/dL Final   • TIBC 10/12/2022 301  298 - 536 mcg/dL Final   Lab on 06/24/2022   Component Date Value Ref Range Status   • WBC 06/24/2022 6.76  3.40 - 10.80 10*3/mm3 Final   • RBC 06/24/2022 5.50  4.14 - 5.80 10*6/mm3 Final   • Hemoglobin 06/24/2022 15.3  13.0 - 17.7 g/dL Final   • Hematocrit 06/24/2022 46.0  37.5 - 51.0 % Final   • MCV 06/24/2022 83.6  79.0 - 97.0 fL Final   • MCH 06/24/2022 27.8  26.6 - 33.0 pg Final   • MCHC 06/24/2022 33.3  31.5 - 35.7 g/dL Final   • RDW 06/24/2022 13.2  12.3 - 15.4 % Final   • RDW-SD 06/24/2022 39.9  37.0 - 54.0 fl Final   • MPV 06/24/2022 10.1  6.0 - 12.0 fL Final   • Platelets 06/24/2022 207  140 - 450 10*3/mm3 Final   • Neutrophil % 06/24/2022 61.2  42.7 - 76.0 % Final   • Lymphocyte % 06/24/2022 22.0  19.6 - 45.3 % Final   • Monocyte % 06/24/2022 10.1  5.0 - 12.0 % Final   • Eosinophil % 06/24/2022 4.4  0.3 - 6.2 % Final   • Basophil % 06/24/2022 1.3  0.0 - 1.5 % Final   • Immature Grans % 06/24/2022 1.0 (H)  0.0 - 0.5 % Final   • Neutrophils, Absolute 06/24/2022 4.13  1.70 - 7.00 10*3/mm3 Final   • Lymphocytes, Absolute 06/24/2022 1.49  0.70 - 3.10 10*3/mm3 Final   • Monocytes, Absolute 06/24/2022 0.68  0.10 - 0.90 10*3/mm3 Final   • Eosinophils, Absolute 06/24/2022 0.30  0.00 - 0.40 10*3/mm3 Final   • Basophils, Absolute 06/24/2022 0.09  0.00 - 0.20 10*3/mm3 Final   • Immature Grans, Absolute 06/24/2022 0.07 (H)  0.00 - 0.05 10*3/mm3 Final   • nRBC 06/24/2022 0.0  0.0 - 0.2 /100 WBC Final   • Glucose 06/24/2022 91  65 - 99 mg/dL Final   • BUN 06/24/2022 9  6 - 20 mg/dL Final   • Creatinine 06/24/2022 1.08  0.76 -  1.27 mg/dL Final   • Sodium 06/24/2022 140  136 - 145 mmol/L Final   • Potassium 06/24/2022 4.2  3.5 - 5.2 mmol/L Final   • Chloride 06/24/2022 104  98 - 107 mmol/L Final   • CO2 06/24/2022 25.0  22.0 - 29.0 mmol/L Final   • Calcium 06/24/2022 9.2  8.6 - 10.5 mg/dL Final   • Total Protein 06/24/2022 7.0  6.0 - 8.5 g/dL Final   • Albumin 06/24/2022 4.50  3.50 - 5.20 g/dL Final   • ALT (SGPT) 06/24/2022 28  1 - 41 U/L Final   • AST (SGOT) 06/24/2022 24  1 - 40 U/L Final   • Alkaline Phosphatase 06/24/2022 69  39 - 117 U/L Final   • Total Bilirubin 06/24/2022 0.5  0.0 - 1.2 mg/dL Final   • Globulin 06/24/2022 2.5  gm/dL Final   • A/G Ratio 06/24/2022 1.8  g/dL Final   • BUN/Creatinine Ratio 06/24/2022 8.3  7.0 - 25.0 Final   • Anion Gap 06/24/2022 11.0  5.0 - 15.0 mmol/L Final   • eGFR 06/24/2022 87.9  >60.0 mL/min/1.73 Final    National Kidney Foundation and American Society of Nephrology (ASN) Task Force recommended calculation based on the Chronic Kidney Disease Epidemiology Collaboration (CKD-EPI) equation refit without adjustment for race.   • Hemoglobin A1C 06/24/2022 5.30  4.80 - 5.60 % Final   • Total Cholesterol 06/24/2022 181  0 - 200 mg/dL Final   • Triglycerides 06/24/2022 61  0 - 150 mg/dL Final   • HDL Cholesterol 06/24/2022 56  40 - 60 mg/dL Final   • LDL Cholesterol  06/24/2022 113 (H)  0 - 100 mg/dL Final   • VLDL Cholesterol 06/24/2022 12  5 - 40 mg/dL Final   • LDL/HDL Ratio 06/24/2022 2.01   Final   • TSH 06/24/2022 0.976  0.270 - 4.200 uIU/mL Final   • Free T4 06/24/2022 0.95  0.93 - 1.70 ng/dL Final   • 25 Hydroxy, Vitamin D 06/24/2022 41.7  30.0 - 100.0 ng/ml Final   • Vitamin B-12 06/24/2022 545  211 - 946 pg/mL Final   • Iron 06/24/2022 90  59 - 158 mcg/dL Final   • Iron Saturation 06/24/2022 29  20 - 50 % Final   • Transferrin 06/24/2022 210  200 - 360 mg/dL Final   • TIBC 06/24/2022 313  298 - 536 mcg/dL Final   • Ferritin 06/24/2022 193.00  30.00 - 400.00 ng/mL Final   • Testosterone, Total  06/24/2022 412.8  264.0 - 916.0 ng/dL Final    This [facility] LC/MS-MS method is currently certified by the CDC  Hormone Standardization Program (HoSt). Adult male reference  interval is based on a population of healthy nonobese males  (BMI <30) between 19 and 39 years old. Alvina, et.al. JCEM  2017,102;4048-5879. PMID: [id number].   • Testosterone, Free 06/24/2022 13.5  6.8 - 21.5 pg/mL Final   • Testosterone, Total 06/24/2022 415.3  264.0 - 916.0 ng/dL Final    This [facility] LC/MS-MS method is currently certified by the CDC  Hormone Standardization Program (HoSt). Adult male reference  interval is based on a population of healthy nonobese males  (BMI <30) between 19 and 39 years old. Alvina, et.al. JCEM  2017,102;3729-9496. PMID: [id number].   • Testosterone, Free 06/24/2022 10.96  5.00 - 21.00 ng/dL Final   • Testosterone, Free % 06/24/2022 2.64  1.50 - 4.20 % Final   • Class Description 06/24/2022 Comment   Final        Levels of Specific IgE       Class  Description of Class      ---------------------------  -----  --------------------                     < 0.10         0         Negative             0.10 -    0.31         0/I       Equivocal/Low             0.32 -    0.55         I         Low             0.56 -    1.40         II        Moderate             1.41 -    3.90         III       High             3.91 -   19.00         IV        Very High            19.01 -  100.00         V         Very High                    >100.00         VI        Very High   • Egg White 06/24/2022 <0.10  Class 0 kU/L Final   • Milk, Cow's 06/24/2022 0.38 (A)  Class I kU/L Final   • CodFish 06/24/2022 <0.10  Class 0 kU/L Final   • Sesame Seed 06/24/2022 <0.10  Class 0 kU/L Final   • Peanut 06/24/2022 <0.10  Class 0 kU/L Final   • Soybean 06/24/2022 <0.10  Class 0 kU/L Final   • Hazelnut 06/24/2022 <0.10  Class 0 kU/L Final   • Shrimp 06/24/2022 <0.10  Class 0 kU/L Final   • Scallop 06/24/2022 <0.10  Class 0 kU/L Final   • Gluten  06/24/2022 0.21 (A)  Class 0/I kU/L Final   • [place] 06/24/2022 <0.10  Class 0 kU/L Final   • Wheat 06/24/2022 0.24 (A)  Class 0/I kU/L Final   • Gliadin Deamidated Peptide Ab, IgA 06/24/2022 4  0 - 19 units Final                       Negative                   0 - 19                     Weak Positive             20 - 30                     Moderate to Strong Positive   >30   • Deaminated Gliadin Ab IgG 06/24/2022 2  0 - 19 units Final                       Negative                   0 - 19                     Weak Positive             20 - 30                     Moderate to Strong Positive   >30   • Tissue Transglutaminase IgA 06/24/2022 <2  0 - 3 U/mL Final                                  Negative        0 -  3                                Weak Positive   4 - 10                                Positive           >10   Tissue Transglutaminase (tTG) has been identified   as the endomysial antigen.  Studies have demonstr-   ated that endomysial IgA antibodies have over 99%   specificity for gluten sensitive enteropathy.   • Tissue Transglutaminase IgG 06/24/2022 <2  0 - 5 U/mL Final                                  Negative        0 - 5                                Weak Positive   6 - 9                                Positive           >9      XR Spine Lumbar 4+ View  Narrative: EXAMINATION:  XR SPINE LUMBAR COMPLETE 4+VW    CLINICAL HISTORY:  41 years Male,numbness of left toe, R20.0  Anesthesia of skin    COMPARISON:  None    FINDINGS:  Five lumbar type vertebral bodies demonstrate normal  height and alignment. Interspinous distances are preserved. Facet  articulations are aligned without appreciable facet degenerative  change. No appreciable bony neuroforaminal narrowing with oblique  imaging.  Impression: Negative exam of the lumbar spine.    Electronically signed by:  Jose Travis MD  3/23/2022 1:08 PM CDT  Workstation: [id number]    @[email or web address]@  Immunization History   Administered Date(s) Administered  "  • COVID-19 (MODERNA) 1st, 2nd, 3rd Dose Only 09/14/2021   • FluLaval/Fluzone >6mos 11/30/2020, 09/28/2021   • Td 01/04/1996   • Tdap 06/20/2019       The following portions of the patient's history were reviewed and updated as appropriate: allergies, current medications, past family history, past medical history, past social history, past surgical history and problem list.        Physical Exam  /64 (BP Location: Left arm, Patient Position: Sitting, Cuff Size: Adult)   Pulse 82   Temp 98.5 °F (36.9 °C)   Ht 177.8 cm (70\")   Wt 108 kg (238 lb 9.6 oz)   SpO2 99%   BMI 34.24 kg/m²     Physical Exam  Vitals and nursing note reviewed.   Constitutional:       Appearance: He is well-developed. He is not diaphoretic.   HENT:      Head: Normocephalic and atraumatic.      Right Ear: External ear normal.   Eyes:      Conjunctiva/sclera: Conjunctivae normal.      Pupils: Pupils are equal, round, and reactive to light.   Cardiovascular:      Rate and Rhythm: Normal rate and regular rhythm.      Heart sounds: Normal heart sounds. No murmur heard.  Pulmonary:      Effort: Pulmonary effort is normal. No respiratory distress.      Breath sounds: Normal breath sounds.   Abdominal:      General: Bowel sounds are normal. There is no distension.      Palpations: Abdomen is soft.      Tenderness: There is abdominal tenderness.   Musculoskeletal:         General: Tenderness present. No deformity.      Cervical back: Normal range of motion and neck supple.      Right hip: Tenderness and bony tenderness present. Decreased range of motion.      Left hip: Tenderness and bony tenderness present. Decreased range of motion.   Skin:     General: Skin is warm.      Coloration: Skin is not pale.      Findings: No erythema or rash.   Neurological:      Mental Status: He is alert and oriented to person, place, and time.      Cranial Nerves: No cranial nerve deficit.   Psychiatric:         Behavior: Behavior normal. "         [unfilled]   Diagnosis Plan   1. Multiple joint pain  CONSTANTIN    Rheumatoid Factor    Sedimentation Rate    C-reactive Protein    CONSTANTIN Comprehensive Panel    Uric Acid    HLA-B27 Antigen      2. Overweight        3. Mixed hyperlipidemia        4. Fatty liver        5. Restless leg syndrome        6. Vitamin D deficiency        7. Panic attack        8. COOKIE (generalized anxiety disorder)        9. Gastroesophageal reflux disease with esophagitis without hemorrhage  Ambulatory Referral to Gastroenterology      10. Food sensitivity with gastrointestinal symptoms  Ambulatory Referral to Gastroenterology      11. Eosinophilic esophagitis  Ambulatory Referral to Gastroenterology      12. Class 1 obesity with serious comorbidity and body mass index (BMI) of 34.0 to 34.9 in adult, unspecified obesity type        13. Dysphagia as late effect of cerebral aneurysm  Ambulatory Referral to Gastroenterology      14. Daytime sleepiness  Ambulatory Referral to Sleep Medicine      15. Snoring  Ambulatory Referral to Sleep Medicine      16. Family history of rheumatoid arthritis  CONSTANTIN    Rheumatoid Factor    Sedimentation Rate    C-reactive Protein    CONSTANTIN Comprehensive Panel    Uric Acid    HLA-B27 Antigen              -recommend labwork   -recommend COVID-19 vaccination  -recommend influenza vaccination   -recommend tdap vaccination/pneumonia vaccination   -daytime sleepiness/snoring - refer to slee pmedicine   -family history of rheumatoid - will get rheumatoid panel   -history of ADHD/inattention - recommend pt get evaluated with [facility]. Once evaluted can start on medication   -insomnia - recommend MIDNITE melatonin on trazodone 150 mg at bedtime. Refer to sleep medicine for possbile sleep study   -nausea/vomiting -  zofran 8 mg PO every 8 hours   -renal cyst - continue monitor.   -fatigue - consider sleep study. Will check testosterone level   -hypothyroidism - on synthroid 50 mcg PO q daily  -iron  deficiency/restless legs - on requip 1 mg at bedtime on ferrous sulfate 325 mg PO q daily.   -obesity  counseled weight loss >5 minutes BMI at 34.24   -vitamin D deficiency -vitamin D once a week  -HLP - recommend DASH diet, heart healthy diet.  -GERD with  esophagitis, gastritis/fatty liver/eosinophilic esophagitis/alpha gal allergy/dysphagia     -on nexium/ GI following  on dexliant 60 mg dialy samples provided on carafate 1 gram QID. Will refer back to Gastroenterology for EGD  -insomnia- on trazodone 100 mg PO q daily.    -allergic rhintis - flonase nasal psray   -panic attack/COOKIE/mood disorder  - on respidal 1mg PO qhs   -advised pt to be safe and call with questions and concerns. All questions addressed tdoay.   -advised pt to go to ER or call 911 if symptoms worrisome or severe  -advised pt to followup with specialist and referrals  -advised pt to be safe during COVID-19 pandemic  I spent 30  minutes caring for Jordin on this date of service. This time includes time spent by me in the following activities: preparing for the visit, reviewing tests, obtaining and/or reviewing a separately obtained history, performing a medically appropriate examination and/or evaluation, counseling and educating the patient/family/caregiver, ordering medications, tests, or procedures, referring and communicating with other health care professionals, documenting information in the medical record, independently interpreting results and communicating that information with the patient/family/caregiver and care coordination  -recheck in 2 months         This document has been electronically signed by Jhon Amezquita MD on November 29, 2022 15:32 CST

## 2022-11-29 ENCOUNTER — LAB (OUTPATIENT)
Dept: LAB | Facility: HOSPITAL | Age: 42
End: 2022-11-29

## 2022-11-29 ENCOUNTER — OFFICE VISIT (OUTPATIENT)
Dept: FAMILY MEDICINE CLINIC | Facility: CLINIC | Age: 42
End: 2022-11-29

## 2022-11-29 VITALS
WEIGHT: 238.6 LBS | DIASTOLIC BLOOD PRESSURE: 64 MMHG | TEMPERATURE: 98.5 F | HEIGHT: 70 IN | BODY MASS INDEX: 34.16 KG/M2 | HEART RATE: 82 BPM | OXYGEN SATURATION: 99 % | SYSTOLIC BLOOD PRESSURE: 114 MMHG

## 2022-11-29 DIAGNOSIS — E78.2 MIXED HYPERLIPIDEMIA: ICD-10-CM

## 2022-11-29 DIAGNOSIS — E55.9 VITAMIN D DEFICIENCY: ICD-10-CM

## 2022-11-29 DIAGNOSIS — Z82.61 FAMILY HISTORY OF RHEUMATOID ARTHRITIS: ICD-10-CM

## 2022-11-29 DIAGNOSIS — K20.0 EOSINOPHILIC ESOPHAGITIS: ICD-10-CM

## 2022-11-29 DIAGNOSIS — R06.83 SNORING: ICD-10-CM

## 2022-11-29 DIAGNOSIS — M25.50 MULTIPLE JOINT PAIN: Primary | ICD-10-CM

## 2022-11-29 DIAGNOSIS — F41.0 PANIC ATTACK: ICD-10-CM

## 2022-11-29 DIAGNOSIS — E66.3 OVERWEIGHT: ICD-10-CM

## 2022-11-29 DIAGNOSIS — K76.0 FATTY LIVER: ICD-10-CM

## 2022-11-29 DIAGNOSIS — E66.9 CLASS 1 OBESITY WITH SERIOUS COMORBIDITY AND BODY MASS INDEX (BMI) OF 34.0 TO 34.9 IN ADULT, UNSPECIFIED OBESITY TYPE: ICD-10-CM

## 2022-11-29 DIAGNOSIS — K21.00 GASTROESOPHAGEAL REFLUX DISEASE WITH ESOPHAGITIS WITHOUT HEMORRHAGE: ICD-10-CM

## 2022-11-29 DIAGNOSIS — R40.0 DAYTIME SLEEPINESS: ICD-10-CM

## 2022-11-29 DIAGNOSIS — G25.81 RESTLESS LEG SYNDROME: ICD-10-CM

## 2022-11-29 DIAGNOSIS — F41.1 GAD (GENERALIZED ANXIETY DISORDER): ICD-10-CM

## 2022-11-29 DIAGNOSIS — I69.891 DYSPHAGIA AS LATE EFFECT OF CEREBRAL ANEURYSM: ICD-10-CM

## 2022-11-29 DIAGNOSIS — M25.50 MULTIPLE JOINT PAIN: ICD-10-CM

## 2022-11-29 DIAGNOSIS — T78.1XXA FOOD SENSITIVITY WITH GASTROINTESTINAL SYMPTOMS: ICD-10-CM

## 2022-11-29 PROCEDURE — 99214 OFFICE O/P EST MOD 30 MIN: CPT | Performed by: FAMILY MEDICINE

## 2022-11-29 PROCEDURE — 86140 C-REACTIVE PROTEIN: CPT

## 2022-11-29 PROCEDURE — 86235 NUCLEAR ANTIGEN ANTIBODY: CPT

## 2022-11-29 PROCEDURE — 86225 DNA ANTIBODY NATIVE: CPT

## 2022-11-29 PROCEDURE — 81374 HLA I TYPING 1 ANTIGEN LR: CPT

## 2022-11-29 PROCEDURE — 84550 ASSAY OF BLOOD/URIC ACID: CPT

## 2022-11-29 PROCEDURE — 86200 CCP ANTIBODY: CPT

## 2022-11-29 PROCEDURE — 86038 ANTINUCLEAR ANTIBODIES: CPT

## 2022-11-29 PROCEDURE — 36415 COLL VENOUS BLD VENIPUNCTURE: CPT

## 2022-11-29 PROCEDURE — 85652 RBC SED RATE AUTOMATED: CPT

## 2022-11-29 PROCEDURE — 86431 RHEUMATOID FACTOR QUANT: CPT

## 2022-11-29 NOTE — PATIENT INSTRUCTIONS
Refer to sleep medicine for sleep study and daytime sleepiness    Will refer to Gastroenterology for repeat EGD and trouble sleepiness     Recheck in 2 months

## 2022-11-30 LAB
CHROMATIN AB SERPL-ACNC: <10 IU/ML (ref 0–14)
CRP SERPL-MCNC: <0.3 MG/DL (ref 0–0.5)
ERYTHROCYTE [SEDIMENTATION RATE] IN BLOOD: 8 MM/HR (ref 0–15)
URATE SERPL-MCNC: 6.3 MG/DL (ref 3.4–7)

## 2022-12-01 LAB
ANA SER QL: NEGATIVE
CENTROMERE B AB SER-ACNC: <0.2 AI (ref 0–0.9)
CHROMATIN AB SERPL-ACNC: <0.2 AI (ref 0–0.9)
DSDNA AB SER-ACNC: <1 IU/ML (ref 0–9)
ENA JO1 AB SER-ACNC: <0.2 AI (ref 0–0.9)
ENA RNP AB SER-ACNC: <0.2 AI (ref 0–0.9)
ENA SCL70 AB SER-ACNC: <0.2 AI (ref 0–0.9)
ENA SM AB SER-ACNC: <0.2 AI (ref 0–0.9)
ENA SS-A AB SER-ACNC: <0.2 AI (ref 0–0.9)
ENA SS-B AB SER-ACNC: <0.2 AI (ref 0–0.9)
Lab: NORMAL

## 2022-12-02 LAB — CCP IGA+IGG SERPL IA-ACNC: 2 UNITS (ref 0–19)

## 2022-12-05 ENCOUNTER — OFFICE VISIT (OUTPATIENT)
Dept: SLEEP MEDICINE | Facility: HOSPITAL | Age: 42
End: 2022-12-05

## 2022-12-05 VITALS
WEIGHT: 237 LBS | BODY MASS INDEX: 33.93 KG/M2 | DIASTOLIC BLOOD PRESSURE: 85 MMHG | HEART RATE: 63 BPM | SYSTOLIC BLOOD PRESSURE: 129 MMHG | HEIGHT: 70 IN | OXYGEN SATURATION: 97 %

## 2022-12-05 DIAGNOSIS — R06.83 SNORING: ICD-10-CM

## 2022-12-05 DIAGNOSIS — G47.00 FREQUENT NOCTURNAL AWAKENING: ICD-10-CM

## 2022-12-05 DIAGNOSIS — G47.19 EXCESSIVE DAYTIME SLEEPINESS: ICD-10-CM

## 2022-12-05 DIAGNOSIS — G25.81 RESTLESS LEGS SYNDROME: ICD-10-CM

## 2022-12-05 DIAGNOSIS — F51.04 PSYCHOPHYSIOLOGICAL INSOMNIA: Primary | ICD-10-CM

## 2022-12-05 PROCEDURE — 99214 OFFICE O/P EST MOD 30 MIN: CPT | Performed by: NURSE PRACTITIONER

## 2022-12-05 NOTE — PROGRESS NOTES
New Patient Sleep Medicine Consultation    Encounter Date: 12/5/2022         Patient's Primary Care Provider: Jhon Amezquita MD  Referring Provider: Jhon Amezquita MD  Reason for consultation/chief complaint: snoring, excessive daytime sleepiness, unrefreshing sleep and insomnia    Jordin Parham is a 41 y.o. male who admits to snoring, unrestful sleep, decreased libido, irritability, sleeping less than 6 hours per night, disturbed or restless sleep, memory loss, up to bathroom at night, sleepy driving, restless legs at night, difficulty falling asleep, difficulty staying asleep and taking medicine to help go to sleep.    He denies cataplexy, sleep paralysis, or hypnagogic hallucinations. His bedtime is ~ 2030. He  falls asleep after 60 minutes, and is up 3-4 times per night. He wakes up ~ 2608-4716. He endorses 4 hours of sleep. He drinks 1 cups of coffee, 0 teas, and 0 sodas per day. He drinks 0 alcoholic beverages per week. He is not a current smoker. He does not take sedatives or hypnotics. He has occasional sleepiness with driving. He naps every few days if able.     [place] - 24  [place] Sleepiness Scale  Sitting and reading: High chance of dozing  Watching TV: High chance of dozing  Sitting, inactive in a public place (e.g. a theatre or a meeting): High chance of dozing  As a passenger in a car for an hour without a break: High chance of dozing  Lying down to rest in the afternoon when circumstances permit: High chance of dozing  Sitting and talking to someone: High chance of dozing  Sitting quietly after a lunch without alcohol: High chance of dozing  In a car, while stopped for a few minutes in traffic: High chance of dozing  Total score: 24    Prior Sleep Testing: None    Past Medical History:   Diagnosis Date   • Anxiety    • Fatty liver    • GERD (gastroesophageal reflux disease)    • Hyperlipidemia    • Lyme disease      Social History     Socioeconomic History   • Marital status:     Tobacco Use   • Smoking status: Never   • Smokeless tobacco: Never   Vaping Use   • Vaping Use: Never used   Substance and Sexual Activity   • Alcohol use: Not Currently     Comment: rarely   • Drug use: Yes     Types: Marijuana, Methamphetamines   • Sexual activity: Defer     Family History   Problem Relation Age of Onset   • Alcohol abuse Mother    • Anxiety disorder Mother    • Arthritis Mother    • Asthma Mother    • Cancer Mother    • Depression Mother    • Hypertension Mother    • Stroke Mother    • Hyperlipidemia Father    • Thyroid disease Sister    • Alcohol abuse Sister    • Anxiety disorder Sister    • Cancer Sister    • Depression Sister    • Alcohol abuse Brother    • Anxiety disorder Brother    • Cancer Brother    • Depression Brother    • Hyperlipidemia Maternal Uncle    • Heart failure Maternal Uncle    • Heart disease Maternal Uncle      Prior T&A, UPPP, maxillofacial, or bariatric surgery: none  Family history of sleep disorders: None  Other family history + for: As above  Occupation:  - CDL  Marital status:   Children: 1  Has 2 brothers and 2 sisters  Smoking history: smoked never    Review of Systems:  Constitutional: positive for fatigue  Ears, nose, mouth, throat, and face: positive for snoring  Respiratory: negative  Cardiovascular: negative  Gastrointestinal: negative  Genitourinary:negative  Musculoskeletal:negative  Neurological: negative  Behavioral/Psych: positive for anxiety, fatigue and sleep disturbance  Patient advised to discuss any positive ROS with PCP.      Vitals:    12/05/22 1110   BP: 129/85   Pulse: 63   SpO2: 97%           12/05/22  1110   Weight: 108 kg (237 lb)       Body mass index is 34.01 kg/m². BMI is >= 30 and <35. (Class 1 Obesity). The following options were offered after discussion;: referral to primary care    Tobacco Use: Low Risk    • Smoking Tobacco Use: Never   • Smokeless Tobacco Use: Never   • Passive Exposure: Not on file       Physical  "Exam:        General: Alert. Cooperative. Well developed. No acute distress.   Head/Neck:  Normocephalic. Symmetrical. Atraumatic.     Neck circumference: 16.5\"             Eyes: Sclera clear. No icterus. PERRLA. Normal EOM.             Ears: No deformities. Normal hearing.             Nose: Slight left septal deviation. No drainage.          Throat: No oral lesions. No thrush. Moist mucous membranes. Trachea midline.    Tongue is normal     Dentition is upper and lower dentures       Pharynx: Posterior pharyngeal pillars are narrow    Mallampati score of II (hard and soft palate, upper portion of tonsils anduvula visible)    Pharynx is normal with unrermarkable tonsils   Chest Wall:  Normal shape. Symmetric expansion with respiration. No tenderness.          Lungs:  Clear to auscultation bilaterally. No wheezes. No rhonchi. No rales. Respirations regular, even and unlabored.            Heart:  Regular rhythm and normal rate. Normal S1 and S2. No murmur.     Abdomen:  Soft, non-tender and non-distended. Normal bowel sounds. No masses.  Extremities:  Moves all extremities well. No edema.           Pulses: Pulses palpable and equal bilaterally.               Skin: Dry. Intact. No bleeding. No rash.           Neuro: Moves all 4 extremities and cranial nerves grossly intact.  Psychiatric: Normal mood and affect.      Current Outpatient Medications:   •  atorvastatin (Lipitor) 20 MG tablet, Take 1 tablet by mouth Every Night., Disp: 30 tablet, Rfl: 1  •  ferrous sulfate 325 (65 Fe) MG tablet, Take 1 tablet by mouth Daily With Breakfast., Disp: 30 tablet, Rfl: 1  •  hydrOXYzine (ATARAX) 25 MG tablet, Take 1 tablet by mouth Every 8 (Eight) Hours As Needed for Anxiety., Disp: 90 tablet, Rfl: 3  •  levothyroxine (Synthroid) 50 MCG tablet, Take 1 tablet by mouth Every Morning., Disp: 90 tablet, Rfl: 0  •  montelukast (SINGULAIR) 10 MG tablet, Take 1 tablet by mouth Every Night., Disp: 30 tablet, Rfl: 1  •  risperiDONE " (RisperDAL) 1 MG tablet, Take 1 tablet by mouth Every Night., Disp: 30 tablet, Rfl: 2  •  rOPINIRole (REQUIP) 1 MG tablet, TAKE 1 TABLET DAILY BEFORE BEDTIME, Disp: 30 tablet, Rfl: 2  •  sucralfate (Carafate) 1 GM/10ML suspension, Take 10 mL by mouth 4 (Four) Times a Day With Meals & at Bedtime., Disp: 414 mL, Rfl: 3  •  traZODone (DESYREL) 150 MG tablet, Take 1 tablet by mouth Every Night., Disp: 30 tablet, Rfl: 2  •  vitamin B-12 (CYANOCOBALAMIN) 500 MCG tablet, Take 1 tablet by mouth Daily., Disp: 30 tablet, Rfl: 1  •  vitamin D (ERGOCALCIFEROL) 1.25 MG (20334 UT) capsule capsule, Take 1 capsule by mouth 1 (One) Time Per Week., Disp: 4 capsule, Rfl: 3    WBC   Date Value Ref Range Status   10/12/2022 5.98 3.40 - 10.80 10*3/mm3 Final     RBC   Date Value Ref Range Status   10/12/2022 5.36 4.14 - 5.80 10*6/mm3 Final     Hemoglobin   Date Value Ref Range Status   10/12/2022 15.2 13.0 - 17.7 g/dL Final     Hematocrit   Date Value Ref Range Status   10/12/2022 44.9 37.5 - 51.0 % Final     MCV   Date Value Ref Range Status   10/12/2022 83.8 79.0 - 97.0 fL Final     MCH   Date Value Ref Range Status   10/12/2022 28.4 26.6 - 33.0 pg Final     MCHC   Date Value Ref Range Status   10/12/2022 33.9 31.5 - 35.7 g/dL Final     RDW   Date Value Ref Range Status   10/12/2022 12.8 12.3 - 15.4 % Final     RDW-SD   Date Value Ref Range Status   10/12/2022 38.0 37.0 - 54.0 fl Final     MPV   Date Value Ref Range Status   10/12/2022 10.4 6.0 - 12.0 fL Final     Platelets   Date Value Ref Range Status   10/12/2022 247 140 - 450 10*3/mm3 Final     Neutrophil %   Date Value Ref Range Status   10/12/2022 50.7 42.7 - 76.0 % Final     Lymphocyte %   Date Value Ref Range Status   10/12/2022 31.6 19.6 - 45.3 % Final     Monocyte %   Date Value Ref Range Status   10/12/2022 9.0 5.0 - 12.0 % Final     Eosinophil %   Date Value Ref Range Status   10/12/2022 6.9 (H) 0.3 - 6.2 % Final     Basophil %   Date Value Ref Range Status   10/12/2022 1.5  0.0 - 1.5 % Final     Immature Grans %   Date Value Ref Range Status   10/12/2022 0.3 0.0 - 0.5 % Final     Neutrophils, Absolute   Date Value Ref Range Status   10/12/2022 3.03 1.70 - 7.00 10*3/mm3 Final     Lymphocytes, Absolute   Date Value Ref Range Status   10/12/2022 1.89 0.70 - 3.10 10*3/mm3 Final     Monocytes, Absolute   Date Value Ref Range Status   10/12/2022 0.54 0.10 - 0.90 10*3/mm3 Final     Eosinophils, Absolute   Date Value Ref Range Status   10/12/2022 0.41 (H) 0.00 - 0.40 10*3/mm3 Final     Basophils, Absolute   Date Value Ref Range Status   10/12/2022 0.09 0.00 - 0.20 10*3/mm3 Final     Immature Grans, Absolute   Date Value Ref Range Status   10/12/2022 0.02 0.00 - 0.05 10*3/mm3 Final     nRBC   Date Value Ref Range Status   10/12/2022 0.0 0.0 - 0.2 /100 WBC Final     Iron   Date Value Ref Range Status   10/12/2022 108 59 - 158 mcg/dL Final     Ferritin   Date Value Ref Range Status   06/24/2022 193.00 30.00 - 400.00 ng/mL Final     Lab Results   Component Value Date    GLUCOSE 104 (H) 10/12/2022    BUN 10 10/12/2022    CREATININE 1.09 10/12/2022    EGFRIFNONA 83 12/06/2021    BCR 9.2 10/12/2022    K 4.2 10/12/2022    CO2 21.0 (L) 10/12/2022    CALCIUM 9.2 10/12/2022    ALBUMIN 4.60 10/12/2022    AST 28 10/12/2022    ALT 24 10/12/2022       Contraindications to home sleep test: none    ASSESSMENT:  1. Excessive daytime sleepiness, presumed obstructive sleep apnea - New (to me), additional work-up planned (4)  1. Check home sleep study (disposable)  2. Follow up for results  3. Pertinent labs were reviewed as listed above  4. Drowsy driving tips - do not drive if drowsy  2. Snoring, presumed obstructive sleep apnea - New (to me), additional work-up planned (4)  1. As above  3. Frequent nocturnal awakenings - New (to me), additional work-up planned (4)  1. As above  4. Restless leg syndrome/ Periodic limb movement disorder - (RLS/PLMD) New (to me), additional work-up planned (4)  1. Continue Requip  1 mg QHS per PCP  2. Address further at follow up if needed  5. Insomnia - sleep onset/maintenance - New (to me), additional work-up planned (4)    1.   As above   2.   Continue trazodone 150 mg QHS per PCP      I spent 30 minutes caring for Jordin on this date of service. This time includes time spent by me in the following activities: preparing for the visit, reviewing tests, obtaining and/or reviewing a separately obtained history, performing a medically appropriate examination and/or evaluation , counseling and educating the patient/family/caregiver, ordering medications, tests, or procedures, documenting information in the medical record and care coordination; discussing Further testing    RTC 2 weeks after testing. Patient agrees to return sooner if changes in symptoms.         This document has been electronically signed by YULIET Chacon on December 5, 2022 11:16 CST          CC: Jhon Amezquita MD Jamora, David C, MD

## 2022-12-09 LAB — HLA-B27 QL NAA+PROBE: NEGATIVE

## 2022-12-12 DIAGNOSIS — G25.81 RESTLESS LEG SYNDROME: ICD-10-CM

## 2022-12-12 RX ORDER — ERGOCALCIFEROL 1.25 MG/1
50000 CAPSULE ORAL WEEKLY
Qty: 4 CAPSULE | Refills: 1 | Status: SHIPPED | OUTPATIENT
Start: 2022-12-12 | End: 2023-01-29 | Stop reason: SDUPTHER

## 2022-12-12 RX ORDER — CHOLECALCIFEROL (VITAMIN D3) 125 MCG
500 CAPSULE ORAL DAILY
Qty: 30 TABLET | Refills: 1 | Status: SHIPPED | OUTPATIENT
Start: 2022-12-12 | End: 2023-02-05 | Stop reason: SDUPTHER

## 2022-12-12 RX ORDER — ROPINIROLE 1 MG/1
TABLET, FILM COATED ORAL
Qty: 30 TABLET | Refills: 1 | Status: SHIPPED | OUTPATIENT
Start: 2022-12-12 | End: 2023-01-09

## 2022-12-12 RX ORDER — PNV NO.95/FERROUS FUM/FOLIC AC 28MG-0.8MG
1 TABLET ORAL
Qty: 30 TABLET | Refills: 1 | Status: SHIPPED | OUTPATIENT
Start: 2022-12-12 | End: 2023-02-19 | Stop reason: SDUPTHER

## 2022-12-15 ENCOUNTER — OFFICE VISIT (OUTPATIENT)
Dept: BEHAVIORAL HEALTH | Facility: CLINIC | Age: 42
End: 2022-12-15

## 2022-12-15 DIAGNOSIS — F41.1 GENERALIZED ANXIETY DISORDER: Primary | ICD-10-CM

## 2022-12-15 PROCEDURE — 90791 PSYCH DIAGNOSTIC EVALUATION: CPT | Performed by: PSYCHOLOGIST

## 2022-12-15 NOTE — PROGRESS NOTES
12/15/2022    This provider is located at the [facility] (through [facility]), [address], [place] [address] using a secure Click With Me Nowt Video Visit through [employer]. Patient is being seen remotely via telehealth at their home address in [place], and stated they are in a secure environment for this session. The patient's condition being diagnosed/treated is appropriate for telemedicine. The provider identified herself as well as her credentials.   The patient, and/or patients guardian, consent to be seen remotely, and when consent is given they understand that the consent allows for patient identifiable information to be sent to a third party as needed.   They may refuse to be seen remotely at any time. The electronic data is encrypted and password protected, and the patient and/or guardian has been advised of the potential risks to privacy not withstanding such measures.    You have chosen to receive care through a telehealth visit.  Do you consent to use a video/audio connection for your medical care today? Yes    Jordin Parham, a 42 y.o. male, was seen today for initial appointment lasting 45 minutes.  11:00-11:45 am CST  Patient is referred by Jhon Amezquita MD for an assessment related to ADHD.     SUBJECTIVE:  He is experiencing: inattention, hyperactivity, academic underachievement, restlessness, fidgety, impulsivity, talkativeness, racing thoughts, mood swings, easily distracted, poor organizational skills, poor coping skills, interrupts others, quick temper, irritability, forgetfulness, and low tolerance to stress.    The symptoms have been present since childhood.     His mother  in 2018.    FAMILY HISTORY:  ADHD- mother, sister, brother  MDD- mother  Anxiety- mother, sister   Alcohol- mother   Drug- none    The patient met all developmental milestones on time.  He contracted alpha gal.     His parents never . The relationship produced 1 child.  His mother  in . They  in . They remarried in .  His mother was a homemaker.  His stepfather worked as a .     He met his father when he was 15 years old.     He  in .  The relationship did not produce any children.  They  in .  He remarried in .  The relationship produced a daughter in .     He remarried in .  She  in a motorcycle accident in . He adopted his stepdaughter.     He remarried in .     He dropped out of the 8th grade at [employer]. He was sent to the [employer] school due to his misbehavior. He struggled academically.  He repeated the 1st and 4th grades.     He was a  for [employer] ([phone]).     He is self-employed as a  (since ).      MENTAL STATUS:  The patient’s speech was WNL (rate, volume, articulation, coherence, etc.).  The patient was oriented to time, place, and person.  The patient’s memory (remote and recent) was intact.  The patient’s attention and concentration were WNL.  The patient’s mood and affect were congruent.    The patient’s thought content did not appear to possess delusions or hallucinations. These results do not appear to be significantly influenced by the effects of visual, auditory, or motor deficits, environmental/economic or cultural differences.  The patient denied SI/HI.        strengths: the patient is polite and courteous  weakness: the patient is unable to manage symptoms   short term goal: the patient will reduce symptoms from 7 x day to 1 x week  long term goal: the patient will eliminate the above-mentioned symptoms    DIAGNOSIS:    ICD-10-CM ICD-9-CM   1. Generalized anxiety disorder  F41.1 300.02       ASSESSMENT PLAN:  He will complete the psychological assessment.  Copied text within this note has been reviewed and is accurate as of 12/15/22            This document has been electronically signed by Epi Potts, PhD on December  15, 2022 11:28 CST

## 2023-01-09 RX ORDER — ROPINIROLE 1 MG/1
1 TABLET, FILM COATED ORAL NIGHTLY
Qty: 30 TABLET | Refills: 3
Start: 2023-01-09 | End: 2023-01-15 | Stop reason: SDUPTHER

## 2023-01-09 RX ORDER — TRAZODONE HYDROCHLORIDE 150 MG/1
150 TABLET ORAL NIGHTLY
Qty: 30 TABLET | Refills: 0 | Status: SHIPPED | OUTPATIENT
Start: 2023-01-09 | End: 2023-02-05 | Stop reason: SDUPTHER

## 2023-01-16 RX ORDER — ROPINIROLE 1 MG/1
1 TABLET, FILM COATED ORAL NIGHTLY
Qty: 30 TABLET | Refills: 0
Start: 2023-01-16 | End: 2023-01-23 | Stop reason: SDUPTHER

## 2023-01-16 RX ORDER — RISPERIDONE 1 MG/1
1 TABLET ORAL NIGHTLY
Qty: 30 TABLET | Refills: 0 | Status: SHIPPED | OUTPATIENT
Start: 2023-01-16 | End: 2023-02-19 | Stop reason: SDUPTHER

## 2023-01-23 DIAGNOSIS — E03.9 HYPOTHYROIDISM, UNSPECIFIED TYPE: ICD-10-CM

## 2023-01-23 RX ORDER — ROPINIROLE 1 MG/1
1 TABLET, FILM COATED ORAL NIGHTLY
Qty: 30 TABLET | Refills: 0
Start: 2023-01-23 | End: 2023-01-25 | Stop reason: SDUPTHER

## 2023-01-23 RX ORDER — LEVOTHYROXINE SODIUM 0.05 MG/1
50 TABLET ORAL
Qty: 90 TABLET | Refills: 0 | Status: SHIPPED | OUTPATIENT
Start: 2023-01-23

## 2023-01-25 RX ORDER — ROPINIROLE 1 MG/1
1 TABLET, FILM COATED ORAL NIGHTLY
Qty: 30 TABLET | Refills: 0
Start: 2023-01-25 | End: 2023-03-08

## 2023-01-30 RX ORDER — ERGOCALCIFEROL 1.25 MG/1
50000 CAPSULE ORAL WEEKLY
Qty: 4 CAPSULE | Refills: 1 | Status: SHIPPED | OUTPATIENT
Start: 2023-01-30

## 2023-02-06 RX ORDER — TRAZODONE HYDROCHLORIDE 150 MG/1
150 TABLET ORAL NIGHTLY
Qty: 30 TABLET | Refills: 3 | Status: SHIPPED | OUTPATIENT
Start: 2023-02-06 | End: 2023-03-08

## 2023-02-06 RX ORDER — CHOLECALCIFEROL (VITAMIN D3) 125 MCG
500 CAPSULE ORAL DAILY
Qty: 30 TABLET | Refills: 3 | Status: SHIPPED | OUTPATIENT
Start: 2023-02-06

## 2023-02-08 ENCOUNTER — OFFICE VISIT (OUTPATIENT)
Dept: GASTROENTEROLOGY | Facility: CLINIC | Age: 43
End: 2023-02-08
Payer: COMMERCIAL

## 2023-02-08 VITALS
BODY MASS INDEX: 33.41 KG/M2 | HEART RATE: 69 BPM | WEIGHT: 233.4 LBS | DIASTOLIC BLOOD PRESSURE: 72 MMHG | SYSTOLIC BLOOD PRESSURE: 112 MMHG | HEIGHT: 70 IN

## 2023-02-08 DIAGNOSIS — K29.30 CHRONIC SUPERFICIAL GASTRITIS WITHOUT BLEEDING: ICD-10-CM

## 2023-02-08 DIAGNOSIS — R13.19 ESOPHAGEAL DYSPHAGIA: ICD-10-CM

## 2023-02-08 DIAGNOSIS — K21.00 GASTROESOPHAGEAL REFLUX DISEASE WITH ESOPHAGITIS WITHOUT HEMORRHAGE: Primary | ICD-10-CM

## 2023-02-08 PROCEDURE — 99214 OFFICE O/P EST MOD 30 MIN: CPT | Performed by: NURSE PRACTITIONER

## 2023-02-08 RX ORDER — SODIUM CHLORIDE 9 MG/ML
40 INJECTION, SOLUTION INTRAVENOUS AS NEEDED
Status: CANCELLED | OUTPATIENT
Start: 2023-02-08

## 2023-02-08 RX ORDER — PANTOPRAZOLE SODIUM 40 MG/1
40 TABLET, DELAYED RELEASE ORAL DAILY
Qty: 30 TABLET | Refills: 5 | Status: SHIPPED | OUTPATIENT
Start: 2023-02-08

## 2023-02-08 RX ORDER — DEXTROSE AND SODIUM CHLORIDE 5; .45 G/100ML; G/100ML
30 INJECTION, SOLUTION INTRAVENOUS CONTINUOUS PRN
Status: CANCELLED | OUTPATIENT
Start: 2023-03-06

## 2023-02-08 NOTE — PROGRESS NOTES
Chief Complaint   Patient presents with   • Heartburn       Subjective    Jordin Parham is a 42 y.o. male. he is here today for follow-up.                                                                  Assessment & Plan                                     1. Gastroesophageal reflux disease with esophagitis without hemorrhage    2. Chronic superficial gastritis without bleeding    3. Esophageal dysphagia      Plan; patient for EGD with dilation if indicated will obtain biopsy rule out recurrent H. pylori gastritis or Park's esophagus.  Start patient on Protonix 40 mg/day encouraged to avoid gastric irritants.     Follow-up: Return in about 4 weeks (around 3/8/2023) for Recheck, After test.     HPI    42-year-old male presents to discuss recurrence of GERD and epigastric pain.  Last seen 2019 treated for H. pylori gastritis with antibiotic and symptoms improved.  States he has been taking over-the-counter PPI was prescribed Dexilant and Carafate but reports insurance would not cover it so has only been taking over-the-counter medication.  Reports full globus sensation when he swallows and severe epigastric pain burning acidic material coming up especially worse when he lays down.  Has tried to modify his diet.  Denies any change in bowel movements or blood within the stool.  States he does not smoke or drink any significant amount of alcohol.    Review of Systems  Review of Systems   Constitutional: Positive for fatigue. Negative for activity change, appetite change, chills, diaphoresis, fever and unexpected weight change.   HENT: Positive for trouble swallowing.    Respiratory: Negative for cough and shortness of breath.    Gastrointestinal: Positive for abdominal distention (bloating belching ) and abdominal pain. Negative for anal bleeding, blood in stool, constipation, diarrhea, nausea,  "rectal pain and vomiting.       /72 (BP Location: Left arm)   Pulse 69   Ht 177.8 cm (70\")   Wt 106 kg (233 lb 6.4 oz)   BMI 33.49 kg/m²     Objective      Physical Exam  Constitutional:       General: He is not in acute distress.     Appearance: Normal appearance. He is normal weight. He is not ill-appearing.   HENT:      Head: Normocephalic and atraumatic.   Pulmonary:      Effort: Pulmonary effort is normal.   Abdominal:      General: Abdomen is flat. Bowel sounds are normal. There is no distension.      Palpations: Abdomen is soft. There is no mass.      Tenderness: There is abdominal tenderness in the epigastric area and left upper quadrant.   Neurological:      Mental Status: He is alert.               The following portions of the patient's history were reviewed and updated as appropriate:   Past Medical History:   Diagnosis Date   • Anxiety    • Fatty liver    • GERD (gastroesophageal reflux disease)    • Hyperlipidemia    • Lyme disease      Past Surgical History:   Procedure Laterality Date   • COLONOSCOPY N/A 9/17/2019    Procedure: COLONOSCOPY;  Surgeon: Rigoberto Flower MD;  Location: [facility] ENDOSCOPY;  Service: Gastroenterology   • ENDOSCOPY N/A 9/17/2019    Procedure: ESOPHAGOGASTRODUODENOSCOPY;  Surgeon: Rigoberto Flower MD;  Location: [facility] ENDOSCOPY;  Service: Gastroenterology   • HEMORRHOIDECTOMY       Family History   Problem Relation Age of Onset   • Alcohol abuse Mother    • Anxiety disorder Mother    • Arthritis Mother    • Asthma Mother    • Cancer Mother    • Depression Mother    • Hypertension Mother    • Stroke Mother    • Hyperlipidemia Father    • Thyroid disease Sister    • Alcohol abuse Sister    • Anxiety disorder Sister    • Cancer Sister    • Depression Sister    • Alcohol abuse Brother    • Anxiety disorder Brother    • Cancer Brother    • Depression Brother    • Hyperlipidemia Maternal Uncle    • Heart failure Maternal Uncle    • Heart disease Maternal Uncle        No " Known Allergies  Social History     Socioeconomic History   • Marital status:    Tobacco Use   • Smoking status: Never   • Smokeless tobacco: Never   Vaping Use   • Vaping Use: Never used   Substance and Sexual Activity   • Alcohol use: Not Currently     Comment: rarely   • Drug use: Yes     Types: Marijuana, Methamphetamines   • Sexual activity: Defer     Current Medications:  Prior to Admission medications    Medication Sig Start Date End Date Taking? Authorizing Provider   atorvastatin (Lipitor) 20 MG tablet Take 1 tablet by mouth Every Night. 6/20/22  Yes Jhon Aemzquita MD   ferrous sulfate 325 (65 Fe) MG tablet Take 1 tablet by mouth Daily With Breakfast. 12/12/22  Yes Jhon Amezquita MD   hydrOXYzine (ATARAX) 25 MG tablet Take 1 tablet by mouth Every 8 (Eight) Hours As Needed for Anxiety. 8/29/22  Yes Jhon Amezquita MD   levothyroxine (Synthroid) 50 MCG tablet Take 1 tablet by mouth Every Morning. 1/23/23  Yes Jhon Amezquita MD   montelukast (SINGULAIR) 10 MG tablet Take 1 tablet by mouth Every Night. 6/20/22  Yes Jhon Amezquita MD   risperiDONE (RisperDAL) 1 MG tablet Take 1 tablet by mouth Every Night. 1/16/23  Yes Jhon Amezquita MD   rOPINIRole (Requip) 1 MG tablet Take 1 tablet by mouth Every Night. 1/25/23  Yes Jhon Amezquita MD   sucralfate (Carafate) 1 GM/10ML suspension Take 10 mL by mouth 4 (Four) Times a Day With Meals & at Bedtime. 10/12/22  Yes Jhon Amezquita MD   traZODone (DESYREL) 150 MG tablet Take 1 tablet by mouth Every Night. 2/6/23  Yes Jhon Amezquita MD   vitamin B-12 (CYANOCOBALAMIN) 500 MCG tablet Take 1 tablet by mouth Daily. 2/6/23  Yes Jhon Amezquita MD   vitamin D (ERGOCALCIFEROL) 1.25 MG (39809 UT) capsule capsule Take 1 capsule by mouth 1 (One) Time Per Week. 1/30/23  Yes Jhon Amezquita MD     Orders placed during this encounter include:  Orders Placed This Encounter   Procedures   • Obtain Informed Consent     Standing Status:   Future     Order  Specific Question:   Informed Consent Given For     Answer:   ESOPHAGOGASTRODUODENOSCOPY possible dilation     ESOPHAGOGASTRODUODENOSCOPY possible dilation (N/A)  New Medications Ordered This Visit   Medications   • pantoprazole (PROTONIX) 40 MG EC tablet     Sig: Take 1 tablet by mouth Daily.     Dispense:  30 tablet     Refill:  5         Review and/or summary of lab tests, radiology, procedures, medications. Review and summary of old records and obtaining of history. The risks and benefits of my recommendations, as well as other treatment options were discussed . Any questions/concerned were answered. Patient voiced understanding and agreement.          This document has been electronically signed by YULIET Okeefe on February 8, 2023 12:05 CST                                               Results for orders placed or performed in visit on 11/29/22   Rheumatoid Factor    Specimen: Blood   Result Value Ref Range    Rheumatoid Factor Quantitative <10.0 0.0 - 14.0 IU/mL   CONSTANTIN Comprehensive Panel    Specimen: Blood   Result Value Ref Range    Anti-DNA (DS) Ab Qn <1 0 - 9 IU/mL    RNP Antibodies <0.2 0.0 - 0.9 AI    Aguilar Antibodies <0.2 0.0 - 0.9 AI    Antiscleroderma-70 Antibodies <0.2 0.0 - 0.9 AI    TELMA SSA (RO) Ab <0.2 0.0 - 0.9 AI    TELMA SSB (LA) Ab <0.2 0.0 - 0.9 AI    Antichromatin Antibodies <0.2 0.0 - 0.9 AI    URIEL-1 IgG <0.2 0.0 - 0.9 AI    Anti-Centromere B Antibodies <0.2 0.0 - 0.9 AI    See below: Comment    HLA-B27 Antigen    Specimen: Blood   Result Value Ref Range    HLA B27 Negative    Cyclic Citrul Peptide Antibody, IgG / IgA    Specimen: Blood   Result Value Ref Range    CCP Antibodies IgG/IgA 2 0 - 19 units   Sedimentation Rate    Specimen: Blood   Result Value Ref Range    Sed Rate 8 0 - 15 mm/hr   C-reactive Protein    Specimen: Blood   Result Value Ref Range    C-Reactive Protein <0.30 0.00 - 0.50 mg/dL   CONSTANTIN    Specimen: Blood   Result Value Ref Range    CONSTANTIN Direct Negative Negative   Uric  Acid    Specimen: Blood   Result Value Ref Range    Uric Acid 6.3 3.4 - 7.0 mg/dL   Results for orders placed or performed in visit on 10/12/22   CBC Auto Differential    Specimen: Blood   Result Value Ref Range    WBC 5.98 3.40 - 10.80 10*3/mm3    RBC 5.36 4.14 - 5.80 10*6/mm3    Hemoglobin 15.2 13.0 - 17.7 g/dL    Hematocrit 44.9 37.5 - 51.0 %    MCV 83.8 79.0 - 97.0 fL    MCH 28.4 26.6 - 33.0 pg    MCHC 33.9 31.5 - 35.7 g/dL    RDW 12.8 12.3 - 15.4 %    RDW-SD 38.0 37.0 - 54.0 fl    MPV 10.4 6.0 - 12.0 fL    Platelets 247 140 - 450 10*3/mm3    Neutrophil % 50.7 42.7 - 76.0 %    Lymphocyte % 31.6 19.6 - 45.3 %    Monocyte % 9.0 5.0 - 12.0 %    Eosinophil % 6.9 (H) 0.3 - 6.2 %    Basophil % 1.5 0.0 - 1.5 %    Immature Grans % 0.3 0.0 - 0.5 %    Neutrophils, Absolute 3.03 1.70 - 7.00 10*3/mm3    Lymphocytes, Absolute 1.89 0.70 - 3.10 10*3/mm3    Monocytes, Absolute 0.54 0.10 - 0.90 10*3/mm3    Eosinophils, Absolute 0.41 (H) 0.00 - 0.40 10*3/mm3    Basophils, Absolute 0.09 0.00 - 0.20 10*3/mm3    Immature Grans, Absolute 0.02 0.00 - 0.05 10*3/mm3    nRBC 0.0 0.0 - 0.2 /100 WBC   Iron Profile    Specimen: Blood   Result Value Ref Range    Iron 108 59 - 158 mcg/dL    Iron Saturation 36 20 - 50 %    Transferrin 202 200 - 360 mg/dL    TIBC 301 298 - 536 mcg/dL   Vitamin D 25 Hydroxy    Specimen: Blood   Result Value Ref Range    25 Hydroxy, Vitamin D 37.9 30.0 - 100.0 ng/ml   TSH    Specimen: Blood   Result Value Ref Range    TSH 1.410 0.270 - 4.200 uIU/mL   T4, Free    Specimen: Blood   Result Value Ref Range    Free T4 1.25 0.93 - 1.70 ng/dL   Vitamin B12    Specimen: Blood   Result Value Ref Range    Vitamin B-12 799 211 - 946 pg/mL   Lipid Panel    Specimen: Blood   Result Value Ref Range    Total Cholesterol 164 0 - 200 mg/dL    Triglycerides 113 0 - 150 mg/dL    HDL Cholesterol 33 (L) 40 - 60 mg/dL    LDL Cholesterol  110 (H) 0 - 100 mg/dL    VLDL Cholesterol 21 5 - 40 mg/dL    LDL/HDL Ratio 3.28    Comprehensive  Metabolic Panel    Specimen: Blood   Result Value Ref Range    Glucose 104 (H) 65 - 99 mg/dL    BUN 10 6 - 20 mg/dL    Creatinine 1.09 0.76 - 1.27 mg/dL    Sodium 137 136 - 145 mmol/L    Potassium 4.2 3.5 - 5.2 mmol/L    Chloride 103 98 - 107 mmol/L    CO2 21.0 (L) 22.0 - 29.0 mmol/L    Calcium 9.2 8.6 - 10.5 mg/dL    Total Protein 6.9 6.0 - 8.5 g/dL    Albumin 4.60 3.50 - 5.20 g/dL    ALT (SGPT) 24 1 - 41 U/L    AST (SGOT) 28 1 - 40 U/L    Alkaline Phosphatase 70 39 - 117 U/L    Total Bilirubin 0.6 0.0 - 1.2 mg/dL    Globulin 2.3 gm/dL    A/G Ratio 2.0 g/dL    BUN/Creatinine Ratio 9.2 7.0 - 25.0    Anion Gap 13.0 5.0 - 15.0 mmol/L    eGFR 86.9 >60.0 mL/min/1.73   Results for orders placed or performed in visit on 06/24/22   Testosterone (Free & Total), LC / MS    Specimen: Blood   Result Value Ref Range    Testosterone, Total 412.8 264.0 - 916.0 ng/dL    Testosterone, Free 13.5 6.8 - 21.5 pg/mL   Glia(IgA / G) & TTG(IgA / G)    Specimen: Blood   Result Value Ref Range    Gliadin Deamidated Peptide Ab, IgA 4 0 - 19 units    Deaminated Gliadin Ab IgG 2 0 - 19 units    Tissue Transglutaminase IgA <2 0 - 3 U/mL    Tissue Transglutaminase IgG <2 0 - 5 U/mL   Testosterone, F Eqlib & T LC / MS    Specimen: Blood   Result Value Ref Range    Testosterone, Total 415.3 264.0 - 916.0 ng/dL    Testosterone, Free 10.96 5.00 - 21.00 ng/dL    Testosterone, Free % 2.64 1.50 - 4.20 %   Allergens (12) Foods    Specimen: Blood   Result Value Ref Range    Class Description Comment     Egg White <0.10 Class 0 kU/L    Milk, Cow's 0.38 (A) Class I kU/L    CodFish <0.10 Class 0 kU/L    Sesame Seed <0.10 Class 0 kU/L    Peanut <0.10 Class 0 kU/L    Soybean <0.10 Class 0 kU/L    Hazelnut <0.10 Class 0 kU/L    Shrimp <0.10 Class 0 kU/L    Scallop <0.10 Class 0 kU/L    Gluten 0.21 (A) Class 0/I kU/L    Clinton <0.10 Class 0 kU/L    Wheat 0.24 (A) Class 0/I kU/L   CBC Auto Differential    Specimen: Blood   Result Value Ref Range    WBC 6.76 3.40  - 10.80 10*3/mm3    RBC 5.50 4.14 - 5.80 10*6/mm3    Hemoglobin 15.3 13.0 - 17.7 g/dL    Hematocrit 46.0 37.5 - 51.0 %    MCV 83.6 79.0 - 97.0 fL    MCH 27.8 26.6 - 33.0 pg    MCHC 33.3 31.5 - 35.7 g/dL    RDW 13.2 12.3 - 15.4 %    RDW-SD 39.9 37.0 - 54.0 fl    MPV 10.1 6.0 - 12.0 fL    Platelets 207 140 - 450 10*3/mm3    Neutrophil % 61.2 42.7 - 76.0 %    Lymphocyte % 22.0 19.6 - 45.3 %    Monocyte % 10.1 5.0 - 12.0 %    Eosinophil % 4.4 0.3 - 6.2 %    Basophil % 1.3 0.0 - 1.5 %    Immature Grans % 1.0 (H) 0.0 - 0.5 %    Neutrophils, Absolute 4.13 1.70 - 7.00 10*3/mm3    Lymphocytes, Absolute 1.49 0.70 - 3.10 10*3/mm3    Monocytes, Absolute 0.68 0.10 - 0.90 10*3/mm3    Eosinophils, Absolute 0.30 0.00 - 0.40 10*3/mm3    Basophils, Absolute 0.09 0.00 - 0.20 10*3/mm3    Immature Grans, Absolute 0.07 (H) 0.00 - 0.05 10*3/mm3    nRBC 0.0 0.0 - 0.2 /100 WBC     *Note: Due to a large number of results and/or encounters for the requested time period, some results have not been displayed. A complete set of results can be found in Results Review.

## 2023-02-20 RX ORDER — RISPERIDONE 1 MG/1
1 TABLET ORAL NIGHTLY
Qty: 30 TABLET | Refills: 3 | Status: SHIPPED | OUTPATIENT
Start: 2023-02-20 | End: 2023-03-08

## 2023-02-20 RX ORDER — PNV NO.95/FERROUS FUM/FOLIC AC 28MG-0.8MG
1 TABLET ORAL
Qty: 30 TABLET | Refills: 3 | Status: SHIPPED | OUTPATIENT
Start: 2023-02-20

## 2023-03-08 RX ORDER — ROPINIROLE 1 MG/1
1.5 TABLET, FILM COATED ORAL NIGHTLY
Qty: 60 TABLET | Refills: 3 | Status: SHIPPED | OUTPATIENT
Start: 2023-03-08

## 2023-03-08 RX ORDER — TRAZODONE HYDROCHLORIDE 100 MG/1
200 TABLET ORAL NIGHTLY
Qty: 60 TABLET | Refills: 3 | Status: SHIPPED | OUTPATIENT
Start: 2023-03-08

## 2023-03-13 DIAGNOSIS — T78.1XXA ALLERGIC REACTION TO ALPHA-GAL: ICD-10-CM

## 2023-03-13 DIAGNOSIS — E78.2 MIXED HYPERLIPIDEMIA: ICD-10-CM

## 2023-03-13 DIAGNOSIS — E03.9 HYPOTHYROIDISM, UNSPECIFIED TYPE: Primary | ICD-10-CM

## 2023-03-13 DIAGNOSIS — E55.9 VITAMIN D DEFICIENCY: ICD-10-CM

## 2023-03-13 DIAGNOSIS — E53.8 VITAMIN B12 DEFICIENCY: ICD-10-CM

## 2023-03-13 DIAGNOSIS — E61.1 IRON DEFICIENCY: Primary | ICD-10-CM

## 2023-03-14 ENCOUNTER — LAB (OUTPATIENT)
Dept: LAB | Facility: HOSPITAL | Age: 43
End: 2023-03-14
Payer: COMMERCIAL

## 2023-03-14 DIAGNOSIS — E61.1 IRON DEFICIENCY: ICD-10-CM

## 2023-03-14 DIAGNOSIS — T78.1XXA ALLERGIC REACTION TO ALPHA-GAL: ICD-10-CM

## 2023-03-14 DIAGNOSIS — E55.9 VITAMIN D DEFICIENCY: ICD-10-CM

## 2023-03-14 DIAGNOSIS — E78.2 MIXED HYPERLIPIDEMIA: ICD-10-CM

## 2023-03-14 DIAGNOSIS — E03.9 HYPOTHYROIDISM, UNSPECIFIED TYPE: ICD-10-CM

## 2023-03-14 DIAGNOSIS — E53.8 VITAMIN B12 DEFICIENCY: ICD-10-CM

## 2023-03-14 LAB
25(OH)D3 SERPL-MCNC: 34.5 NG/ML (ref 30–100)
ALBUMIN SERPL-MCNC: 4.9 G/DL (ref 3.5–5.2)
ALBUMIN/GLOB SERPL: 1.7 G/DL
ALP SERPL-CCNC: 82 U/L (ref 39–117)
ALT SERPL W P-5'-P-CCNC: 21 U/L (ref 1–41)
ANION GAP SERPL CALCULATED.3IONS-SCNC: 15.3 MMOL/L (ref 5–15)
AST SERPL-CCNC: 19 U/L (ref 1–40)
BASOPHILS # BLD AUTO: 0.06 10*3/MM3 (ref 0–0.2)
BASOPHILS NFR BLD AUTO: 0.8 % (ref 0–1.5)
BILIRUB SERPL-MCNC: 0.5 MG/DL (ref 0–1.2)
BUN SERPL-MCNC: 14 MG/DL (ref 6–20)
BUN/CREAT SERPL: 14.3 (ref 7–25)
CALCIUM SPEC-SCNC: 9.6 MG/DL (ref 8.6–10.5)
CHLORIDE SERPL-SCNC: 103 MMOL/L (ref 98–107)
CHOLEST SERPL-MCNC: 193 MG/DL (ref 0–200)
CO2 SERPL-SCNC: 25.7 MMOL/L (ref 22–29)
CREAT SERPL-MCNC: 0.98 MG/DL (ref 0.76–1.27)
DEPRECATED RDW RBC AUTO: 38.4 FL (ref 37–54)
EGFRCR SERPLBLD CKD-EPI 2021: 98.1 ML/MIN/1.73
EOSINOPHIL # BLD AUTO: 0.23 10*3/MM3 (ref 0–0.4)
EOSINOPHIL NFR BLD AUTO: 3.1 % (ref 0.3–6.2)
ERYTHROCYTE [DISTWIDTH] IN BLOOD BY AUTOMATED COUNT: 13 % (ref 12.3–15.4)
GLOBULIN UR ELPH-MCNC: 2.9 GM/DL
GLUCOSE SERPL-MCNC: 100 MG/DL (ref 65–99)
HCT VFR BLD AUTO: 45.7 % (ref 37.5–51)
HDLC SERPL-MCNC: 34 MG/DL (ref 40–60)
HGB BLD-MCNC: 15.3 G/DL (ref 13–17.7)
IMM GRANULOCYTES # BLD AUTO: 0.07 10*3/MM3 (ref 0–0.05)
IMM GRANULOCYTES NFR BLD AUTO: 0.9 % (ref 0–0.5)
IRON 24H UR-MRATE: 88 MCG/DL (ref 59–158)
IRON SATN MFR SERPL: 30 % (ref 20–50)
LDLC SERPL CALC-MCNC: 126 MG/DL (ref 0–100)
LDLC/HDLC SERPL: 3.59 {RATIO}
LYMPHOCYTES # BLD AUTO: 1.83 10*3/MM3 (ref 0.7–3.1)
LYMPHOCYTES NFR BLD AUTO: 24.5 % (ref 19.6–45.3)
MCH RBC QN AUTO: 27.6 PG (ref 26.6–33)
MCHC RBC AUTO-ENTMCNC: 33.5 G/DL (ref 31.5–35.7)
MCV RBC AUTO: 82.3 FL (ref 79–97)
MONOCYTES # BLD AUTO: 0.74 10*3/MM3 (ref 0.1–0.9)
MONOCYTES NFR BLD AUTO: 9.9 % (ref 5–12)
NEUTROPHILS NFR BLD AUTO: 4.53 10*3/MM3 (ref 1.7–7)
NEUTROPHILS NFR BLD AUTO: 60.8 % (ref 42.7–76)
NRBC BLD AUTO-RTO: 0 /100 WBC (ref 0–0.2)
PLATELET # BLD AUTO: 268 10*3/MM3 (ref 140–450)
PMV BLD AUTO: 10.1 FL (ref 6–12)
POTASSIUM SERPL-SCNC: 4.3 MMOL/L (ref 3.5–5.2)
PROT SERPL-MCNC: 7.8 G/DL (ref 6–8.5)
RBC # BLD AUTO: 5.55 10*6/MM3 (ref 4.14–5.8)
SODIUM SERPL-SCNC: 144 MMOL/L (ref 136–145)
T4 FREE SERPL-MCNC: 1.14 NG/DL (ref 0.93–1.7)
TIBC SERPL-MCNC: 289 MCG/DL (ref 298–536)
TRANSFERRIN SERPL-MCNC: 194 MG/DL (ref 200–360)
TRIGL SERPL-MCNC: 184 MG/DL (ref 0–150)
TSH SERPL DL<=0.05 MIU/L-ACNC: 0.59 UIU/ML (ref 0.27–4.2)
VIT B12 BLD-MCNC: 1072 PG/ML (ref 211–946)
VLDLC SERPL-MCNC: 33 MG/DL (ref 5–40)
WBC NRBC COR # BLD: 7.46 10*3/MM3 (ref 3.4–10.8)

## 2023-03-14 PROCEDURE — 83540 ASSAY OF IRON: CPT

## 2023-03-14 PROCEDURE — 86008 ALLG SPEC IGE RECOMB EA: CPT

## 2023-03-14 PROCEDURE — 86003 ALLG SPEC IGE CRUDE XTRC EA: CPT

## 2023-03-14 PROCEDURE — 84439 ASSAY OF FREE THYROXINE: CPT

## 2023-03-14 PROCEDURE — 80053 COMPREHEN METABOLIC PANEL: CPT

## 2023-03-14 PROCEDURE — 82306 VITAMIN D 25 HYDROXY: CPT

## 2023-03-14 PROCEDURE — 82607 VITAMIN B-12: CPT

## 2023-03-14 PROCEDURE — 82785 ASSAY OF IGE: CPT

## 2023-03-14 PROCEDURE — 85025 COMPLETE CBC W/AUTO DIFF WBC: CPT

## 2023-03-14 PROCEDURE — 84466 ASSAY OF TRANSFERRIN: CPT

## 2023-03-14 PROCEDURE — 84443 ASSAY THYROID STIM HORMONE: CPT

## 2023-03-14 PROCEDURE — 80061 LIPID PANEL: CPT

## 2023-03-16 RX ORDER — ATORVASTATIN CALCIUM 20 MG/1
20 TABLET, FILM COATED ORAL NIGHTLY
Qty: 30 TABLET | Refills: 1 | Status: SHIPPED | OUTPATIENT
Start: 2023-03-16

## 2023-03-18 LAB
ALPHA-GAL IGE QN: <0.1 KU/L
BEEF IGE QN: <0.1 KU/L
CONV CLASS DESCRIPTION: NORMAL
IGE SERPL-ACNC: 50 IU/ML (ref 6–495)
LAMB IGE QN: <0.1 KU/L
PORK IGE QN: <0.1 KU/L

## 2023-03-22 ENCOUNTER — HOSPITAL ENCOUNTER (OUTPATIENT)
Facility: HOSPITAL | Age: 43
Setting detail: HOSPITAL OUTPATIENT SURGERY
Discharge: HOME | End: 2023-03-22
Attending: INTERNAL MEDICINE | Admitting: INTERNAL MEDICINE
Payer: COMMERCIAL

## 2023-03-22 ENCOUNTER — ANESTHESIA (OUTPATIENT)
Dept: GASTROENTEROLOGY | Facility: HOSPITAL | Age: 43
End: 2023-03-22
Payer: COMMERCIAL

## 2023-03-22 ENCOUNTER — ANESTHESIA EVENT (OUTPATIENT)
Dept: GASTROENTEROLOGY | Facility: HOSPITAL | Age: 43
End: 2023-03-22
Payer: COMMERCIAL

## 2023-03-22 VITALS
RESPIRATION RATE: 20 BRPM | BODY MASS INDEX: 32.33 KG/M2 | TEMPERATURE: 96.2 F | OXYGEN SATURATION: 93 % | HEIGHT: 70 IN | HEART RATE: 80 BPM | SYSTOLIC BLOOD PRESSURE: 110 MMHG | WEIGHT: 225.8 LBS | DIASTOLIC BLOOD PRESSURE: 70 MMHG

## 2023-03-22 DIAGNOSIS — K29.30 SUPERFICIAL NONEROSIVE NONSPECIFIC GASTRITIS: ICD-10-CM

## 2023-03-22 DIAGNOSIS — K21.00 REFLUX ESOPHAGITIS: ICD-10-CM

## 2023-03-22 PROCEDURE — 43248 EGD GUIDE WIRE INSERTION: CPT | Performed by: INTERNAL MEDICINE

## 2023-03-22 PROCEDURE — 43239 EGD BIOPSY SINGLE/MULTIPLE: CPT | Performed by: INTERNAL MEDICINE

## 2023-03-22 PROCEDURE — 25010000002 PROPOFOL 10 MG/ML EMULSION: Performed by: NURSE ANESTHETIST, CERTIFIED REGISTERED

## 2023-03-22 PROCEDURE — C1769 GUIDE WIRE: HCPCS | Performed by: INTERNAL MEDICINE

## 2023-03-22 RX ORDER — PROPOFOL 10 MG/ML
VIAL (ML) INTRAVENOUS AS NEEDED
Status: DISCONTINUED | OUTPATIENT
Start: 2023-03-22 | End: 2023-03-22 | Stop reason: SURG

## 2023-03-22 RX ORDER — LIDOCAINE HYDROCHLORIDE 20 MG/ML
INJECTION, SOLUTION INFILTRATION; PERINEURAL AS NEEDED
Status: DISCONTINUED | OUTPATIENT
Start: 2023-03-22 | End: 2023-03-22 | Stop reason: SURG

## 2023-03-22 RX ORDER — DEXTROSE AND SODIUM CHLORIDE 5; .45 G/100ML; G/100ML
50 INJECTION, SOLUTION INTRAVENOUS CONTINUOUS
Status: DISCONTINUED | OUTPATIENT
Start: 2023-03-22 | End: 2023-03-22 | Stop reason: HOSPADM

## 2023-03-22 RX ADMIN — PROPOFOL 50 MG: 10 INJECTION, EMULSION INTRAVENOUS at 14:45

## 2023-03-22 RX ADMIN — DEXTROSE AND SODIUM CHLORIDE 50 ML/HR: 5; 450 INJECTION, SOLUTION INTRAVENOUS at 13:57

## 2023-03-22 RX ADMIN — PROPOFOL 50 MG: 10 INJECTION, EMULSION INTRAVENOUS at 14:48

## 2023-03-22 RX ADMIN — LIDOCAINE HYDROCHLORIDE 50 MG: 20 INJECTION, SOLUTION INFILTRATION; PERINEURAL at 14:42

## 2023-03-22 RX ADMIN — PROPOFOL 150 MG: 10 INJECTION, EMULSION INTRAVENOUS at 14:42

## 2023-03-22 NOTE — ANESTHESIA PREPROCEDURE EVALUATION
Anesthesia Evaluation     Patient summary reviewed and Nursing notes reviewed   NPO Solid Status: > 8 hours  NPO Liquid Status: > 2 hours           Airway   Mallampati: II  No difficulty expected  Dental      Pulmonary - negative pulmonary ROS and normal exam   Cardiovascular     Rhythm: regular  Rate: normal    (+) hyperlipidemia,       Neuro/Psych  (+) psychiatric history Bipolar,    GI/Hepatic/Renal/Endo    (+) obesity, morbid obesity, GERD,  liver disease fatty liver disease,     Musculoskeletal (-) negative ROS    Abdominal    Substance History      OB/GYN          Other - negative ROS                         Anesthesia Plan    ASA 3     general   total IV anesthesia  intravenous induction     Anesthetic plan, risks, benefits, and alternatives have been provided, discussed and informed consent has been obtained with: patient.    Plan discussed with CRNA.

## 2023-03-22 NOTE — ANESTHESIA POSTPROCEDURE EVALUATION
Patient: Jordin Parham    Procedure Summary     Date: 03/22/23 Room / Location: [facility] ENDOSCOPY 1 / [facility] ENDOSCOPY    Anesthesia Start: 1439 Anesthesia Stop: 1449    Procedure: ESOPHAGOGASTRODUODENOSCOPY Diagnosis:       Reflux esophagitis      Superficial nonerosive nonspecific gastritis      (Reflux esophagitis [K21.00])      (Superficial nonerosive nonspecific gastritis [K29.30])    Surgeons: Rigoberto Flower MD Provider: Man Ayala CRNA    Anesthesia Type: general ASA Status: 3          Anesthesia Type: general    Vitals  No vitals data found for the desired time range.          Post Anesthesia Care and Evaluation    Patient location during evaluation: PHASE II  Patient participation: complete - patient participated  Level of consciousness: sleepy but conscious  Pain score: 0  Pain management: adequate    Airway patency: patent  Anesthetic complications: No anesthetic complications  PONV Status: none  Cardiovascular status: acceptable  Respiratory status: acceptable  Hydration status: acceptable    Comments: ---------------------------               03/22/23                      1449        ---------------------------   BP:          130/86         Pulse:         87           Resp:          18           Temp:   98.2 °F (36.8 °C)   SpO2:          96%         ---------------------------

## 2023-03-22 NOTE — H&P
No chief complaint on file.      Subjective    Jordin Parham is a 42 y.o. male. he is here today for follow-up.                                                                  Assessment & Plan                                     No diagnosis found.  Plan; patient for EGD with dilation if indicated will obtain biopsy rule out recurrent H. pylori gastritis or Park's esophagus.  Start patient on Protonix 40 mg/day encouraged to avoid gastric irritants.     Follow-up: No follow-ups on file.     HPI I agree with the current note with no changes in the history.  Risks and benefits discussed with patient. Patient understands these and would like to proceed with procedure.      42-year-old male presents to discuss recurrence of GERD and epigastric pain.  Last seen 2019 treated for H. pylori gastritis with antibiotic and symptoms improved.  States he has been taking over-the-counter PPI was prescribed Dexilant and Carafate but reports insurance would not cover it so has only been taking over-the-counter medication.  Reports full globus sensation when he swallows and severe epigastric pain burning acidic material coming up especially worse when he lays down.  Has tried to modify his diet.  Denies any change in bowel movements or blood within the stool.  States he does not smoke or drink any significant amount of alcohol.    Review of Systems  Review of Systems   Constitutional: Positive for fatigue. Negative for activity change, appetite change, chills, diaphoresis, fever and unexpected weight change.   HENT: Positive for trouble swallowing.    Respiratory: Negative for cough and shortness of breath.    Gastrointestinal: Positive for abdominal distention (bloating belching ) and abdominal pain. Negative for anal bleeding, blood in stool, constipation, diarrhea, nausea, rectal pain and vomiting.  "      /71   Pulse 87   Temp 98.2 °F (36.8 °C)   Resp 18   Ht 177.8 cm (70\")   Wt 102 kg (225 lb 12.8 oz)   SpO2 96%   BMI 32.40 kg/m²     Objective      Physical Exam  Constitutional:       General: He is not in acute distress.     Appearance: Normal appearance. He is normal weight. He is not ill-appearing.   HENT:      Head: Normocephalic and atraumatic.   Pulmonary:      Effort: Pulmonary effort is normal.   Abdominal:      General: Abdomen is flat. Bowel sounds are normal. There is no distension.      Palpations: Abdomen is soft. There is no mass.      Tenderness: There is abdominal tenderness in the epigastric area and left upper quadrant.   Neurological:      Mental Status: He is alert.               The following portions of the patient's history were reviewed and updated as appropriate:   Past Medical History:   Diagnosis Date   • Anxiety    • Fatty liver    • GERD (gastroesophageal reflux disease)    • Hyperlipidemia    • Lyme disease    • Restless legs    • Seasonal allergies      Past Surgical History:   Procedure Laterality Date   • COLONOSCOPY N/A 9/17/2019    Procedure: COLONOSCOPY;  Surgeon: Rigoberto Flower MD;  Location: [facility] ENDOSCOPY;  Service: Gastroenterology   • ENDOSCOPY N/A 9/17/2019    Procedure: ESOPHAGOGASTRODUODENOSCOPY;  Surgeon: Rigoberto Flower MD;  Location: [facility] ENDOSCOPY;  Service: Gastroenterology   • HEMORRHOIDECTOMY       Family History   Problem Relation Age of Onset   • Alcohol abuse Mother    • Anxiety disorder Mother    • Arthritis Mother    • Asthma Mother    • Cancer Mother    • Depression Mother    • Hypertension Mother    • Stroke Mother    • Hyperlipidemia Father    • Thyroid disease Sister    • Alcohol abuse Sister    • Anxiety disorder Sister    • Cancer Sister    • Depression Sister    • Alcohol abuse Brother    • Anxiety disorder Brother    • Cancer Brother    • Depression Brother    • Hyperlipidemia Maternal Uncle    • Heart failure Maternal Uncle  "   • Heart disease Maternal Uncle        No Known Allergies  Social History     Socioeconomic History   • Marital status:    Tobacco Use   • Smoking status: Never   • Smokeless tobacco: Never   Vaping Use   • Vaping Use: Never used   Substance and Sexual Activity   • Alcohol use: Not Currently     Comment: rarely   • Drug use: Not Currently     Types: Marijuana, Methamphetamines   • Sexual activity: Defer     Current Medications:  Prior to Admission medications    Medication Sig Start Date End Date Taking? Authorizing Provider   atorvastatin (Lipitor) 20 MG tablet Take 1 tablet by mouth Every Night. 6/20/22  Yes Jhon Amezquita MD   ferrous sulfate 325 (65 Fe) MG tablet Take 1 tablet by mouth Daily With Breakfast. 12/12/22  Yes Jhon Amezquita MD   hydrOXYzine (ATARAX) 25 MG tablet Take 1 tablet by mouth Every 8 (Eight) Hours As Needed for Anxiety. 8/29/22  Yes Jhon Amezquita MD   levothyroxine (Synthroid) 50 MCG tablet Take 1 tablet by mouth Every Morning. 1/23/23  Yes Jhon Amezquita MD   montelukast (SINGULAIR) 10 MG tablet Take 1 tablet by mouth Every Night. 6/20/22  Yes Jhon Amezquita MD   risperiDONE (RisperDAL) 1 MG tablet Take 1 tablet by mouth Every Night. 1/16/23  Yes Jhon Amezquita MD   rOPINIRole (Requip) 1 MG tablet Take 1 tablet by mouth Every Night. 1/25/23  Yes Jhon Amezquita MD   sucralfate (Carafate) 1 GM/10ML suspension Take 10 mL by mouth 4 (Four) Times a Day With Meals & at Bedtime. 10/12/22  Yes Jhon Amezquita MD   traZODone (DESYREL) 150 MG tablet Take 1 tablet by mouth Every Night. 2/6/23  Yes Jhon Amezquita MD   vitamin B-12 (CYANOCOBALAMIN) 500 MCG tablet Take 1 tablet by mouth Daily. 2/6/23  Yes Jhon Amezquita MD   vitamin D (ERGOCALCIFEROL) 1.25 MG (21408 UT) capsule capsule Take 1 capsule by mouth 1 (One) Time Per Week. 1/30/23  Yes Jhon Amezquita MD     Orders placed during this encounter include:  Orders Placed This Encounter   Procedures   • Obtain Informed  Consent     Standing Status:   Standing     Number of Occurrences:   1     Order Specific Question:   Informed Consent Given For     Answer:   egd   • Insert Peripheral IV     Standing Status:   Standing     Number of Occurrences:   1     ESOPHAGOGASTRODUODENOSCOPY (N/A)  New Medications Ordered This Visit   Medications   • dextrose 5 % and sodium chloride 0.45 % infusion         Review and/or summary of lab tests, radiology, procedures, medications. Review and summary of old records and obtaining of history. The risks and benefits of my recommendations, as well as other treatment options were discussed . Any questions/concerned were answered. Patient voiced understanding and agreement.          This document has been electronically signed by Rigoberto Flower MD on March 22, 2023 13:57 CDT                                               Results for orders placed or performed in visit on 03/14/23   Alpha-Gal IgE Panel    Specimen: Blood   Result Value Ref Range    Class Description Comment     IgE 50 6 - 495 IU/mL    [id number] Alpha-Gal <0.10 Class 0 kU/L    Beef <0.10 Class 0 kU/L    Pork <0.10 Class 0 kU/L    Lamb <0.10 Class 0 kU/L   CBC Auto Differential    Specimen: Blood   Result Value Ref Range    WBC 7.46 3.40 - 10.80 10*3/mm3    RBC 5.55 4.14 - 5.80 10*6/mm3    Hemoglobin 15.3 13.0 - 17.7 g/dL    Hematocrit 45.7 37.5 - 51.0 %    MCV 82.3 79.0 - 97.0 fL    MCH 27.6 26.6 - 33.0 pg    MCHC 33.5 31.5 - 35.7 g/dL    RDW 13.0 12.3 - 15.4 %    RDW-SD 38.4 37.0 - 54.0 fl    MPV 10.1 6.0 - 12.0 fL    Platelets 268 140 - 450 10*3/mm3    Neutrophil % 60.8 42.7 - 76.0 %    Lymphocyte % 24.5 19.6 - 45.3 %    Monocyte % 9.9 5.0 - 12.0 %    Eosinophil % 3.1 0.3 - 6.2 %    Basophil % 0.8 0.0 - 1.5 %    Immature Grans % 0.9 (H) 0.0 - 0.5 %    Neutrophils, Absolute 4.53 1.70 - 7.00 10*3/mm3    Lymphocytes, Absolute 1.83 0.70 - 3.10 10*3/mm3    Monocytes, Absolute 0.74 0.10 - 0.90 10*3/mm3    Eosinophils, Absolute 0.23 0.00 -  0.40 10*3/mm3    Basophils, Absolute 0.06 0.00 - 0.20 10*3/mm3    Immature Grans, Absolute 0.07 (H) 0.00 - 0.05 10*3/mm3    nRBC 0.0 0.0 - 0.2 /100 WBC   Iron Profile    Specimen: Blood   Result Value Ref Range    Iron 88 59 - 158 mcg/dL    Iron Saturation 30 20 - 50 %    Transferrin 194 (L) 200 - 360 mg/dL    TIBC 289 (L) 298 - 536 mcg/dL   Vitamin D,25-Hydroxy    Specimen: Blood   Result Value Ref Range    25 Hydroxy, Vitamin D 34.5 30.0 - 100.0 ng/ml   TSH    Specimen: Blood   Result Value Ref Range    TSH 0.594 0.270 - 4.200 uIU/mL   T4, Free    Specimen: Blood   Result Value Ref Range    Free T4 1.14 0.93 - 1.70 ng/dL   Vitamin B12    Specimen: Blood   Result Value Ref Range    Vitamin B-12 1,072 (H) 211 - 946 pg/mL   Lipid Panel    Specimen: Blood   Result Value Ref Range    Total Cholesterol 193 0 - 200 mg/dL    Triglycerides 184 (H) 0 - 150 mg/dL    HDL Cholesterol 34 (L) 40 - 60 mg/dL    LDL Cholesterol  126 (H) 0 - 100 mg/dL    VLDL Cholesterol 33 5 - 40 mg/dL    LDL/HDL Ratio 3.59    Comprehensive Metabolic Panel    Specimen: Blood   Result Value Ref Range    Glucose 100 (H) 65 - 99 mg/dL    BUN 14 6 - 20 mg/dL    Creatinine 0.98 0.76 - 1.27 mg/dL    Sodium 144 136 - 145 mmol/L    Potassium 4.3 3.5 - 5.2 mmol/L    Chloride 103 98 - 107 mmol/L    CO2 25.7 22.0 - 29.0 mmol/L    Calcium 9.6 8.6 - 10.5 mg/dL    Total Protein 7.8 6.0 - 8.5 g/dL    Albumin 4.9 3.5 - 5.2 g/dL    ALT (SGPT) 21 1 - 41 U/L    AST (SGOT) 19 1 - 40 U/L    Alkaline Phosphatase 82 39 - 117 U/L    Total Bilirubin 0.5 0.0 - 1.2 mg/dL    Globulin 2.9 gm/dL    A/G Ratio 1.7 g/dL    BUN/Creatinine Ratio 14.3 7.0 - 25.0    Anion Gap 15.3 (H) 5.0 - 15.0 mmol/L    eGFR 98.1 >60.0 mL/min/1.73   Results for orders placed or performed in visit on 11/29/22   Rheumatoid Factor    Specimen: Blood   Result Value Ref Range    Rheumatoid Factor Quantitative <10.0 0.0 - 14.0 IU/mL   CONSTANTIN Comprehensive Panel    Specimen: Blood   Result Value Ref Range     Anti-DNA (DS) Ab Qn <1 0 - 9 IU/mL    RNP Antibodies <0.2 0.0 - 0.9 AI    Aguilar Antibodies <0.2 0.0 - 0.9 AI    Antiscleroderma-70 Antibodies <0.2 0.0 - 0.9 AI    TELMA SSA (RO) Ab <0.2 0.0 - 0.9 AI    TELMA SSB (LA) Ab <0.2 0.0 - 0.9 AI    Antichromatin Antibodies <0.2 0.0 - 0.9 AI    URIEL-1 IgG <0.2 0.0 - 0.9 AI    Anti-Centromere B Antibodies <0.2 0.0 - 0.9 AI    See below: Comment    HLA-B27 Antigen    Specimen: Blood   Result Value Ref Range    HLA B27 Negative    Cyclic Citrul Peptide Antibody, IgG / IgA    Specimen: Blood   Result Value Ref Range    CCP Antibodies IgG/IgA 2 0 - 19 units   Sedimentation Rate    Specimen: Blood   Result Value Ref Range    Sed Rate 8 0 - 15 mm/hr   C-reactive Protein    Specimen: Blood   Result Value Ref Range    C-Reactive Protein <0.30 0.00 - 0.50 mg/dL   CONSTANTIN    Specimen: Blood   Result Value Ref Range    CONSTANTIN Direct Negative Negative   Uric Acid    Specimen: Blood   Result Value Ref Range    Uric Acid 6.3 3.4 - 7.0 mg/dL   Results for orders placed or performed in visit on 10/12/22   CBC Auto Differential    Specimen: Blood   Result Value Ref Range    WBC 5.98 3.40 - 10.80 10*3/mm3    RBC 5.36 4.14 - 5.80 10*6/mm3    Hemoglobin 15.2 13.0 - 17.7 g/dL    Hematocrit 44.9 37.5 - 51.0 %    MCV 83.8 79.0 - 97.0 fL    MCH 28.4 26.6 - 33.0 pg    MCHC 33.9 31.5 - 35.7 g/dL    RDW 12.8 12.3 - 15.4 %    RDW-SD 38.0 37.0 - 54.0 fl    MPV 10.4 6.0 - 12.0 fL    Platelets 247 140 - 450 10*3/mm3    Neutrophil % 50.7 42.7 - 76.0 %    Lymphocyte % 31.6 19.6 - 45.3 %    Monocyte % 9.0 5.0 - 12.0 %    Eosinophil % 6.9 (H) 0.3 - 6.2 %    Basophil % 1.5 0.0 - 1.5 %    Immature Grans % 0.3 0.0 - 0.5 %    Neutrophils, Absolute 3.03 1.70 - 7.00 10*3/mm3    Lymphocytes, Absolute 1.89 0.70 - 3.10 10*3/mm3    Monocytes, Absolute 0.54 0.10 - 0.90 10*3/mm3    Eosinophils, Absolute 0.41 (H) 0.00 - 0.40 10*3/mm3    Basophils, Absolute 0.09 0.00 - 0.20 10*3/mm3    Immature Grans, Absolute 0.02 0.00 - 0.05 10*3/mm3     nRBC 0.0 0.0 - 0.2 /100 WBC     *Note: Due to a large number of results and/or encounters for the requested time period, some results have not been displayed. A complete set of results can be found in Results Review.

## 2023-03-28 LAB — REF LAB TEST METHOD: NORMAL

## 2023-03-28 RX ORDER — HYDROXYZINE HYDROCHLORIDE 25 MG/1
25 TABLET, FILM COATED ORAL EVERY 8 HOURS PRN
Qty: 90 TABLET | Refills: 3 | Status: SHIPPED | OUTPATIENT
Start: 2023-03-28

## 2023-04-06 RX ORDER — SUCRALFATE 1 G/1
1 TABLET ORAL 4 TIMES DAILY
Qty: 120 TABLET | Refills: 2 | Status: SHIPPED | OUTPATIENT
Start: 2023-04-06

## 2023-04-10 RX ORDER — ERGOCALCIFEROL 1.25 MG/1
50000 CAPSULE ORAL WEEKLY
Qty: 4 CAPSULE | Refills: 1 | Status: SHIPPED | OUTPATIENT
Start: 2023-04-10

## 2023-04-18 RX ORDER — CHOLECALCIFEROL (VITAMIN D3) 125 MCG
500 CAPSULE ORAL DAILY
Qty: 30 TABLET | Refills: 3 | Status: SHIPPED | OUTPATIENT
Start: 2023-04-18

## 2023-04-18 RX ORDER — HYDROXYZINE HYDROCHLORIDE 25 MG/1
25 TABLET, FILM COATED ORAL EVERY 8 HOURS PRN
Qty: 90 TABLET | Refills: 3 | Status: SHIPPED | OUTPATIENT
Start: 2023-04-18

## 2023-04-19 ENCOUNTER — OFFICE VISIT (OUTPATIENT)
Dept: BEHAVIORAL HEALTH | Facility: CLINIC | Age: 43
End: 2023-04-19

## 2023-04-19 DIAGNOSIS — F33.1 MAJOR DEPRESSIVE DISORDER, RECURRENT EPISODE, MODERATE WITH ANXIOUS DISTRESS: ICD-10-CM

## 2023-04-19 DIAGNOSIS — F90.2 ATTENTION DEFICIT HYPERACTIVITY DISORDER, COMBINED TYPE: ICD-10-CM

## 2023-05-03 DIAGNOSIS — E03.9 HYPOTHYROIDISM, UNSPECIFIED TYPE: ICD-10-CM

## 2023-05-04 RX ORDER — LEVOTHYROXINE SODIUM 0.05 MG/1
50 TABLET ORAL
Qty: 90 TABLET | Refills: 0 | Status: SHIPPED | OUTPATIENT
Start: 2023-05-04

## 2023-05-04 NOTE — TELEPHONE ENCOUNTER
Patient has been made aware that medication has been sent to pharmacy. Patient voiced understanding.

## 2023-05-05 RX ORDER — OMEPRAZOLE 40 MG/1
40 CAPSULE, DELAYED RELEASE ORAL DAILY
Qty: 30 CAPSULE | Refills: 0 | OUTPATIENT
Start: 2023-05-05

## 2023-05-05 RX ORDER — ROPINIROLE 1 MG/1
1 TABLET, FILM COATED ORAL
Qty: 30 TABLET | Refills: 0 | Status: SHIPPED | OUTPATIENT
Start: 2023-05-05

## 2023-05-05 RX ORDER — TRAZODONE HYDROCHLORIDE 100 MG/1
100 TABLET ORAL EVERY EVENING
Qty: 30 TABLET | Refills: 0 | Status: SHIPPED | OUTPATIENT
Start: 2023-05-05

## 2023-05-05 RX ORDER — FLUTICASONE PROPIONATE 50 MCG
2 SPRAY, SUSPENSION (ML) NASAL DAILY
Qty: 16 G | Refills: 0 | Status: SHIPPED | OUTPATIENT
Start: 2023-05-05

## 2023-05-05 RX ORDER — MONTELUKAST SODIUM 10 MG/1
10 TABLET ORAL
Qty: 30 TABLET | Refills: 0 | Status: SHIPPED | OUTPATIENT
Start: 2023-05-05

## 2023-05-09 ENCOUNTER — LAB (OUTPATIENT)
Dept: LAB | Facility: HOSPITAL | Age: 43
End: 2023-05-09
Payer: COMMERCIAL

## 2023-05-09 ENCOUNTER — OFFICE VISIT (OUTPATIENT)
Dept: GASTROENTEROLOGY | Facility: CLINIC | Age: 43
End: 2023-05-09
Payer: COMMERCIAL

## 2023-05-09 VITALS
DIASTOLIC BLOOD PRESSURE: 78 MMHG | SYSTOLIC BLOOD PRESSURE: 130 MMHG | BODY MASS INDEX: 33.56 KG/M2 | HEART RATE: 82 BPM | HEIGHT: 70 IN | WEIGHT: 234.4 LBS

## 2023-05-09 DIAGNOSIS — K62.5 RECTAL BLEEDING: ICD-10-CM

## 2023-05-09 DIAGNOSIS — K21.00 GASTROESOPHAGEAL REFLUX DISEASE WITH ESOPHAGITIS WITHOUT HEMORRHAGE: ICD-10-CM

## 2023-05-09 DIAGNOSIS — K20.0 EOSINOPHILIC ESOPHAGITIS: ICD-10-CM

## 2023-05-09 DIAGNOSIS — K22.2 STRICTURE AND STENOSIS OF ESOPHAGUS: ICD-10-CM

## 2023-05-09 DIAGNOSIS — R10.12 LEFT UPPER QUADRANT PAIN: Primary | ICD-10-CM

## 2023-05-09 DIAGNOSIS — R19.5 MUCUS IN STOOL: ICD-10-CM

## 2023-05-09 LAB
ALBUMIN SERPL-MCNC: 4.8 G/DL (ref 3.5–5.2)
ALBUMIN/GLOB SERPL: 1.8 G/DL
ALP SERPL-CCNC: 76 U/L (ref 39–117)
ALT SERPL W P-5'-P-CCNC: 27 U/L (ref 1–41)
ANION GAP SERPL CALCULATED.3IONS-SCNC: 11.4 MMOL/L (ref 5–15)
AST SERPL-CCNC: 20 U/L (ref 1–40)
BILIRUB SERPL-MCNC: 0.7 MG/DL (ref 0–1.2)
BUN SERPL-MCNC: 10 MG/DL (ref 6–20)
BUN/CREAT SERPL: 9.2 (ref 7–25)
CALCIUM SPEC-SCNC: 9.8 MG/DL (ref 8.6–10.5)
CHLORIDE SERPL-SCNC: 102 MMOL/L (ref 98–107)
CO2 SERPL-SCNC: 26.6 MMOL/L (ref 22–29)
CREAT SERPL-MCNC: 1.09 MG/DL (ref 0.76–1.27)
DEPRECATED RDW RBC AUTO: 40 FL (ref 37–54)
EGFRCR SERPLBLD CKD-EPI 2021: 86.4 ML/MIN/1.73
ERYTHROCYTE [DISTWIDTH] IN BLOOD BY AUTOMATED COUNT: 13.1 % (ref 12.3–15.4)
GLOBULIN UR ELPH-MCNC: 2.6 GM/DL
GLUCOSE SERPL-MCNC: 86 MG/DL (ref 65–99)
HCT VFR BLD AUTO: 45.4 % (ref 37.5–51)
HGB BLD-MCNC: 14.9 G/DL (ref 13–17.7)
MCH RBC QN AUTO: 27.4 PG (ref 26.6–33)
MCHC RBC AUTO-ENTMCNC: 32.8 G/DL (ref 31.5–35.7)
MCV RBC AUTO: 83.6 FL (ref 79–97)
PLATELET # BLD AUTO: 220 10*3/MM3 (ref 140–450)
PMV BLD AUTO: 10.3 FL (ref 6–12)
POTASSIUM SERPL-SCNC: 4.1 MMOL/L (ref 3.5–5.2)
PROT SERPL-MCNC: 7.4 G/DL (ref 6–8.5)
RBC # BLD AUTO: 5.43 10*6/MM3 (ref 4.14–5.8)
SODIUM SERPL-SCNC: 140 MMOL/L (ref 136–145)
WBC NRBC COR # BLD: 9.28 10*3/MM3 (ref 3.4–10.8)

## 2023-05-09 PROCEDURE — 80053 COMPREHEN METABOLIC PANEL: CPT | Performed by: NURSE PRACTITIONER

## 2023-05-09 PROCEDURE — 85027 COMPLETE CBC AUTOMATED: CPT | Performed by: NURSE PRACTITIONER

## 2023-05-09 PROCEDURE — 99213 OFFICE O/P EST LOW 20 MIN: CPT | Performed by: NURSE PRACTITIONER

## 2023-05-09 PROCEDURE — 36415 COLL VENOUS BLD VENIPUNCTURE: CPT | Performed by: NURSE PRACTITIONER

## 2023-05-09 RX ORDER — SODIUM PICOSULFATE, MAGNESIUM OXIDE, AND ANHYDROUS CITRIC ACID 10; 3.5; 12 MG/160ML; G/160ML; G/160ML
160 LIQUID ORAL SEE ADMIN INSTRUCTIONS
Qty: 320 ML | Refills: 0 | Status: SHIPPED | OUTPATIENT
Start: 2023-05-09

## 2023-05-09 RX ORDER — SODIUM CHLORIDE 9 MG/ML
40 INJECTION, SOLUTION INTRAVENOUS AS NEEDED
OUTPATIENT
Start: 2023-05-09

## 2023-05-09 RX ORDER — DEXTROSE AND SODIUM CHLORIDE 5; .45 G/100ML; G/100ML
30 INJECTION, SOLUTION INTRAVENOUS CONTINUOUS PRN
OUTPATIENT
Start: 2023-05-09

## 2023-05-09 NOTE — PATIENT INSTRUCTIONS
Eosinophilic Esophagitis: Treatment  The treatment of EoE involves treating reflux, avoiding triggers of inflammation (usually foods) and sometimes using medications to reduce inflammation in the esophagus.    1. Controlling reflux: Proton pump inhibitors, which control the amount of acid produced, have also been used to help treat EoE. Some patients respond well to proton pump inhibitors and have a large decrease in the number of eosinophils and inflammation when a follow up endoscopy and biopsy is done. However, proton pump inhibitors can also improve EoE symptoms without making the inflammation any better.     2. Decreasing Inflammation: Glucocorticosteroids, which control inflammation, are the most helpful medications for treating EoE. Swallowing small doses of corticosteroids is the most common treatment. Different forms of swallowed corticosteroids are available. At first, higher doses may be needed to control the inflammation but they are linked with a greater risk of side effects. Asthma inhalers, in which the medication is normally inhaled, can be used to treat EoE. When these are used, the medication is swallowed, rather that inhaled. To use an inhaler, the medication is inhaled into the mouth and then swallowed, being careful not to inhaler it. Fluticasone is commonly used for this. Another medication, budesonide, comes in a liquid form and can be mixed with Splenda to make a slurry that can be swallowed. It is important not to eat or drink for 30 minutes after swallowing the steroid. Rinse your mouth after swallowing the topical steroid.   Not every patient with EoE needs to take these medications. Some patients can treat EoE only by controlling reflux, and others can treat EoE by controlling reflux and avoidance diets.    3. Empiric Elimination Diets  Eliminating major food allergens from the diet is an important part of treating EoE for patients who have a particular food trigger. Previously, skin  testing, patch testing and blood tests were used to determine what foods would be eliminated. This is no longer recommended. Now, it is no longer recommended to use these tests to recommend elimination diets. Instead, empiric elimination is recommended. Normally, this starts with the 2 food group elimination diet.   The 2 food group elimination diet includes avoiding cow's milk and gluten avoidance. The four food elimination diet includes cow's milk, gluten, egg and legumes such as soy, peanut, beans. The six food elimination diet includes cow's milk, gluten, egg, legumes, tree nuts, seafood. Your doctor will work with you to determine the best diet for you.    Working with Your Doctors  EoE is a complex disorder. It’s important for patients to work their gastroenterologist for advice on managing EoE and figuring out when endoscopies are needed to check to see if the condition is getting better or worse. Patients also need to work closely with their allergist / immunologist to find out if allergies are playing a role. An allergist / immunologist will also be able to tell if you need to avoid any foods and can help you manage related problems like asthma and allergic rhinitis. If you are following a diet to treat your EoE, it’s often recommended to visit a dietitian.    It’s important to have cooperation among physicians and families. When you first find out you have EoE, it can be overwhelming. Families often benefit from participating in support groups and organizations. Visit [employer] and [employer]. These are two lay organizations that have ongoing relationships with the AAAAI (American Academy of Asthma, Allergy and Immunology). Www.[facility]oclinic.org and [email or web address] have websites with useful information about EoE.

## 2023-05-09 NOTE — PROGRESS NOTES
Chief Complaint   Patient presents with   • Heartburn   • Abdominal Pain   • Rectal Bleeding       Subjective    Jordin Parham is a 42 y.o. male. he is here today for follow-up.                                                                  Assessment & Plan                                     1. Left upper quadrant pain    2. Rectal bleeding    3. Mucus in stool    4. Stricture and stenosis of esophagus    5. Gastroesophageal reflux disease with esophagitis without hemorrhage    6. Eosinophilic esophagitis      Plan; schedule patient for colonoscopy due to rectal bleeding mucus in stool and intermittent diarrhea will obtain biopsy rule out microscopic colitis or other etiology.  Will obtain CT due to pain on exam.  Continue PPI daily Carafate before meals and bedtime discussed referral to allergist due to eosinophilic esophagitis he declines at this time elimination diet discussed and informational sheet provided he is going to try elimination diet and continue with medication.    Follow-up: Return in about 4 weeks (around 6/6/2023) for Recheck.     HPI  42-year-old male presents to discuss EGD results and new problem of rectal bleeding.  States he is having a lot of left upper quadrant abdominal pain is worse with increased anxiety.  Bowel movements are 2-3 times per day with intermittent bright red blood within the stool.  Initially thought symptoms are occurring due to ingestion of red meat however he has not had any red meat over the last 3 weeks and is still having blood and mucus within the stool.  He had normal colonoscopy in 2019.  EGD in 2019 was consistent with eosinophilic esophagitis.  He has been on PPI and Carafate without significant change in symptoms.  EGD was completed 3/22/2023 noted esophageal stricture that was dilated esophagitis and gastritis.  Biopsy noted focally  "increased eosinophils up to 15 per high-power field gastritis negative for H. pylori.  He generally avoids allergens but does not follow routinely with allergist.    Review of Systems  Review of Systems   Constitutional: Negative for appetite change, fatigue and fever.   Respiratory: Negative for cough and shortness of breath.    Gastrointestinal: Positive for abdominal pain (left upper) and blood in stool. Negative for abdominal distention, anal bleeding, constipation, diarrhea, nausea, rectal pain and vomiting.       /78 (BP Location: Left arm)   Pulse 82   Ht 177.8 cm (70\")   Wt 106 kg (234 lb 6.4 oz)   BMI 33.63 kg/m²     Objective      Physical Exam  Constitutional:       General: He is not in acute distress.     Appearance: Normal appearance. He is normal weight. He is not ill-appearing or toxic-appearing.   HENT:      Head: Normocephalic and atraumatic.   Pulmonary:      Effort: Pulmonary effort is normal.   Abdominal:      General: Abdomen is flat. Bowel sounds are normal. There is no distension.      Palpations: Abdomen is soft. There is no mass.      Tenderness: There is abdominal tenderness in the left upper quadrant.   Neurological:      Mental Status: He is alert.               The following portions of the patient's history were reviewed and updated as appropriate:   Past Medical History:   Diagnosis Date   • Anxiety    • Fatty liver    • GERD (gastroesophageal reflux disease)    • Hyperlipidemia    • Lyme disease    • Restless legs    • Seasonal allergies      Past Surgical History:   Procedure Laterality Date   • COLONOSCOPY N/A 9/17/2019    Procedure: COLONOSCOPY;  Surgeon: Rigoberto Flower MD;  Location: [facility] ENDOSCOPY;  Service: Gastroenterology   • ENDOSCOPY N/A 9/17/2019    Procedure: ESOPHAGOGASTRODUODENOSCOPY;  Surgeon: Rigoberto Flower MD;  Location: [facility] ENDOSCOPY;  Service: Gastroenterology   • ENDOSCOPY N/A 3/22/2023    Procedure: ESOPHAGOGASTRODUODENOSCOPY;  Surgeon: " Rigoberto Flower MD;  Location: [facility] ENDOSCOPY;  Service: Gastroenterology;  Laterality: N/A;   • HEMORRHOIDECTOMY       Family History   Problem Relation Age of Onset   • Alcohol abuse Mother    • Anxiety disorder Mother    • Arthritis Mother    • Asthma Mother    • Cancer Mother    • Depression Mother    • Hypertension Mother    • Stroke Mother    • Hyperlipidemia Father    • Thyroid disease Sister    • Alcohol abuse Sister    • Anxiety disorder Sister    • Cancer Sister    • Depression Sister    • Alcohol abuse Brother    • Anxiety disorder Brother    • Cancer Brother    • Depression Brother    • Hyperlipidemia Maternal Uncle    • Heart failure Maternal Uncle    • Heart disease Maternal Uncle        No Known Allergies  Social History     Socioeconomic History   • Marital status:    Tobacco Use   • Smoking status: Never   • Smokeless tobacco: Never   Vaping Use   • Vaping Use: Never used   Substance and Sexual Activity   • Alcohol use: Not Currently     Comment: rarely   • Drug use: Not Currently     Types: Marijuana, Methamphetamines   • Sexual activity: Defer     Current Medications:  Prior to Admission medications    Medication Sig Start Date End Date Taking? Authorizing Provider   atorvastatin (Lipitor) 20 MG tablet Take 1 tablet by mouth Every Night. 3/16/23  Yes Jhon Amezquita MD   ferrous sulfate 325 (65 Fe) MG tablet Take 1 tablet by mouth Daily With Breakfast. 2/20/23  Yes Jhon Amezquita MD   fluticasone (FLONASE) 50 MCG/ACT nasal spray 2 sprays by Each Nare route Daily. NO ADDITIONAL REFILLS WITHOUT AN APPOINTMENT 5/5/23  Yes Jhon Amezquita MD   hydrOXYzine (ATARAX) 25 MG tablet Take 1 tablet by mouth Every 8 (Eight) Hours As Needed for Anxiety. 4/18/23  Yes Jhon Amezquita MD   levothyroxine (Synthroid) 50 MCG tablet Take 1 tablet by mouth Every Morning. NO ADDITIONAL REFILLS WITHOUT AN APPOINTMENT 5/4/23  Yes Jhon Amezquita MD   montelukast (SINGULAIR) 10 MG tablet Take 1 tablet by  mouth every night at bedtime. NO ADDITIONAL REFILLS WITHOUT AN APPOINTMENT 5/5/23  Yes Jhon Amezquita MD   pantoprazole (PROTONIX) 40 MG EC tablet Take 1 tablet by mouth Daily. 2/8/23  Yes Renetta Moore APRN   rOPINIRole (REQUIP) 1 MG tablet Take 1 tablet by mouth every night at bedtime. NO ADDITIONAL REFILLS WITHOUT AN APPOINTMENT 5/5/23  Yes Jhon Amezquita MD   sucralfate (Carafate) 1 g tablet Take 1 tablet by mouth 4 (Four) Times a Day. 4/6/23  Yes Renetta Moore APRN   traZODone (DESYREL) 100 MG tablet Take 1 tablet by mouth Every Evening. NO ADDITIONAL REFILLS WITHOUT AN APPOINTMENT 5/5/23  Yes Jhon Amezquita MD   vitamin B-12 (CYANOCOBALAMIN) 500 MCG tablet Take 1 tablet by mouth Daily. 4/18/23  Yes Jhon Amezquita MD   vitamin D (ERGOCALCIFEROL) 1.25 MG (94076 UT) capsule capsule Take 1 capsule by mouth 1 (One) Time Per Week. 4/10/23  Yes Jhon Amezquita MD     Orders placed during this encounter include:  Orders Placed This Encounter   Procedures   • CT Abdomen With Contrast     Standing Status:   Future     Standing Expiration Date:   5/9/2024     Order Specific Question:   Release to patient     Answer:   Routine Release     Order Specific Question:   Will Oral Contrast be needed for this procedure?     Answer:   Yes   • CBC (No Diff)     Order Specific Question:   Release to patient     Answer:   Routine Release   • Comprehensive Metabolic Panel     Order Specific Question:   Release to patient     Answer:   Routine Release   • Obtain Informed Consent     Standing Status:   Future     Order Specific Question:   Informed Consent Given For     Answer:   COLONOSCOPY     COLONOSCOPY (N/A)  New Medications Ordered This Visit   Medications   • Clenpiq 10-3.5-12 MG-GM -GM/160ML solution     Sig: Take 160 mL by mouth See Admin Instructions.     Dispense:  320 mL     Refill:  0         Review and/or summary of lab tests, radiology, procedures, medications. Review and summary of old records and obtaining of  history. The risks and benefits of my recommendations, as well as other treatment options were discussed . Any questions/concerned were answered. Patient voiced understanding and agreement.          This document has been electronically signed by YULIET Okeefe on May 9, 2023 13:47 CDT                                               Results for orders placed or performed during the hospital encounter of 23   TISSUE EXAM, [facility] ([facility])    Specimen: A: Gastric, Antrum; Tissue    B: Esophagus, Distal; Tissue   Result Value Ref Range    Reference Lab Report       [facility]  [address] [place] [address]  Phone: [phone] or [phone]  Fax: [phone]  Owen Villeda M.D., Medical Director    PATIENT NAME                           LABORATORY NO.  [id number]                 [id number]  [id number]                         AGE              SEX  SSN           CLIENT REF #  [facility]           42      1980      [id number]   [id number]    [place]                       REQUESTING M.D.     ATTENDING M.D.     COPY TO[address]                 [place] [address]             DATE COLLECTED      DATE RECEIVED      DATE REPORTED  2023    DIAGNOSIS:  A.   ANTRUM, BIOPSY:  Active chronic gastritis, mild  Negative for Helicobacter pylori  B.   ESOPHAGUS, BIOPSY, DISTAL:  Chronic esophagitis with focally increased eosinophils (up to 15 per high  power  field)    JBS/pah    COMMENT:  I ordered H. pylori immunohistochemical (IHC) stain on block  A1 because a typical inflammatory infiltrate was present, and organisms are not  seen on H&E staining.  H. pylori IHC stain is negative for Helicobacter pylori.  Control tissue stains as expected.    CLINICAL HISTORY:  Reflux esophagitis, Superficial non-erosive nonspecific  "gastritis    SPECIMENS RECEIVED:  A.  ANTRUM, BIOPSY  B.  ESOPHAGUS, BIOPSY, DISTAL    MICROSCOPIC DESCRIPTION:  Tissue blocks are prepared and slides are examined microscopically on all  specimens. See diagnosis for details.    The internal and external (both positive and negative) controls reacted  appropriately. Some of our immunohistochemical and in situ hybridization  studies are performed as analyte specific reagents. The following statement  applies to those tests: This test was developed, and its performance  characteristics determined by [facility]. It has not been  cleared or approved by the [place]  Food and Drug Administration. However, the  FDA has determined that approval and clearance are not necessary.    Professional interpretation rendered by Kennedy Wilkes M.D. at [facility],  [employer], [address] [place] [address].    GROSS DESCRIPTION:  A.  Labeled \"antrum\".  Consists of 1 piece of tan soft tissue measuring 0.4 x  0.3 x 0.2 cm and is submitted entirely in 1 block.  ALEXY  B.  Labeled \"distal esophagus\".  Consists of 1 piece of tan soft tissue  measuring 0.5 x 0.2 x 0.1 cm and is submitted entirely in 1 block.    REVIEWED, DIAGNOSED AND ELECTRONICALLY  SIGNED BY:    Kennedy Wilkes M.D.  CPT CODES:  88305x2, 37072     Results for orders placed or performed in visit on 03/14/23   Alpha-Gal IgE Panel    Specimen: Blood   Result Value Ref Range    Class Description Comment     IgE 50 6 - 495 IU/mL    [id number] Alpha-Gal <0.10 Class 0 kU/L    Beef <0.10 Class 0 kU/L    Pork <0.10 Class 0 kU/L    Lamb <0.10 Class 0 kU/L   CBC Auto Differential    Specimen: Blood   Result Value Ref Range    WBC 7.46 3.40 - 10.80 10*3/mm3    RBC 5.55 4.14 - 5.80 10*6/mm3    Hemoglobin 15.3 13.0 - 17.7 g/dL    Hematocrit 45.7 37.5 - 51.0 %    MCV 82.3 79.0 - 97.0 fL    MCH 27.6 26.6 - 33.0 pg    MCHC 33.5 31.5 - 35.7 g/dL    RDW 13.0 12.3 - 15.4 %    RDW-SD 38.4 37.0 - 54.0 fl    MPV 10.1 6.0 - 12.0 " fL    Platelets 268 140 - 450 10*3/mm3    Neutrophil % 60.8 42.7 - 76.0 %    Lymphocyte % 24.5 19.6 - 45.3 %    Monocyte % 9.9 5.0 - 12.0 %    Eosinophil % 3.1 0.3 - 6.2 %    Basophil % 0.8 0.0 - 1.5 %    Immature Grans % 0.9 (H) 0.0 - 0.5 %    Neutrophils, Absolute 4.53 1.70 - 7.00 10*3/mm3    Lymphocytes, Absolute 1.83 0.70 - 3.10 10*3/mm3    Monocytes, Absolute 0.74 0.10 - 0.90 10*3/mm3    Eosinophils, Absolute 0.23 0.00 - 0.40 10*3/mm3    Basophils, Absolute 0.06 0.00 - 0.20 10*3/mm3    Immature Grans, Absolute 0.07 (H) 0.00 - 0.05 10*3/mm3    nRBC 0.0 0.0 - 0.2 /100 WBC   Iron Profile    Specimen: Blood   Result Value Ref Range    Iron 88 59 - 158 mcg/dL    Iron Saturation 30 20 - 50 %    Transferrin 194 (L) 200 - 360 mg/dL    TIBC 289 (L) 298 - 536 mcg/dL   Vitamin D,25-Hydroxy    Specimen: Blood   Result Value Ref Range    25 Hydroxy, Vitamin D 34.5 30.0 - 100.0 ng/ml   TSH    Specimen: Blood   Result Value Ref Range    TSH 0.594 0.270 - 4.200 uIU/mL   T4, Free    Specimen: Blood   Result Value Ref Range    Free T4 1.14 0.93 - 1.70 ng/dL   Vitamin B12    Specimen: Blood   Result Value Ref Range    Vitamin B-12 1,072 (H) 211 - 946 pg/mL   Lipid Panel    Specimen: Blood   Result Value Ref Range    Total Cholesterol 193 0 - 200 mg/dL    Triglycerides 184 (H) 0 - 150 mg/dL    HDL Cholesterol 34 (L) 40 - 60 mg/dL    LDL Cholesterol  126 (H) 0 - 100 mg/dL    VLDL Cholesterol 33 5 - 40 mg/dL    LDL/HDL Ratio 3.59    Comprehensive Metabolic Panel    Specimen: Blood   Result Value Ref Range    Glucose 100 (H) 65 - 99 mg/dL    BUN 14 6 - 20 mg/dL    Creatinine 0.98 0.76 - 1.27 mg/dL    Sodium 144 136 - 145 mmol/L    Potassium 4.3 3.5 - 5.2 mmol/L    Chloride 103 98 - 107 mmol/L    CO2 25.7 22.0 - 29.0 mmol/L    Calcium 9.6 8.6 - 10.5 mg/dL    Total Protein 7.8 6.0 - 8.5 g/dL    Albumin 4.9 3.5 - 5.2 g/dL    ALT (SGPT) 21 1 - 41 U/L    AST (SGOT) 19 1 - 40 U/L    Alkaline Phosphatase 82 39 - 117 U/L    Total Bilirubin  0.5 0.0 - 1.2 mg/dL    Globulin 2.9 gm/dL    A/G Ratio 1.7 g/dL    BUN/Creatinine Ratio 14.3 7.0 - 25.0    Anion Gap 15.3 (H) 5.0 - 15.0 mmol/L    eGFR 98.1 >60.0 mL/min/1.73   Results for orders placed or performed in visit on 11/29/22   Rheumatoid Factor    Specimen: Blood   Result Value Ref Range    Rheumatoid Factor Quantitative <10.0 0.0 - 14.0 IU/mL   CONSTANTIN Comprehensive Panel    Specimen: Blood   Result Value Ref Range    Anti-DNA (DS) Ab Qn <1 0 - 9 IU/mL    RNP Antibodies <0.2 0.0 - 0.9 AI    Aguilar Antibodies <0.2 0.0 - 0.9 AI    Antiscleroderma-70 Antibodies <0.2 0.0 - 0.9 AI    Sjogren's Anti-SS-A <0.2 0.0 - 0.9 AI    Sjogren's Anti-SS-B <0.2 0.0 - 0.9 AI    Antichromatin Antibodies <0.2 0.0 - 0.9 AI    URIEL-1 IgG <0.2 0.0 - 0.9 AI    Anti-Centromere B Antibodies <0.2 0.0 - 0.9 AI    See below: Comment    HLA-B27 Antigen    Specimen: Blood   Result Value Ref Range    HLA B27 Negative    Cyclic Citrul Peptide Antibody, IgG / IgA    Specimen: Blood   Result Value Ref Range    CCP Antibodies IgG/IgA 2 0 - 19 units   Sedimentation Rate    Specimen: Blood   Result Value Ref Range    Sed Rate 8 0 - 15 mm/hr   C-reactive Protein    Specimen: Blood   Result Value Ref Range    C-Reactive Protein <0.30 0.00 - 0.50 mg/dL   CONSTANTIN    Specimen: Blood   Result Value Ref Range    CONSTANTIN Direct Negative Negative   Uric Acid    Specimen: Blood   Result Value Ref Range    Uric Acid 6.3 3.4 - 7.0 mg/dL   Results for orders placed or performed in visit on 10/12/22   CBC Auto Differential    Specimen: Blood   Result Value Ref Range    WBC 5.98 3.40 - 10.80 10*3/mm3    RBC 5.36 4.14 - 5.80 10*6/mm3    Hemoglobin 15.2 13.0 - 17.7 g/dL    Hematocrit 44.9 37.5 - 51.0 %    MCV 83.8 79.0 - 97.0 fL    MCH 28.4 26.6 - 33.0 pg    MCHC 33.9 31.5 - 35.7 g/dL    RDW 12.8 12.3 - 15.4 %    RDW-SD 38.0 37.0 - 54.0 fl    MPV 10.4 6.0 - 12.0 fL    Platelets 247 140 - 450 10*3/mm3    Neutrophil % 50.7 42.7 - 76.0 %    Lymphocyte % 31.6 19.6 - 45.3 %     Monocyte % 9.0 5.0 - 12.0 %    Eosinophil % 6.9 (H) 0.3 - 6.2 %    Basophil % 1.5 0.0 - 1.5 %    Immature Grans % 0.3 0.0 - 0.5 %    Neutrophils, Absolute 3.03 1.70 - 7.00 10*3/mm3    Lymphocytes, Absolute 1.89 0.70 - 3.10 10*3/mm3    Monocytes, Absolute 0.54 0.10 - 0.90 10*3/mm3    Eosinophils, Absolute 0.41 (H) 0.00 - 0.40 10*3/mm3    Basophils, Absolute 0.09 0.00 - 0.20 10*3/mm3    Immature Grans, Absolute 0.02 0.00 - 0.05 10*3/mm3    nRBC 0.0 0.0 - 0.2 /100 WBC     *Note: Due to a large number of results and/or encounters for the requested time period, some results have not been displayed. A complete set of results can be found in Results Review.

## 2023-05-09 NOTE — H&P (VIEW-ONLY)
Chief Complaint   Patient presents with   • Heartburn   • Abdominal Pain   • Rectal Bleeding       Subjective    Jordin Parham is a 42 y.o. male. he is here today for follow-up.                                                                  Assessment & Plan                                     1. Left upper quadrant pain    2. Rectal bleeding    3. Mucus in stool    4. Stricture and stenosis of esophagus    5. Gastroesophageal reflux disease with esophagitis without hemorrhage    6. Eosinophilic esophagitis    7. Diarrhea, unspecified type    8. Change in bowel habits      Plan; schedule patient for colonoscopy due to rectal bleeding mucus in stool and chronic intermittent diarrhea will obtain biopsy rule out microscopic colitis or inflammatory bowel disease.  Will obtain CT abdomen and pelvis with contrast to rule out inflammatory bowel disease or obstructive process due to pain on exam blood in stool and diarrhea for last 3 months.  Continue PPI daily Carafate before meals and bedtime discussed referral to allergist due to eosinophilic esophagitis he declines at this time elimination diet discussed and informational sheet provided he is going to try elimination diet and continue with medication.    Follow-up: Return in about 4 weeks (around 6/6/2023) for Recheck.     HPI  42-year-old male presents to discuss EGD results and new problem of rectal bleeding.  States he is having a lot of left upper quadrant abdominal pain is worse with increased anxiety.  Bowel movements are 2-3 times per day with intermittent bright red blood within the stool.  Initially thought symptoms are occurring due to ingestion of red meat however he has not had any red meat over the last 3 weeks and is still having blood and mucus within the stool.  He had normal colonoscopy in 2019.  EGD in 2019 was consistent with  "eosinophilic esophagitis.  He has been on PPI and Carafate without significant change in symptoms.  EGD was completed 3/22/2023 noted esophageal stricture that was dilated esophagitis and gastritis.  Biopsy noted focally increased eosinophils up to 15 per high-power field gastritis negative for H. pylori.  He generally avoids allergens but does not follow routinely with allergist.    Review of Systems  Review of Systems   Constitutional: Negative for appetite change, fatigue and fever.   Respiratory: Negative for cough and shortness of breath.    Gastrointestinal: Positive for abdominal pain (left upper) and blood in stool. Negative for abdominal distention, anal bleeding, constipation, diarrhea, nausea, rectal pain and vomiting.       /78 (BP Location: Left arm)   Pulse 82   Ht 177.8 cm (70\")   Wt 106 kg (234 lb 6.4 oz)   BMI 33.63 kg/m²     Objective      Physical Exam  Constitutional:       General: He is not in acute distress.     Appearance: Normal appearance. He is normal weight. He is not ill-appearing or toxic-appearing.   HENT:      Head: Normocephalic and atraumatic.   Pulmonary:      Effort: Pulmonary effort is normal.   Abdominal:      General: Abdomen is flat. Bowel sounds are normal. There is no distension.      Palpations: Abdomen is soft. There is no mass.      Tenderness: There is abdominal tenderness in the left upper quadrant.   Neurological:      Mental Status: He is alert.               The following portions of the patient's history were reviewed and updated as appropriate:   Past Medical History:   Diagnosis Date   • Anxiety    • Fatty liver    • GERD (gastroesophageal reflux disease)    • Hyperlipidemia    • Lyme disease    • Restless legs    • Seasonal allergies      Past Surgical History:   Procedure Laterality Date   • COLONOSCOPY N/A 9/17/2019    Procedure: COLONOSCOPY;  Surgeon: Rigoberto Flower MD;  Location: [facility] ENDOSCOPY;  Service: Gastroenterology   • ENDOSCOPY N/A " 9/17/2019    Procedure: ESOPHAGOGASTRODUODENOSCOPY;  Surgeon: Rigoberto Flower MD;  Location: [facility] ENDOSCOPY;  Service: Gastroenterology   • ENDOSCOPY N/A 3/22/2023    Procedure: ESOPHAGOGASTRODUODENOSCOPY;  Surgeon: Rigoberto Flower MD;  Location: [facility] ENDOSCOPY;  Service: Gastroenterology;  Laterality: N/A;   • HEMORRHOIDECTOMY       Family History   Problem Relation Age of Onset   • Alcohol abuse Mother    • Anxiety disorder Mother    • Arthritis Mother    • Asthma Mother    • Cancer Mother    • Depression Mother    • Hypertension Mother    • Stroke Mother    • Hyperlipidemia Father    • Thyroid disease Sister    • Alcohol abuse Sister    • Anxiety disorder Sister    • Cancer Sister    • Depression Sister    • Alcohol abuse Brother    • Anxiety disorder Brother    • Cancer Brother    • Depression Brother    • Hyperlipidemia Maternal Uncle    • Heart failure Maternal Uncle    • Heart disease Maternal Uncle        No Known Allergies  Social History     Socioeconomic History   • Marital status:    Tobacco Use   • Smoking status: Never   • Smokeless tobacco: Never   Vaping Use   • Vaping Use: Never used   Substance and Sexual Activity   • Alcohol use: Not Currently     Comment: rarely   • Drug use: Not Currently     Types: Marijuana, Methamphetamines   • Sexual activity: Defer     Current Medications:  Prior to Admission medications    Medication Sig Start Date End Date Taking? Authorizing Provider   atorvastatin (Lipitor) 20 MG tablet Take 1 tablet by mouth Every Night. 3/16/23  Yes Jhon Amezquita MD   ferrous sulfate 325 (65 Fe) MG tablet Take 1 tablet by mouth Daily With Breakfast. 2/20/23  Yes Jhon Amezquita MD   fluticasone (FLONASE) 50 MCG/ACT nasal spray 2 sprays by Each Nare route Daily. NO ADDITIONAL REFILLS WITHOUT AN APPOINTMENT 5/5/23  Yes Jhon Amezquita MD   hydrOXYzine (ATARAX) 25 MG tablet Take 1 tablet by mouth Every 8 (Eight) Hours As Needed for Anxiety. 4/18/23  Yes Jhon Amezquita  MD   levothyroxine (Synthroid) 50 MCG tablet Take 1 tablet by mouth Every Morning. NO ADDITIONAL REFILLS WITHOUT AN APPOINTMENT 5/4/23  Yes Jhon Amezquita MD   montelukast (SINGULAIR) 10 MG tablet Take 1 tablet by mouth every night at bedtime. NO ADDITIONAL REFILLS WITHOUT AN APPOINTMENT 5/5/23  Yes Jhon Amezquita MD   pantoprazole (PROTONIX) 40 MG EC tablet Take 1 tablet by mouth Daily. 2/8/23  Yes Renetta Moore APRN   rOPINIRole (REQUIP) 1 MG tablet Take 1 tablet by mouth every night at bedtime. NO ADDITIONAL REFILLS WITHOUT AN APPOINTMENT 5/5/23  Yes Jhon Amezquita MD   sucralfate (Carafate) 1 g tablet Take 1 tablet by mouth 4 (Four) Times a Day. 4/6/23  Yes Renetta Moore APRN   traZODone (DESYREL) 100 MG tablet Take 1 tablet by mouth Every Evening. NO ADDITIONAL REFILLS WITHOUT AN APPOINTMENT 5/5/23  Yes Jhon Amezquita MD   vitamin B-12 (CYANOCOBALAMIN) 500 MCG tablet Take 1 tablet by mouth Daily. 4/18/23  Yes Jhon Amezquita MD   vitamin D (ERGOCALCIFEROL) 1.25 MG (66335 UT) capsule capsule Take 1 capsule by mouth 1 (One) Time Per Week. 4/10/23  Yes Jhon Amezquita MD     Orders placed during this encounter include:  Orders Placed This Encounter   Procedures   • CT Abdomen With Contrast     Standing Status:   Future     Standing Expiration Date:   5/9/2024     Order Specific Question:   Release to patient     Answer:   Routine Release     Order Specific Question:   Will Oral Contrast be needed for this procedure?     Answer:   Yes   • CBC (No Diff)     Order Specific Question:   Release to patient     Answer:   Routine Release   • Comprehensive Metabolic Panel     Order Specific Question:   Release to patient     Answer:   Routine Release   • Obtain Informed Consent     Standing Status:   Future     Order Specific Question:   Informed Consent Given For     Answer:   COLONOSCOPY     COLONOSCOPY (N/A)  New Medications Ordered This Visit   Medications   • Clenpiq 10-3.5-12 MG-GM -GM/160ML solution      Sig: Take 160 mL by mouth See Admin Instructions.     Dispense:  320 mL     Refill:  0         Review and/or summary of lab tests, radiology, procedures, medications. Review and summary of old records and obtaining of history. The risks and benefits of my recommendations, as well as other treatment options were discussed . Any questions/concerned were answered. Patient voiced understanding and agreement.          This document has been electronically signed by YULIET Okeefe on May 22, 2023 09:07 CDT                                               Results for orders placed or performed in visit on 05/09/23   CBC (No Diff)    Specimen: Blood   Result Value Ref Range    WBC 9.28 3.40 - 10.80 10*3/mm3    RBC 5.43 4.14 - 5.80 10*6/mm3    Hemoglobin 14.9 13.0 - 17.7 g/dL    Hematocrit 45.4 37.5 - 51.0 %    MCV 83.6 79.0 - 97.0 fL    MCH 27.4 26.6 - 33.0 pg    MCHC 32.8 31.5 - 35.7 g/dL    RDW 13.1 12.3 - 15.4 %    RDW-SD 40.0 37.0 - 54.0 fl    MPV 10.3 6.0 - 12.0 fL    Platelets 220 140 - 450 10*3/mm3   Comprehensive Metabolic Panel    Specimen: Blood   Result Value Ref Range    Glucose 86 65 - 99 mg/dL    BUN 10 6 - 20 mg/dL    Creatinine 1.09 0.76 - 1.27 mg/dL    Sodium 140 136 - 145 mmol/L    Potassium 4.1 3.5 - 5.2 mmol/L    Chloride 102 98 - 107 mmol/L    CO2 26.6 22.0 - 29.0 mmol/L    Calcium 9.8 8.6 - 10.5 mg/dL    Total Protein 7.4 6.0 - 8.5 g/dL    Albumin 4.8 3.5 - 5.2 g/dL    ALT (SGPT) 27 1 - 41 U/L    AST (SGOT) 20 1 - 40 U/L    Alkaline Phosphatase 76 39 - 117 U/L    Total Bilirubin 0.7 0.0 - 1.2 mg/dL    Globulin 2.6 gm/dL    A/G Ratio 1.8 g/dL    BUN/Creatinine Ratio 9.2 7.0 - 25.0    Anion Gap 11.4 5.0 - 15.0 mmol/L    eGFR 86.4 >60.0 mL/min/1.73   Results for orders placed or performed during the hospital encounter of 03/22/23   TISSUE EXAM, [facility] ([facility])    Specimen: A: Gastric, Antrum; Tissue    B: Esophagus, Distal; Tissue   Result Value Ref Range    Reference Lab Report       [facility]  [address] [place] [address]  Phone: [phone] or [phone]  Fax: [phone]  Owen Villeda M.D., Medical Director    PATIENT NAME                           LABORATORY NO.  CRYSTAL GRACIA.                 [id number]  [id number]                         AGE              SEX  SSN           CLIENT REF #  [facility]           42      1980      [id number]   [id number]    [place]                       REQUESTING MKAVITHA.     ATTENDING M.D.     COPY TO.  [address] [place] [address]                 KELLY CAL[place] [address]             DATE COLLECTED      DATE RECEIVED      DATE REPORTED  2023    DIAGNOSIS:  A.   ANTRUM, BIOPSY:  Active chronic gastritis, mild  Negative for Helicobacter pylori  B.   ESOPHAGUS, BIOPSY, DISTAL:  Chronic esophagitis with focally increased eosinophils (up to 15 per high  power  field)    JBS/pah    COMMENT:  I ordered H. pylori immunohistochemical (IHC) stain on block  A1 because a typical inflammatory infiltrate was present, and organisms are not  seen on H&E staining.  H. pylori IHC stain is negative for Helicobacter pylori.  Control tissue stains as expected.    CLINICAL HISTORY:  Reflux esophagitis, Superficial non-erosive nonspecific gastritis    SPECIMENS RECEIVED:  A.  ANTRUM, BIOPSY  B.  ESOPHAGUS, BIOPSY, DISTAL    MICROSCOPIC DESCRIPTION:  Tissue blocks are prepared and slides are examined microscopically on all  specimens. See diagnosis for details.    The internal and external (both positive and negative) controls reacted  appropriately. Some of our immunohistochemical and in situ hybridization  studies are performed as analyte specific reagents. The following statement  applies to those tests: This test was developed, and its performance  characteristics determined by [facility]. It has not been  cleared or  "approved by the [place]  Food and Drug Administration. However, the  FDA has determined that approval and clearance are not necessary.    Professional interpretation rendered by Kennedy Wilkes M.D. at [facility], [address] [place] [address].    GROSS DESCRIPTION:  A.  Labeled \"antrum\".  Consists of 1 piece of tan soft tissue measuring 0.4 x  0.3 x 0.2 cm and is submitted entirely in 1 block.  ALEXY  B.  Labeled \"distal esophagus\".  Consists of 1 piece of tan soft tissue  measuring 0.5 x 0.2 x 0.1 cm and is submitted entirely in 1 block.    REVIEWED, DIAGNOSED AND ELECTRONICALLY  SIGNED BY:    Kennedy Wilkes M.D.  CPT CODES:  88305x2, 89354     Results for orders placed or performed in visit on 03/14/23   Alpha-Gal IgE Panel    Specimen: Blood   Result Value Ref Range    Class Description Comment     IgE 50 6 - 495 IU/mL    [id number] Alpha-Gal <0.10 Class 0 kU/L    Beef <0.10 Class 0 kU/L    Pork <0.10 Class 0 kU/L    Lamb <0.10 Class 0 kU/L   CBC Auto Differential    Specimen: Blood   Result Value Ref Range    WBC 7.46 3.40 - 10.80 10*3/mm3    RBC 5.55 4.14 - 5.80 10*6/mm3    Hemoglobin 15.3 13.0 - 17.7 g/dL    Hematocrit 45.7 37.5 - 51.0 %    MCV 82.3 79.0 - 97.0 fL    MCH 27.6 26.6 - 33.0 pg    MCHC 33.5 31.5 - 35.7 g/dL    RDW 13.0 12.3 - 15.4 %    RDW-SD 38.4 37.0 - 54.0 fl    MPV 10.1 6.0 - 12.0 fL    Platelets 268 140 - 450 10*3/mm3    Neutrophil % 60.8 42.7 - 76.0 %    Lymphocyte % 24.5 19.6 - 45.3 %    Monocyte % 9.9 5.0 - 12.0 %    Eosinophil % 3.1 0.3 - 6.2 %    Basophil % 0.8 0.0 - 1.5 %    Immature Grans % 0.9 (H) 0.0 - 0.5 %    Neutrophils, Absolute 4.53 1.70 - 7.00 10*3/mm3    Lymphocytes, Absolute 1.83 0.70 - 3.10 10*3/mm3    Monocytes, Absolute 0.74 0.10 - 0.90 10*3/mm3    Eosinophils, Absolute 0.23 0.00 - 0.40 10*3/mm3    Basophils, Absolute 0.06 0.00 - 0.20 10*3/mm3    Immature Grans, Absolute 0.07 (H) 0.00 - 0.05 10*3/mm3    nRBC 0.0 0.0 - 0.2 /100 WBC   Iron Profile    Specimen: " Blood   Result Value Ref Range    Iron 88 59 - 158 mcg/dL    Iron Saturation 30 20 - 50 %    Transferrin 194 (L) 200 - 360 mg/dL    TIBC 289 (L) 298 - 536 mcg/dL   Vitamin D,25-Hydroxy    Specimen: Blood   Result Value Ref Range    25 Hydroxy, Vitamin D 34.5 30.0 - 100.0 ng/ml   TSH    Specimen: Blood   Result Value Ref Range    TSH 0.594 0.270 - 4.200 uIU/mL   T4, Free    Specimen: Blood   Result Value Ref Range    Free T4 1.14 0.93 - 1.70 ng/dL   Vitamin B12    Specimen: Blood   Result Value Ref Range    Vitamin B-12 1,072 (H) 211 - 946 pg/mL   Lipid Panel    Specimen: Blood   Result Value Ref Range    Total Cholesterol 193 0 - 200 mg/dL    Triglycerides 184 (H) 0 - 150 mg/dL    HDL Cholesterol 34 (L) 40 - 60 mg/dL    LDL Cholesterol  126 (H) 0 - 100 mg/dL    VLDL Cholesterol 33 5 - 40 mg/dL    LDL/HDL Ratio 3.59    Comprehensive Metabolic Panel    Specimen: Blood   Result Value Ref Range    Glucose 100 (H) 65 - 99 mg/dL    BUN 14 6 - 20 mg/dL    Creatinine 0.98 0.76 - 1.27 mg/dL    Sodium 144 136 - 145 mmol/L    Potassium 4.3 3.5 - 5.2 mmol/L    Chloride 103 98 - 107 mmol/L    CO2 25.7 22.0 - 29.0 mmol/L    Calcium 9.6 8.6 - 10.5 mg/dL    Total Protein 7.8 6.0 - 8.5 g/dL    Albumin 4.9 3.5 - 5.2 g/dL    ALT (SGPT) 21 1 - 41 U/L    AST (SGOT) 19 1 - 40 U/L    Alkaline Phosphatase 82 39 - 117 U/L    Total Bilirubin 0.5 0.0 - 1.2 mg/dL    Globulin 2.9 gm/dL    A/G Ratio 1.7 g/dL    BUN/Creatinine Ratio 14.3 7.0 - 25.0    Anion Gap 15.3 (H) 5.0 - 15.0 mmol/L    eGFR 98.1 >60.0 mL/min/1.73   Results for orders placed or performed in visit on 11/29/22   Rheumatoid Factor    Specimen: Blood   Result Value Ref Range    Rheumatoid Factor Quantitative <10.0 0.0 - 14.0 IU/mL   [facility] Comprehensive Panel    Specimen: Blood   Result Value Ref Range    Anti-DNA (DS) Ab Qn <1 0 - 9 IU/mL    RNP Antibodies <0.2 0.0 - 0.9 AI    Aguilar Antibodies <0.2 0.0 - 0.9 AI    Antiscleroderma-70 Antibodies <0.2 0.0 - 0.9 AI    Sjogren's  Anti-SS-A <0.2 0.0 - 0.9 AI    Sjogren's Anti-SS-B <0.2 0.0 - 0.9 AI    Antichromatin Antibodies <0.2 0.0 - 0.9 AI    URIEL-1 IgG <0.2 0.0 - 0.9 AI    Anti-Centromere B Antibodies <0.2 0.0 - 0.9 AI    See below: Comment      *Note: Due to a large number of results and/or encounters for the requested time period, some results have not been displayed. A complete set of results can be found in Results Review.

## 2023-05-10 RX ORDER — PAROXETINE 10 MG/1
10 TABLET, FILM COATED ORAL EVERY MORNING
Qty: 30 TABLET | Refills: 3 | Status: SHIPPED | OUTPATIENT
Start: 2023-05-10

## 2023-05-15 RX ORDER — ATORVASTATIN CALCIUM 20 MG/1
TABLET, FILM COATED ORAL
Qty: 30 TABLET | Refills: 0 | Status: SHIPPED | OUTPATIENT
Start: 2023-05-15

## 2023-05-16 DIAGNOSIS — R10.12 LEFT UPPER QUADRANT PAIN: Primary | ICD-10-CM

## 2023-05-16 DIAGNOSIS — K62.5 RECTAL BLEEDING: ICD-10-CM

## 2023-05-16 DIAGNOSIS — R19.5 MUCUS IN STOOL: ICD-10-CM

## 2023-05-16 RX ORDER — ERGOCALCIFEROL 1.25 MG/1
CAPSULE ORAL
Qty: 4 CAPSULE | Refills: 0 | Status: SHIPPED | OUTPATIENT
Start: 2023-05-16

## 2023-05-26 RX ORDER — RISPERIDONE 1 MG/1
TABLET ORAL
Qty: 30 TABLET | Refills: 1 | OUTPATIENT
Start: 2023-05-26

## 2023-05-26 RX ORDER — CHOLECALCIFEROL (VITAMIN D3) 125 MCG
CAPSULE ORAL
Qty: 30 TABLET | Refills: 1 | Status: SHIPPED | OUTPATIENT
Start: 2023-05-26

## 2023-05-26 RX ORDER — FERROUS SULFATE 325(65) MG
TABLET ORAL
Qty: 30 TABLET | Refills: 1 | OUTPATIENT
Start: 2023-05-26

## 2023-05-26 RX ORDER — OMEPRAZOLE 40 MG/1
CAPSULE, DELAYED RELEASE ORAL
Qty: 30 CAPSULE | Refills: 0 | OUTPATIENT
Start: 2023-05-26

## 2023-05-30 ENCOUNTER — TELEPHONE (OUTPATIENT)
Dept: FAMILY MEDICINE CLINIC | Facility: CLINIC | Age: 43
End: 2023-05-30

## 2023-05-30 DIAGNOSIS — K62.5 HEMORRHAGE OF RECTUM AND ANUS: ICD-10-CM

## 2023-05-30 DIAGNOSIS — R10.12 ABDOMINAL PAIN, LEFT UPPER QUADRANT: Primary | ICD-10-CM

## 2023-05-30 DIAGNOSIS — R19.5 ABNORMAL FECES: ICD-10-CM

## 2023-05-30 NOTE — TELEPHONE ENCOUNTER
Spoke with patient to inform test results are ready for  from Dr Epi Potts.  He asked for them to be mailed, confirmed signed consent form & address in chart. Mailed on 5/31/2023.

## 2023-05-30 NOTE — PROGRESS NOTES
[facility]  [address] [place] [id number]  PHONE : [phone]  FAX: [phone]      DATE:  2023    PATIENT:   Jordin Parham 1980                                 MEDICAL RECORD #:  [id number]  Chronological age: 42 y.o.   Date of Psychological Assessment:   Examiner: Epi Potts, PhD   Licensed Psychologist    Tests Administered:  Cano Brief Intelligence Test (KBIT-2)  Wide Range Achievement Test- Fifth Edition (WRAT-5)  Brown ADD Scales (Brown ADD)  Desouza Depression Inventory (BDI-2)  Desouza Anxiety Inventory (NÉSTOR)  Vahid’s Incomplete Sentence Blank- Adult (RISB-A)  Clinical Interview and Review of Records    Identification and Referral Information:   Jordin Parham was referred by Jhon Amezquita MD for an assessment related to ADHD.    Presenting Problem and Background Information:   Jordin is presenting with the following symptoms: inattention, hyperactivity, academic underachievement, restlessness, fidgety, impulsivity, talkativeness, racing thoughts, mood swings, easily distracted, poor organizational skills, poor coping skills, interrupts others, quick temper, irritability, forgetfulness, and low tolerance to stress.     The symptoms have been present since childhood.      His mother  in 2018.        Behavioral Observations:  Jordin was alert and oriented to time, place, and person. His thought content did not appear to possess delusions or hallucinations. These results do not appear to be significantly influenced by the effects of visual, auditory, or motor deficits, environmental/economic or cultural differences. The following results are thought to be valid.      Test Results:  The interpretive information in this report should be viewed as only one source of hypotheses and no decision should be based solely on this information. This data should be integrated with all other sources of information in reaching  professional decisions about the individual. This report is confidential and intended for use by qualified professionals only.    KBIT-2  The KBIT-2 is a brief, individually administered measure of verbal and nonverbal intelligence for children, adolescents, and adults, spanning the ages from 4 to 90 years.    Jordin obtained an IQ Composite Standard Score of 84 which yielded a Percentile of 14 and places him in the Below Average range of intellectual functioning. A Percentile of 14 means that he scored as well as or better than 14 out of 100 peers in the sample population. At a 90% Confidence Interval his true IQ Score falls between 79 and 90.  Individuals with similar scores learn material at a diminished rate compared to same age peers and require a greater degree of examples, repetition and guided practice as same-aged peers.    The 8 point difference between the Verbal Standard Score (89, Average, 23%ile, 16.6 years) and the Nonverbal Standard Score (81, Below Average, 10%ile, 8.9 years) was not significant and suggests that his verbal reasoning abilities are equally developed compared to his verbal reasoning abilities.     WRAT-5   The WRAT-5 is a screening measure of academic achievement. Mr. Parham dropped out of the 8th grade at [employer]. He was sent to the [employer] school due to his misbehavior. He struggled academically.  He repeated the 1st and 4th grades. He was a  for [employer] ([id number]). He is self-employed as a  (since 2010).    The results of the WRAT-5 indicate he is performing at a Grade Score of 6.1 in Word Reading, with a Word Reading Subtest Standard Score of 78 and a Percentile Rank of 7.  On the Spelling Subtest, the examinee obtained a Standard Score of 65 (1%ile) and a Grade Score of 3.2.  On the Math Computation Subtest he obtained a score of 82 (12%ile) and a Grade Score of 4.6.  On the Sentence Comprehension, the examinee obtained a  Standard Score of 92 (30%ile) and a Grade Score of 10.9. On the Reading Composite the examinee obtained a Standard Score of 83 (13%ile).        The scores are commensurate with his cognitive ability.         Brown ADD Scales  The Brown ADD Scales help to assess a wide range of symptoms of executive function impairments associated with ADHD/ADD.  The Brown ADD Scales include 40 items that assess five clusters of ADHD-related executive function impairments.      Mr. Parham, his spouse, and Mr. Hopper completed the rating scales.  T-Scores 60 and above are considered clinically significant.  He obtained the following T-scores:      Activation Attention Effort Affect Memory Total Score   Self 89 91 88 77 75 91   Spouse  89 93 91 67 75 91     The results of the Brown ADD Scales indicate ADHD at home.  However, no data exists to establish the onset of symptoms.  A provisional diagnosis is warranted.     BDI-2  The BDI-2 is a 21 item, self-report instrument for measuring the severity of depression in adults and adolescents aged 13 years or older. The BDI-2 was developed for the assessment of symptoms corresponding to criteria for diagnosing depressive disorders listed in the American Psychiatric Association's Diagnostic and Statistical Manual of Mental Disorders (DSM-IV-TR). Scores above 28 are considered “severe”, 20-28 are “moderate”, 14-19 are “mild”, and 0-13 are “minimal”.        Mr. Parham obtained a score of 19 on the BDI-2. This score falls in the “mild” range of depressive symptoms.  He is endorsing clinically significant symptoms of depression.    NÉSTOR  The Desouza Anxiety Inventory (NÉSTOR) is a widely used 21-item self-report inventory used to assess anxiety levels in adults and adolescents. It has been used in multiple studies, including in treatment-outcome studies for individuals who have experienced traumas. The age range for the measure is from 17 to 80 years.      The NÉSTOR discriminates between anxious and  non-anxious groups.  Scores of 26 and above are “severe, 16-25 are “moderate”, 8-15 are “mild”, and 0-7 are “minimal”.    Mr. Parham obtained a score of 7 on the NÉSTOR.  This score falls in the “minimal” level of anxiety symptoms.  He is not endorsing clinically significant symptoms of anxiety.     RISB-A  Vahid’s Incomplete Sentence Blank- Adult is a 40 item fill-in-the blank project test designed to gather psychological data in the assessment process.  The results of the RISB-A indicate irritability, academic underachievement (by history), and poor reading skills.     Diagnosis  Problems Addressed this Visit    None  Visit Diagnoses     Major depressive disorder, recurrent episode, moderate with anxious distress        Attention deficit hyperactivity disorder, combined type          Diagnoses       Codes Comments    Major depressive disorder, recurrent episode, moderate with anxious distress     ICD-10-CM: F33.1  ICD-9-CM: 296.32     Attention deficit hyperactivity disorder, combined type     ICD-10-CM: F90.2  ICD-9-CM: 314.01 Provisional         Summary:   Jordin Parham was referred by Jhon Amezquita MD for an assessment related to ADHD.      The results of the K-BIT-2 indicate that he is performing in the Below Average range (84 IQ Composite).  The results of the WRAT-5 indicate he is performing with Grade Scores of 6.1 in Word Reading, 3.2 in Spelling, 4.6 in Math Computation, and 10.9 in Sentence Comprehension. The results of the Brown ADD indicate ADHD symptoms. The results of the BDI-2 indicate depression. The results of the NÉSTOR do not indicate anxiety. The results of the RISB-A indicate irritability, academic underachievement (by history), and poor reading skills.    Recommendations:  It is the recommendation of the undersigned that Jordin Parham receive:   1. Counseling as needed to address ADHD, and MDD  2. Psychiatric services as needed   3. Vocational and educational assistance as available     I  spent 60 minutes in direct face to face contact with patient.  Greater than 50% of this time was spent counseling patient and discussing plan of care.    Copied text within this note has been reviewed and is accurate as of 05/30/23            This document has been electronically signed by Epi Potts, PhD on May 30, 2023 09:43 CDT        Epi Potts, PhD   Licensed Psychologist

## 2023-05-31 RX ORDER — TRAZODONE HYDROCHLORIDE 100 MG/1
100 TABLET ORAL EVERY EVENING
Qty: 30 TABLET | Refills: 0 | Status: SHIPPED | OUTPATIENT
Start: 2023-05-31

## 2023-05-31 RX ORDER — ERGOCALCIFEROL 1.25 MG/1
50000 CAPSULE ORAL
Qty: 4 CAPSULE | Refills: 0 | Status: SHIPPED | OUTPATIENT
Start: 2023-05-31

## 2023-05-31 RX ORDER — ROPINIROLE 1 MG/1
1 TABLET, FILM COATED ORAL
Qty: 30 TABLET | Refills: 0 | Status: SHIPPED | OUTPATIENT
Start: 2023-05-31

## 2023-05-31 NOTE — TELEPHONE ENCOUNTER
Rx Refill Note  Requested Prescriptions     Pending Prescriptions Disp Refills   • traZODone (DESYREL) 100 MG tablet 30 tablet 0     Sig: Take 1 tablet by mouth Every Evening. NO ADDITIONAL REFILLS WITHOUT AN APPOINTMENT   • rOPINIRole (REQUIP) 1 MG tablet 30 tablet 0     Sig: Take 1 tablet by mouth every night at bedtime. NO ADDITIONAL REFILLS WITHOUT AN APPOINTMENT   • vitamin D (ERGOCALCIFEROL) 1.25 MG (97158 UT) capsule capsule 4 capsule 0     Sig: Take 1 capsule by mouth Every 7 (Seven) Days.      Last office visit with prescribing clinician: 11/29/2022   Last telemedicine visit with prescribing clinician: Visit date not found   Next office visit with prescribing clinician: Visit date not found   {TIP  Encounters:    Message sent to pt to call and schedule a follow up appointment. He was supposed to come back in 2 months from his last appt on 11/29/22.    {TIP  Please add Last Relevant Lab Date if appropriate             {TIP  Is Refill Pharmacy correct? yes    Would you like a call back once the refill request has been completed: [] Yes [] No    If the office needs to give you a call back, can they leave a voicemail: [] Yes [] No    Albina Sanchez LPN  05/31/23, 08:53 CDT

## 2023-06-04 PROBLEM — E53.8 VITAMIN B12 DEFICIENCY: Status: ACTIVE | Noted: 2023-06-04

## 2023-06-04 PROBLEM — E03.9 HYPOTHYROIDISM: Status: ACTIVE | Noted: 2023-06-04

## 2023-06-05 RX ORDER — CHLORAL HYDRATE 500 MG
CAPSULE ORAL
COMMUNITY

## 2023-06-07 ENCOUNTER — HOSPITAL ENCOUNTER (OUTPATIENT)
Facility: HOSPITAL | Age: 43
Setting detail: HOSPITAL OUTPATIENT SURGERY
Discharge: HOME | End: 2023-06-07
Attending: INTERNAL MEDICINE | Admitting: NURSE PRACTITIONER
Payer: COMMERCIAL

## 2023-06-07 ENCOUNTER — ANESTHESIA (OUTPATIENT)
Dept: GASTROENTEROLOGY | Facility: HOSPITAL | Age: 43
End: 2023-06-07
Payer: COMMERCIAL

## 2023-06-07 ENCOUNTER — ANESTHESIA EVENT (OUTPATIENT)
Dept: GASTROENTEROLOGY | Facility: HOSPITAL | Age: 43
End: 2023-06-07
Payer: COMMERCIAL

## 2023-06-07 VITALS
DIASTOLIC BLOOD PRESSURE: 64 MMHG | HEART RATE: 53 BPM | BODY MASS INDEX: 32.5 KG/M2 | OXYGEN SATURATION: 93 % | HEIGHT: 70 IN | SYSTOLIC BLOOD PRESSURE: 100 MMHG | TEMPERATURE: 97.1 F | RESPIRATION RATE: 18 BRPM | WEIGHT: 227 LBS

## 2023-06-07 DIAGNOSIS — R19.5 MUCUS IN STOOL: ICD-10-CM

## 2023-06-07 DIAGNOSIS — K62.5 RECTAL BLEEDING: ICD-10-CM

## 2023-06-07 DIAGNOSIS — R10.12 LEFT UPPER QUADRANT PAIN: ICD-10-CM

## 2023-06-07 PROCEDURE — 25010000002 PROPOFOL 10 MG/ML EMULSION: Performed by: NURSE ANESTHETIST, CERTIFIED REGISTERED

## 2023-06-07 PROCEDURE — 45380 COLONOSCOPY AND BIOPSY: CPT | Performed by: INTERNAL MEDICINE

## 2023-06-07 RX ORDER — PROPOFOL 10 MG/ML
VIAL (ML) INTRAVENOUS AS NEEDED
Status: DISCONTINUED | OUTPATIENT
Start: 2023-06-07 | End: 2023-06-07 | Stop reason: SURG

## 2023-06-07 RX ORDER — LIDOCAINE HYDROCHLORIDE 20 MG/ML
INJECTION, SOLUTION INTRAVENOUS AS NEEDED
Status: DISCONTINUED | OUTPATIENT
Start: 2023-06-07 | End: 2023-06-07 | Stop reason: SURG

## 2023-06-07 RX ORDER — PROMETHAZINE HYDROCHLORIDE 25 MG/1
25 TABLET ORAL ONCE AS NEEDED
Status: DISCONTINUED | OUTPATIENT
Start: 2023-06-07 | End: 2023-06-07 | Stop reason: HOSPADM

## 2023-06-07 RX ORDER — DEXTROSE AND SODIUM CHLORIDE 5; .45 G/100ML; G/100ML
30 INJECTION, SOLUTION INTRAVENOUS CONTINUOUS PRN
Status: DISCONTINUED | OUTPATIENT
Start: 2023-06-07 | End: 2023-06-07 | Stop reason: HOSPADM

## 2023-06-07 RX ORDER — PROMETHAZINE HYDROCHLORIDE 25 MG/1
25 SUPPOSITORY RECTAL ONCE AS NEEDED
Status: DISCONTINUED | OUTPATIENT
Start: 2023-06-07 | End: 2023-06-07 | Stop reason: HOSPADM

## 2023-06-07 RX ORDER — MEPERIDINE HYDROCHLORIDE 25 MG/ML
12.5 INJECTION INTRAMUSCULAR; INTRAVENOUS; SUBCUTANEOUS
Status: DISCONTINUED | OUTPATIENT
Start: 2023-06-07 | End: 2023-06-07 | Stop reason: HOSPADM

## 2023-06-07 RX ORDER — ONDANSETRON 2 MG/ML
4 INJECTION INTRAMUSCULAR; INTRAVENOUS ONCE AS NEEDED
Status: DISCONTINUED | OUTPATIENT
Start: 2023-06-07 | End: 2023-06-07 | Stop reason: HOSPADM

## 2023-06-07 RX ADMIN — PROPOFOL 200 MG: 10 INJECTION, EMULSION INTRAVENOUS at 15:51

## 2023-06-07 RX ADMIN — LIDOCAINE HYDROCHLORIDE 100 MG: 20 INJECTION, SOLUTION INTRAVENOUS at 15:40

## 2023-06-07 RX ADMIN — PROPOFOL 110 MG: 10 INJECTION, EMULSION INTRAVENOUS at 15:40

## 2023-06-07 RX ADMIN — DEXTROSE AND SODIUM CHLORIDE 30 ML/HR: 5; 450 INJECTION, SOLUTION INTRAVENOUS at 14:58

## 2023-06-07 NOTE — ANESTHESIA PREPROCEDURE EVALUATION
Anesthesia Evaluation     Patient summary reviewed   NPO Solid Status: > 8 hours  NPO Liquid Status: > 2 hours           Airway   Mallampati: II  TM distance: >3 FB  Neck ROM: full  No difficulty expected  Dental    (+) lower dentures and upper dentures    Pulmonary - normal exam   Cardiovascular - normal exam        Neuro/Psych  GI/Hepatic/Renal/Endo    (+) obesity, GERD well controlled, liver disease fatty liver disease    Musculoskeletal     Abdominal  - normal exam   Substance History - negative use     OB/GYN negative ob/gyn ROS         Other                        Anesthesia Plan    ASA 2     general and MAC   total IV anesthesia  intravenous induction     Anesthetic plan, risks, benefits, and alternatives have been provided, discussed and informed consent has been obtained with: patient.  Pre-procedure education provided  Plan discussed with CRNA and medical student.      CODE STATUS:

## 2023-06-08 ENCOUNTER — OFFICE VISIT (OUTPATIENT)
Dept: FAMILY MEDICINE CLINIC | Facility: CLINIC | Age: 43
End: 2023-06-08
Payer: COMMERCIAL

## 2023-06-08 VITALS
BODY MASS INDEX: 34.27 KG/M2 | HEIGHT: 70 IN | DIASTOLIC BLOOD PRESSURE: 72 MMHG | OXYGEN SATURATION: 97 % | SYSTOLIC BLOOD PRESSURE: 120 MMHG | WEIGHT: 239.4 LBS | TEMPERATURE: 97.9 F | HEART RATE: 62 BPM

## 2023-06-08 DIAGNOSIS — T78.1XXA FOOD SENSITIVITY WITH GASTROINTESTINAL SYMPTOMS: ICD-10-CM

## 2023-06-08 DIAGNOSIS — F41.1 GAD (GENERALIZED ANXIETY DISORDER): ICD-10-CM

## 2023-06-08 DIAGNOSIS — R10.84 GENERALIZED ABDOMINAL PAIN: ICD-10-CM

## 2023-06-08 DIAGNOSIS — E61.1 IRON DEFICIENCY: ICD-10-CM

## 2023-06-08 DIAGNOSIS — E03.9 HYPOTHYROIDISM, UNSPECIFIED TYPE: ICD-10-CM

## 2023-06-08 DIAGNOSIS — K20.0 EOSINOPHILIC ESOPHAGITIS: ICD-10-CM

## 2023-06-08 DIAGNOSIS — G47.00 INSOMNIA, UNSPECIFIED TYPE: ICD-10-CM

## 2023-06-08 DIAGNOSIS — Z91.018 ALLERGY TO ALPHA-GAL: ICD-10-CM

## 2023-06-08 DIAGNOSIS — E66.09 CLASS 1 OBESITY DUE TO EXCESS CALORIES WITH SERIOUS COMORBIDITY AND BODY MASS INDEX (BMI) OF 34.0 TO 34.9 IN ADULT: Primary | ICD-10-CM

## 2023-06-08 DIAGNOSIS — K21.00 GASTROESOPHAGEAL REFLUX DISEASE WITH ESOPHAGITIS WITHOUT HEMORRHAGE: ICD-10-CM

## 2023-06-08 DIAGNOSIS — G25.81 RESTLESS LEG SYNDROME: ICD-10-CM

## 2023-06-08 DIAGNOSIS — K76.0 FATTY LIVER: ICD-10-CM

## 2023-06-08 DIAGNOSIS — F41.0 PANIC ATTACK: ICD-10-CM

## 2023-06-08 DIAGNOSIS — E55.9 VITAMIN D DEFICIENCY: ICD-10-CM

## 2023-06-08 DIAGNOSIS — R79.89 HIGH SERUM VITAMIN B12: ICD-10-CM

## 2023-06-08 DIAGNOSIS — R73.01 IMPAIRED FASTING GLUCOSE: ICD-10-CM

## 2023-06-08 NOTE — PROGRESS NOTES
Subjective:  Jordin Parham is a 42 y.o. male who presents for       Patient Active Problem List   Diagnosis    Class 1 obesity with body mass index (BMI) of 30.0 to 30.9 in adult    Gastroesophageal reflux disease    COOKIE (generalized anxiety disorder)    Left upper quadrant pain    Low libido    Mood disorder    H. pylori infection    Renal impairment    Mixed hyperlipidemia    Vitamin D deficiency    History of drug use    Fatty liver    Renal cyst    Change in bowel habits    Mucus in stool    Allergy to alpha-gal    Bronchitis    Upper respiratory tract infection    Eosinophilic esophagitis    Onychomycosis    Bilateral hand pain    Bilateral wrist pain    Overweight    Pruritic rash    Insomnia    Arthritis of right hand    Bilateral swelling of feet    Blood in stool    Cough    Panic attacks    Restless leg syndrome    Restless legs    Panic attack    Ear pain, bilateral    Class 1 obesity without serious comorbidity with body mass index (BMI) of 30.0 to 30.9 in adult    Food sensitivity with gastrointestinal symptoms    Inattention    History of ADHD    Class 1 obesity with serious comorbidity and body mass index (BMI) of 33.0 to 33.9 in adult    Chronic superficial gastritis without bleeding    Esophageal dysphagia    Rectal bleeding    Hypothyroidism    Vitamin B12 deficiency    High serum vitamin B12    Class 1 obesity due to excess calories with serious comorbidity and body mass index (BMI) of 34.0 to 34.9 in adult    Iron deficiency           Current Outpatient Medications:     atorvastatin (LIPITOR) 20 MG tablet, TAKE ONE TABLET BY MOUTH AT BEDTIME, Disp: 30 tablet, Rfl: 0    Cariprazine HCl (Vraylar) 1.5 MG capsule capsule, Take 1 capsule by mouth Daily., Disp: 30 capsule, Rfl: 3    Clenpiq 10-3.5-12 MG-GM -GM/160ML solution, Take 160 mL by mouth See Admin Instructions., Disp: 320 mL, Rfl: 0    ferrous sulfate 325 (65 Fe) MG tablet, Take 1 tablet by mouth Daily With Breakfast., Disp: 30  tablet, Rfl: 3    fluticasone (FLONASE) 50 MCG/ACT nasal spray, 2 sprays by Each Nare route Daily. NO ADDITIONAL REFILLS WITHOUT AN APPOINTMENT, Disp: 16 g, Rfl: 0    hydrOXYzine (ATARAX) 25 MG tablet, Take 1 tablet by mouth Every 8 (Eight) Hours As Needed for Anxiety., Disp: 90 tablet, Rfl: 3    levothyroxine (Synthroid) 50 MCG tablet, Take 1 tablet by mouth Every Morning. NO ADDITIONAL REFILLS WITHOUT AN APPOINTMENT, Disp: 90 tablet, Rfl: 0    montelukast (SINGULAIR) 10 MG tablet, Take 1 tablet by mouth every night at bedtime. NO ADDITIONAL REFILLS WITHOUT AN APPOINTMENT, Disp: 30 tablet, Rfl: 0    Omega-3 Fatty Acids (fish oil) 1000 MG capsule capsule, Take  by mouth Daily With Breakfast., Disp: , Rfl:     pantoprazole (PROTONIX) 40 MG EC tablet, Take 1 tablet by mouth Daily., Disp: 30 tablet, Rfl: 5    PARoxetine (Paxil) 10 MG tablet, Take 1 tablet by mouth Every Morning., Disp: 30 tablet, Rfl: 3    rOPINIRole (REQUIP) 1 MG tablet, Take 1 tablet by mouth every night at bedtime. NO ADDITIONAL REFILLS WITHOUT AN APPOINTMENT, Disp: 30 tablet, Rfl: 0    sucralfate (Carafate) 1 g tablet, Take 1 tablet by mouth 4 (Four) Times a Day., Disp: 120 tablet, Rfl: 2    traZODone (DESYREL) 100 MG tablet, Take 1 tablet by mouth Every Evening. NO ADDITIONAL REFILLS WITHOUT AN APPOINTMENT, Disp: 30 tablet, Rfl: 0    vitamin B-12 (CYANOCOBALAMIN) 500 MCG tablet, TAKE ONE TABLET BY MOUTH EVERY DAY, Disp: 30 tablet, Rfl: 1    vitamin D (ERGOCALCIFEROL) 1.25 MG (11584 UT) capsule capsule, Take 1 capsule by mouth Every 7 (Seven) Days., Disp: 4 capsule, Rfl: 0  No current facility-administered medications for this visit.    HPI    Pt is 43 yo male with management  of obesity, COOKIE, major depression, panic attacks GERD with esophagitis, gastritis,  history of esophageal stenosis  , mood disorder, history of H pylori infection, VItamin D deficiency,  Renal impairment, History of illicit drug use, fatty liver disease, gastritis,   Eosinophilic esophagitis, internal/external hemorrhoids, sp hemorrhoidectomy, history of [place] fever, internal/external hemorrhoids hypothyroidism         11/29/22 in office visit for recheck. Pt was started on dexilant since last visit for his GERD and advised to make an appt with Gastroenterology. He had labowrk done on 10/12/22 that showed lipid panel LDL at 110  With HDL at 33. CMP showed stable kidney and liver function. Rest of labwork was stable. Pt states he developed dysphagia about 3 weeks ago and has difficulty swallowing both solids and liquids. He states food has difficult time going down his esophagus. His last EGD was in 2019 that showed esophagitis and gastritis.  He also gets tired easily during the day. He has not had a sleep study. Pt also would like to be tested for rheumatoid has hip pain both sides. Mother had a history of rheumatoid arthritis.      6/8/23 in office visit for recheck. Pt saw Behavioral health on 12/15/22 and 4/19/23. Pt also has been seeing Gastroenterology and had endoscopy on 3/22/23 that showed intrinsic stenosis and gastritis. Biopsy showed active chronic gastritis with chronic esophagitis with increased eosinophils. Pt saw GI again on 5/9/23 and he was recommended colonoscopy and a CT of abdomen was ordered. He is to continue PPI and carafate. He was recommended referral to allergies. Pt was recently admitted to [facility]. Pt has colonoscopy on 6/7/23 that showed internal/external hemorrhoids, biopsies were taken. He has been doing accupuncture. In [facility]. He has done in twice and now he eats whatever he wants including meat and pork. He has no abdominal pain no diarrhea or vomiting. He was also diagnosed with severe major depression by he psychologist. He continues paxil. Pt reports no suicidal thoughts or ideations.  His anxiety and depression could still improve     Heartburn  He complains of abdominal pain. He reports no chest pain, no choking, no coughing, no  nausea, no sore throat or no wheezing. This is a recurrent problem. The current episode started more than 1 month ago. Nothing aggravates the symptoms. Associated symptoms include fatigue. There are no known risk factors. He has tried a PPI for the symptoms. Past procedures do not include an abdominal ultrasound, an EGD, esophageal manometry, H. pylori antibody titer or a UGI. Past invasive treatments do not include gastroplasty, gastroplication or reflux surgery.    Obesity  This is a chronic problem. The current episode started more than 1 year ago. The problem occurs constantly. Associated symptoms include abdominal pain, fatigue, numbness and weakness. Pertinent negatives include no anorexia, arthralgias, change in bowel habit, chest pain, chills, congestion, coughing, diaphoresis, fever, headaches, joint swelling, myalgias, nausea, neck pain, sore throat, swollen glands, urinary symptoms, vertigo, visual change or vomiting. Nothing aggravates the symptoms. He has tried nothing for the symptoms. The treatment provided no relief.   Fatigue  This is a chronic problem. The current episode started more than 1 month ago. The problem occurs constantly. The problem has been unchanged. Associated symptoms include abdominal pain, fatigue, numbness and weakness. Pertinent negatives include no anorexia, arthralgias, change in bowel habit, chest pain, chills, congestion, coughing, diaphoresis, fever, headaches, joint swelling, myalgias, nausea, neck pain, sore throat, swollen glands, urinary symptoms, vertigo, visual change or vomiting. Nothing aggravates the symptoms. He has tried nothing for the symptoms. The treatment provided no relief.   Anxiety  Presents for follow-up visit. Symptoms include decreased concentration, depressed mood, excessive worry, irritability, malaise, nervous/anxious behavior, obsessions and restlessness. Patient reports no chest pain, compulsions, confusion, dizziness, dry mouth, feeling of  choking, hyperventilation, impotence, insomnia, muscle tension, nausea, palpitations, panic, shortness of breath or suicidal ideas. The severity of symptoms is severe  The quality of sleep is fair.      His past medical history is significant for depression. Compliance with medications is %.   Depression  Visit Type: follow-up  Patient presents with the following symptoms: decreased concentration, depressed mood, excessive worry, irritability, malaise, nervousness/anxiety, obsessions and restlessness.  Patient is not experiencing: anhedonia, chest pain, choking sensation, compulsions, confusion, dizziness, dry mouth, fatigue, feelings of hopelessness, feelings of worthlessness, hypersomnia, hyperventilation, impotence, insomnia, memory impairment, muscle tension, nausea, palpitations, panic, psychomotor agitation, shortness of breath, suicidal ideas, suicidal planning, thoughts of death, weight gain and weight loss.  Severity: moderate   Sleep quality: fair  Compliance with medications:  %   GI Problem  The primary symptoms include abdominal pain. Primary symptoms do not include fever, weight loss, fatigue, nausea, vomiting, diarrhea, melena, hematemesis, jaundice, hematochezia, dysuria, myalgias, arthralgias or rash.   The illness does not include chills, anorexia, dysphagia, odynophagia, bloating, constipation, tenesmus, back pain or itching. Significant associated medical issues include GERD. Associated medical issues do not include inflammatory bowel disease, gallstones, liver disease, alcohol abuse, PUD, gastric bypass, bowel resection, irritable bowel syndrome, hemorrhoids or diverticulitis.     Review of Systems  Review of Systems   Constitutional:  Positive for activity change and fatigue. Negative for appetite change, chills, diaphoresis and fever.   HENT:  Negative for congestion, postnasal drip, rhinorrhea, sinus pressure, sinus pain, sneezing, sore throat, trouble swallowing and voice change.     Respiratory:  Negative for cough, choking, chest tightness, shortness of breath, wheezing and stridor.    Cardiovascular:  Negative for chest pain.   Gastrointestinal:  Positive for abdominal pain and rectal pain. Negative for diarrhea, nausea and vomiting.   Musculoskeletal:  Positive for arthralgias.   Allergic/Immunologic: Positive for environmental allergies and food allergies.   Neurological:  Positive for weakness and numbness. Negative for headaches.   Psychiatric/Behavioral:  The patient is nervous/anxious.      Patient Active Problem List   Diagnosis    Class 1 obesity with body mass index (BMI) of 30.0 to 30.9 in adult    Gastroesophageal reflux disease    COOKIE (generalized anxiety disorder)    Left upper quadrant pain    Low libido    Mood disorder    H. pylori infection    Renal impairment    Mixed hyperlipidemia    Vitamin D deficiency    History of drug use    Fatty liver    Renal cyst    Change in bowel habits    Mucus in stool    Allergy to alpha-gal    Bronchitis    Upper respiratory tract infection    Eosinophilic esophagitis    Onychomycosis    Bilateral hand pain    Bilateral wrist pain    Overweight    Pruritic rash    Insomnia    Arthritis of right hand    Bilateral swelling of feet    Blood in stool    Cough    Panic attacks    Restless leg syndrome    Restless legs    Panic attack    Ear pain, bilateral    Class 1 obesity without serious comorbidity with body mass index (BMI) of 30.0 to 30.9 in adult    Food sensitivity with gastrointestinal symptoms    Inattention    History of ADHD    Class 1 obesity with serious comorbidity and body mass index (BMI) of 33.0 to 33.9 in adult    Chronic superficial gastritis without bleeding    Esophageal dysphagia    Rectal bleeding    Hypothyroidism    Vitamin B12 deficiency    High serum vitamin B12    Class 1 obesity due to excess calories with serious comorbidity and body mass index (BMI) of 34.0 to 34.9 in adult    Iron deficiency     Past Surgical  History:   Procedure Laterality Date    COLONOSCOPY N/A 9/17/2019    Procedure: COLONOSCOPY;  Surgeon: Rigoberto Flower MD;  Location: [facility] ENDOSCOPY;  Service: Gastroenterology    ENDOSCOPY N/A 9/17/2019    Procedure: ESOPHAGOGASTRODUODENOSCOPY;  Surgeon: Rigoberto Flower MD;  Location: [facility] ENDOSCOPY;  Service: Gastroenterology    ENDOSCOPY N/A 3/22/2023    Procedure: ESOPHAGOGASTRODUODENOSCOPY;  Surgeon: Rigoberto Flower MD;  Location: [facility] ENDOSCOPY;  Service: Gastroenterology;  Laterality: N/A;    HEMORRHOIDECTOMY       Social History     Socioeconomic History    Marital status:    Tobacco Use    Smoking status: Never    Smokeless tobacco: Never   Vaping Use    Vaping Use: Never used   Substance and Sexual Activity    Alcohol use: Not Currently     Comment: rarely    Drug use: Not Currently     Types: Marijuana, Methamphetamines    Sexual activity: Yes     Partners: Female     Comment:      Family History   Problem Relation Age of Onset    Alcohol abuse Mother     Anxiety disorder Mother     Arthritis Mother     Asthma Mother     Cancer Mother     Depression Mother     Hypertension Mother     Stroke Mother     Hyperlipidemia Father     Thyroid disease Sister     Alcohol abuse Sister     Anxiety disorder Sister     Cancer Sister     Depression Sister     Alcohol abuse Brother     Anxiety disorder Brother     Cancer Brother     Depression Brother     Hyperlipidemia Maternal Uncle     Heart failure Maternal Uncle     Heart disease Maternal Uncle      Office Visit on 05/09/2023   Component Date Value Ref Range Status    WBC 05/09/2023 9.28  3.40 - 10.80 10*3/mm3 Final    RBC 05/09/2023 5.43  4.14 - 5.80 10*6/mm3 Final    Hemoglobin 05/09/2023 14.9  13.0 - 17.7 g/dL Final    Hematocrit 05/09/2023 45.4  37.5 - 51.0 % Final    MCV 05/09/2023 83.6  79.0 - 97.0 fL Final    MCH 05/09/2023 27.4  26.6 - 33.0 pg Final    MCHC 05/09/2023 32.8  31.5 - 35.7 g/dL Final    RDW 05/09/2023 13.1  12.3 - 15.4 %  Final    RDW-SD 2023 40.0  37.0 - 54.0 fl Final    MPV 2023 10.3  6.0 - 12.0 fL Final    Platelets 2023 220  140 - 450 10*3/mm3 Final    Glucose 2023 86  65 - 99 mg/dL Final    BUN 2023 10  6 - 20 mg/dL Final    Creatinine 2023 1.09  0.76 - 1.27 mg/dL Final    Sodium 2023 140  136 - 145 mmol/L Final    Potassium 2023 4.1  3.5 - 5.2 mmol/L Final    Chloride 2023 102  98 - 107 mmol/L Final    CO2 2023 26.6  22.0 - 29.0 mmol/L Final    Calcium 2023 9.8  8.6 - 10.5 mg/dL Final    Total Protein 2023 7.4  6.0 - 8.5 g/dL Final    Albumin 2023 4.8  3.5 - 5.2 g/dL Final    ALT (SGPT) 2023 27  1 - 41 U/L Final    AST (SGOT) 2023 20  1 - 40 U/L Final    Alkaline Phosphatase 2023 76  39 - 117 U/L Final    Total Bilirubin 2023 0.7  0.0 - 1.2 mg/dL Final    Globulin 2023 2.6  gm/dL Final    A/G Ratio 2023 1.8  g/dL Final    BUN/Creatinine Ratio 2023 9.2  7.0 - 25.0 Final    Anion Gap 2023 11.4  5.0 - 15.0 mmol/L Final    eGFR 2023 86.4  >60.0 mL/min/1.73 Final   Admission on 2023, Discharged on 2023   Component Date Value Ref Range Status    Reference Lab Report 2023    Final                    Value:[facility]  [address] [place] [address]  Phone: [phone] or [phone]  Fax: [phone]  Owen Villeda M.D., Medical Director    PATIENT NAME                           LABORATORY NO.  [id number]  [address]ST[address] ZAIDI.                 [id number]  [id number]                         AGE              SEX  SSN           CLIENT REF #  [facility]           42      1980      [id number]   [id number]    [facility]                       REQUESTING M.D.     ATTENDING MBRAYAN     COPY TO[address]                 [place] [address]             DATE COLLECTED      DATE RECEIVED       "DATE REPORTED  03/22/2023 03/23/2023 03/28/2023    DIAGNOSIS:  A.   ANTRUM, BIOPSY:  Active chronic gastritis, mild  Negative for Helicobacter pylori  B.   ESOPHAGUS, BIOPSY, DISTAL:  Chronic esophagitis with focally increased eosinophils (up to 15 per high  power                           field)    JBS/pah    COMMENT:  I ordered H. pylori immunohistochemical (IHC) stain on block  A1 because a typical inflammatory infiltrate was present, and organisms are not  seen on H&E staining.  H. pylori IHC stain is negative for Helicobacter pylori.  Control tissue stains as expected.    CLINICAL HISTORY:  Reflux esophagitis, Superficial non-erosive nonspecific gastritis    SPECIMENS RECEIVED:  A.  ANTRUM, BIOPSY  B.  ESOPHAGUS, BIOPSY, DISTAL    MICROSCOPIC DESCRIPTION:  Tissue blocks are prepared and slides are examined microscopically on all  specimens. See diagnosis for details.    The internal and external (both positive and negative) controls reacted  appropriately. Some of our immunohistochemical and in situ hybridization  studies are performed as analyte specific reagents. The following statement  applies to those tests: This test was developed, and its performance  characteristics determined by [facility]. It has not been  cleared or approved by the [place]                           Food and Drug Administration. However, the  FDA has determined that approval and clearance are not necessary.    Professional interpretation rendered by Kennedy Wilkes M.D. at [facility],  [facility], [address] [place] [address].    GROSS DESCRIPTION:  A.  Labeled \"antrum\".  Consists of 1 piece of tan soft tissue measuring 0.4 x  0.3 x 0.2 cm and is submitted entirely in 1 block.  ALEXY  B.  Labeled \"distal esophagus\".  Consists of 1 piece of tan soft tissue  measuring 0.5 x 0.2 x 0.1 cm and is submitted entirely in 1 block.    REVIEWED, DIAGNOSED AND ELECTRONICALLY  SIGNED BY:    Kennedy Wilkes, " M.D.  CPT CODES:  88305x2, 31863     Lab on 03/14/2023   Component Date Value Ref Range Status    WBC 03/14/2023 7.46  3.40 - 10.80 10*3/mm3 Final    RBC 03/14/2023 5.55  4.14 - 5.80 10*6/mm3 Final    Hemoglobin 03/14/2023 15.3  13.0 - 17.7 g/dL Final    Hematocrit 03/14/2023 45.7  37.5 - 51.0 % Final    MCV 03/14/2023 82.3  79.0 - 97.0 fL Final    MCH 03/14/2023 27.6  26.6 - 33.0 pg Final    MCHC 03/14/2023 33.5  31.5 - 35.7 g/dL Final    RDW 03/14/2023 13.0  12.3 - 15.4 % Final    RDW-SD 03/14/2023 38.4  37.0 - 54.0 fl Final    MPV 03/14/2023 10.1  6.0 - 12.0 fL Final    Platelets 03/14/2023 268  140 - 450 10*3/mm3 Final    Neutrophil % 03/14/2023 60.8  42.7 - 76.0 % Final    Lymphocyte % 03/14/2023 24.5  19.6 - 45.3 % Final    Monocyte % 03/14/2023 9.9  5.0 - 12.0 % Final    Eosinophil % 03/14/2023 3.1  0.3 - 6.2 % Final    Basophil % 03/14/2023 0.8  0.0 - 1.5 % Final    Immature Grans % 03/14/2023 0.9 (H)  0.0 - 0.5 % Final    Neutrophils, Absolute 03/14/2023 4.53  1.70 - 7.00 10*3/mm3 Final    Lymphocytes, Absolute 03/14/2023 1.83  0.70 - 3.10 10*3/mm3 Final    Monocytes, Absolute 03/14/2023 0.74  0.10 - 0.90 10*3/mm3 Final    Eosinophils, Absolute 03/14/2023 0.23  0.00 - 0.40 10*3/mm3 Final    Basophils, Absolute 03/14/2023 0.06  0.00 - 0.20 10*3/mm3 Final    Immature Grans, Absolute 03/14/2023 0.07 (H)  0.00 - 0.05 10*3/mm3 Final    nRBC 03/14/2023 0.0  0.0 - 0.2 /100 WBC Final    Glucose 03/14/2023 100 (H)  65 - 99 mg/dL Final    BUN 03/14/2023 14  6 - 20 mg/dL Final    Creatinine 03/14/2023 0.98  0.76 - 1.27 mg/dL Final    Sodium 03/14/2023 144  136 - 145 mmol/L Final    Potassium 03/14/2023 4.3  3.5 - 5.2 mmol/L Final    Chloride 03/14/2023 103  98 - 107 mmol/L Final    CO2 03/14/2023 25.7  22.0 - 29.0 mmol/L Final    Calcium 03/14/2023 9.6  8.6 - 10.5 mg/dL Final    Total Protein 03/14/2023 7.8  6.0 - 8.5 g/dL Final    Albumin 03/14/2023 4.9  3.5 - 5.2 g/dL Final    ALT (SGPT) 03/14/2023 21  1 - 41 U/L  Final    AST (SGOT) 03/14/2023 19  1 - 40 U/L Final    Alkaline Phosphatase 03/14/2023 82  39 - 117 U/L Final    Total Bilirubin 03/14/2023 0.5  0.0 - 1.2 mg/dL Final    Globulin 03/14/2023 2.9  gm/dL Final    A/G Ratio 03/14/2023 1.7  g/dL Final    BUN/Creatinine Ratio 03/14/2023 14.3  7.0 - 25.0 Final    Anion Gap 03/14/2023 15.3 (H)  5.0 - 15.0 mmol/L Final    eGFR 03/14/2023 98.1  >60.0 mL/min/1.73 Final    Total Cholesterol 03/14/2023 193  0 - 200 mg/dL Final    Triglycerides 03/14/2023 184 (H)  0 - 150 mg/dL Final    HDL Cholesterol 03/14/2023 34 (L)  40 - 60 mg/dL Final    LDL Cholesterol  03/14/2023 126 (H)  0 - 100 mg/dL Final    VLDL Cholesterol 03/14/2023 33  5 - 40 mg/dL Final    LDL/HDL Ratio 03/14/2023 3.59   Final    TSH 03/14/2023 0.594  0.270 - 4.200 uIU/mL Final    Free T4 03/14/2023 1.14  0.93 - 1.70 ng/dL Final    25 Hydroxy, Vitamin D 03/14/2023 34.5  30.0 - 100.0 ng/ml Final    Vitamin B-12 03/14/2023 1,072 (H)  211 - 946 pg/mL Final    Class Description 03/14/2023 Comment   Final        Levels of Specific IgE       Class  Description of Class      ---------------------------  -----  --------------------                     < 0.10         0         Negative             0.10 -    0.31         0/I       Equivocal/Low             0.32 -    0.55         I         Low             0.56 -    1.40         II        Moderate             1.41 -    3.90         III       High             3.91 -   19.00         IV        Very High            19.01 -  100.00         V         Very High                    >100.00         VI        Very High    IgE 03/14/2023 50  6 - 495 IU/mL Final    [id number] Alpha-Gal 03/14/2023 <0.10  Class 0 kU/L Final    Beef 03/14/2023 <0.10  Class 0 kU/L Final    Pork 03/14/2023 <0.10  Class 0 kU/L Final    Lamb 03/14/2023 <0.10  Class 0 kU/L Final    Iron 03/14/2023 88  59 - 158 mcg/dL Final    Iron Saturation (TSAT) 03/14/2023 30  20 - 50 % Final    Transferrin 03/14/2023 194 (L)   "200 - 360 mg/dL Final    TIBC 03/14/2023 289 (L)  298 - 536 mcg/dL Final      XR Spine Lumbar 4+ View  Narrative: EXAMINATION:  XR SPINE LUMBAR COMPLETE 4+VW    CLINICAL HISTORY:  41 years Male,numbness of left toe, R20.0  Anesthesia of skin    COMPARISON:  None    FINDINGS:  Five lumbar type vertebral bodies demonstrate normal  height and alignment. Interspinous distances are preserved. Facet  articulations are aligned without appreciable facet degenerative  change. No appreciable bony neuroforaminal narrowing with oblique  imaging.  Impression: Negative exam of the lumbar spine.    Electronically signed by:  Jose Travis MD  3/23/2022 1:08 PM CDT  Workstation: [id number]    [email or web address]  Immunization History   Administered Date(s) Administered    COVID-19 (MODERNA) 1st,2nd,3rd Dose Monovalent 09/14/2021    FluLaval/Fluzone >6mos 11/30/2020, 09/28/2021    Td 01/04/1996    Tdap 06/20/2019       The following portions of the patient's history were reviewed and updated as appropriate: allergies, current medications, past family history, past medical history, past social history, past surgical history and problem list.        Physical Exam  /72 (BP Location: Right arm, Patient Position: Sitting, Cuff Size: Adult)   Pulse 62   Temp 97.9 °F (36.6 °C)   Ht 177.8 cm (70\")   Wt 109 kg (239 lb 6.4 oz)   SpO2 97%   BMI 34.35 kg/m²     Physical Exam  Vitals and nursing note reviewed.   Constitutional:       Appearance: He is well-developed. He is not diaphoretic.   HENT:      Head: Normocephalic and atraumatic.      Right Ear: External ear normal.   Eyes:      Conjunctiva/sclera: Conjunctivae normal.      Pupils: Pupils are equal, round, and reactive to light.   Cardiovascular:      Rate and Rhythm: Normal rate and regular rhythm.      Heart sounds: Normal heart sounds. No murmur heard.  Pulmonary:      Effort: Pulmonary effort is normal. No respiratory distress.      Breath sounds: Normal breath sounds. "   Abdominal:      General: Bowel sounds are normal. There is no distension.      Palpations: Abdomen is soft.      Tenderness: There is no abdominal tenderness.   Musculoskeletal:         General: No deformity. Normal range of motion.      Cervical back: Normal range of motion and neck supple.   Skin:     General: Skin is warm.      Coloration: Skin is not pale.      Findings: No erythema or rash.   Neurological:      Mental Status: He is alert and oriented to person, place, and time.      Cranial Nerves: No cranial nerve deficit.   Psychiatric:         Behavior: Behavior normal.       [unfilled]   Diagnosis Plan   1. Class 1 obesity due to excess calories with serious comorbidity and body mass index (BMI) of 34.0 to 34.9 in adult        2. Allergy to alpha-gal  CBC Auto Differential    Comprehensive Metabolic Panel    Hemoglobin A1c    Lipid Panel    TSH    T4, Free    Vitamin D,25-Hydroxy    Vitamin B12    Iron Profile    Alpha-Gal IgE Panel      3. Vitamin D deficiency  CBC Auto Differential    Comprehensive Metabolic Panel    Hemoglobin A1c    Lipid Panel    TSH    T4, Free    Vitamin D,25-Hydroxy    Vitamin B12    Iron Profile      4. Gastroesophageal reflux disease with esophagitis without hemorrhage  CBC Auto Differential    Comprehensive Metabolic Panel    Hemoglobin A1c    Lipid Panel    TSH    T4, Free    Vitamin D,25-Hydroxy    Vitamin B12    Iron Profile      5. Eosinophilic esophagitis  CBC Auto Differential    Comprehensive Metabolic Panel    Hemoglobin A1c    Lipid Panel    TSH    T4, Free    Vitamin D,25-Hydroxy    Vitamin B12    Iron Profile      6. Fatty liver  CBC Auto Differential    Comprehensive Metabolic Panel    Hemoglobin A1c    Lipid Panel    TSH    T4, Free    Vitamin D,25-Hydroxy    Vitamin B12    Iron Profile      7. Food sensitivity with gastrointestinal symptoms  CBC Auto Differential    Comprehensive Metabolic Panel    Hemoglobin A1c    Lipid Panel    TSH    T4, Free    Vitamin  D,25-Hydroxy    Vitamin B12    Iron Profile      8. Restless leg syndrome  CBC Auto Differential    Comprehensive Metabolic Panel    Hemoglobin A1c    Lipid Panel    TSH    T4, Free    Vitamin D,25-Hydroxy    Vitamin B12    Iron Profile      9. COOKIE (generalized anxiety disorder)  CBC Auto Differential    Comprehensive Metabolic Panel    Hemoglobin A1c    Lipid Panel    TSH    T4, Free    Vitamin D,25-Hydroxy    Vitamin B12    Iron Profile      10. Panic attack  CBC Auto Differential    Comprehensive Metabolic Panel    Hemoglobin A1c    Lipid Panel    TSH    T4, Free    Vitamin D,25-Hydroxy    Vitamin B12    Iron Profile      11. Insomnia, unspecified type  CBC Auto Differential    Comprehensive Metabolic Panel    Hemoglobin A1c    Lipid Panel    TSH    T4, Free    Vitamin D,25-Hydroxy    Vitamin B12    Iron Profile      12. Generalized abdominal pain  CBC Auto Differential    Comprehensive Metabolic Panel    Hemoglobin A1c    Lipid Panel    TSH    T4, Free    Vitamin D,25-Hydroxy    Vitamin B12    Iron Profile      13. Hypothyroidism, unspecified type  CBC Auto Differential    Comprehensive Metabolic Panel    Hemoglobin A1c    Lipid Panel    TSH    T4, Free    Vitamin D,25-Hydroxy    Vitamin B12    Iron Profile      14. Impaired fasting glucose  CBC Auto Differential    Comprehensive Metabolic Panel    Hemoglobin A1c    Lipid Panel    TSH    T4, Free    Vitamin D,25-Hydroxy    Vitamin B12    Iron Profile      15. Iron deficiency  CBC Auto Differential    Comprehensive Metabolic Panel    Hemoglobin A1c    Lipid Panel    TSH    T4, Free    Vitamin D,25-Hydroxy    Vitamin B12    Iron Profile      16. High serum vitamin B12                      -recommend labwork   -daytime sleepiness/snoring - referred to sleep medicine   -family history of rheumatoid - last rheumatoid panel negative  -history of ADHD/inattention - recommend pt get evaluated with [facility]. Once evaluted can start on medication    -insomnia - recommend MIDNITE melatonin on trazodone 150 mg at bedtime. Refer to sleep medicine for possbile sleep study   -nausea/vomiting -  zofran 8 mg PO every 8 hours   -renal cyst - continue monitor.   -fatigue - consider sleep study. Will check testosterone level   -hypothyroidism - on synthroid 50 mcg PO q daily  -iron deficiency/restless legs - on requip 1 mg at bedtime on ferrous sulfate 325 mg PO q daily.   -obesity  counseled weight loss >5 minutes BMI at 34.35   -vitamin D deficiency -vitamin D once a week  -HLP - recommend DASH diet, heart healthy diet.  -GERD with  esophagitis, gastritis/fatty liver/eosinophilic esophagitis/alpha gal allergy/dysphagia/history of esophageal stenosis     GI following  on protonix 40 mg daily.  provided on carafate 1 gram QID. Had recent EGD. Colonoscopy done recently. Has biopsies pending. CT of abdomen was ordered  -insomnia- on trazodone 100 mg PO q daily.    -allergic rhintis - flonase nasal spray   -panic attack/COOKIE/mood disorder/major depression  - on paxil 10 mg every daily. Start on vraylar 1.5 mg PO q daily. Gave drug information. Stop  risperdal I recommend pt talk to therapy and counseling but he will hold for now and let me know when he does decide   -advised pt to be safe and call with questions and concerns. All questions addressed tdoay.   -advised pt to go to ER or call 911 if symptoms worrisome or severe  -advised pt to followup with specialist and referrals  -advised pt to be safe during COVID-19 pandemic  I spent 36 minutes caring for Jordin on this date of service. This time includes time spent by me in the following activities: preparing for the visit, reviewing tests, obtaining and/or reviewing a separately obtained history, performing a medically appropriate examination and/or evaluation, counseling and educating the patient/family/caregiver, ordering medications, tests, or procedures, referring and communicating with other health care  professionals, documenting information in the medical record, independently interpreting results and communicating that information with the patient/family/caregiver, and care coordination.    -recheck in  6 weeks         This document has been electronically signed by Jhon Amezquita MD on June 8, 2023 14:49 CDT

## 2023-06-09 LAB — REF LAB TEST METHOD: NORMAL

## 2023-06-19 RX ORDER — ROPINIROLE 1 MG/1
1 TABLET, FILM COATED ORAL
Qty: 90 TABLET | Refills: 0 | Status: SHIPPED | OUTPATIENT
Start: 2023-06-19

## 2023-06-19 RX ORDER — TRAZODONE HYDROCHLORIDE 100 MG/1
100 TABLET ORAL EVERY EVENING
Qty: 90 TABLET | Refills: 0 | Status: SHIPPED | OUTPATIENT
Start: 2023-06-19

## 2023-06-19 RX ORDER — PNV NO.95/FERROUS FUM/FOLIC AC 28MG-0.8MG
1 TABLET ORAL
Qty: 90 TABLET | Refills: 1 | Status: SHIPPED | OUTPATIENT
Start: 2023-06-19 | End: 2023-06-26

## 2023-06-19 RX ORDER — PAROXETINE 10 MG/1
10 TABLET, FILM COATED ORAL EVERY MORNING
Qty: 90 TABLET | Refills: 30 | Status: SHIPPED | OUTPATIENT
Start: 2023-06-19

## 2023-06-19 RX ORDER — ATORVASTATIN CALCIUM 20 MG/1
20 TABLET, FILM COATED ORAL NIGHTLY
Qty: 90 TABLET | Refills: 0 | Status: SHIPPED | OUTPATIENT
Start: 2023-06-19 | End: 2023-06-26

## 2023-06-19 NOTE — TELEPHONE ENCOUNTER
Pt's wife came by the office and stated pt only has 3 left of the vraylar 1.5mg and would like to get more samples and/or refills. Would like prescriptions to be sent to [facility] Call back [phone]

## 2023-07-25 DIAGNOSIS — E03.9 HYPOTHYROIDISM, UNSPECIFIED TYPE: ICD-10-CM

## 2023-07-25 RX ORDER — SUCRALFATE 1 G/1
TABLET ORAL
Qty: 332 TABLET | Refills: 0 | OUTPATIENT
Start: 2023-07-25

## 2023-07-25 RX ORDER — LEVOTHYROXINE SODIUM 0.05 MG/1
TABLET ORAL
Qty: 90 TABLET | Refills: 0 | Status: SHIPPED | OUTPATIENT
Start: 2023-07-25

## 2023-07-25 RX ORDER — FERROUS SULFATE 325(65) MG
TABLET ORAL
Qty: 30 TABLET | Refills: 3 | Status: SHIPPED | OUTPATIENT
Start: 2023-07-25

## 2023-07-25 RX ORDER — HYDROXYZINE HYDROCHLORIDE 25 MG/1
TABLET, FILM COATED ORAL
Qty: 90 TABLET | Refills: 3 | Status: SHIPPED | OUTPATIENT
Start: 2023-07-25

## 2023-07-25 RX ORDER — RISPERIDONE 1 MG/1
TABLET ORAL
Qty: 30 TABLET | Refills: 3 | Status: SHIPPED | OUTPATIENT
Start: 2023-07-25

## 2023-07-25 RX ORDER — FLUTICASONE PROPIONATE 50 MCG
SPRAY, SUSPENSION (ML) NASAL
Qty: 16 G | Refills: 3 | Status: SHIPPED | OUTPATIENT
Start: 2023-07-25

## 2023-07-25 RX ORDER — PANTOPRAZOLE SODIUM 40 MG/1
TABLET, DELAYED RELEASE ORAL
Qty: 30 TABLET | Refills: 2 | OUTPATIENT
Start: 2023-07-25

## 2023-07-25 RX ORDER — MONTELUKAST SODIUM 10 MG/1
TABLET ORAL
Qty: 30 TABLET | Refills: 3 | Status: SHIPPED | OUTPATIENT
Start: 2023-07-25

## 2023-08-11 DIAGNOSIS — E03.9 HYPOTHYROIDISM, UNSPECIFIED TYPE: ICD-10-CM

## 2023-08-11 RX ORDER — LEVOTHYROXINE SODIUM 0.05 MG/1
50 TABLET ORAL DAILY
Qty: 90 TABLET | Refills: 0 | Status: SHIPPED | OUTPATIENT
Start: 2023-08-11

## 2023-08-11 RX ORDER — RISPERIDONE 1 MG/1
1 TABLET ORAL EVERY EVENING
Qty: 90 TABLET | Refills: 0 | Status: SHIPPED | OUTPATIENT
Start: 2023-08-11

## 2023-08-14 RX ORDER — PAROXETINE HYDROCHLORIDE 20 MG/1
20 TABLET, FILM COATED ORAL EVERY MORNING
Qty: 30 TABLET | Refills: 3 | Status: SHIPPED | OUTPATIENT
Start: 2023-08-14

## 2024-01-04 NOTE — ANESTHESIA POSTPROCEDURE EVALUATION
Patient: Jordin Parham    Procedure Summary       Date: 06/07/23 Room / Location: [facility] ENDOSCOPY 1 / [facility] END[facility]COPY    Anesthesia Start: 1535 Anesthesia Stop: 1553    Procedure: COLONOSCOPY Diagnosis:       Left upper quadrant pain      Rectal bleeding      Mucus in stool      (Left upper quadrant pain [R10.12])      (Rectal bleeding [K62.5])      (Mucus in stool [R19.5])    Surgeons: Rigoberto Flower MD Provider: Garth Torres CRNA    Anesthesia Type: general, MAC ASA Status: 2            Anesthesia Type: general, MAC    Vitals  No vitals data found for the desired time range.          Post Anesthesia Care and Evaluation    Patient location during evaluation: PHASE II  Patient participation: waiting for patient participation  Level of consciousness: sleepy but conscious  Pain management: adequate    Airway patency: patent  Anesthetic complications: No anesthetic complications  PONV Status: none  Cardiovascular status: acceptable  Respiratory status: acceptable, spontaneous ventilation and room air  Hydration status: acceptable     33.8

## 2024-10-25 NOTE — TELEPHONE ENCOUNTER
Requested Prescriptions   Pending Prescriptions Disp Refills    rosuvastatin (CRESTOR) 5 MG tablet [Pharmacy Med Name: ROSUVASTATIN CALCIUM 5 MG TAB] 90 tablet 0     Sig: TAKE 1 TABLET BY MOUTH EVERY DAY       Antihyperlipidemic agents Passed - 10/25/2024  7:07 AM        Passed - LDL on file in the past 12 months        Passed - Medication is active on med list        Passed - Recent (12 mo) or future (90 days) visit within the authorizing provider's specialty     The patient must have completed an in-person or virtual visit within the past 12 months or has a future visit scheduled within the next 90 days with the authorizing provider s specialty.  Urgent care and e-visits do not quality as an office visit for this protocol.          Passed - Patient is age 18 years or older        Passed - No active pregnancy on record        Passed - No positive pregnancy test in past 12 mos              Sent through [employer].

## (undated) DEVICE — BITEBLOCK ENDO W/STRAP 60F A/ LF DISP

## (undated) DEVICE — SINGLE-USE BIOPSY FORCEPS: Brand: RADIAL JAW 4

## (undated) DEVICE — CLEANGUIDE DISPOSABLE MARKED SPRING TIP GUIDEWIRE, 1.86 MM X 210 CM: Brand: CLEANGUIDE

## (undated) DEVICE — CANN SMPL SOFTECH BIFLO ETCO2 A/M 7FT

## (undated) DEVICE — BITEBLOCK ENDOSCOPY